# Patient Record
Sex: MALE | Race: OTHER | Employment: UNEMPLOYED | URBAN - METROPOLITAN AREA
[De-identification: names, ages, dates, MRNs, and addresses within clinical notes are randomized per-mention and may not be internally consistent; named-entity substitution may affect disease eponyms.]

---

## 2022-07-26 PROBLEM — D50.9 IRON DEFICIENCY ANEMIA: Status: ACTIVE | Noted: 2022-07-26

## 2022-07-26 PROBLEM — E11.9 TYPE 2 DIABETES MELLITUS WITHOUT COMPLICATION, WITHOUT LONG-TERM CURRENT USE OF INSULIN (HCC): Status: ACTIVE | Noted: 2022-07-26

## 2022-07-26 PROBLEM — I10 PRIMARY HYPERTENSION: Status: ACTIVE | Noted: 2022-07-26

## 2022-07-26 PROBLEM — F33.42 RECURRENT MAJOR DEPRESSIVE DISORDER, IN FULL REMISSION (HCC): Status: ACTIVE | Noted: 2022-07-26

## 2022-07-26 PROBLEM — K21.9 GASTROESOPHAGEAL REFLUX DISEASE WITHOUT ESOPHAGITIS: Status: ACTIVE | Noted: 2022-07-26

## 2022-07-26 PROBLEM — E78.5 HYPERLIPIDEMIA ASSOCIATED WITH TYPE 2 DIABETES MELLITUS (HCC): Status: ACTIVE | Noted: 2022-07-26

## 2022-07-26 PROBLEM — E11.69 HYPERLIPIDEMIA ASSOCIATED WITH TYPE 2 DIABETES MELLITUS: Status: ACTIVE | Noted: 2022-07-26

## 2022-07-26 PROBLEM — E78.5 HYPERLIPIDEMIA ASSOCIATED WITH TYPE 2 DIABETES MELLITUS: Status: ACTIVE | Noted: 2022-07-26

## 2022-07-26 PROBLEM — F33.42 RECURRENT MAJOR DEPRESSIVE DISORDER, IN FULL REMISSION: Status: ACTIVE | Noted: 2022-07-26

## 2022-07-26 PROBLEM — E78.5 HYPERLIPIDEMIA ASSOCIATED WITH TYPE 2 DIABETES MELLITUS  (HCC): Status: ACTIVE | Noted: 2022-07-26

## 2022-07-26 PROBLEM — E11.69 HYPERLIPIDEMIA ASSOCIATED WITH TYPE 2 DIABETES MELLITUS (HCC): Status: ACTIVE | Noted: 2022-07-26

## 2022-07-26 PROBLEM — E11.69 HYPERLIPIDEMIA ASSOCIATED WITH TYPE 2 DIABETES MELLITUS  (HCC): Status: ACTIVE | Noted: 2022-07-26

## 2022-07-26 NOTE — TELEPHONE ENCOUNTER
[facility] stated sertraline tablets are cheaper than capsules and wants to confirm if it was okay to switch.  If okay to switch to tablets, prescriptions would need to change since tablets do not come in 200mgs - they only come in 100mg    Confirmed [phone] as best contact for pharmacy

## 2022-07-26 NOTE — PATIENT INSTRUCTIONS
Please have the blood tests done    Please see me as soon as you get your insurance    Please see our blood specialist, hematologist as well      You may schedule an appointment by one of the following:    - [email or web address]  - [address] [address] at [phone]

## 2022-07-27 NOTE — TELEPHONE ENCOUNTER
Understood, but do not recommend it.  There is a medication called gabapentin that can help with cramps if he is interested    [employer] DO

## 2022-07-27 NOTE — TELEPHONE ENCOUNTER
Pt seen yesterday   He said we were gonna send QUININE SULFATE OR to the pharmacy   It was not sent.    Please advise     Tobi Manzo   [address], [place], [address] [place] [address]  [phone]

## 2022-07-27 NOTE — TELEPHONE ENCOUNTER
Spoke with patient-willing to try gabapentin for leg cramps.  Would like prescription called in to the following pharmacy:    [facility]   [address], [place], [address]  [phone]

## 2022-07-27 NOTE — TELEPHONE ENCOUNTER
Spoke with pt, pt would like to know why Suzan Iraheta does not recommend it.  He states he has been taking it for 15 years and it is  the only thing that cures his leg cramps

## 2022-08-03 ENCOUNTER — HOSPITAL ENCOUNTER (OUTPATIENT)
Age: 67
Discharge: ANOTHER HOSPITAL | End: 2022-08-03
Attending: EMERGENCY MEDICINE
Payer: MEDICAID

## 2022-08-03 ENCOUNTER — HOSPITAL ENCOUNTER (INPATIENT)
Facility: HOSPITAL | Age: 67
LOS: 1 days | Discharge: HOME | End: 2022-08-04
Attending: EMERGENCY MEDICINE
Payer: MEDICAID

## 2022-08-03 VITALS
TEMPERATURE: 98 F | RESPIRATION RATE: 14 BRPM | HEART RATE: 65 BPM | OXYGEN SATURATION: 97 % | DIASTOLIC BLOOD PRESSURE: 62 MMHG | SYSTOLIC BLOOD PRESSURE: 109 MMHG

## 2022-08-03 DIAGNOSIS — K92.2 GASTROINTESTINAL HEMORRHAGE, UNSPECIFIED GASTROINTESTINAL HEMORRHAGE TYPE: ICD-10-CM

## 2022-08-03 DIAGNOSIS — Z95.2 H/O HEART VALVE REPLACEMENT WITH MECHANICAL VALVE: Primary | ICD-10-CM

## 2022-08-03 DIAGNOSIS — Z79.01 CHRONIC ANTICOAGULATION: ICD-10-CM

## 2022-08-03 DIAGNOSIS — K62.5 RECTAL BLEEDING: Primary | ICD-10-CM

## 2022-08-03 DIAGNOSIS — R53.83 FATIGUE, UNSPECIFIED TYPE: ICD-10-CM

## 2022-08-03 DIAGNOSIS — R42 DIZZINESS: ICD-10-CM

## 2022-08-03 LAB
ALBUMIN SERPL-MCNC: 3.2 G/DL (ref 3.4–5)
ALBUMIN/GLOB SERPL: 0.8 {RATIO} (ref 1–2)
ALP LIVER SERPL-CCNC: 128 U/L
ALT SERPL-CCNC: 20 U/L
AMMONIA PLAS-MCNC: <10 UMOL/L (ref 11–32)
ANION GAP SERPL CALC-SCNC: 4 MMOL/L (ref 0–18)
ANTIBODY SCREEN: NEGATIVE
APTT PPP: 39.7 SECONDS (ref 23.3–35.6)
AST SERPL-CCNC: 19 U/L (ref 15–37)
BASOPHILS # BLD AUTO: 0.05 X10(3) UL (ref 0–0.2)
BASOPHILS NFR BLD AUTO: 0.6 %
BILIRUB SERPL-MCNC: 0.4 MG/DL (ref 0.1–2)
BUN BLD-MCNC: 16 MG/DL (ref 7–18)
CALCIUM BLD-MCNC: 9 MG/DL (ref 8.5–10.1)
CHLORIDE SERPL-SCNC: 104 MMOL/L (ref 98–112)
CO2 SERPL-SCNC: 27 MMOL/L (ref 21–32)
CREAT BLD-MCNC: 1.22 MG/DL
EOSINOPHIL # BLD AUTO: 0.17 X10(3) UL (ref 0–0.7)
EOSINOPHIL NFR BLD AUTO: 2.2 %
ERYTHROCYTE [DISTWIDTH] IN BLOOD BY AUTOMATED COUNT: 15.4 %
EST. AVERAGE GLUCOSE BLD GHB EST-MCNC: 148 MG/DL (ref 68–126)
GFR SERPLBLD BASED ON 1.73 SQ M-ARVRAT: 65 ML/MIN/1.73M2 (ref 60–?)
GLOBULIN PLAS-MCNC: 3.9 G/DL (ref 2.8–4.4)
GLUCOSE BLD-MCNC: 140 MG/DL (ref 70–99)
GLUCOSE BLD-MCNC: 199 MG/DL (ref 70–99)
HBA1C MFR BLD: 6.8 % (ref ?–5.7)
HCT VFR BLD AUTO: 26.2 %
HGB BLD-MCNC: 7.7 G/DL
IMM GRANULOCYTES # BLD AUTO: 0.03 X10(3) UL (ref 0–1)
IMM GRANULOCYTES NFR BLD: 0.4 %
INR BLD: 1.32 (ref 0.85–1.16)
IRON SATN MFR SERPL: 4 %
IRON SERPL-MCNC: 20 UG/DL
LIPASE SERPL-CCNC: 272 U/L (ref 73–393)
LYMPHOCYTES # BLD AUTO: 1.01 X10(3) UL (ref 1–4)
LYMPHOCYTES NFR BLD AUTO: 12.9 %
MCH RBC QN AUTO: 23.3 PG (ref 26–34)
MCHC RBC AUTO-ENTMCNC: 29.4 G/DL (ref 31–37)
MCV RBC AUTO: 79.2 FL
MONOCYTES # BLD AUTO: 0.47 X10(3) UL (ref 0.1–1)
MONOCYTES NFR BLD AUTO: 6 %
NEUTROPHILS # BLD AUTO: 6.08 X10 (3) UL (ref 1.5–7.7)
NEUTROPHILS # BLD AUTO: 6.08 X10(3) UL (ref 1.5–7.7)
NEUTROPHILS NFR BLD AUTO: 77.9 %
OSMOLALITY SERPL CALC.SUM OF ELEC: 283 MOSM/KG (ref 275–295)
PLATELET # BLD AUTO: 147 10(3)UL (ref 150–450)
POTASSIUM SERPL-SCNC: 4.3 MMOL/L (ref 3.5–5.1)
PROT SERPL-MCNC: 7.1 G/DL (ref 6.4–8.2)
PROTHROMBIN TIME: 16.4 SECONDS (ref 11.6–14.8)
RBC # BLD AUTO: 3.31 X10(6)UL
RH BLOOD TYPE: POSITIVE
SARS-COV-2 RNA RESP QL NAA+PROBE: NOT DETECTED
SODIUM SERPL-SCNC: 135 MMOL/L (ref 136–145)
TIBC SERPL-MCNC: 553 UG/DL (ref 240–450)
TRANSFERRIN SERPL-MCNC: 371 MG/DL (ref 200–360)
WBC # BLD AUTO: 7.8 X10(3) UL (ref 4–11)

## 2022-08-03 PROCEDURE — 99214 OFFICE O/P EST MOD 30 MIN: CPT

## 2022-08-03 PROCEDURE — 93010 ELECTROCARDIOGRAM REPORT: CPT

## 2022-08-03 PROCEDURE — 99223 1ST HOSP IP/OBS HIGH 75: CPT | Performed by: INTERNAL MEDICINE

## 2022-08-03 PROCEDURE — 3044F HG A1C LEVEL LT 7.0%: CPT | Performed by: INTERNAL MEDICINE

## 2022-08-03 PROCEDURE — 93005 ELECTROCARDIOGRAM TRACING: CPT

## 2022-08-03 RX ORDER — ACETAMINOPHEN 500 MG
500 TABLET ORAL EVERY 4 HOURS PRN
Status: DISCONTINUED | OUTPATIENT
Start: 2022-08-03 | End: 2022-08-04

## 2022-08-03 RX ORDER — BISACODYL 10 MG
10 SUPPOSITORY, RECTAL RECTAL
Status: DISCONTINUED | OUTPATIENT
Start: 2022-08-03 | End: 2022-08-04

## 2022-08-03 RX ORDER — RAMIPRIL 5 MG/1
10 CAPSULE ORAL DAILY
Status: DISCONTINUED | OUTPATIENT
Start: 2022-08-04 | End: 2022-08-04

## 2022-08-03 RX ORDER — SODIUM CHLORIDE, SODIUM LACTATE, POTASSIUM CHLORIDE, CALCIUM CHLORIDE 600; 310; 30; 20 MG/100ML; MG/100ML; MG/100ML; MG/100ML
INJECTION, SOLUTION INTRAVENOUS CONTINUOUS
Status: ACTIVE | OUTPATIENT
Start: 2022-08-03 | End: 2022-08-04

## 2022-08-03 RX ORDER — DEXTROSE MONOHYDRATE 25 G/50ML
50 INJECTION, SOLUTION INTRAVENOUS
Status: DISCONTINUED | OUTPATIENT
Start: 2022-08-03 | End: 2022-08-04

## 2022-08-03 RX ORDER — WARFARIN SODIUM 10 MG/1
5 TABLET ORAL EVERY MORNING
COMMUNITY
End: 2022-08-03 | Stop reason: CLARIF

## 2022-08-03 RX ORDER — PANTOPRAZOLE SODIUM 40 MG/1
40 TABLET, DELAYED RELEASE ORAL
Status: DISCONTINUED | OUTPATIENT
Start: 2022-08-04 | End: 2022-08-04

## 2022-08-03 RX ORDER — SERTRALINE HYDROCHLORIDE 100 MG/1
200 TABLET, FILM COATED ORAL DAILY
Status: DISCONTINUED | OUTPATIENT
Start: 2022-08-04 | End: 2022-08-04

## 2022-08-03 RX ORDER — ONDANSETRON 2 MG/ML
4 INJECTION INTRAMUSCULAR; INTRAVENOUS EVERY 6 HOURS PRN
Status: DISCONTINUED | OUTPATIENT
Start: 2022-08-03 | End: 2022-08-04

## 2022-08-03 RX ORDER — AMLODIPINE BESYLATE 5 MG/1
10 TABLET ORAL DAILY
Status: DISCONTINUED | OUTPATIENT
Start: 2022-08-04 | End: 2022-08-04

## 2022-08-03 RX ORDER — ATORVASTATIN CALCIUM 20 MG/1
20 TABLET, FILM COATED ORAL DAILY
Status: DISCONTINUED | OUTPATIENT
Start: 2022-08-04 | End: 2022-08-04

## 2022-08-03 RX ORDER — SODIUM PHOSPHATE, DIBASIC AND SODIUM PHOSPHATE, MONOBASIC 7; 19 G/133ML; G/133ML
1 ENEMA RECTAL ONCE AS NEEDED
Status: DISCONTINUED | OUTPATIENT
Start: 2022-08-03 | End: 2022-08-04

## 2022-08-03 RX ORDER — GABAPENTIN 300 MG/1
300 CAPSULE ORAL NIGHTLY
Status: DISCONTINUED | OUTPATIENT
Start: 2022-08-03 | End: 2022-08-04

## 2022-08-03 RX ORDER — NICOTINE POLACRILEX 4 MG
30 LOZENGE BUCCAL
Status: DISCONTINUED | OUTPATIENT
Start: 2022-08-03 | End: 2022-08-04

## 2022-08-03 RX ORDER — POLYETHYLENE GLYCOL 3350 17 G/17G
17 POWDER, FOR SOLUTION ORAL DAILY PRN
Status: DISCONTINUED | OUTPATIENT
Start: 2022-08-03 | End: 2022-08-04

## 2022-08-03 RX ORDER — SENNOSIDES 8.6 MG
17.2 TABLET ORAL NIGHTLY PRN
Status: DISCONTINUED | OUTPATIENT
Start: 2022-08-03 | End: 2022-08-04

## 2022-08-03 RX ORDER — NICOTINE POLACRILEX 4 MG
15 LOZENGE BUCCAL
Status: DISCONTINUED | OUTPATIENT
Start: 2022-08-03 | End: 2022-08-04

## 2022-08-03 RX ORDER — MELATONIN
3 NIGHTLY PRN
Status: DISCONTINUED | OUTPATIENT
Start: 2022-08-03 | End: 2022-08-04

## 2022-08-03 NOTE — ED INITIAL ASSESSMENT (HPI)
Dizziness started at 7am morning  With feeling confused. Per daughter pt appears pale to her. Pt is from [facility] visiting his daughter. Accompanied by feeling confused \" I feel like Im drunk\"    Left leg infection- started Sunday pt currently taking antibiotic which was prescribe in [facility] flucloxacillin  Which pt started Sunday  On day 4 of 7. Denies fever. Per pt he has an ongoing chronic leg infection.  Pt has h/o sepsis  Last year September in [facility] pt wa in the hospital for 2 weeks

## 2022-08-03 NOTE — ED INITIAL ASSESSMENT (HPI)
Pt states dizziness upon waking. Pt states he laid back down and slept for 3 hours. Pt states he \"feels drunk\". Pt state hx anemia. Pt states he is pale compared to his normal.  Pt states bright red blood with BM's.

## 2022-08-04 VITALS
HEART RATE: 81 BPM | HEIGHT: 70 IN | RESPIRATION RATE: 18 BRPM | TEMPERATURE: 98 F | OXYGEN SATURATION: 94 % | WEIGHT: 257.69 LBS | SYSTOLIC BLOOD PRESSURE: 135 MMHG | DIASTOLIC BLOOD PRESSURE: 62 MMHG | BODY MASS INDEX: 36.89 KG/M2

## 2022-08-04 LAB
ANION GAP SERPL CALC-SCNC: 4 MMOL/L (ref 0–18)
ATRIAL RATE: 60 BPM
ATRIAL RATE: 62 BPM
BASOPHILS # BLD AUTO: 0.03 X10(3) UL (ref 0–0.2)
BASOPHILS NFR BLD AUTO: 0.7 %
BUN BLD-MCNC: 14 MG/DL (ref 7–18)
CALCIUM BLD-MCNC: 8.7 MG/DL (ref 8.5–10.1)
CHLORIDE SERPL-SCNC: 106 MMOL/L (ref 98–112)
CO2 SERPL-SCNC: 28 MMOL/L (ref 21–32)
CREAT BLD-MCNC: 1.1 MG/DL
DEPRECATED HBV CORE AB SER IA-ACNC: 9.6 NG/ML
EOSINOPHIL # BLD AUTO: 0.14 X10(3) UL (ref 0–0.7)
EOSINOPHIL NFR BLD AUTO: 3.3 %
ERYTHROCYTE [DISTWIDTH] IN BLOOD BY AUTOMATED COUNT: 15.4 %
GFR SERPLBLD BASED ON 1.73 SQ M-ARVRAT: 74 ML/MIN/1.73M2 (ref 60–?)
GLUCOSE BLD-MCNC: 118 MG/DL (ref 70–99)
GLUCOSE BLD-MCNC: 119 MG/DL (ref 70–99)
GLUCOSE BLD-MCNC: 157 MG/DL (ref 70–99)
HCT VFR BLD AUTO: 25.9 %
HGB BLD-MCNC: 6.7 G/DL
HGB BLD-MCNC: 7.7 G/DL
IMM GRANULOCYTES # BLD AUTO: 0.02 X10(3) UL (ref 0–1)
IMM GRANULOCYTES NFR BLD: 0.5 %
INR BLD: 1.49 (ref 0.85–1.16)
LYMPHOCYTES # BLD AUTO: 0.84 X10(3) UL (ref 1–4)
LYMPHOCYTES NFR BLD AUTO: 19.8 %
MCH RBC QN AUTO: 23.8 PG (ref 26–34)
MCHC RBC AUTO-ENTMCNC: 29.7 G/DL (ref 31–37)
MCV RBC AUTO: 79.9 FL
MONOCYTES # BLD AUTO: 0.3 X10(3) UL (ref 0.1–1)
MONOCYTES NFR BLD AUTO: 7.1 %
NEUTROPHILS # BLD AUTO: 2.92 X10 (3) UL (ref 1.5–7.7)
NEUTROPHILS # BLD AUTO: 2.92 X10(3) UL (ref 1.5–7.7)
NEUTROPHILS NFR BLD AUTO: 68.6 %
OSMOLALITY SERPL CALC.SUM OF ELEC: 288 MOSM/KG (ref 275–295)
P AXIS: 108 DEGREES
P AXIS: 99 DEGREES
P-R INTERVAL: 252 MS
P-R INTERVAL: 286 MS
PLATELET # BLD AUTO: 98 10(3)UL (ref 150–450)
POTASSIUM SERPL-SCNC: 4 MMOL/L (ref 3.5–5.1)
PROTHROMBIN TIME: 18 SECONDS (ref 11.6–14.8)
Q-T INTERVAL: 438 MS
Q-T INTERVAL: 446 MS
QRS DURATION: 106 MS
QRS DURATION: 108 MS
QTC CALCULATION (BEZET): 444 MS
QTC CALCULATION (BEZET): 446 MS
R AXIS: -26 DEGREES
R AXIS: -31 DEGREES
RBC # BLD AUTO: 3.24 X10(6)UL
SODIUM SERPL-SCNC: 138 MMOL/L (ref 136–145)
T AXIS: 32 DEGREES
T AXIS: 39 DEGREES
VENTRICULAR RATE: 60 BPM
VENTRICULAR RATE: 62 BPM
WBC # BLD AUTO: 4.3 X10(3) UL (ref 4–11)

## 2022-08-04 PROCEDURE — 30233N1 TRANSFUSION OF NONAUTOLOGOUS RED BLOOD CELLS INTO PERIPHERAL VEIN, PERCUTANEOUS APPROACH: ICD-10-PCS | Performed by: STUDENT IN AN ORGANIZED HEALTH CARE EDUCATION/TRAINING PROGRAM

## 2022-08-04 PROCEDURE — 99239 HOSP IP/OBS DSCHRG MGMT >30: CPT | Performed by: HOSPITALIST

## 2022-08-04 RX ORDER — SODIUM CHLORIDE 9 MG/ML
INJECTION, SOLUTION INTRAVENOUS ONCE
Status: COMPLETED | OUTPATIENT
Start: 2022-08-04 | End: 2022-08-04

## 2022-08-04 NOTE — ED QUICK NOTES
Orders for admission, patient is aware of plan and ready to go upstairs. Any questions, please call ED RN Saw at extension [phone].      Patient Covid vaccination status: Unvaccinated     COVID Test Ordered in ED: Rapid SARS-CoV-2 by PCR    COVID Suspicion at Admission: Low clinical suspicion for COVID    Running Infusions:  None    Mental Status/LOC at time of transport: A&O x3    Other pertinent information:   CIWA score: N/A   NIH score:  N/A

## 2022-08-04 NOTE — PLAN OF CARE
Discharge instructions given to pt, understanding verbalized. Stressed the importance of follow -up next week with GI and Cardiology. Please verbalized understanding of importance for follow-up; Pt rec'd awake and alert. No distress or discomfort asseesed. NPO status for possible GI testing. IVF of LR infusiong as ordered. Pt up in chair, to BR with steady gait. Py's dtr at [facility] for visit , brought pt a sandwich despite NPO orders. GI APN  made aware. Pt refusing any GI testing  this time, new orders for doet noted. GI attending to see pt. Teleemetry shwoing kSR. Will contnue to monitor. Problem: Diabetes/Glucose Control  Goal: Glucose maintained within prescribed range  Description: INTERVENTIONS:  - Monitor Blood Glucose as ordered  - Assess for signs and symptoms of hyperglycemia and hypoglycemia  - Administer ordered medications to maintain glucose within target range  - Assess barriers to adequate nutritional intake and initiate nutrition consult as needed  - Instruct patient on self management of diabetes  Outcome: Progressing     Problem: Patient/Family Goals  Goal: Patient/Family Long Term Goal  Description: Patient's Long Term Goal: to go home  Interventions:  - monitor labs, VS,  telemetry monitoriong, GI work-up  - See additional Care Plan goals for specific interventions  Outcome: Progressing  Goal: Patient/Family Short Term Goal  Description: Patient's Short Term Goal: to feel better    Interventions:   - Moniotr labs, VS Telemetry, GI woerkup.   - See additional Care Plan goals for specific interventions  Outcome: Progressing     Problem: HEMATOLOLGIC  Goal: Maintain hematologic stability  Description: Interventions:  - Assess for signs and symptoms of bleeding or hemorrhage  - Monitor labs for bleeding or clotting disorders  - Administer blood products/factors as ordered  - Educate parent/family on condition and treatment plan  Outcome: Progressing     Problem: INFECTION  Goal: No evidence of infection  Description: Interventions:  - Monitor for symptoms of infection  - Monitor surgical sites and insertion sites for all indwelling lines, tubes and drains for drainage, redness or edema  - Monitor culture and CBC results  - Administer antibiotics as ordered; Monitor drug levels as ordered  - Provide breast milk as ordered  - Educate parent/family on condition and treatment plan  - Educate parent/family on good hand hygiene and isolation precautions  Outcome: Progressing

## 2022-08-04 NOTE — BH RN ADMISSION NOTE
NURSING ADMISSION NOTE      Patient admitted via Cart  Oriented to room. Safety precautions initiated. Bed in low position. Call light in reach. Patient is Aox4. NSR on tele. Pt on room air, denies shortness of breath. Pt denies any pain. Cellulitis to LLE, pt on atb at home. Abrasian to LUE with dried blood. No bloody stools overnight. Continent of B&B. Call light within reach.      Plan: Hgb 6.7-> 1unit RBC transfused overnight  Daily IV Venofer  NPO

## 2022-08-05 ENCOUNTER — TELEPHONE (OUTPATIENT)
Dept: INTERNAL MEDICINE CLINIC | Facility: CLINIC | Age: 67
End: 2022-08-05

## 2022-08-05 ENCOUNTER — PATIENT OUTREACH (OUTPATIENT)
Dept: CASE MANAGEMENT | Age: 67
End: 2022-08-05

## 2022-08-05 LAB
BLOOD TYPE BARCODE: 5100

## 2022-08-05 NOTE — TELEPHONE ENCOUNTER
Spoke to pt for condition update today. Pt does not have HFU appt scheduled at this time. Pt declined an appt when offered. A NON- TCM HFU appt recommended by 8/11/22 as pt is a high risk for readmission. Please advise. Clinical staff:  Please f/u with pt and try to get them to schedule as pt would greatly benefit from an HFU appt. Thank you!

## 2022-08-10 ENCOUNTER — PATIENT OUTREACH (OUTPATIENT)
Dept: CASE MANAGEMENT | Age: 67
End: 2022-08-10

## 2022-08-10 NOTE — PROGRESS NOTES
VM received; pt requesting assistance w/scheduling apt (dc 08/04)    Dr Marlow [address]  [phone]  Follow up 1 week    Dr Chely Mccartney  [address]  [phone]  Follow up 1 week

## 2022-08-24 ENCOUNTER — LAB ENCOUNTER (OUTPATIENT)
Dept: LAB | Age: 67
End: 2022-08-24
Attending: INTERNAL MEDICINE
Payer: MEDICAID

## 2022-08-24 ENCOUNTER — TELEPHONE (OUTPATIENT)
Dept: HEMATOLOGY/ONCOLOGY | Facility: HOSPITAL | Age: 67
End: 2022-08-24

## 2022-08-24 ENCOUNTER — OFFICE VISIT (OUTPATIENT)
Facility: LOCATION | Age: 67
End: 2022-08-24
Payer: MEDICAID

## 2022-08-24 ENCOUNTER — TELEPHONE (OUTPATIENT)
Dept: INTERNAL MEDICINE CLINIC | Facility: CLINIC | Age: 67
End: 2022-08-24

## 2022-08-24 ENCOUNTER — OFFICE VISIT (OUTPATIENT)
Dept: INTERNAL MEDICINE CLINIC | Facility: CLINIC | Age: 67
End: 2022-08-24
Payer: MEDICAID

## 2022-08-24 VITALS
DIASTOLIC BLOOD PRESSURE: 54 MMHG | HEIGHT: 68.75 IN | BODY MASS INDEX: 39.79 KG/M2 | TEMPERATURE: 99 F | WEIGHT: 268.63 LBS | OXYGEN SATURATION: 99 % | HEART RATE: 73 BPM | RESPIRATION RATE: 12 BRPM | SYSTOLIC BLOOD PRESSURE: 124 MMHG

## 2022-08-24 VITALS — TEMPERATURE: 97 F | HEART RATE: 67 BPM

## 2022-08-24 DIAGNOSIS — M25.552 BILATERAL HIP PAIN: ICD-10-CM

## 2022-08-24 DIAGNOSIS — K64.9 HEMORRHOIDS, UNSPECIFIED HEMORRHOID TYPE: Primary | ICD-10-CM

## 2022-08-24 DIAGNOSIS — E78.5 HYPERLIPIDEMIA ASSOCIATED WITH TYPE 2 DIABETES MELLITUS (HCC): ICD-10-CM

## 2022-08-24 DIAGNOSIS — E53.8 VITAMIN B12 DEFICIENCY: ICD-10-CM

## 2022-08-24 DIAGNOSIS — I10 PRIMARY HYPERTENSION: ICD-10-CM

## 2022-08-24 DIAGNOSIS — R60.0 BILATERAL LOWER EXTREMITY EDEMA: ICD-10-CM

## 2022-08-24 DIAGNOSIS — D69.6 THROMBOCYTOPENIA (HCC): ICD-10-CM

## 2022-08-24 DIAGNOSIS — M25.551 BILATERAL HIP PAIN: ICD-10-CM

## 2022-08-24 DIAGNOSIS — Z12.5 SCREENING FOR PROSTATE CANCER: ICD-10-CM

## 2022-08-24 DIAGNOSIS — D50.9 IRON DEFICIENCY ANEMIA, UNSPECIFIED IRON DEFICIENCY ANEMIA TYPE: ICD-10-CM

## 2022-08-24 DIAGNOSIS — F33.42 RECURRENT MAJOR DEPRESSIVE DISORDER, IN FULL REMISSION (HCC): ICD-10-CM

## 2022-08-24 DIAGNOSIS — Z95.2 H/O MECHANICAL AORTIC VALVE REPLACEMENT: ICD-10-CM

## 2022-08-24 DIAGNOSIS — E11.9 TYPE 2 DIABETES MELLITUS WITHOUT COMPLICATION, WITHOUT LONG-TERM CURRENT USE OF INSULIN (HCC): ICD-10-CM

## 2022-08-24 DIAGNOSIS — L98.9 SKIN LESION: ICD-10-CM

## 2022-08-24 DIAGNOSIS — Z95.2 H/O HEART VALVE REPLACEMENT WITH MECHANICAL VALVE: ICD-10-CM

## 2022-08-24 DIAGNOSIS — Z00.00 ROUTINE PHYSICAL EXAMINATION: Primary | ICD-10-CM

## 2022-08-24 DIAGNOSIS — D64.9 ANEMIA, UNSPECIFIED TYPE: ICD-10-CM

## 2022-08-24 DIAGNOSIS — K21.9 GASTROESOPHAGEAL REFLUX DISEASE WITHOUT ESOPHAGITIS: ICD-10-CM

## 2022-08-24 DIAGNOSIS — Z98.84 HISTORY OF GASTRIC BYPASS: ICD-10-CM

## 2022-08-24 DIAGNOSIS — Z00.00 BLOOD TESTS FOR ROUTINE GENERAL PHYSICAL EXAMINATION: ICD-10-CM

## 2022-08-24 DIAGNOSIS — D62 ACUTE BLOOD LOSS ANEMIA: ICD-10-CM

## 2022-08-24 DIAGNOSIS — E11.65 TYPE 2 DIABETES MELLITUS WITH HYPERGLYCEMIA, WITHOUT LONG-TERM CURRENT USE OF INSULIN (HCC): ICD-10-CM

## 2022-08-24 DIAGNOSIS — L20.9 ATOPIC DERMATITIS, UNSPECIFIED TYPE: ICD-10-CM

## 2022-08-24 DIAGNOSIS — E11.69 HYPERLIPIDEMIA ASSOCIATED WITH TYPE 2 DIABETES MELLITUS (HCC): ICD-10-CM

## 2022-08-24 PROBLEM — K92.2 GASTROINTESTINAL HEMORRHAGE, UNSPECIFIED GASTROINTESTINAL HEMORRHAGE TYPE: Status: RESOLVED | Noted: 2022-08-03 | Resolved: 2022-08-24

## 2022-08-24 PROBLEM — E66.01 MORBID (SEVERE) OBESITY DUE TO EXCESS CALORIES (HCC): Status: ACTIVE | Noted: 2022-08-24

## 2022-08-24 LAB
ALT SERPL-CCNC: 21 U/L
ANION GAP SERPL CALC-SCNC: 0 MMOL/L (ref 0–18)
AST SERPL-CCNC: 19 U/L (ref 15–37)
BASOPHILS # BLD AUTO: 0.03 X10(3) UL (ref 0–0.2)
BASOPHILS NFR BLD AUTO: 0.8 %
BUN BLD-MCNC: 20 MG/DL (ref 7–18)
CALCIUM BLD-MCNC: 8.9 MG/DL (ref 8.5–10.1)
CHLORIDE SERPL-SCNC: 108 MMOL/L (ref 98–112)
CHOLEST SERPL-MCNC: 110 MG/DL (ref ?–200)
CO2 SERPL-SCNC: 30 MMOL/L (ref 21–32)
CREAT BLD-MCNC: 0.85 MG/DL
CREAT UR-SCNC: 86.6 MG/DL
DEPRECATED HBV CORE AB SER IA-ACNC: 21.6 NG/ML
EOSINOPHIL # BLD AUTO: 0.11 X10(3) UL (ref 0–0.7)
EOSINOPHIL NFR BLD AUTO: 2.9 %
ERYTHROCYTE [DISTWIDTH] IN BLOOD BY AUTOMATED COUNT: 17.6 %
EST. AVERAGE GLUCOSE BLD GHB EST-MCNC: 128 MG/DL (ref 68–126)
FASTING PATIENT LIPID ANSWER: YES
FASTING STATUS PATIENT QL REPORTED: YES
GFR SERPLBLD BASED ON 1.73 SQ M-ARVRAT: 95 ML/MIN/1.73M2 (ref 60–?)
GLUCOSE BLD-MCNC: 112 MG/DL (ref 70–99)
HBA1C MFR BLD: 6.1 % (ref ?–5.7)
HCT VFR BLD AUTO: 27.7 %
HDLC SERPL-MCNC: 47 MG/DL (ref 40–59)
HGB BLD-MCNC: 7.9 G/DL
IMM GRANULOCYTES # BLD AUTO: 0.02 X10(3) UL (ref 0–1)
IMM GRANULOCYTES NFR BLD: 0.5 %
INR BLD: 1.1 (ref 0.85–1.16)
IRON SATN MFR SERPL: 6 %
IRON SERPL-MCNC: 33 UG/DL
LDLC SERPL CALC-MCNC: 45 MG/DL (ref ?–100)
LYMPHOCYTES # BLD AUTO: 0.72 X10(3) UL (ref 1–4)
LYMPHOCYTES NFR BLD AUTO: 19.3 %
MCH RBC QN AUTO: 24.1 PG (ref 26–34)
MCHC RBC AUTO-ENTMCNC: 28.5 G/DL (ref 31–37)
MCV RBC AUTO: 84.5 FL
MICROALBUMIN UR-MCNC: 1.39 MG/DL
MICROALBUMIN/CREAT 24H UR-RTO: 16.1 UG/MG (ref ?–30)
MONOCYTES # BLD AUTO: 0.32 X10(3) UL (ref 0.1–1)
MONOCYTES NFR BLD AUTO: 8.6 %
NEUTROPHILS # BLD AUTO: 2.53 X10 (3) UL (ref 1.5–7.7)
NEUTROPHILS # BLD AUTO: 2.53 X10(3) UL (ref 1.5–7.7)
NEUTROPHILS NFR BLD AUTO: 67.9 %
NONHDLC SERPL-MCNC: 63 MG/DL (ref ?–130)
OSMOLALITY SERPL CALC.SUM OF ELEC: 289 MOSM/KG (ref 275–295)
PLATELET # BLD AUTO: 67 10(3)UL (ref 150–450)
PLATELET MORPHOLOGY: NORMAL
POTASSIUM SERPL-SCNC: 4.5 MMOL/L (ref 3.5–5.1)
PROTHROMBIN TIME: 14.3 SECONDS (ref 11.6–14.8)
RBC # BLD AUTO: 3.28 X10(6)UL
SODIUM SERPL-SCNC: 138 MMOL/L (ref 136–145)
TIBC SERPL-MCNC: 510 UG/DL (ref 240–450)
TRANSFERRIN SERPL-MCNC: 342 MG/DL (ref 200–360)
TRIGL SERPL-MCNC: 91 MG/DL (ref 30–149)
TSI SER-ACNC: 2.31 MIU/ML (ref 0.36–3.74)
VLDLC SERPL CALC-MCNC: 13 MG/DL (ref 0–30)
WBC # BLD AUTO: 3.7 X10(3) UL (ref 4–11)

## 2022-08-24 PROCEDURE — 83036 HEMOGLOBIN GLYCOSYLATED A1C: CPT

## 2022-08-24 PROCEDURE — 80048 BASIC METABOLIC PNL TOTAL CA: CPT

## 2022-08-24 PROCEDURE — 82043 UR ALBUMIN QUANTITATIVE: CPT

## 2022-08-24 PROCEDURE — 82607 VITAMIN B-12: CPT

## 2022-08-24 PROCEDURE — 1111F DSCHRG MED/CURRENT MED MERGE: CPT | Performed by: INTERNAL MEDICINE

## 2022-08-24 PROCEDURE — 82570 ASSAY OF URINE CREATININE: CPT

## 2022-08-24 PROCEDURE — 3074F SYST BP LT 130 MM HG: CPT | Performed by: INTERNAL MEDICINE

## 2022-08-24 PROCEDURE — 83921 ORGANIC ACID SINGLE QUANT: CPT

## 2022-08-24 PROCEDURE — 3078F DIAST BP <80 MM HG: CPT | Performed by: INTERNAL MEDICINE

## 2022-08-24 PROCEDURE — 84443 ASSAY THYROID STIM HORMONE: CPT

## 2022-08-24 PROCEDURE — 36415 COLL VENOUS BLD VENIPUNCTURE: CPT

## 2022-08-24 PROCEDURE — 83550 IRON BINDING TEST: CPT

## 2022-08-24 PROCEDURE — 84450 TRANSFERASE (AST) (SGOT): CPT

## 2022-08-24 PROCEDURE — 83540 ASSAY OF IRON: CPT

## 2022-08-24 PROCEDURE — 85610 PROTHROMBIN TIME: CPT

## 2022-08-24 PROCEDURE — 80061 LIPID PANEL: CPT

## 2022-08-24 PROCEDURE — 3008F BODY MASS INDEX DOCD: CPT | Performed by: INTERNAL MEDICINE

## 2022-08-24 PROCEDURE — 99397 PER PM REEVAL EST PAT 65+ YR: CPT | Performed by: INTERNAL MEDICINE

## 2022-08-24 PROCEDURE — 82728 ASSAY OF FERRITIN: CPT

## 2022-08-24 PROCEDURE — 85025 COMPLETE CBC W/AUTO DIFF WBC: CPT

## 2022-08-24 PROCEDURE — 84460 ALANINE AMINO (ALT) (SGPT): CPT

## 2022-08-24 RX ORDER — CETIRIZINE HYDROCHLORIDE 10 MG/1
10 TABLET ORAL DAILY
Qty: 90 TABLET | Refills: 0 | Status: SHIPPED | OUTPATIENT
Start: 2022-08-24

## 2022-08-24 RX ORDER — TRIAMCINOLONE ACETONIDE 1 MG/G
CREAM TOPICAL
Qty: 80 G | Refills: 1 | Status: SHIPPED | OUTPATIENT
Start: 2022-08-24

## 2022-08-24 RX ORDER — HYDROCHLOROTHIAZIDE 12.5 MG/1
12.5 TABLET ORAL DAILY
Qty: 90 TABLET | Refills: 1 | Status: SHIPPED | OUTPATIENT
Start: 2022-08-24 | End: 2023-08-19

## 2022-08-24 NOTE — TELEPHONE ENCOUNTER
MANUEL    Spoke with Ramona Alatorre at [facility] [employer] [facility] [phone]. States no GI appts available until end of September. Also stated PCP could order labs for outpatient follow up. Called office of Dr. Wendy Roman [phone], spoke Denia who provided registration # [phone] to inquire about insurance. Called but was unable to speak to anyone-waited on hold for 10 minutes. Called  general surgery [phone], spoke with Braxton Gonzales and advised her of patients condition. Braxton Gonzales was able to schedule patient for today at 2:45 with Dr. Kuldip Stoddard. Patient informed. Called hematology at [phone], spoke with [facility]. Relayed patients condition. States Dr. Ping Blackwood has a  that will call patient to schedule OV. States they usually try to schedule within 7-10 days.

## 2022-08-24 NOTE — PATIENT INSTRUCTIONS
Stop the amlodipine. Start hydrochlorothiazide to help with the swelling in the legs. If no improvement please schedule an ultrasound of the legs    We will call to help schedule an appointment with a hematologist (blood specialist for infusions) and GI doctor    I will let you know about your blood test results    I have started you on a cream to be applied twice a day for 2 weeks and use the pill called zyrtec for itching.  If no improvement see our dermatologist     I have ordered x rays for you

## 2022-08-25 ENCOUNTER — NURSE ONLY (OUTPATIENT)
Dept: INTERNAL MEDICINE CLINIC | Facility: CLINIC | Age: 67
End: 2022-08-25
Payer: MEDICAID

## 2022-08-25 ENCOUNTER — HOSPITAL ENCOUNTER (EMERGENCY)
Facility: HOSPITAL | Age: 67
Discharge: HOME | End: 2022-08-25
Attending: EMERGENCY MEDICINE
Payer: MEDICAID

## 2022-08-25 ENCOUNTER — HOSPITAL ENCOUNTER (OUTPATIENT)
Dept: GENERAL RADIOLOGY | Age: 67
Discharge: HOME | End: 2022-08-25
Attending: INTERNAL MEDICINE
Payer: MEDICAID

## 2022-08-25 ENCOUNTER — PATIENT MESSAGE (OUTPATIENT)
Dept: INTERNAL MEDICINE CLINIC | Facility: CLINIC | Age: 67
End: 2022-08-25

## 2022-08-25 VITALS
HEART RATE: 64 BPM | RESPIRATION RATE: 16 BRPM | OXYGEN SATURATION: 97 % | TEMPERATURE: 97 F | DIASTOLIC BLOOD PRESSURE: 78 MMHG | BODY MASS INDEX: 38.51 KG/M2 | WEIGHT: 260 LBS | SYSTOLIC BLOOD PRESSURE: 156 MMHG | HEIGHT: 69 IN

## 2022-08-25 DIAGNOSIS — E53.8 VITAMIN B12 DEFICIENCY: Primary | ICD-10-CM

## 2022-08-25 DIAGNOSIS — R79.1 SUBTHERAPEUTIC INTERNATIONAL NORMALIZED RATIO (INR): ICD-10-CM

## 2022-08-25 DIAGNOSIS — D69.6 THROMBOCYTOPENIA (HCC): Primary | ICD-10-CM

## 2022-08-25 DIAGNOSIS — M25.551 BILATERAL HIP PAIN: ICD-10-CM

## 2022-08-25 DIAGNOSIS — D64.9 CHRONIC ANEMIA: ICD-10-CM

## 2022-08-25 DIAGNOSIS — M25.552 BILATERAL HIP PAIN: ICD-10-CM

## 2022-08-25 LAB
ALBUMIN SERPL-MCNC: 3.1 G/DL (ref 3.4–5)
ALBUMIN/GLOB SERPL: 0.8 {RATIO} (ref 1–2)
ALP LIVER SERPL-CCNC: 115 U/L
ALT SERPL-CCNC: 25 U/L
ANION GAP SERPL CALC-SCNC: 0 MMOL/L (ref 0–18)
AST SERPL-CCNC: 18 U/L (ref 15–37)
BASOPHILS # BLD AUTO: 0.04 X10(3) UL (ref 0–0.2)
BASOPHILS NFR BLD AUTO: 1.1 %
BILIRUB SERPL-MCNC: 0.4 MG/DL (ref 0.1–2)
BUN BLD-MCNC: 21 MG/DL (ref 7–18)
CALCIUM BLD-MCNC: 8.7 MG/DL (ref 8.5–10.1)
CHLORIDE SERPL-SCNC: 113 MMOL/L (ref 98–112)
CO2 SERPL-SCNC: 28 MMOL/L (ref 21–32)
CREAT BLD-MCNC: 1.03 MG/DL
EOSINOPHIL # BLD AUTO: 0.14 X10(3) UL (ref 0–0.7)
EOSINOPHIL NFR BLD AUTO: 3.8 %
ERYTHROCYTE [DISTWIDTH] IN BLOOD BY AUTOMATED COUNT: 17.4 %
GFR SERPLBLD BASED ON 1.73 SQ M-ARVRAT: 80 ML/MIN/1.73M2 (ref 60–?)
GLOBULIN PLAS-MCNC: 3.8 G/DL (ref 2.8–4.4)
GLUCOSE BLD-MCNC: 119 MG/DL (ref 70–99)
HCT VFR BLD AUTO: 27.9 %
HGB BLD-MCNC: 8.2 G/DL
IMM GRANULOCYTES # BLD AUTO: 0.01 X10(3) UL (ref 0–1)
IMM GRANULOCYTES NFR BLD: 0.3 %
INR BLD: 1.09 (ref 0.85–1.16)
IRON SATN MFR SERPL: 5 %
IRON SERPL-MCNC: 27 UG/DL
LYMPHOCYTES # BLD AUTO: 0.73 X10(3) UL (ref 1–4)
LYMPHOCYTES NFR BLD AUTO: 19.7 %
MCH RBC QN AUTO: 24.6 PG (ref 26–34)
MCHC RBC AUTO-ENTMCNC: 29.4 G/DL (ref 31–37)
MCV RBC AUTO: 83.5 FL
MONOCYTES # BLD AUTO: 0.29 X10(3) UL (ref 0.1–1)
MONOCYTES NFR BLD AUTO: 7.8 %
NEUTROPHILS # BLD AUTO: 2.5 X10 (3) UL (ref 1.5–7.7)
NEUTROPHILS # BLD AUTO: 2.5 X10(3) UL (ref 1.5–7.7)
NEUTROPHILS NFR BLD AUTO: 67.3 %
NT-PROBNP SERPL-MCNC: 486 PG/ML (ref ?–125)
OSMOLALITY SERPL CALC.SUM OF ELEC: 296 MOSM/KG (ref 275–295)
PLATELET # BLD AUTO: 106 10(3)UL (ref 150–450)
POTASSIUM SERPL-SCNC: 4.5 MMOL/L (ref 3.5–5.1)
PROT SERPL-MCNC: 6.9 G/DL (ref 6.4–8.2)
PROTHROMBIN TIME: 14.1 SECONDS (ref 11.6–14.8)
RBC # BLD AUTO: 3.34 X10(6)UL
SODIUM SERPL-SCNC: 141 MMOL/L (ref 136–145)
TIBC SERPL-MCNC: 532 UG/DL (ref 240–450)
TRANSFERRIN SERPL-MCNC: 357 MG/DL (ref 200–360)
TROPONIN I HIGH SENSITIVITY: 11 NG/L
VIT B12 SERPL-MCNC: 181 PG/ML (ref 193–986)
WBC # BLD AUTO: 3.7 X10(3) UL (ref 4–11)

## 2022-08-25 PROCEDURE — 73523 X-RAY EXAM HIPS BI 5/> VIEWS: CPT | Performed by: INTERNAL MEDICINE

## 2022-08-25 PROCEDURE — 80053 COMPREHEN METABOLIC PANEL: CPT | Performed by: EMERGENCY MEDICINE

## 2022-08-25 PROCEDURE — 96372 THER/PROPH/DIAG INJ SC/IM: CPT | Performed by: INTERNAL MEDICINE

## 2022-08-25 PROCEDURE — 83550 IRON BINDING TEST: CPT | Performed by: EMERGENCY MEDICINE

## 2022-08-25 PROCEDURE — 86850 RBC ANTIBODY SCREEN: CPT | Performed by: EMERGENCY MEDICINE

## 2022-08-25 PROCEDURE — 86900 BLOOD TYPING SEROLOGIC ABO: CPT | Performed by: EMERGENCY MEDICINE

## 2022-08-25 PROCEDURE — 83880 ASSAY OF NATRIURETIC PEPTIDE: CPT | Performed by: EMERGENCY MEDICINE

## 2022-08-25 PROCEDURE — 99283 EMERGENCY DEPT VISIT LOW MDM: CPT

## 2022-08-25 PROCEDURE — 84484 ASSAY OF TROPONIN QUANT: CPT | Performed by: EMERGENCY MEDICINE

## 2022-08-25 PROCEDURE — 86901 BLOOD TYPING SEROLOGIC RH(D): CPT | Performed by: EMERGENCY MEDICINE

## 2022-08-25 PROCEDURE — 85025 COMPLETE CBC W/AUTO DIFF WBC: CPT | Performed by: EMERGENCY MEDICINE

## 2022-08-25 PROCEDURE — 96372 THER/PROPH/DIAG INJ SC/IM: CPT

## 2022-08-25 PROCEDURE — 85610 PROTHROMBIN TIME: CPT | Performed by: EMERGENCY MEDICINE

## 2022-08-25 PROCEDURE — 1111F DSCHRG MED/CURRENT MED MERGE: CPT | Performed by: INTERNAL MEDICINE

## 2022-08-25 PROCEDURE — 36415 COLL VENOUS BLD VENIPUNCTURE: CPT

## 2022-08-25 PROCEDURE — 83540 ASSAY OF IRON: CPT | Performed by: EMERGENCY MEDICINE

## 2022-08-25 RX ORDER — CYANOCOBALAMIN 1000 UG/ML
1000 INJECTION INTRAMUSCULAR; SUBCUTANEOUS ONCE
Status: COMPLETED | OUTPATIENT
Start: 2022-08-25 | End: 2022-08-25

## 2022-08-25 RX ORDER — ENOXAPARIN SODIUM 150 MG/ML
1 INJECTION SUBCUTANEOUS ONCE
Status: COMPLETED | OUTPATIENT
Start: 2022-08-25 | End: 2022-08-25

## 2022-08-25 RX ADMIN — CYANOCOBALAMIN 1000 MCG: 1000 INJECTION INTRAMUSCULAR; SUBCUTANEOUS at 16:07:00

## 2022-08-25 NOTE — TELEPHONE ENCOUNTER
Pt is requesting to speak with Dr Lee Couch regarding his lab results from 8/24. I tried to confirm with pt that he already spoke with [facility] RN. Pt stated he was not sure and thought his daughter spoke to her instead. Pt also sent [employer] message inquiring on what he should do next regarding his results.      Confirmed [phone]

## 2022-08-25 NOTE — TELEPHONE ENCOUNTER
Regarding: Blood test results  ----- Message from [facility], EDERRUDOLPH sent at 8/25/2022 12:33 PM CDT -----       ----- Message from Nathen David to Mariano Borrego DO sent at 8/25/2022 12:29 PM -----   Hi Dr. José Hutton,   I have had a look at my results from last night and I see that my platelet count is low and my iron. I'm not sure who I need to call to schedule getting blood or iron and how urgently I need to get blood.   Thanks  Jarad Calderon

## 2022-08-26 LAB
ANTIBODY SCREEN: NEGATIVE
RH BLOOD TYPE: POSITIVE

## 2022-08-26 NOTE — ED INITIAL ASSESSMENT (HPI)
Sent to ED from PCP for low PLT count. Pt with increased weakness, REILLY and intermittent confusion.

## 2022-08-29 ENCOUNTER — OFFICE VISIT (OUTPATIENT)
Dept: HEMATOLOGY/ONCOLOGY | Facility: HOSPITAL | Age: 67
End: 2022-08-29
Attending: INTERNAL MEDICINE
Payer: MEDICAID

## 2022-08-29 VITALS
BODY MASS INDEX: 38.51 KG/M2 | HEART RATE: 63 BPM | RESPIRATION RATE: 16 BRPM | OXYGEN SATURATION: 99 % | TEMPERATURE: 98 F | DIASTOLIC BLOOD PRESSURE: 69 MMHG | HEIGHT: 70 IN | WEIGHT: 269 LBS | SYSTOLIC BLOOD PRESSURE: 109 MMHG

## 2022-08-29 DIAGNOSIS — M89.9 BONE LESION: Primary | ICD-10-CM

## 2022-08-29 DIAGNOSIS — D64.9 ANEMIA, UNSPECIFIED TYPE: ICD-10-CM

## 2022-08-29 LAB
COMPLEXED PSA SERPL-MCNC: 0.16 NG/ML (ref ?–4)
FOLATE SERPL-MCNC: 6.2 NG/ML (ref 8.7–?)
IGA SERPL-MCNC: 271 MG/DL (ref 70–312)
IGM SERPL-MCNC: 232 MG/DL (ref 43–279)
IMMUNOGLOBULIN PNL SER-MCNC: 1010 MG/DL (ref 791–1643)
MMA: 1.4 UMOL/L

## 2022-08-29 PROCEDURE — 99245 OFF/OP CONSLTJ NEW/EST HI 55: CPT | Performed by: INTERNAL MEDICINE

## 2022-08-29 RX ORDER — CYANOCOBALAMIN 1000 UG/ML
INJECTION INTRAMUSCULAR; SUBCUTANEOUS
Qty: 30 ML | Refills: 0 | Status: SHIPPED | OUTPATIENT
Start: 2022-08-29

## 2022-08-29 RX ORDER — SYRINGE W-NEEDLE,DISPOSAB,3 ML 25GX5/8"
SYRINGE, EMPTY DISPOSABLE MISCELLANEOUS
Qty: 50 EACH | Refills: 0 | Status: SHIPPED | OUTPATIENT
Start: 2022-08-29

## 2022-08-29 NOTE — PROGRESS NOTES
New consult for anemia and lesion on his hip noted on xray. Pt complains of right hip pain worse than left. No recent weight loss. Denies night sweats. Pt states he has had diarrhea for 20 years. Pt states he has diarrhea a couple times a day. Last colonoscopy about 3 years in [facility]. He is a vegan. He had gastric bypass surgery about 15 years ago in [facility]. He complains of occasional shortness of breath, dizziness and fatigue.       Outpatient Oncology Care Plan  Problem list:  knowledge deficit    Problems related to:    disease/disease progression    Interventions:  provided general teaching    Expected outcomes:  understands plan of care    Progress towards outcome:  making progress    Education Record    Learner:  Patient and Family Member  Barriers / Limitations:  None  Method:  Brief focused  Outcome:  Shows understanding  Comments:

## 2022-08-30 LAB
KAPPA LC FREE SER-MCNC: 7.88 MG/DL (ref 0.33–1.94)
KAPPA LC FREE/LAMBDA FREE SER NEPH: 1.55 {RATIO} (ref 0.26–1.65)
LAMBDA LC FREE SERPL-MCNC: 5.08 MG/DL (ref 0.57–2.63)

## 2022-08-30 NOTE — PROGRESS NOTES
Mel Victoria, you have a folic acid deficiency. Please start taking 1mg folic acid once a day (this can be purchased over the counter at any pharmacy).

## 2022-09-02 ENCOUNTER — OFFICE VISIT (OUTPATIENT)
Dept: HEMATOLOGY/ONCOLOGY | Facility: HOSPITAL | Age: 67
End: 2022-09-02
Attending: INTERNAL MEDICINE
Payer: MEDICAID

## 2022-09-02 VITALS
BODY MASS INDEX: 38.99 KG/M2 | HEART RATE: 56 BPM | RESPIRATION RATE: 18 BRPM | TEMPERATURE: 98 F | SYSTOLIC BLOOD PRESSURE: 114 MMHG | HEIGHT: 70 IN | OXYGEN SATURATION: 95 % | DIASTOLIC BLOOD PRESSURE: 66 MMHG | WEIGHT: 272.38 LBS

## 2022-09-02 DIAGNOSIS — D50.9 IRON DEFICIENCY ANEMIA, UNSPECIFIED IRON DEFICIENCY ANEMIA TYPE: Primary | ICD-10-CM

## 2022-09-02 PROCEDURE — 96376 TX/PRO/DX INJ SAME DRUG ADON: CPT

## 2022-09-02 PROCEDURE — 96365 THER/PROPH/DIAG IV INF INIT: CPT

## 2022-09-02 NOTE — PROGRESS NOTES
Education Record    Learner:  Patient    Disease / Diagnosis:Iron deficiency anemia, unspecified iron deficiency anemia type     Barriers / Limitations:  None   Comments:    Method:  Brief focused   Comments:    General Topics:  Infection, Medication, Pain, Precautions, Procedure, Side effects and symptom management, Plan of care reviewed and Fall risk and prevention   Comments:    Outcome:  Shows understanding   Comments: Tolerated well. Given AVS. Patient observed for 30 minutes .  VSS

## 2022-09-06 LAB
ALBUMIN SERPL ELPH-MCNC: 3.75 G/DL (ref 3.75–5.21)
ALBUMIN/GLOB SERPL: 1.27 {RATIO} (ref 1–2)
ALPHA1 GLOB SERPL ELPH-MCNC: 0.29 G/DL (ref 0.19–0.46)
ALPHA2 GLOB SERPL ELPH-MCNC: 0.61 G/DL (ref 0.48–1.05)
B-GLOBULIN SERPL ELPH-MCNC: 0.86 G/DL (ref 0.68–1.23)
GAMMA GLOB SERPL ELPH-MCNC: 1.2 G/DL (ref 0.62–1.7)
PROT SERPL-MCNC: 6.7 G/DL (ref 6.4–8.2)

## 2022-09-08 ENCOUNTER — HOSPITAL ENCOUNTER (OUTPATIENT)
Dept: CT IMAGING | Age: 67
Discharge: HOME | End: 2022-09-08
Attending: INTERNAL MEDICINE
Payer: MEDICAID

## 2022-09-08 ENCOUNTER — TELEPHONE (OUTPATIENT)
Dept: HEMATOLOGY/ONCOLOGY | Facility: HOSPITAL | Age: 67
End: 2022-09-08

## 2022-09-08 DIAGNOSIS — M89.9 BONE LESION: ICD-10-CM

## 2022-09-08 PROCEDURE — 74177 CT ABD & PELVIS W/CONTRAST: CPT | Performed by: INTERNAL MEDICINE

## 2022-09-08 NOTE — TELEPHONE ENCOUNTER
Discussed normal CT results but CT did not extend to femur. Need MRI femur for further evaluation. Discussed with daughter Evelin Camarillo.

## 2022-09-08 NOTE — TELEPHONE ENCOUNTER
Contacted pt daughter in regard to MRI scheduled for next Friday. appts will be viewable in [facility]. Verbalized understanding, and will call with any questions.

## 2022-09-10 ENCOUNTER — HOSPITAL ENCOUNTER (OUTPATIENT)
Dept: ULTRASOUND IMAGING | Age: 67
Discharge: HOME | End: 2022-09-10
Attending: INTERNAL MEDICINE
Payer: MEDICAID

## 2022-09-10 DIAGNOSIS — R60.0 BILATERAL LOWER EXTREMITY EDEMA: ICD-10-CM

## 2022-09-10 PROCEDURE — 93970 EXTREMITY STUDY: CPT | Performed by: INTERNAL MEDICINE

## 2022-09-14 ENCOUNTER — TELEPHONE (OUTPATIENT)
Dept: INTERNAL MEDICINE CLINIC | Facility: CLINIC | Age: 67
End: 2022-09-14

## 2022-09-14 NOTE — TELEPHONE ENCOUNTER
Pauline Landaverde with [facility] stating pt has a surgery scheduled for Monday 9/19, pt has not been cleared for surgery by Shin Liang and it is required for this procedure to to take place. Pauline Landaverde would like to know if Shin Scale is able to addend her notes to clear pt.  She is requesting we fax all OV notes clearing pt for surgery to : [phone]

## 2022-09-16 ENCOUNTER — HOSPITAL ENCOUNTER (OUTPATIENT)
Dept: MRI IMAGING | Facility: HOSPITAL | Age: 67
Discharge: HOME | End: 2022-09-16
Attending: INTERNAL MEDICINE
Payer: MEDICAID

## 2022-09-16 ENCOUNTER — ANESTHESIA EVENT (OUTPATIENT)
Dept: SURGERY | Facility: HOSPITAL | Age: 67
End: 2022-09-16
Payer: MEDICAID

## 2022-09-16 DIAGNOSIS — M89.9 LESION OF RIGHT FEMUR: ICD-10-CM

## 2022-09-16 PROCEDURE — 73720 MRI LWR EXTREMITY W/O&W/DYE: CPT | Performed by: INTERNAL MEDICINE

## 2022-09-16 PROCEDURE — A9575 INJ GADOTERATE MEGLUMI 0.1ML: HCPCS | Performed by: INTERNAL MEDICINE

## 2022-09-17 ENCOUNTER — LAB ENCOUNTER (OUTPATIENT)
Dept: LAB | Age: 67
End: 2022-09-17
Attending: SURGERY

## 2022-09-17 DIAGNOSIS — Z20.822 ENCOUNTER FOR PREOPERATIVE SCREENING LABORATORY TESTING FOR COVID-19 VIRUS: ICD-10-CM

## 2022-09-17 DIAGNOSIS — Z01.812 ENCOUNTER FOR PREOPERATIVE SCREENING LABORATORY TESTING FOR COVID-19 VIRUS: ICD-10-CM

## 2022-09-18 LAB — SARS-COV-2 RNA RESP QL NAA+PROBE: NOT DETECTED

## 2022-09-19 ENCOUNTER — HOSPITAL ENCOUNTER (OUTPATIENT)
Facility: HOSPITAL | Age: 67
Setting detail: HOSPITAL OUTPATIENT SURGERY
Discharge: HOME | End: 2022-09-19
Attending: SURGERY | Admitting: SURGERY

## 2022-09-19 ENCOUNTER — ANESTHESIA (OUTPATIENT)
Dept: SURGERY | Facility: HOSPITAL | Age: 67
End: 2022-09-19
Payer: MEDICAID

## 2022-09-19 VITALS
DIASTOLIC BLOOD PRESSURE: 82 MMHG | SYSTOLIC BLOOD PRESSURE: 126 MMHG | RESPIRATION RATE: 16 BRPM | WEIGHT: 262.81 LBS | OXYGEN SATURATION: 100 % | HEART RATE: 72 BPM | BODY MASS INDEX: 37.62 KG/M2 | TEMPERATURE: 97 F | HEIGHT: 70 IN

## 2022-09-19 DIAGNOSIS — K64.9 HEMORRHOIDS, UNSPECIFIED HEMORRHOID TYPE: ICD-10-CM

## 2022-09-19 DIAGNOSIS — Z20.822 ENCOUNTER FOR PREOPERATIVE SCREENING LABORATORY TESTING FOR COVID-19 VIRUS: Primary | ICD-10-CM

## 2022-09-19 DIAGNOSIS — Z01.812 ENCOUNTER FOR PREOPERATIVE SCREENING LABORATORY TESTING FOR COVID-19 VIRUS: Primary | ICD-10-CM

## 2022-09-19 LAB
GLUCOSE BLD-MCNC: 108 MG/DL (ref 70–99)
GLUCOSE BLD-MCNC: 123 MG/DL (ref 70–99)
INR BLD: 1.33 (ref 0.85–1.16)
PROTHROMBIN TIME: 16.4 SECONDS (ref 11.6–14.8)

## 2022-09-19 PROCEDURE — 06LY7CC OCCLUSION OF HEMORRHOIDAL PLEXUS WITH EXTRALUMINAL DEVICE, VIA NATURAL OR ARTIFICIAL OPENING: ICD-10-PCS | Performed by: SURGERY

## 2022-09-19 PROCEDURE — 82962 GLUCOSE BLOOD TEST: CPT

## 2022-09-19 PROCEDURE — 85610 PROTHROMBIN TIME: CPT

## 2022-09-19 RX ORDER — NICOTINE POLACRILEX 4 MG
15 LOZENGE BUCCAL
Status: DISCONTINUED | OUTPATIENT
Start: 2022-09-19 | End: 2022-09-19 | Stop reason: HOSPADM

## 2022-09-19 RX ORDER — NALOXONE HYDROCHLORIDE 0.4 MG/ML
80 INJECTION, SOLUTION INTRAMUSCULAR; INTRAVENOUS; SUBCUTANEOUS AS NEEDED
Status: DISCONTINUED | OUTPATIENT
Start: 2022-09-19 | End: 2022-09-19

## 2022-09-19 RX ORDER — 0.9 % SODIUM CHLORIDE 0.9 %
INTRAVENOUS SOLUTION INTRAVENOUS
Status: DISCONTINUED
Start: 2022-09-19 | End: 2022-09-19 | Stop reason: WASHOUT

## 2022-09-19 RX ORDER — EPHEDRINE SULFATE 50 MG/ML
INJECTION INTRAVENOUS AS NEEDED
Status: DISCONTINUED | OUTPATIENT
Start: 2022-09-19 | End: 2022-09-19 | Stop reason: SURG

## 2022-09-19 RX ORDER — LIDOCAINE HYDROCHLORIDE 40 MG/ML
INJECTION, SOLUTION RETROBULBAR; TOPICAL AS NEEDED
Status: DISCONTINUED | OUTPATIENT
Start: 2022-09-19 | End: 2022-09-19 | Stop reason: SURG

## 2022-09-19 RX ORDER — HYDROMORPHONE HYDROCHLORIDE 1 MG/ML
0.2 INJECTION, SOLUTION INTRAMUSCULAR; INTRAVENOUS; SUBCUTANEOUS EVERY 5 MIN PRN
Status: DISCONTINUED | OUTPATIENT
Start: 2022-09-19 | End: 2022-09-19

## 2022-09-19 RX ORDER — ONDANSETRON 2 MG/ML
4 INJECTION INTRAMUSCULAR; INTRAVENOUS EVERY 6 HOURS PRN
Status: DISCONTINUED | OUTPATIENT
Start: 2022-09-19 | End: 2022-09-19

## 2022-09-19 RX ORDER — MIDAZOLAM HYDROCHLORIDE 1 MG/ML
1 INJECTION INTRAMUSCULAR; INTRAVENOUS EVERY 5 MIN PRN
Status: DISCONTINUED | OUTPATIENT
Start: 2022-09-19 | End: 2022-09-19

## 2022-09-19 RX ORDER — NICOTINE POLACRILEX 4 MG
30 LOZENGE BUCCAL
Status: DISCONTINUED | OUTPATIENT
Start: 2022-09-19 | End: 2022-09-19 | Stop reason: HOSPADM

## 2022-09-19 RX ORDER — METOCLOPRAMIDE HYDROCHLORIDE 5 MG/ML
10 INJECTION INTRAMUSCULAR; INTRAVENOUS EVERY 8 HOURS PRN
Status: DISCONTINUED | OUTPATIENT
Start: 2022-09-19 | End: 2022-09-19

## 2022-09-19 RX ORDER — ONDANSETRON 2 MG/ML
INJECTION INTRAMUSCULAR; INTRAVENOUS AS NEEDED
Status: DISCONTINUED | OUTPATIENT
Start: 2022-09-19 | End: 2022-09-19 | Stop reason: SURG

## 2022-09-19 RX ORDER — HYDROCODONE BITARTRATE AND ACETAMINOPHEN 5; 325 MG/1; MG/1
1 TABLET ORAL ONCE AS NEEDED
Status: COMPLETED | OUTPATIENT
Start: 2022-09-19 | End: 2022-09-19

## 2022-09-19 RX ORDER — CEFOXITIN 2 G/1
INJECTION, POWDER, FOR SOLUTION INTRAVENOUS
Status: DISCONTINUED
Start: 2022-09-19 | End: 2022-09-19 | Stop reason: WASHOUT

## 2022-09-19 RX ORDER — ACETAMINOPHEN 500 MG
1000 TABLET ORAL ONCE AS NEEDED
Status: COMPLETED | OUTPATIENT
Start: 2022-09-19 | End: 2022-09-19

## 2022-09-19 RX ORDER — INSULIN ASPART 100 [IU]/ML
INJECTION, SOLUTION INTRAVENOUS; SUBCUTANEOUS ONCE
Status: DISCONTINUED | OUTPATIENT
Start: 2022-09-19 | End: 2022-09-19

## 2022-09-19 RX ORDER — DIBUCAINE 1 G/100G
1 OINTMENT TOPICAL 3 TIMES DAILY
Qty: 28 G | Refills: 0 | Status: SHIPPED | OUTPATIENT
Start: 2022-09-19 | End: 2022-09-20

## 2022-09-19 RX ORDER — HYDROMORPHONE HYDROCHLORIDE 1 MG/ML
0.6 INJECTION, SOLUTION INTRAMUSCULAR; INTRAVENOUS; SUBCUTANEOUS EVERY 5 MIN PRN
Status: DISCONTINUED | OUTPATIENT
Start: 2022-09-19 | End: 2022-09-19

## 2022-09-19 RX ORDER — ACETAMINOPHEN 500 MG
1000 TABLET ORAL ONCE
Status: DISCONTINUED | OUTPATIENT
Start: 2022-09-19 | End: 2022-09-19 | Stop reason: HOSPADM

## 2022-09-19 RX ORDER — BUPIVACAINE HYDROCHLORIDE 2.5 MG/ML
INJECTION, SOLUTION EPIDURAL; INFILTRATION; INTRACAUDAL AS NEEDED
Status: DISCONTINUED | OUTPATIENT
Start: 2022-09-19 | End: 2022-09-19 | Stop reason: HOSPADM

## 2022-09-19 RX ORDER — HYDROCODONE BITARTRATE AND ACETAMINOPHEN 5; 325 MG/1; MG/1
1 TABLET ORAL EVERY 6 HOURS PRN
Qty: 20 TABLET | Refills: 0 | Status: SHIPPED | OUTPATIENT
Start: 2022-09-19

## 2022-09-19 RX ORDER — HYDROCODONE BITARTRATE AND ACETAMINOPHEN 5; 325 MG/1; MG/1
2 TABLET ORAL ONCE AS NEEDED
Status: COMPLETED | OUTPATIENT
Start: 2022-09-19 | End: 2022-09-19

## 2022-09-19 RX ORDER — HYDROMORPHONE HYDROCHLORIDE 1 MG/ML
0.4 INJECTION, SOLUTION INTRAMUSCULAR; INTRAVENOUS; SUBCUTANEOUS EVERY 5 MIN PRN
Status: DISCONTINUED | OUTPATIENT
Start: 2022-09-19 | End: 2022-09-19

## 2022-09-19 RX ORDER — DEXTROSE MONOHYDRATE 25 G/50ML
50 INJECTION, SOLUTION INTRAVENOUS
Status: DISCONTINUED | OUTPATIENT
Start: 2022-09-19 | End: 2022-09-19 | Stop reason: HOSPADM

## 2022-09-19 RX ORDER — HEPARIN SODIUM 5000 [USP'U]/ML
5000 INJECTION, SOLUTION INTRAVENOUS; SUBCUTANEOUS ONCE
Status: DISCONTINUED | OUTPATIENT
Start: 2022-09-19 | End: 2022-09-19

## 2022-09-19 RX ORDER — SODIUM CHLORIDE, SODIUM LACTATE, POTASSIUM CHLORIDE, CALCIUM CHLORIDE 600; 310; 30; 20 MG/100ML; MG/100ML; MG/100ML; MG/100ML
INJECTION, SOLUTION INTRAVENOUS CONTINUOUS
Status: DISCONTINUED | OUTPATIENT
Start: 2022-09-19 | End: 2022-09-19

## 2022-09-19 RX ORDER — MEPERIDINE HYDROCHLORIDE 25 MG/ML
12.5 INJECTION INTRAMUSCULAR; INTRAVENOUS; SUBCUTANEOUS AS NEEDED
Status: DISCONTINUED | OUTPATIENT
Start: 2022-09-19 | End: 2022-09-19

## 2022-09-19 RX ORDER — LIDOCAINE HYDROCHLORIDE 10 MG/ML
INJECTION, SOLUTION EPIDURAL; INFILTRATION; INTRACAUDAL; PERINEURAL AS NEEDED
Status: DISCONTINUED | OUTPATIENT
Start: 2022-09-19 | End: 2022-09-19 | Stop reason: SURG

## 2022-09-19 RX ORDER — ROCURONIUM BROMIDE 10 MG/ML
INJECTION, SOLUTION INTRAVENOUS AS NEEDED
Status: DISCONTINUED | OUTPATIENT
Start: 2022-09-19 | End: 2022-09-19 | Stop reason: SURG

## 2022-09-19 RX ORDER — AMLODIPINE BESYLATE 10 MG/1
1 TABLET ORAL DAILY
COMMUNITY
Start: 2022-08-26

## 2022-09-19 RX ORDER — DIPHENHYDRAMINE HYDROCHLORIDE 50 MG/ML
12.5 INJECTION INTRAMUSCULAR; INTRAVENOUS AS NEEDED
Status: DISCONTINUED | OUTPATIENT
Start: 2022-09-19 | End: 2022-09-19

## 2022-09-19 RX ORDER — HEPARIN SODIUM 5000 [USP'U]/ML
INJECTION, SOLUTION INTRAVENOUS; SUBCUTANEOUS
Status: DISCONTINUED
Start: 2022-09-19 | End: 2022-09-19 | Stop reason: WASHOUT

## 2022-09-19 RX ADMIN — EPHEDRINE SULFATE 10 MG: 50 INJECTION INTRAVENOUS at 15:51:00

## 2022-09-19 RX ADMIN — EPHEDRINE SULFATE 10 MG: 50 INJECTION INTRAVENOUS at 16:15:00

## 2022-09-19 RX ADMIN — LIDOCAINE HYDROCHLORIDE 4 ML: 40 INJECTION, SOLUTION RETROBULBAR; TOPICAL at 15:39:00

## 2022-09-19 RX ADMIN — ONDANSETRON 4 MG: 2 INJECTION INTRAMUSCULAR; INTRAVENOUS at 16:21:00

## 2022-09-19 RX ADMIN — EPHEDRINE SULFATE 10 MG: 50 INJECTION INTRAVENOUS at 15:47:00

## 2022-09-19 RX ADMIN — SODIUM CHLORIDE, SODIUM LACTATE, POTASSIUM CHLORIDE, CALCIUM CHLORIDE: 600; 310; 30; 20 INJECTION, SOLUTION INTRAVENOUS at 15:34:00

## 2022-09-19 RX ADMIN — ROCURONIUM BROMIDE 50 MG: 10 INJECTION, SOLUTION INTRAVENOUS at 15:38:00

## 2022-09-19 RX ADMIN — LIDOCAINE HYDROCHLORIDE 50 MG: 10 INJECTION, SOLUTION EPIDURAL; INFILTRATION; INTRACAUDAL; PERINEURAL at 15:38:00

## 2022-09-19 RX ADMIN — SODIUM CHLORIDE, SODIUM LACTATE, POTASSIUM CHLORIDE, CALCIUM CHLORIDE: 600; 310; 30; 20 INJECTION, SOLUTION INTRAVENOUS at 16:22:00

## 2022-09-19 NOTE — ANESTHESIA POSTPROCEDURE EVALUATION
[address] Patient Status:  Hospital Outpatient Surgery   Age/Gender 79year old male MRN [id number]   [facility] SURGERY Attending Letha Roy DO   Hosp Day # 0 PCP [facility]       Anesthesia Post-op Note    ANAL EXAMINATION UNDER ANESTHESIA, RUBBER BAND LIGATION, HEMORRHOIDECTOMY LIGATION OF BLEEDING INTERNAL HEMORRHOID    Procedure Summary     Date: 09/19/22 Room / Location: [address] [place] [address] MAIN OR 17 / 1404 [facility] OR    Anesthesia Start: 2167 Anesthesia Stop: 3132    Procedure: ANAL EXAMINATION UNDER ANESTHESIA, RUBBER BAND LIGATION, HEMORRHOIDECTOMY LIGATION OF BLEEDING INTERNAL HEMORRHOID (N/A Anus) Diagnosis:       Hemorrhoids, unspecified hemorrhoid type      (Hemorrhoids, unspecified hemorrhoid type [K64.9])    Surgeons: Letha Roy DO Anesthesiologist: Quentin aGrcia MD    Anesthesia Type: general ASA Status: 3          Anesthesia Type: general    Vitals Value Taken Time   /58 09/19/22 1639   Temp 97.9 09/19/22 1639   Pulse 62 09/19/22 1639   Resp 18 09/19/22 1639   SpO2 99% 09/19/22 1639       Patient Location: PACU    Anesthesia Type: general    Airway Patency: patent    Postop Pain Control: adequate    Mental Status: mildly sedated but able to meaningfully participate in the post-anesthesia evaluation    Nausea/Vomiting: none    Cardiopulmonary/Hydration status: stable euvolemic    Complications: no apparent anesthesia related complications    Postop vital signs: stable    Dental Exam: Unchanged from Preop    Patient to be discharged from PACU when criteria met.

## 2022-09-19 NOTE — ANESTHESIA PROCEDURE NOTES
Airway  Date/Time: 9/19/2022 3:40 PM  Urgency: elective      General Information and Staff    Patient location during procedure: OR  Anesthesiologist: Maryam Mendez MD  Resident/CRNA: Fede Grijalva CRNA  Performed: CRNA     Indications and Patient Condition  Indications for airway management: anesthesia  Sedation level: deep  Preoxygenated: yes  Patient position: sniffing  Mask difficulty assessment: 0 - not attempted    Final Airway Details  Final airway type: endotracheal airway      Successful airway: ETT  Cuffed: yes   Successful intubation technique: direct laryngoscopy  Endotracheal tube insertion site: oral  Blade: Brissa  Blade size: #3  ETT size (mm): 7.5    Cormack-Lehane Classification: grade I - full view of glottis  Placement verified by: chest auscultation and capnometry   Measured from: lips  ETT to lips (cm): 22  Number of attempts at approach: 1

## 2022-09-20 ENCOUNTER — OFFICE VISIT (OUTPATIENT)
Dept: INTERNAL MEDICINE CLINIC | Facility: CLINIC | Age: 67
End: 2022-09-20

## 2022-09-20 VITALS
RESPIRATION RATE: 16 BRPM | WEIGHT: 265.38 LBS | SYSTOLIC BLOOD PRESSURE: 100 MMHG | BODY MASS INDEX: 37.99 KG/M2 | HEIGHT: 70 IN | TEMPERATURE: 99 F | DIASTOLIC BLOOD PRESSURE: 50 MMHG | OXYGEN SATURATION: 95 % | HEART RATE: 74 BPM

## 2022-09-20 DIAGNOSIS — K64.9 HEMORRHOIDS, UNSPECIFIED HEMORRHOID TYPE: Primary | ICD-10-CM

## 2022-09-20 DIAGNOSIS — L98.9 SKIN LESION: ICD-10-CM

## 2022-09-20 DIAGNOSIS — Z86.73 HISTORY OF TIA (TRANSIENT ISCHEMIC ATTACK): ICD-10-CM

## 2022-09-20 DIAGNOSIS — F33.42 RECURRENT MAJOR DEPRESSIVE DISORDER, IN FULL REMISSION (HCC): ICD-10-CM

## 2022-09-20 DIAGNOSIS — D50.9 IRON DEFICIENCY ANEMIA, UNSPECIFIED IRON DEFICIENCY ANEMIA TYPE: ICD-10-CM

## 2022-09-20 PROBLEM — Z98.84 S/P GASTRIC BYPASS: Status: ACTIVE | Noted: 2022-09-20

## 2022-09-20 PROBLEM — Z87.19 HISTORY OF PANCREATITIS: Status: ACTIVE | Noted: 2022-09-20

## 2022-09-20 PROCEDURE — 3008F BODY MASS INDEX DOCD: CPT | Performed by: INTERNAL MEDICINE

## 2022-09-20 PROCEDURE — 3074F SYST BP LT 130 MM HG: CPT | Performed by: INTERNAL MEDICINE

## 2022-09-20 PROCEDURE — 99214 OFFICE O/P EST MOD 30 MIN: CPT | Performed by: INTERNAL MEDICINE

## 2022-09-20 PROCEDURE — 3078F DIAST BP <80 MM HG: CPT | Performed by: INTERNAL MEDICINE

## 2022-09-20 RX ORDER — LIDOCAINE 5 G/100G
CREAM RECTAL; TOPICAL
Qty: 45 G | Refills: 0 | Status: SHIPPED | OUTPATIENT
Start: 2022-09-20

## 2022-09-20 NOTE — PATIENT INSTRUCTIONS
Please follow up with the GI doctor  See Dr. Al Mooney in 2 months    See the skin doctor    Please schedule your MRI

## 2022-09-20 NOTE — OPERATIVE REPORT
[address]    PATIENT'S NAME: Estefany Mirza   ATTENDING PHYSICIAN: Kinza Whitlock D.O.   OPERATING PHYSICIAN: Kinza Whitlock D.O.   PATIENT ACCOUNT#:   [de-identified]    LOCATION:  [address]  MEDICAL RECORD #:   [id number]       YOB: 1955  ADMISSION DATE:       09/19/2022      OPERATION DATE:  09/19/2022    OPERATIVE REPORT    PREOPERATIVE DIAGNOSIS:  Grade 4 hemorrhoidal disease. POSTOPERATIVE DIAGNOSIS:  Grade 4 hemorrhoidal disease with massive engorgement of his hemorrhoidal tissue, with high pressures within the perianal venous plexus, rule out portal hypertension. PROCEDURE:  Anal exam under anesthesia with ligation of bleeding hemorrhoids and rubber band ligation of hemorrhoids at the right anterior position and left lateral position. ASSISTANT:  Krzysztof Mcginnis PA-C. ANESTHESIA:  General.    COMPLICATIONS:  None. OPERATIVE TECHNIQUE:  Informed consent was previously obtained. The patient was taken to the operative suite and placed in the supine position. General inhalational anesthetic was provided, and the patient was then placed in prone jackknife position with buttocks taped widely apart. [place] anoscopy was carried out demonstrating massive hemorrhoidal disease, the worst hemorrhoidal complexes were severe at the right posterior, right anterior, and left lateral positions. The left lateral external component extended approximately the entire left-hand side of the perianal tissue. [place] anoscopy alone caused disruption of one of the hemorrhoidal complexes at the right anterior position. This required suture ligation with a 2-0 chromic suture. Patient had significant bleeding just from the anal examination under anesthesia. A decision was made not to perform excisional hemorrhoidectomy in this patient with high perianal venous hemorrhoidal pressures and rectal vein pressures and currently on Coumadin.   Patient proceeded to the OR with a normal INR; however, he had taken a Coumadin dose the day prior to evaluation. Therefore, a decision was made to proceed with a simple rubber band ligation of the hemorrhoids at the right anterior and left lateral position. This was performed. Gelfoam/dibucaine pack placed in the anal canal.  Overlying sterile dressings were applied. The patient was transported from the operative suite to Recovery in stable condition.     Dictated By Ekaterina Valverde D.O.  d: 09/19/2022 17:56:48  t: 09/19/2022 22:48:48  [facility] [id number]  HQ/    cc: Fahad Regalado D.O.

## 2022-09-22 ENCOUNTER — IMMUNIZATION (OUTPATIENT)
Dept: LAB | Age: 67
End: 2022-09-22
Attending: EMERGENCY MEDICINE

## 2022-09-22 DIAGNOSIS — Z23 NEED FOR VACCINATION: Primary | ICD-10-CM

## 2022-09-22 PROCEDURE — 0052A SARSCOV2 VAC 30MCG TRS SUCR: CPT

## 2022-09-30 ENCOUNTER — HOSPITAL ENCOUNTER (OUTPATIENT)
Dept: MRI IMAGING | Facility: HOSPITAL | Age: 67
Discharge: HOME | End: 2022-09-30
Attending: INTERNAL MEDICINE
Payer: MEDICAID

## 2022-09-30 DIAGNOSIS — Z86.73 HISTORY OF TIA (TRANSIENT ISCHEMIC ATTACK): ICD-10-CM

## 2022-09-30 PROCEDURE — 70551 MRI BRAIN STEM W/O DYE: CPT | Performed by: INTERNAL MEDICINE

## 2022-10-18 DIAGNOSIS — G25.81 RESTLESS LEG SYNDROME: ICD-10-CM

## 2022-10-18 DIAGNOSIS — E11.9 TYPE 2 DIABETES MELLITUS WITHOUT COMPLICATION, WITHOUT LONG-TERM CURRENT USE OF INSULIN (HCC): ICD-10-CM

## 2022-10-19 RX ORDER — GABAPENTIN 300 MG/1
300 CAPSULE ORAL NIGHTLY
Qty: 90 CAPSULE | Refills: 0 | Status: SHIPPED | OUTPATIENT
Start: 2022-10-19

## 2022-10-19 NOTE — TELEPHONE ENCOUNTER
LOV: 9/20/2022 with Dr. Heather Abraham  RTC: 3 months  Last Relevant Labs: August 2022  Filled: 7/26/2022    #90 with 0 refills    Future Appointments   Date Time Provider Annie Licona   10/28/2022 11:15 AM Pat Gonzalez MD [address] [employer]   10/28/2022 11:30 AM [facility] [place] RN2 [address] [place] [address]   11/14/2022  1:00 PM Pat Gonzalez MD [address] [facility]   11/22/2022  1:00 PM [address] [place] [address] [facility] [address] [place] [address]   12/20/2022 10:30 AM Nasrin Hanks MD [facility]

## 2022-10-28 ENCOUNTER — OFFICE VISIT (OUTPATIENT)
Dept: HEMATOLOGY/ONCOLOGY | Facility: HOSPITAL | Age: 67
End: 2022-10-28
Attending: INTERNAL MEDICINE
Payer: MEDICAID

## 2022-10-28 VITALS
RESPIRATION RATE: 18 BRPM | BODY MASS INDEX: 39 KG/M2 | OXYGEN SATURATION: 98 % | DIASTOLIC BLOOD PRESSURE: 75 MMHG | WEIGHT: 272.63 LBS | SYSTOLIC BLOOD PRESSURE: 127 MMHG | HEART RATE: 61 BPM | TEMPERATURE: 98 F

## 2022-10-28 VITALS
HEART RATE: 54 BPM | OXYGEN SATURATION: 98 % | DIASTOLIC BLOOD PRESSURE: 64 MMHG | SYSTOLIC BLOOD PRESSURE: 133 MMHG | RESPIRATION RATE: 16 BRPM | TEMPERATURE: 98 F

## 2022-10-28 DIAGNOSIS — D64.9 ANEMIA, UNSPECIFIED TYPE: ICD-10-CM

## 2022-10-28 DIAGNOSIS — E53.8 VITAMIN B12 DEFICIENCY: Primary | ICD-10-CM

## 2022-10-28 DIAGNOSIS — K64.9 HEMORRHOIDS, UNSPECIFIED HEMORRHOID TYPE: ICD-10-CM

## 2022-10-28 DIAGNOSIS — D50.9 IRON DEFICIENCY ANEMIA, UNSPECIFIED IRON DEFICIENCY ANEMIA TYPE: Primary | ICD-10-CM

## 2022-10-28 LAB
BASOPHILS # BLD AUTO: 0.03 X10(3) UL (ref 0–0.2)
BASOPHILS NFR BLD AUTO: 0.8 %
DEPRECATED HBV CORE AB SER IA-ACNC: 21.5 NG/ML
EOSINOPHIL # BLD AUTO: 0.12 X10(3) UL (ref 0–0.7)
EOSINOPHIL NFR BLD AUTO: 3.4 %
ERYTHROCYTE [DISTWIDTH] IN BLOOD BY AUTOMATED COUNT: 17.6 %
FOLATE SERPL-MCNC: 28.9 NG/ML (ref 8.7–?)
HCT VFR BLD AUTO: 31.5 %
HGB BLD-MCNC: 10 G/DL
IMM GRANULOCYTES # BLD AUTO: 0.01 X10(3) UL (ref 0–1)
IMM GRANULOCYTES NFR BLD: 0.3 %
IRON SATN MFR SERPL: 7 %
IRON SERPL-MCNC: 38 UG/DL
LYMPHOCYTES # BLD AUTO: 0.71 X10(3) UL (ref 1–4)
LYMPHOCYTES NFR BLD AUTO: 19.8 %
MCH RBC QN AUTO: 26.2 PG (ref 26–34)
MCHC RBC AUTO-ENTMCNC: 31.7 G/DL (ref 31–37)
MCV RBC AUTO: 82.7 FL
MONOCYTES # BLD AUTO: 0.31 X10(3) UL (ref 0.1–1)
MONOCYTES NFR BLD AUTO: 8.7 %
NEUTROPHILS # BLD AUTO: 2.4 X10 (3) UL (ref 1.5–7.7)
NEUTROPHILS # BLD AUTO: 2.4 X10(3) UL (ref 1.5–7.7)
NEUTROPHILS NFR BLD AUTO: 67 %
PLATELET # BLD AUTO: 103 10(3)UL (ref 150–450)
RBC # BLD AUTO: 3.81 X10(6)UL
TIBC SERPL-MCNC: 547 UG/DL (ref 240–450)
TRANSFERRIN SERPL-MCNC: 367 MG/DL (ref 200–360)
VIT B12 SERPL-MCNC: 498 PG/ML (ref 193–986)
WBC # BLD AUTO: 3.6 X10(3) UL (ref 4–11)

## 2022-10-28 PROCEDURE — 96365 THER/PROPH/DIAG IV INF INIT: CPT

## 2022-10-28 PROCEDURE — 99215 OFFICE O/P EST HI 40 MIN: CPT | Performed by: INTERNAL MEDICINE

## 2022-10-28 PROCEDURE — 96372 THER/PROPH/DIAG INJ SC/IM: CPT

## 2022-10-28 RX ORDER — CYANOCOBALAMIN 1000 UG/ML
1000 INJECTION, SOLUTION INTRAMUSCULAR; SUBCUTANEOUS ONCE
Status: CANCELLED
Start: 2022-10-28 | End: 2022-10-28

## 2022-10-28 RX ORDER — CYANOCOBALAMIN 1000 UG/ML
1000 INJECTION, SOLUTION INTRAMUSCULAR; SUBCUTANEOUS
Qty: 3 ML | Refills: 3 | Status: SHIPPED | OUTPATIENT
Start: 2022-10-28

## 2022-10-28 RX ORDER — CYANOCOBALAMIN 1000 UG/ML
1000 INJECTION, SOLUTION INTRAMUSCULAR; SUBCUTANEOUS ONCE
Status: COMPLETED | OUTPATIENT
Start: 2022-10-28 | End: 2022-10-28

## 2022-10-28 RX ADMIN — CYANOCOBALAMIN 1000 MCG: 1000 INJECTION, SOLUTION INTRAMUSCULAR; SUBCUTANEOUS at 13:55:00

## 2022-10-28 NOTE — PROGRESS NOTES
Education Record    Learner:  Patient    Disease / Diagnosis: Iron Deficiency Anemia    Barriers / Limitations:  None   Comments:    Method:  Brief focused and Reinforcement   Comments:    General Topics:  Plan of care reviewed and Fall risk and prevention   Comments:    Outcome:  Shows understanding   Comments:  INFED infused without any complications. Observed for half hour after infusion. Vital signs taken at the end of the 30-minute observation. Patient denied any complaints/issues. Discharged in good condition. Advised to call for any questions/concerns/issues. Per Dr. Lona Lott - follow up in 2 months with labs. Patient aware and verbalized understanding. Scheduled for early January.

## 2022-10-28 NOTE — PROGRESS NOTES
Patient is here today for follow up with Matilde Bacon for iron deficiency anemia and Vitamin B12 deficiency. Stated he is feeling better since his last visit in August. Patient denies pain. Medication list,medical history and toxicities were reviewed and updated. Education Record    Learner:  Patient      Disease / Diagnosis:  iron deficiency anemia and Vitamin B12 deficiency    Barriers / Limitations:  None   Comments:    Method:  Brief focused, Discussion, Printed material and Reinforcement   Comments:    General Topics:  Medication, Pain, Procedure and Plan of care reviewed   Comments:    Outcome:  Shows understanding   Comments:  Eugenie Curtis MD visit. Patient to have Infed today and Vitamin B12 injection. - treating RN notified. AVS provided and follow up reviewed. Patient instructed to call as needed.

## 2022-10-28 NOTE — PROGRESS NOTES
Bloodwork still shows you're iron deficient. But folic acid and vitamin B12 now normal, continue daily folic acid and monthly vitamin B12 shots. Come back in 2 months for repeat bloodwork recheck and possible IV iron infusion.

## 2022-10-30 DIAGNOSIS — I10 PRIMARY HYPERTENSION: ICD-10-CM

## 2022-10-30 LAB — COPPER, SERUM: 87.6 UG/DL

## 2022-10-31 RX ORDER — FOLIC ACID 1 MG/1
1 TABLET ORAL DAILY
Qty: 90 TABLET | Refills: 0 | Status: SHIPPED | OUTPATIENT
Start: 2022-10-31

## 2022-10-31 RX ORDER — HYDROCHLOROTHIAZIDE 12.5 MG/1
12.5 TABLET ORAL DAILY
Qty: 90 TABLET | Refills: 1 | Status: SHIPPED | OUTPATIENT
Start: 2022-10-31

## 2022-10-31 NOTE — TELEPHONE ENCOUNTER
LOV: 9/20/2022 with Dr. Jose David Langley  RTC: 3 months  Last Relevant Labs: 10/28/2022  Filled: 8/24/2022 (Hydrochlorothiazide 12.5 mg)     #90 with 1 refill    Calcium Carb-Cholecalciferol 500-400 MG-UNIT Oral Tab historical document in medication list; no previous authorizations/refills.     Future Appointments   Date Time Provider Annie Licona   11/22/2022  1:00  [facility]   12/20/2022 10:30 AM Madhuri Murcia MD [facility]   1/6/2023 11:45 AM Bernie Rutledge MD [facility]

## 2022-11-14 DIAGNOSIS — L20.9 ATOPIC DERMATITIS, UNSPECIFIED TYPE: ICD-10-CM

## 2022-11-14 RX ORDER — CETIRIZINE HYDROCHLORIDE 10 MG/1
TABLET ORAL
Qty: 90 TABLET | Refills: 0 | Status: SHIPPED | OUTPATIENT
Start: 2022-11-14

## 2022-11-22 ENCOUNTER — IMMUNIZATION (OUTPATIENT)
Dept: LAB | Age: 67
End: 2022-11-22
Attending: EMERGENCY MEDICINE
Payer: MEDICAID

## 2022-11-22 DIAGNOSIS — Z23 NEED FOR VACCINATION: Primary | ICD-10-CM

## 2022-11-22 PROCEDURE — 0124A SARSCOV2 VAC BVL 30MCG/0.3ML: CPT

## 2022-11-27 DIAGNOSIS — E11.69 HYPERLIPIDEMIA ASSOCIATED WITH TYPE 2 DIABETES MELLITUS (HCC): ICD-10-CM

## 2022-11-27 DIAGNOSIS — E78.5 HYPERLIPIDEMIA ASSOCIATED WITH TYPE 2 DIABETES MELLITUS (HCC): ICD-10-CM

## 2022-11-27 DIAGNOSIS — I10 PRIMARY HYPERTENSION: ICD-10-CM

## 2022-11-27 DIAGNOSIS — F33.42 RECURRENT MAJOR DEPRESSIVE DISORDER, IN FULL REMISSION (HCC): ICD-10-CM

## 2022-11-28 RX ORDER — SERTRALINE HYDROCHLORIDE 100 MG/1
200 TABLET, FILM COATED ORAL DAILY
Qty: 180 TABLET | Refills: 0 | Status: SHIPPED | OUTPATIENT
Start: 2022-11-28

## 2022-11-28 RX ORDER — RAMIPRIL 10 MG/1
10 CAPSULE ORAL DAILY
Qty: 90 CAPSULE | Refills: 0 | Status: SHIPPED | OUTPATIENT
Start: 2022-11-28

## 2022-11-28 RX ORDER — SIMVASTATIN 40 MG
40 TABLET ORAL NIGHTLY
Qty: 90 TABLET | Refills: 2 | Status: SHIPPED | OUTPATIENT
Start: 2022-11-28

## 2022-11-28 NOTE — TELEPHONE ENCOUNTER
Sertraline 100 mg  Filled 7-26-22  Qty 180  0 refills  No upcoming appt. LOV 9-20-22     Ramipril 10 mg  Filled 7-26-22  Qty 90  0 refills  No upcoming appt. LOV 8-24-22 SV  Labs: 8-24-22 BMP    Simvastatin 40 mg  Filled 7-26-22  Qty 0  No upcoming appt.   LOV 8-24-22 SV  Labs: 8-24-22 Lipid

## 2022-11-30 DIAGNOSIS — E11.9 TYPE 2 DIABETES MELLITUS WITHOUT COMPLICATION, WITHOUT LONG-TERM CURRENT USE OF INSULIN (HCC): ICD-10-CM

## 2022-11-30 NOTE — TELEPHONE ENCOUNTER
LOV: 9/20/2022 with Dr. Marcial Izquierdo  RTC: 3 months  Last Relevant Labs: 10/28/2022  Filled: 10/19/2022    #90 with 0 refills    Future Appointments   Date Time Provider Annie Licona   12/20/2022 10:30 AM Aries Sullivan MD [facility] [address] [facility]   1/6/2023 11:45 AM Andres Carter MD [address] [facility]

## 2022-12-13 ENCOUNTER — PATIENT MESSAGE (OUTPATIENT)
Dept: INTERNAL MEDICINE CLINIC | Facility: CLINIC | Age: 67
End: 2022-12-13

## 2022-12-17 DIAGNOSIS — K21.9 GASTROESOPHAGEAL REFLUX DISEASE WITHOUT ESOPHAGITIS: ICD-10-CM

## 2022-12-19 RX ORDER — LANSOPRAZOLE 30 MG/1
30 CAPSULE, DELAYED RELEASE ORAL
Qty: 90 CAPSULE | Refills: 0 | Status: SHIPPED | OUTPATIENT
Start: 2022-12-19

## 2023-01-06 ENCOUNTER — APPOINTMENT (OUTPATIENT)
Dept: HEMATOLOGY/ONCOLOGY | Facility: HOSPITAL | Age: 68
End: 2023-01-06
Attending: INTERNAL MEDICINE
Payer: MEDICAID

## 2023-01-06 ENCOUNTER — TELEPHONE (OUTPATIENT)
Dept: HEMATOLOGY/ONCOLOGY | Facility: HOSPITAL | Age: 68
End: 2023-01-06

## 2023-01-06 VITALS
RESPIRATION RATE: 18 BRPM | BODY MASS INDEX: 39.8 KG/M2 | OXYGEN SATURATION: 97 % | DIASTOLIC BLOOD PRESSURE: 70 MMHG | TEMPERATURE: 98 F | SYSTOLIC BLOOD PRESSURE: 132 MMHG | WEIGHT: 278 LBS | HEART RATE: 56 BPM | HEIGHT: 70 IN

## 2023-01-06 DIAGNOSIS — D69.6 THROMBOCYTOPENIA (HCC): Primary | ICD-10-CM

## 2023-01-06 DIAGNOSIS — D64.9 ANEMIA, UNSPECIFIED TYPE: ICD-10-CM

## 2023-01-06 DIAGNOSIS — E53.8 VITAMIN B12 DEFICIENCY: ICD-10-CM

## 2023-01-06 DIAGNOSIS — E53.8 FOLIC ACID DEFICIENCY: ICD-10-CM

## 2023-01-06 LAB
BASOPHILS # BLD AUTO: 0.04 X10(3) UL (ref 0–0.2)
BASOPHILS NFR BLD AUTO: 0.8 %
DEPRECATED HBV CORE AB SER IA-ACNC: 102 NG/ML
EOSINOPHIL # BLD AUTO: 0.12 X10(3) UL (ref 0–0.7)
EOSINOPHIL NFR BLD AUTO: 2.4 %
ERYTHROCYTE [DISTWIDTH] IN BLOOD BY AUTOMATED COUNT: 15.9 %
FOLATE SERPL-MCNC: 37.8 NG/ML (ref 8.7–?)
HCT VFR BLD AUTO: 36.9 %
HGB BLD-MCNC: 11.8 G/DL
IMM GRANULOCYTES # BLD AUTO: 0.03 X10(3) UL (ref 0–1)
IMM GRANULOCYTES NFR BLD: 0.6 %
IRON SATN MFR SERPL: 15 %
IRON SERPL-MCNC: 68 UG/DL
LYMPHOCYTES # BLD AUTO: 0.88 X10(3) UL (ref 1–4)
LYMPHOCYTES NFR BLD AUTO: 17.3 %
MCH RBC QN AUTO: 27.3 PG (ref 26–34)
MCHC RBC AUTO-ENTMCNC: 32 G/DL (ref 31–37)
MCV RBC AUTO: 85.2 FL
MONOCYTES # BLD AUTO: 0.28 X10(3) UL (ref 0.1–1)
MONOCYTES NFR BLD AUTO: 5.5 %
NEUTROPHILS # BLD AUTO: 3.75 X10 (3) UL (ref 1.5–7.7)
NEUTROPHILS # BLD AUTO: 3.75 X10(3) UL (ref 1.5–7.7)
NEUTROPHILS NFR BLD AUTO: 73.4 %
PLATELET # BLD AUTO: 31 10(3)UL (ref 150–450)
RBC # BLD AUTO: 4.33 X10(6)UL
TIBC SERPL-MCNC: 466 UG/DL (ref 240–450)
TRANSFERRIN SERPL-MCNC: 313 MG/DL (ref 200–360)
VIT B12 SERPL-MCNC: 1004 PG/ML (ref 193–986)
WBC # BLD AUTO: 5.1 X10(3) UL (ref 4–11)

## 2023-01-06 PROCEDURE — 99215 OFFICE O/P EST HI 40 MIN: CPT | Performed by: INTERNAL MEDICINE

## 2023-01-06 RX ORDER — FOLIC ACID 1 MG/1
1 TABLET ORAL DAILY
Qty: 90 TABLET | Refills: 3 | Status: SHIPPED | OUTPATIENT
Start: 2023-01-06

## 2023-01-06 NOTE — TELEPHONE ENCOUNTER
Kentrellgabriela Vizcaino would like a call back he would like to know his lab results.  Thanks [employer]

## 2023-01-09 ENCOUNTER — PATIENT MESSAGE (OUTPATIENT)
Dept: INTERNAL MEDICINE CLINIC | Facility: CLINIC | Age: 68
End: 2023-01-09

## 2023-01-09 DIAGNOSIS — Z95.2 H/O HEART VALVE REPLACEMENT WITH MECHANICAL VALVE: Primary | ICD-10-CM

## 2023-01-09 NOTE — TELEPHONE ENCOUNTER
Lab order pended-please review and sign if appropriate. TY! Patient desires tubal ligation/1st Trimester Sonogram/20 Week Level II Sonogram/Non Invasive Prenatal Screen (NIPS)/Ultra Screen at 12 Weeks

## 2023-01-10 ENCOUNTER — LAB ENCOUNTER (OUTPATIENT)
Dept: LAB | Age: 68
End: 2023-01-10
Attending: INTERNAL MEDICINE
Payer: MEDICAID

## 2023-01-10 ENCOUNTER — PATIENT MESSAGE (OUTPATIENT)
Dept: INTERNAL MEDICINE CLINIC | Facility: CLINIC | Age: 68
End: 2023-01-10

## 2023-01-10 DIAGNOSIS — Z95.2 H/O HEART VALVE REPLACEMENT WITH MECHANICAL VALVE: ICD-10-CM

## 2023-01-10 LAB
INR BLD: 1.66 (ref 0.85–1.16)
PROTHROMBIN TIME: 19.5 SECONDS (ref 11.6–14.8)

## 2023-01-10 PROCEDURE — 85610 PROTHROMBIN TIME: CPT

## 2023-01-10 PROCEDURE — 36415 COLL VENOUS BLD VENIPUNCTURE: CPT

## 2023-01-10 NOTE — TELEPHONE ENCOUNTER
From: Leroy Khan  Sent: 1/10/2023 10:02 AM CST  To: [facility] Clinical Staff  Subject: Vaccination     Thanks  Will one of your team set up an appointment or shall I just pop round ?

## 2023-01-11 ENCOUNTER — TELEPHONE (OUTPATIENT)
Dept: INTERNAL MEDICINE CLINIC | Facility: CLINIC | Age: 68
End: 2023-01-11

## 2023-01-11 DIAGNOSIS — Z95.2 H/O HEART VALVE REPLACEMENT WITH MECHANICAL VALVE: Primary | ICD-10-CM

## 2023-01-11 NOTE — TELEPHONE ENCOUNTER
Pt called back with his daughter Clara Prado (on verbal). alex confirmed that her dad did stop warfarin on Saturday. She also confirmed he stopped the lansoprazole. Pt and daughter confirmed they saw mcm with cardiologist referral info. Daughter was concerned how soon pt should be seen by cardio since he stopped taking warfarin but also has mechanical valve. Advised her to get an appointment within the next week, and could also see one of Dr. Carter Maldonado partners if they have sooner openings. Told her I would also confirm with Dr. Frankie Yepez and get back to them. Clara Prado stated they check mcm's frequently. After speaking with Dr. Frankie Yepez, she confirmed pt should try to get into cardio in next week.      [facility] sent

## 2023-01-11 NOTE — PROGRESS NOTES
Dear RN, please let the patient know   PT/INR is low. He needs to follow up with a cardiologist as he cannot take coumadin due to his low platelet count but he has a mechanical valve.  If he does not have a cardiologist, I have placed a new referral  [address] [place] [address]

## 2023-01-13 ENCOUNTER — NURSE ONLY (OUTPATIENT)
Dept: HEMATOLOGY/ONCOLOGY | Facility: HOSPITAL | Age: 68
End: 2023-01-13
Attending: INTERNAL MEDICINE
Payer: MEDICAID

## 2023-01-13 DIAGNOSIS — D64.9 ANEMIA, UNSPECIFIED TYPE: ICD-10-CM

## 2023-01-13 DIAGNOSIS — D69.6 THROMBOCYTOPENIA (HCC): ICD-10-CM

## 2023-01-13 LAB
ALBUMIN SERPL-MCNC: 3.3 G/DL (ref 3.4–5)
ALBUMIN/GLOB SERPL: 0.8 {RATIO} (ref 1–2)
ALP LIVER SERPL-CCNC: 121 U/L
ALT SERPL-CCNC: 24 U/L
ANION GAP SERPL CALC-SCNC: 3 MMOL/L (ref 0–18)
AST SERPL-CCNC: 23 U/L (ref 15–37)
BASOPHILS # BLD AUTO: 0.04 X10(3) UL (ref 0–0.2)
BASOPHILS NFR BLD AUTO: 0.9 %
BILIRUB SERPL-MCNC: 0.5 MG/DL (ref 0.1–2)
BUN BLD-MCNC: 23 MG/DL (ref 7–18)
CALCIUM BLD-MCNC: 9.1 MG/DL (ref 8.5–10.1)
CHLORIDE SERPL-SCNC: 105 MMOL/L (ref 98–112)
CO2 SERPL-SCNC: 30 MMOL/L (ref 21–32)
CREAT BLD-MCNC: 1.06 MG/DL
EOSINOPHIL # BLD AUTO: 0.09 X10(3) UL (ref 0–0.7)
EOSINOPHIL NFR BLD AUTO: 2 %
ERYTHROCYTE [DISTWIDTH] IN BLOOD BY AUTOMATED COUNT: 15.7 %
FASTING STATUS PATIENT QL REPORTED: NO
GFR SERPLBLD BASED ON 1.73 SQ M-ARVRAT: 77 ML/MIN/1.73M2 (ref 60–?)
GLOBULIN PLAS-MCNC: 4.2 G/DL (ref 2.8–4.4)
GLUCOSE BLD-MCNC: 261 MG/DL (ref 70–99)
HBV CORE AB SERPL QL IA: REACTIVE
HBV CORE IGM SER QL: NONREACTIVE
HBV SURFACE AB SER QL: REACTIVE
HBV SURFACE AB SERPL IA-ACNC: 21.39 MIU/ML
HBV SURFACE AG SER-ACNC: <0.1 [IU]/L
HBV SURFACE AG SERPL QL IA: NONREACTIVE
HCT VFR BLD AUTO: 35.3 %
HCV AB SERPL QL IA: NONREACTIVE
HGB BLD-MCNC: 11.5 G/DL
IMM GRANULOCYTES # BLD AUTO: 0.03 X10(3) UL (ref 0–1)
IMM GRANULOCYTES NFR BLD: 0.7 %
LYMPHOCYTES # BLD AUTO: 0.74 X10(3) UL (ref 1–4)
LYMPHOCYTES NFR BLD AUTO: 16.5 %
MCH RBC QN AUTO: 28.3 PG (ref 26–34)
MCHC RBC AUTO-ENTMCNC: 32.6 G/DL (ref 31–37)
MCV RBC AUTO: 86.9 FL
MONOCYTES # BLD AUTO: 0.25 X10(3) UL (ref 0.1–1)
MONOCYTES NFR BLD AUTO: 5.6 %
NEUTROPHILS # BLD AUTO: 3.34 X10 (3) UL (ref 1.5–7.7)
NEUTROPHILS # BLD AUTO: 3.34 X10(3) UL (ref 1.5–7.7)
NEUTROPHILS NFR BLD AUTO: 74.3 %
OSMOLALITY SERPL CALC.SUM OF ELEC: 299 MOSM/KG (ref 275–295)
PLATELET # BLD AUTO: 115 10(3)UL (ref 150–450)
POTASSIUM SERPL-SCNC: 4.2 MMOL/L (ref 3.5–5.1)
PROT SERPL-MCNC: 7.5 G/DL (ref 6.4–8.2)
RBC # BLD AUTO: 4.06 X10(6)UL
SODIUM SERPL-SCNC: 138 MMOL/L (ref 136–145)
WBC # BLD AUTO: 4.5 X10(3) UL (ref 4–11)

## 2023-01-13 PROCEDURE — 85025 COMPLETE CBC W/AUTO DIFF WBC: CPT

## 2023-01-13 PROCEDURE — 36415 COLL VENOUS BLD VENIPUNCTURE: CPT

## 2023-01-13 PROCEDURE — 80053 COMPREHEN METABOLIC PANEL: CPT

## 2023-01-13 PROCEDURE — 87340 HEPATITIS B SURFACE AG IA: CPT

## 2023-01-13 PROCEDURE — 86705 HEP B CORE ANTIBODY IGM: CPT

## 2023-01-13 PROCEDURE — 86704 HEP B CORE ANTIBODY TOTAL: CPT

## 2023-01-13 PROCEDURE — 86803 HEPATITIS C AB TEST: CPT

## 2023-01-13 PROCEDURE — 86706 HEP B SURFACE ANTIBODY: CPT

## 2023-01-14 DIAGNOSIS — G25.81 RESTLESS LEG SYNDROME: ICD-10-CM

## 2023-01-16 RX ORDER — GABAPENTIN 300 MG/1
300 CAPSULE ORAL NIGHTLY
Qty: 90 CAPSULE | Refills: 0 | Status: SHIPPED | OUTPATIENT
Start: 2023-01-16

## 2023-01-16 RX ORDER — AMLODIPINE BESYLATE 10 MG/1
10 TABLET ORAL DAILY
Qty: 90 TABLET | Refills: 0 | Status: SHIPPED | OUTPATIENT
Start: 2023-01-16

## 2023-01-21 ENCOUNTER — HOSPITAL ENCOUNTER (EMERGENCY)
Facility: HOSPITAL | Age: 68
Discharge: HOME | End: 2023-01-21
Attending: EMERGENCY MEDICINE
Payer: MEDICAID

## 2023-01-21 ENCOUNTER — APPOINTMENT (OUTPATIENT)
Dept: CT IMAGING | Facility: HOSPITAL | Age: 68
End: 2023-01-21
Attending: EMERGENCY MEDICINE
Payer: MEDICAID

## 2023-01-21 ENCOUNTER — HOSPITAL ENCOUNTER (OUTPATIENT)
Age: 68
Discharge: ANOTHER HOSPITAL | End: 2023-01-21
Attending: EMERGENCY MEDICINE
Payer: MEDICAID

## 2023-01-21 ENCOUNTER — APPOINTMENT (OUTPATIENT)
Dept: GENERAL RADIOLOGY | Age: 68
End: 2023-01-21
Attending: EMERGENCY MEDICINE
Payer: MEDICAID

## 2023-01-21 ENCOUNTER — APPOINTMENT (OUTPATIENT)
Dept: GENERAL RADIOLOGY | Facility: HOSPITAL | Age: 68
End: 2023-01-21
Payer: MEDICAID

## 2023-01-21 VITALS
BODY MASS INDEX: 37.94 KG/M2 | DIASTOLIC BLOOD PRESSURE: 87 MMHG | SYSTOLIC BLOOD PRESSURE: 146 MMHG | WEIGHT: 265 LBS | HEART RATE: 81 BPM | TEMPERATURE: 98 F | OXYGEN SATURATION: 98 % | RESPIRATION RATE: 20 BRPM | HEIGHT: 70 IN

## 2023-01-21 VITALS
TEMPERATURE: 97 F | SYSTOLIC BLOOD PRESSURE: 155 MMHG | HEART RATE: 60 BPM | BODY MASS INDEX: 37.1 KG/M2 | OXYGEN SATURATION: 97 % | HEIGHT: 70.87 IN | DIASTOLIC BLOOD PRESSURE: 74 MMHG | WEIGHT: 265 LBS | RESPIRATION RATE: 14 BRPM
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
## 2023-01-21 DIAGNOSIS — R07.9 CHEST PAIN OF UNCERTAIN ETIOLOGY: Primary | ICD-10-CM
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
## 2023-01-21 DIAGNOSIS — R07.89 CHEST WALL PAIN: ICD-10-CM

## 2023-01-21 LAB
ALBUMIN SERPL-MCNC: 3.7 G/DL (ref 3.4–5)
ALBUMIN/GLOB SERPL: 0.9 {RATIO} (ref 1–2)
ALP LIVER SERPL-CCNC: 157 U/L
ALT SERPL-CCNC: 28 U/L
ANION GAP SERPL CALC-SCNC: 2 MMOL/L (ref 0–18)
APTT PPP: 61.7 SECONDS (ref 23.3–35.6)
AST SERPL-CCNC: 21 U/L (ref 15–37)
BASOPHILS # BLD AUTO: 0.06 X10(3) UL (ref 0–0.2)
BASOPHILS NFR BLD AUTO: 1 %
BILIRUB SERPL-MCNC: 0.5 MG/DL (ref 0.1–2)
BUN BLD-MCNC: 15 MG/DL (ref 7–18)
CALCIUM BLD-MCNC: 9.3 MG/DL (ref 8.5–10.1)
CHLORIDE SERPL-SCNC: 104 MMOL/L (ref 98–112)
CO2 SERPL-SCNC: 32 MMOL/L (ref 21–32)
CREAT BLD-MCNC: 1.09 MG/DL
EOSINOPHIL # BLD AUTO: 0.14 X10(3) UL (ref 0–0.7)
EOSINOPHIL NFR BLD AUTO: 2.3 %
ERYTHROCYTE [DISTWIDTH] IN BLOOD BY AUTOMATED COUNT: 15.6 %
GFR SERPLBLD BASED ON 1.73 SQ M-ARVRAT: 74 ML/MIN/1.73M2 (ref 60–?)
GLOBULIN PLAS-MCNC: 4 G/DL (ref 2.8–4.4)
GLUCOSE BLD-MCNC: 192 MG/DL (ref 70–99)
HCT VFR BLD AUTO: 36.6 %
HGB BLD-MCNC: 12.2 G/DL
IMM GRANULOCYTES # BLD AUTO: 0.06 X10(3) UL (ref 0–1)
IMM GRANULOCYTES NFR BLD: 1 %
INR BLD: 2.9 (ref 0.85–1.16)
LYMPHOCYTES # BLD AUTO: 0.92 X10(3) UL (ref 1–4)
LYMPHOCYTES NFR BLD AUTO: 15.2 %
MCH RBC QN AUTO: 28 PG (ref 26–34)
MCHC RBC AUTO-ENTMCNC: 33.3 G/DL (ref 31–37)
MCV RBC AUTO: 84.1 FL
MONOCYTES # BLD AUTO: 0.39 X10(3) UL (ref 0.1–1)
MONOCYTES NFR BLD AUTO: 6.4 %
NEUTROPHILS # BLD AUTO: 4.48 X10 (3) UL (ref 1.5–7.7)
NEUTROPHILS # BLD AUTO: 4.48 X10(3) UL (ref 1.5–7.7)
NEUTROPHILS NFR BLD AUTO: 74.1 %
OSMOLALITY SERPL CALC.SUM OF ELEC: 292 MOSM/KG (ref 275–295)
PLATELET # BLD AUTO: 136 10(3)UL (ref 150–450)
POTASSIUM SERPL-SCNC: 4.2 MMOL/L (ref 3.5–5.1)
PROT SERPL-MCNC: 7.7 G/DL (ref 6.4–8.2)
PROTHROMBIN TIME: 29.9 SECONDS (ref 11.6–14.8)
RBC # BLD AUTO: 4.35 X10(6)UL
SODIUM SERPL-SCNC: 138 MMOL/L (ref 136–145)
TROPONIN I HIGH SENSITIVITY: 8 NG/L
WBC # BLD AUTO: 6.1 X10(3) UL (ref 4–11)

## 2023-01-21 PROCEDURE — 71045 X-RAY EXAM CHEST 1 VIEW: CPT

## 2023-01-21 PROCEDURE — 99214 OFFICE O/P EST MOD 30 MIN: CPT

## 2023-01-21 PROCEDURE — 80053 COMPREHEN METABOLIC PANEL: CPT | Performed by: EMERGENCY MEDICINE

## 2023-01-21 PROCEDURE — 84484 ASSAY OF TROPONIN QUANT: CPT | Performed by: EMERGENCY MEDICINE

## 2023-01-21 PROCEDURE — 93005 ELECTROCARDIOGRAM TRACING: CPT

## 2023-01-21 PROCEDURE — 99285 EMERGENCY DEPT VISIT HI MDM: CPT

## 2023-01-21 PROCEDURE — 36415 COLL VENOUS BLD VENIPUNCTURE: CPT

## 2023-01-21 PROCEDURE — 85610 PROTHROMBIN TIME: CPT | Performed by: EMERGENCY MEDICINE

## 2023-01-21 PROCEDURE — 93010 ELECTROCARDIOGRAM REPORT: CPT

## 2023-01-21 PROCEDURE — 70450 CT HEAD/BRAIN W/O DYE: CPT | Performed by: EMERGENCY MEDICINE

## 2023-01-21 PROCEDURE — 85730 THROMBOPLASTIN TIME PARTIAL: CPT | Performed by: EMERGENCY MEDICINE

## 2023-01-21 PROCEDURE — 85025 COMPLETE CBC W/AUTO DIFF WBC: CPT | Performed by: EMERGENCY MEDICINE

## 2023-01-21 NOTE — ED QUICK NOTES
Pt instructed to go directly to ED and stay npo; pt verbalized understanding. Pt refusing ambulance.

## 2023-01-21 NOTE — ED INITIAL ASSESSMENT (HPI)
Pt states that 1 week ago he fell out of bed. + head injury. Denies LOC. + blood thinners. Pt states that he landed on his left shoulder and chest. Pt c/o of left CP since the fall that has progressively gotten worse.

## 2023-01-21 NOTE — ED INITIAL ASSESSMENT (HPI)
About 1 week ago pt fell out of bed and hit L side of forehead and L shoulder; head pain resolved but pt states L shoulder has worsened, chest pain and sob

## 2023-01-22 LAB
ATRIAL RATE: 54 BPM
ATRIAL RATE: 69 BPM
P AXIS: 107 DEGREES
P AXIS: 83 DEGREES
P-R INTERVAL: 262 MS
P-R INTERVAL: 278 MS
Q-T INTERVAL: 428 MS
Q-T INTERVAL: 454 MS
QRS DURATION: 108 MS
QRS DURATION: 112 MS
QTC CALCULATION (BEZET): 430 MS
QTC CALCULATION (BEZET): 458 MS
R AXIS: -28 DEGREES
R AXIS: -32 DEGREES
T AXIS: 60 DEGREES
T AXIS: 65 DEGREES
VENTRICULAR RATE: 54 BPM
VENTRICULAR RATE: 69 BPM

## 2023-02-25 DIAGNOSIS — F33.42 RECURRENT MAJOR DEPRESSIVE DISORDER, IN FULL REMISSION (HCC): ICD-10-CM

## 2023-02-25 DIAGNOSIS — Z95.2 H/O MECHANICAL AORTIC VALVE REPLACEMENT: ICD-10-CM

## 2023-02-25 DIAGNOSIS — I10 PRIMARY HYPERTENSION: ICD-10-CM

## 2023-02-25 DIAGNOSIS — L20.9 ATOPIC DERMATITIS, UNSPECIFIED TYPE: ICD-10-CM

## 2023-02-27 RX ORDER — WARFARIN SODIUM 10 MG/1
10 TABLET ORAL NIGHTLY
Qty: 90 TABLET | Refills: 0 | OUTPATIENT
Start: 2023-02-27

## 2023-02-27 RX ORDER — WARFARIN SODIUM 5 MG/1
5 TABLET ORAL NIGHTLY
Qty: 90 TABLET | Refills: 0 | Status: SHIPPED | OUTPATIENT
Start: 2023-02-27

## 2023-02-27 RX ORDER — RAMIPRIL 10 MG/1
10 CAPSULE ORAL DAILY
Qty: 90 CAPSULE | Refills: 0 | Status: SHIPPED | OUTPATIENT
Start: 2023-02-27

## 2023-02-27 RX ORDER — SERTRALINE HYDROCHLORIDE 100 MG/1
200 TABLET, FILM COATED ORAL DAILY
Qty: 180 TABLET | Refills: 0 | Status: SHIPPED | OUTPATIENT
Start: 2023-02-27

## 2023-02-27 RX ORDER — CETIRIZINE HYDROCHLORIDE 10 MG/1
10 TABLET ORAL DAILY
Qty: 90 TABLET | Refills: 0 | Status: SHIPPED | OUTPATIENT
Start: 2023-02-27

## 2023-04-09 DIAGNOSIS — G25.81 RESTLESS LEG SYNDROME: ICD-10-CM

## 2023-04-09 DIAGNOSIS — K21.9 GASTROESOPHAGEAL REFLUX DISEASE WITHOUT ESOPHAGITIS: ICD-10-CM

## 2023-04-09 DIAGNOSIS — F33.42 RECURRENT MAJOR DEPRESSIVE DISORDER, IN FULL REMISSION (HCC): ICD-10-CM

## 2023-04-09 DIAGNOSIS — E78.5 HYPERLIPIDEMIA ASSOCIATED WITH TYPE 2 DIABETES MELLITUS (HCC): ICD-10-CM

## 2023-04-09 DIAGNOSIS — E11.69 HYPERLIPIDEMIA ASSOCIATED WITH TYPE 2 DIABETES MELLITUS (HCC): ICD-10-CM

## 2023-04-09 DIAGNOSIS — I10 PRIMARY HYPERTENSION: ICD-10-CM

## 2023-04-09 DIAGNOSIS — E11.9 TYPE 2 DIABETES MELLITUS WITHOUT COMPLICATION, WITHOUT LONG-TERM CURRENT USE OF INSULIN (HCC): ICD-10-CM

## 2023-04-09 DIAGNOSIS — L20.9 ATOPIC DERMATITIS, UNSPECIFIED TYPE: ICD-10-CM

## 2023-04-10 DIAGNOSIS — E78.5 HYPERLIPIDEMIA ASSOCIATED WITH TYPE 2 DIABETES MELLITUS (HCC): ICD-10-CM

## 2023-04-10 DIAGNOSIS — F33.42 RECURRENT MAJOR DEPRESSIVE DISORDER, IN FULL REMISSION (HCC): ICD-10-CM

## 2023-04-10 DIAGNOSIS — E11.9 TYPE 2 DIABETES MELLITUS WITHOUT COMPLICATION, WITHOUT LONG-TERM CURRENT USE OF INSULIN (HCC): ICD-10-CM

## 2023-04-10 DIAGNOSIS — K21.9 GASTROESOPHAGEAL REFLUX DISEASE WITHOUT ESOPHAGITIS: ICD-10-CM

## 2023-04-10 DIAGNOSIS — G25.81 RESTLESS LEG SYNDROME: ICD-10-CM

## 2023-04-10 DIAGNOSIS — E11.69 HYPERLIPIDEMIA ASSOCIATED WITH TYPE 2 DIABETES MELLITUS (HCC): ICD-10-CM

## 2023-04-10 DIAGNOSIS — I10 PRIMARY HYPERTENSION: ICD-10-CM

## 2023-04-10 DIAGNOSIS — L20.9 ATOPIC DERMATITIS, UNSPECIFIED TYPE: ICD-10-CM

## 2023-04-10 NOTE — TELEPHONE ENCOUNTER
LOV: 9/20/2022 with Dr. Bj Ford  RTC: 3 months  Last Relevant Labs: January 2023    No future appointments.

## 2023-04-11 DIAGNOSIS — E11.9 TYPE 2 DIABETES MELLITUS WITHOUT COMPLICATION, WITHOUT LONG-TERM CURRENT USE OF INSULIN (HCC): ICD-10-CM

## 2023-04-11 RX ORDER — GABAPENTIN 300 MG/1
300 CAPSULE ORAL NIGHTLY
Qty: 90 CAPSULE | Refills: 0 | Status: SHIPPED | OUTPATIENT
Start: 2023-04-11

## 2023-04-11 RX ORDER — LANSOPRAZOLE 30 MG/1
30 CAPSULE, DELAYED RELEASE ORAL
Qty: 90 CAPSULE | Refills: 0 | OUTPATIENT
Start: 2023-04-11

## 2023-04-11 RX ORDER — HYDROCHLOROTHIAZIDE 12.5 MG/1
12.5 TABLET ORAL DAILY
Qty: 90 TABLET | Refills: 1 | OUTPATIENT
Start: 2023-04-11

## 2023-04-11 RX ORDER — TRIAMCINOLONE ACETONIDE 1 MG/G
CREAM TOPICAL
Qty: 80 G | Refills: 1 | OUTPATIENT
Start: 2023-04-11

## 2023-04-11 RX ORDER — SIMVASTATIN 40 MG
40 TABLET ORAL NIGHTLY
Qty: 90 TABLET | Refills: 0 | Status: SHIPPED | OUTPATIENT
Start: 2023-04-11

## 2023-04-11 RX ORDER — AMLODIPINE BESYLATE 10 MG/1
10 TABLET ORAL DAILY
Qty: 90 TABLET | Refills: 0 | OUTPATIENT
Start: 2023-04-11

## 2023-04-11 RX ORDER — RAMIPRIL 10 MG/1
10 CAPSULE ORAL DAILY
Qty: 90 CAPSULE | Refills: 0 | Status: SHIPPED | OUTPATIENT
Start: 2023-04-11

## 2023-04-11 RX ORDER — LANSOPRAZOLE 30 MG/1
30 CAPSULE, DELAYED RELEASE ORAL
Qty: 90 CAPSULE | Refills: 0 | Status: SHIPPED | OUTPATIENT
Start: 2023-04-11 | End: 2023-04-17

## 2023-04-11 RX ORDER — CETIRIZINE HYDROCHLORIDE 10 MG/1
10 TABLET ORAL DAILY
Qty: 90 TABLET | Refills: 0 | OUTPATIENT
Start: 2023-04-11

## 2023-04-11 RX ORDER — RAMIPRIL 10 MG/1
10 CAPSULE ORAL DAILY
Qty: 90 CAPSULE | Refills: 0 | OUTPATIENT
Start: 2023-04-11

## 2023-04-11 RX ORDER — SIMVASTATIN 40 MG
40 TABLET ORAL NIGHTLY
Qty: 90 TABLET | Refills: 2 | OUTPATIENT
Start: 2023-04-11

## 2023-04-11 RX ORDER — GABAPENTIN 300 MG/1
300 CAPSULE ORAL NIGHTLY
Qty: 90 CAPSULE | Refills: 0 | OUTPATIENT
Start: 2023-04-11

## 2023-04-11 RX ORDER — HYDROCHLOROTHIAZIDE 12.5 MG/1
12.5 TABLET ORAL DAILY
Qty: 90 TABLET | Refills: 0 | Status: SHIPPED | OUTPATIENT
Start: 2023-04-11

## 2023-04-11 RX ORDER — WARFARIN SODIUM 5 MG/1
5 TABLET ORAL NIGHTLY
Qty: 90 TABLET | Refills: 0 | Status: SHIPPED | OUTPATIENT
Start: 2023-04-11

## 2023-04-11 RX ORDER — SERTRALINE HYDROCHLORIDE 100 MG/1
200 TABLET, FILM COATED ORAL DAILY
Qty: 180 TABLET | Refills: 0 | OUTPATIENT
Start: 2023-04-11

## 2023-04-11 RX ORDER — SERTRALINE HYDROCHLORIDE 100 MG/1
200 TABLET, FILM COATED ORAL DAILY
Qty: 180 TABLET | Refills: 0 | Status: SHIPPED | OUTPATIENT
Start: 2023-04-11

## 2023-04-11 RX ORDER — CETIRIZINE HYDROCHLORIDE 10 MG/1
10 TABLET ORAL DAILY
Qty: 90 TABLET | Refills: 0 | Status: SHIPPED | OUTPATIENT
Start: 2023-04-11

## 2023-04-11 RX ORDER — WARFARIN SODIUM 5 MG/1
5 TABLET ORAL NIGHTLY
Qty: 90 TABLET | Refills: 0 | OUTPATIENT
Start: 2023-04-11

## 2023-04-11 RX ORDER — AMLODIPINE BESYLATE 10 MG/1
10 TABLET ORAL DAILY
Qty: 90 TABLET | Refills: 0 | Status: SHIPPED | OUTPATIENT
Start: 2023-04-11

## 2023-04-11 NOTE — TELEPHONE ENCOUNTER
Future Appointments   Date Time Provider Annie Licona   4/18/2023 11:30 AM Hallie Gracia,  [facility]

## 2023-04-18 ENCOUNTER — OFFICE VISIT (OUTPATIENT)
Dept: INTERNAL MEDICINE CLINIC | Facility: CLINIC | Age: 68
End: 2023-04-18
Payer: MEDICAID

## 2023-04-18 VITALS
DIASTOLIC BLOOD PRESSURE: 67 MMHG | SYSTOLIC BLOOD PRESSURE: 141 MMHG | OXYGEN SATURATION: 99 % | BODY MASS INDEX: 40.66 KG/M2 | TEMPERATURE: 98 F | WEIGHT: 284 LBS | RESPIRATION RATE: 16 BRPM | HEART RATE: 60 BPM | HEIGHT: 70 IN

## 2023-04-18 DIAGNOSIS — E78.5 HYPERLIPIDEMIA ASSOCIATED WITH TYPE 2 DIABETES MELLITUS (HCC): ICD-10-CM

## 2023-04-18 DIAGNOSIS — E11.65 TYPE 2 DIABETES MELLITUS WITH HYPERGLYCEMIA, WITHOUT LONG-TERM CURRENT USE OF INSULIN (HCC): ICD-10-CM

## 2023-04-18 DIAGNOSIS — E11.69 HYPERLIPIDEMIA ASSOCIATED WITH TYPE 2 DIABETES MELLITUS (HCC): ICD-10-CM

## 2023-04-18 DIAGNOSIS — M16.0 BILATERAL HIP JOINT ARTHRITIS: ICD-10-CM

## 2023-04-18 DIAGNOSIS — K21.9 GASTROESOPHAGEAL REFLUX DISEASE WITHOUT ESOPHAGITIS: ICD-10-CM

## 2023-04-18 DIAGNOSIS — D50.9 IRON DEFICIENCY ANEMIA, UNSPECIFIED IRON DEFICIENCY ANEMIA TYPE: ICD-10-CM

## 2023-04-18 DIAGNOSIS — I10 PRIMARY HYPERTENSION: Primary | ICD-10-CM

## 2023-04-18 DIAGNOSIS — F33.42 RECURRENT MAJOR DEPRESSIVE DISORDER, IN FULL REMISSION (HCC): ICD-10-CM

## 2023-04-18 DIAGNOSIS — Z86.73 HISTORY OF TIA (TRANSIENT ISCHEMIC ATTACK): ICD-10-CM

## 2023-04-18 DIAGNOSIS — E66.01 MORBID (SEVERE) OBESITY DUE TO EXCESS CALORIES (HCC): ICD-10-CM

## 2023-04-18 DIAGNOSIS — Z12.11 SCREENING FOR COLON CANCER: ICD-10-CM

## 2023-04-18 DIAGNOSIS — Z95.2 H/O HEART VALVE REPLACEMENT WITH MECHANICAL VALVE: ICD-10-CM

## 2023-04-18 DIAGNOSIS — D69.6 THROMBOCYTOPENIA (HCC): ICD-10-CM

## 2023-04-18 PROCEDURE — 3008F BODY MASS INDEX DOCD: CPT | Performed by: INTERNAL MEDICINE

## 2023-04-18 PROCEDURE — 99215 OFFICE O/P EST HI 40 MIN: CPT | Performed by: INTERNAL MEDICINE

## 2023-04-18 PROCEDURE — 3077F SYST BP >= 140 MM HG: CPT | Performed by: INTERNAL MEDICINE

## 2023-04-18 PROCEDURE — 3078F DIAST BP <80 MM HG: CPT | Performed by: INTERNAL MEDICINE

## 2023-04-18 NOTE — PATIENT INSTRUCTIONS
I have ordered blood tests for you. Please have it done soon    I have referred you to the cardiologist (Dr. Janeen Clark), GI for colonoscopy (Dr. Vanessa Hanna) and for hip pain (Dr. Mark Mead)    Losing weight is the best for your hip pain    Continue your medications    I recommend the shingles vaccine (2 dose series) and pneumonia vaccine through the pharmacy. Both can cause pain and fever for 24 hours    All patients with diabetes should see an eye doctor. I have referred you to Dr. Meg Carmona and she takes your insurance    Your blood pressure is high, but it could be due to the weight.  Cutting down on salt and watching your diet will help with this    Please see me back in 3 months or sooner for a follow up

## 2023-05-25 ENCOUNTER — HOSPITAL ENCOUNTER (OUTPATIENT)
Dept: GENERAL RADIOLOGY | Age: 68
Discharge: HOME | End: 2023-05-25
Attending: INTERNAL MEDICINE
Payer: MEDICAID

## 2023-05-25 DIAGNOSIS — M16.0 BILATERAL HIP JOINT ARTHRITIS: ICD-10-CM

## 2023-05-25 PROCEDURE — 73523 X-RAY EXAM HIPS BI 5/> VIEWS: CPT | Performed by: INTERNAL MEDICINE

## 2023-06-05 ENCOUNTER — PATIENT MESSAGE (OUTPATIENT)
Dept: INTERNAL MEDICINE CLINIC | Facility: CLINIC | Age: 68
End: 2023-06-05

## 2023-06-05 ENCOUNTER — TELEPHONE (OUTPATIENT)
Dept: ORTHOPEDICS CLINIC | Facility: CLINIC | Age: 68
End: 2023-06-05

## 2023-06-05 DIAGNOSIS — L98.9 SKIN LESION: Primary | ICD-10-CM

## 2023-06-05 NOTE — TELEPHONE ENCOUNTER
Patient has an upcoming appointment for bilat hip pain. Patient had diagnostic imaging on 5/25 and can be viewed in Epic. Please review imaging, and advise if further imaging is required. If, so please place RX accordingly.     Future Appointments   Date Time Provider Annie Monae   7/12/2023  9:40 AM Florin Thompson MD [facility]   8/17/2023 11:00 AM Torey Le MD [facility]

## 2023-07-02 DIAGNOSIS — E11.9 TYPE 2 DIABETES MELLITUS WITHOUT COMPLICATION, WITHOUT LONG-TERM CURRENT USE OF INSULIN (HCC): ICD-10-CM

## 2023-07-03 RX ORDER — WARFARIN SODIUM 5 MG/1
5 TABLET ORAL NIGHTLY
Qty: 90 TABLET | Refills: 0 | Status: SHIPPED | OUTPATIENT
Start: 2023-07-03

## 2023-07-12 ENCOUNTER — OFFICE VISIT (OUTPATIENT)
Dept: ORTHOPEDICS CLINIC | Facility: CLINIC | Age: 68
End: 2023-07-12
Payer: MEDICAID

## 2023-07-12 VITALS — HEIGHT: 69.5 IN | BODY MASS INDEX: 41.52 KG/M2 | WEIGHT: 286.81 LBS

## 2023-07-12 DIAGNOSIS — M76.892 TENDINITIS INVOLVING LEFT HIP ABDUCTORS: Primary | ICD-10-CM

## 2023-07-12 PROCEDURE — 99203 OFFICE O/P NEW LOW 30 MIN: CPT | Performed by: ORTHOPAEDIC SURGERY

## 2023-07-12 PROCEDURE — 3008F BODY MASS INDEX DOCD: CPT | Performed by: ORTHOPAEDIC SURGERY

## 2023-07-14 ENCOUNTER — HOSPITAL ENCOUNTER (OUTPATIENT)
Age: 68
Discharge: HOME | End: 2023-07-14
Payer: MEDICAID

## 2023-07-14 VITALS
HEIGHT: 70 IN | RESPIRATION RATE: 24 BRPM | WEIGHT: 168 LBS | DIASTOLIC BLOOD PRESSURE: 64 MMHG | HEART RATE: 72 BPM | SYSTOLIC BLOOD PRESSURE: 143 MMHG | BODY MASS INDEX: 24.05 KG/M2 | TEMPERATURE: 98 F | OXYGEN SATURATION: 97 %

## 2023-07-14 DIAGNOSIS — L03.116 CELLULITIS OF LEFT LOWER EXTREMITY: Primary | ICD-10-CM

## 2023-07-14 PROCEDURE — 87077 CULTURE AEROBIC IDENTIFY: CPT | Performed by: NURSE PRACTITIONER

## 2023-07-14 PROCEDURE — 87070 CULTURE OTHR SPECIMN AEROBIC: CPT | Performed by: NURSE PRACTITIONER

## 2023-07-14 PROCEDURE — 99214 OFFICE O/P EST MOD 30 MIN: CPT

## 2023-07-14 PROCEDURE — 87205 SMEAR GRAM STAIN: CPT | Performed by: NURSE PRACTITIONER

## 2023-07-14 RX ORDER — CEPHALEXIN 500 MG/1
500 CAPSULE ORAL 3 TIMES DAILY
Qty: 30 CAPSULE | Refills: 0 | Status: SHIPPED | OUTPATIENT
Start: 2023-07-14 | End: 2023-07-24

## 2023-07-14 NOTE — ED INITIAL ASSESSMENT (HPI)
Left lower anterior leg wound x 1 week . Per pt wound has been red, with clear drainage. Per pt his dog ran then hit the wound a piece of skin  fell off. Denies fever.  Pt states he has h/o cellutits and sepsis

## 2023-07-17 DIAGNOSIS — I10 PRIMARY HYPERTENSION: ICD-10-CM

## 2023-07-17 NOTE — TELEPHONE ENCOUNTER
LOV: 4/18/2023 with Dr. Sarmad Diaz  RTC: 3 months  Last Relevant Labs: 1/21/2023  Filled: 4/11/2023    #90 with 0 refills    Future Appointments   Date Time Provider Annie Davidi   7/20/2023  2:00 PM Eros Carrera MD [address]   7/25/2023 11:30 AM Loreta Millan,  [facility]   8/2/2023 11:30 AM Nixon Carpenter PA-C [facility]   8/10/2023  1:30 PM Kane Manzano MD [address]

## 2023-07-18 RX ORDER — HYDROCHLOROTHIAZIDE 12.5 MG/1
12.5 TABLET ORAL DAILY
Qty: 90 TABLET | Refills: 0 | Status: SHIPPED | OUTPATIENT
Start: 2023-07-18

## 2023-07-20 ENCOUNTER — OFFICE VISIT (OUTPATIENT)
Dept: WOUND CARE | Facility: HOSPITAL | Age: 68
End: 2023-07-20
Attending: FAMILY MEDICINE
Payer: MEDICAID

## 2023-07-20 VITALS
TEMPERATURE: 98 F | DIASTOLIC BLOOD PRESSURE: 64 MMHG | WEIGHT: 265 LBS | HEIGHT: 70 IN | RESPIRATION RATE: 16 BRPM | SYSTOLIC BLOOD PRESSURE: 144 MMHG | HEART RATE: 61 BPM | BODY MASS INDEX: 37.94 KG/M2

## 2023-07-20 DIAGNOSIS — I87.332 CHRONIC VENOUS HYPERTENSION (IDIOPATHIC) WITH ULCER AND INFLAMMATION OF LEFT LOWER EXTREMITY (HCC): Primary | ICD-10-CM

## 2023-07-20 DIAGNOSIS — E11.65 TYPE 2 DIABETES MELLITUS WITH HYPERGLYCEMIA, WITHOUT LONG-TERM CURRENT USE OF INSULIN (HCC): ICD-10-CM

## 2023-07-20 DIAGNOSIS — E66.09 CLASS 2 OBESITY DUE TO EXCESS CALORIES WITH BODY MASS INDEX (BMI) OF 38.0 TO 38.9 IN ADULT, UNSPECIFIED WHETHER SERIOUS COMORBIDITY PRESENT: ICD-10-CM

## 2023-07-20 LAB — GLUCOSE BLD-MCNC: 226 MG/DL (ref 70–99)

## 2023-07-20 PROCEDURE — 99204 OFFICE O/P NEW MOD 45 MIN: CPT | Performed by: FAMILY MEDICINE

## 2023-07-20 NOTE — PROGRESS NOTES
Weekly Wound Education Note    Teaching Provided To: Patient  Training Topics: Cleasing and general instructions;Dressing; Discharge instructions; Compression;Edema control  Training Method: Explain/Verbal  Training Response: Patient responds and understands; Reinforcement needed        Notes: Initial visit: wound to left leg. Vashe soak prior to dressing application. Enluxtra (backing removed), ABD pad, unna boot 20-30mmhg. Pt to return for RN visit Monday and provider visit in 1 week. Compression wrap handout provided.

## 2023-07-24 ENCOUNTER — OFFICE VISIT (OUTPATIENT)
Dept: WOUND CARE | Facility: HOSPITAL | Age: 68
End: 2023-07-24
Attending: FAMILY MEDICINE
Payer: MEDICAID

## 2023-07-24 VITALS
RESPIRATION RATE: 16 BRPM | TEMPERATURE: 98 F | DIASTOLIC BLOOD PRESSURE: 75 MMHG | HEART RATE: 71 BPM | SYSTOLIC BLOOD PRESSURE: 127 MMHG

## 2023-07-24 DIAGNOSIS — I87.332 CHRONIC VENOUS HYPERTENSION (IDIOPATHIC) WITH ULCER AND INFLAMMATION OF LEFT LOWER EXTREMITY (HCC): Primary | ICD-10-CM

## 2023-07-24 DIAGNOSIS — E11.65 TYPE 2 DIABETES MELLITUS WITH HYPERGLYCEMIA, WITHOUT LONG-TERM CURRENT USE OF INSULIN (HCC): ICD-10-CM

## 2023-07-24 LAB — GLUCOSE BLD-MCNC: 198 MG/DL (ref 70–99)

## 2023-07-24 PROCEDURE — 82962 GLUCOSE BLOOD TEST: CPT

## 2023-07-24 PROCEDURE — 29581 APPL MULTLAYER CMPRN SYS LEG: CPT

## 2023-07-25 ENCOUNTER — OFFICE VISIT (OUTPATIENT)
Dept: INTERNAL MEDICINE CLINIC | Facility: CLINIC | Age: 68
End: 2023-07-25
Payer: MEDICAID

## 2023-07-25 VITALS
SYSTOLIC BLOOD PRESSURE: 142 MMHG | HEIGHT: 70 IN | BODY MASS INDEX: 40.63 KG/M2 | RESPIRATION RATE: 22 BRPM | WEIGHT: 283.81 LBS | DIASTOLIC BLOOD PRESSURE: 68 MMHG | TEMPERATURE: 98 F | OXYGEN SATURATION: 97 % | HEART RATE: 76 BPM

## 2023-07-25 DIAGNOSIS — E66.01 MORBID (SEVERE) OBESITY DUE TO EXCESS CALORIES (HCC): ICD-10-CM

## 2023-07-25 DIAGNOSIS — Z95.2 H/O HEART VALVE REPLACEMENT WITH MECHANICAL VALVE: ICD-10-CM

## 2023-07-25 DIAGNOSIS — E11.65 TYPE 2 DIABETES MELLITUS WITH HYPERGLYCEMIA, WITHOUT LONG-TERM CURRENT USE OF INSULIN (HCC): Primary | ICD-10-CM

## 2023-07-25 DIAGNOSIS — I10 PRIMARY HYPERTENSION: ICD-10-CM

## 2023-07-25 DIAGNOSIS — C44.90 SKIN CANCER: ICD-10-CM

## 2023-07-25 DIAGNOSIS — D69.6 THROMBOCYTOPENIA (HCC): ICD-10-CM

## 2023-07-25 DIAGNOSIS — E78.5 HYPERLIPIDEMIA ASSOCIATED WITH TYPE 2 DIABETES MELLITUS: ICD-10-CM

## 2023-07-25 DIAGNOSIS — E11.65 TYPE 2 DIABETES MELLITUS WITH HYPERGLYCEMIA, WITHOUT LONG-TERM CURRENT USE OF INSULIN (HCC): ICD-10-CM

## 2023-07-25 DIAGNOSIS — D50.9 IRON DEFICIENCY ANEMIA, UNSPECIFIED IRON DEFICIENCY ANEMIA TYPE: Primary | ICD-10-CM

## 2023-07-25 DIAGNOSIS — E11.69 HYPERLIPIDEMIA ASSOCIATED WITH TYPE 2 DIABETES MELLITUS: ICD-10-CM

## 2023-07-25 PROCEDURE — 90471 IMMUNIZATION ADMIN: CPT | Performed by: INTERNAL MEDICINE

## 2023-07-25 PROCEDURE — 3078F DIAST BP <80 MM HG: CPT | Performed by: INTERNAL MEDICINE

## 2023-07-25 PROCEDURE — 3008F BODY MASS INDEX DOCD: CPT | Performed by: INTERNAL MEDICINE

## 2023-07-25 PROCEDURE — 99214 OFFICE O/P EST MOD 30 MIN: CPT | Performed by: INTERNAL MEDICINE

## 2023-07-25 PROCEDURE — 90677 PCV20 VACCINE IM: CPT | Performed by: INTERNAL MEDICINE

## 2023-07-25 PROCEDURE — 3077F SYST BP >= 140 MM HG: CPT | Performed by: INTERNAL MEDICINE

## 2023-07-25 RX ORDER — BLOOD-GLUCOSE SENSOR
1 EACH MISCELLANEOUS AS DIRECTED
Qty: 6 EACH | Refills: 0 | Status: SHIPPED | OUTPATIENT
Start: 2023-07-25

## 2023-07-25 NOTE — PATIENT INSTRUCTIONS
You received the pneumonia vaccine today'    I have referred you to the eye doctor    Please have blood tests done today    See me back after your colonoscopy for a physical

## 2023-07-27 ENCOUNTER — OFFICE VISIT (OUTPATIENT)
Dept: WOUND CARE | Facility: HOSPITAL | Age: 68
End: 2023-07-27
Attending: FAMILY MEDICINE
Payer: MEDICAID

## 2023-07-27 VITALS
DIASTOLIC BLOOD PRESSURE: 67 MMHG | HEART RATE: 63 BPM | RESPIRATION RATE: 16 BRPM | SYSTOLIC BLOOD PRESSURE: 145 MMHG | TEMPERATURE: 98 F

## 2023-07-27 DIAGNOSIS — I87.332 CHRONIC VENOUS HYPERTENSION (IDIOPATHIC) WITH ULCER AND INFLAMMATION OF LEFT LOWER EXTREMITY (HCC): Primary | ICD-10-CM

## 2023-07-27 DIAGNOSIS — E11.65 TYPE 2 DIABETES MELLITUS WITH HYPERGLYCEMIA, WITHOUT LONG-TERM CURRENT USE OF INSULIN (HCC): ICD-10-CM

## 2023-07-27 DIAGNOSIS — E66.09 CLASS 2 OBESITY DUE TO EXCESS CALORIES WITH BODY MASS INDEX (BMI) OF 38.0 TO 38.9 IN ADULT, UNSPECIFIED WHETHER SERIOUS COMORBIDITY PRESENT: ICD-10-CM

## 2023-07-27 LAB — GLUCOSE BLD-MCNC: 279 MG/DL (ref 70–99)

## 2023-07-27 PROCEDURE — 99215 OFFICE O/P EST HI 40 MIN: CPT | Performed by: FAMILY MEDICINE

## 2023-07-27 NOTE — PROGRESS NOTES
Weekly Wound Education Note    Teaching Provided To: Patient  Training Topics: Cleasing and general instructions; Discharge instructions;Dressing  Training Method: Explain/Verbal  Training Response: Patient responds and understands; Reinforcement needed        Notes: Improving. Hydrofera transfer, kerramax, unna boot 20-30mmhg. Reminded to elevated legs at 3 times a day. 2 layer medivan compression stocking ordered today.

## 2023-07-28 ENCOUNTER — TELEPHONE (OUTPATIENT)
Dept: INTERNAL MEDICINE CLINIC | Facility: CLINIC | Age: 68
End: 2023-07-28

## 2023-07-28 NOTE — TELEPHONE ENCOUNTER
Lets start the PA process but if dexcom is covered then we can try that instead  [address] [place] [address] DO

## 2023-08-01 DIAGNOSIS — G25.81 RESTLESS LEG SYNDROME: ICD-10-CM

## 2023-08-02 ENCOUNTER — OFFICE VISIT (OUTPATIENT)
Dept: ORTHOPEDICS CLINIC | Facility: CLINIC | Age: 68
End: 2023-08-02
Payer: MEDICAID

## 2023-08-02 VITALS — BODY MASS INDEX: 40.23 KG/M2 | WEIGHT: 281 LBS | HEIGHT: 70 IN

## 2023-08-02 DIAGNOSIS — M76.892 TENDINITIS INVOLVING LEFT HIP ABDUCTORS: Primary | ICD-10-CM

## 2023-08-02 PROCEDURE — 3008F BODY MASS INDEX DOCD: CPT | Performed by: PHYSICIAN ASSISTANT

## 2023-08-02 PROCEDURE — 20610 DRAIN/INJ JOINT/BURSA W/O US: CPT | Performed by: PHYSICIAN ASSISTANT

## 2023-08-02 RX ORDER — GABAPENTIN 300 MG/1
300 CAPSULE ORAL NIGHTLY
Qty: 90 CAPSULE | Refills: 3 | Status: SHIPPED | OUTPATIENT
Start: 2023-08-02

## 2023-08-02 RX ORDER — TRIAMCINOLONE ACETONIDE 40 MG/ML
40 INJECTION, SUSPENSION INTRA-ARTICULAR; INTRAMUSCULAR ONCE
Status: COMPLETED | OUTPATIENT
Start: 2023-08-02 | End: 2023-08-02

## 2023-08-02 RX ADMIN — TRIAMCINOLONE ACETONIDE 40 MG: 40 INJECTION, SUSPENSION INTRA-ARTICULAR; INTRAMUSCULAR at 11:51:00

## 2023-08-02 NOTE — TELEPHONE ENCOUNTER
gabapentin 300 MG Oral Cap         Sig: Take 1 capsule (300 mg total) by mouth nightly.     Disp: 90 capsule    Refills: 0    Start: 8/1/2023    Class: Normal    Non-formulary For: Restless leg syndrome    Last ordered: 3 months ago by Janice Cavazos, DO        To be filled at: [place] [id number] [address], [phone], [phone]

## 2023-08-02 NOTE — PROCEDURES
After informed consent, the patient's left hip was marked laterally over the greater trochanter, locally anesthetized with skin refrigerant, prepped with topical antiseptic, and injected with a mixture of 1mL 40mg/mL Kenalog, 2mL 1% lidocaine and 2mL 0.5% marcaine directly into the greater trochanteric bursa. A band-aid was applied. The patient tolerated the procedure well.     Lexis Charles PA-C  [address] Talked to pt and informed her, per Dr. Rohini Daly, Bone density revealed osteopenia, ensure taking daily calcium and vit D supplement otc, weight bearing exercises.

## 2023-08-03 ENCOUNTER — OFFICE VISIT (OUTPATIENT)
Dept: WOUND CARE | Facility: HOSPITAL | Age: 68
End: 2023-08-03
Attending: FAMILY MEDICINE
Payer: MEDICAID

## 2023-08-03 VITALS
RESPIRATION RATE: 16 BRPM | HEART RATE: 74 BPM | TEMPERATURE: 98 F | SYSTOLIC BLOOD PRESSURE: 150 MMHG | DIASTOLIC BLOOD PRESSURE: 76 MMHG

## 2023-08-03 DIAGNOSIS — E66.09 CLASS 2 OBESITY DUE TO EXCESS CALORIES WITH BODY MASS INDEX (BMI) OF 38.0 TO 38.9 IN ADULT, UNSPECIFIED WHETHER SERIOUS COMORBIDITY PRESENT: ICD-10-CM

## 2023-08-03 DIAGNOSIS — I87.332 CHRONIC VENOUS HYPERTENSION (IDIOPATHIC) WITH ULCER AND INFLAMMATION OF LEFT LOWER EXTREMITY (HCC): Primary | ICD-10-CM

## 2023-08-03 DIAGNOSIS — E11.65 TYPE 2 DIABETES MELLITUS WITH HYPERGLYCEMIA, WITHOUT LONG-TERM CURRENT USE OF INSULIN (HCC): ICD-10-CM

## 2023-08-03 LAB — GLUCOSE BLD-MCNC: 299 MG/DL (ref 70–99)

## 2023-08-03 PROCEDURE — 99214 OFFICE O/P EST MOD 30 MIN: CPT | Performed by: FAMILY MEDICINE

## 2023-08-03 NOTE — PATIENT INSTRUCTIONS
PATIENT INSTRUCTIONS      FOR Chelojar[facility] DORIS Pan 1955    DATE OF SERVICE: 8/3/2023        The wound is now completely healed. May be discharged from wound care at this time    Keep the silver foam dressing on the wound and you may remove it after 3 to 4 days. Wear your spandagrip compression stocking until you receive your compression garment in the mail. Always wear your compression garment to help control swelling in your legs in the future and you may remove them at bedtime when legs are elevated. Return to clinic if you have any reoccurrence of the wound or any new wounds develop.

## 2023-08-03 NOTE — PROGRESS NOTES
.Weekly Wound Education Note    Teaching Provided To: Patient  Training Topics: Edema control;Cleasing and general instructions; Compression; Discharge instructions;Dressing  Training Method: Explain/Verbal;Demonstration  Training Response: Reinforcement needed; Patient responds and understands        Notes: Pt here for follow up wound care visit to left lower leg wound. Per provider patient is healed. Edema measurements decreased, wound measurement decreased. Bordered silver foam applied to wound area and spandagrip (E) applied for compression. Pt states he did not recieve compression stocking from [facility] yet, RN states to call clinic if he does not recieve by the end of next week, informed pt to continue wearing spandagrip till he recieves new compression stocking. Educated about importance of daily compression use, patient verbalized understanding. No follow up visit needed with provider at this time. Educated pt to call clinic if any drainge or open areas are noted.

## 2023-08-07 DIAGNOSIS — I10 PRIMARY HYPERTENSION: ICD-10-CM

## 2023-08-07 DIAGNOSIS — L20.9 ATOPIC DERMATITIS, UNSPECIFIED TYPE: ICD-10-CM

## 2023-08-07 RX ORDER — CETIRIZINE HYDROCHLORIDE 10 MG/1
10 TABLET ORAL DAILY
Qty: 90 TABLET | Refills: 0 | Status: SHIPPED | OUTPATIENT
Start: 2023-08-07

## 2023-08-07 RX ORDER — WARFARIN SODIUM 5 MG/1
5 TABLET ORAL NIGHTLY
Qty: 90 TABLET | Refills: 0 | Status: SHIPPED | OUTPATIENT
Start: 2023-08-07

## 2023-08-07 RX ORDER — HYDROCHLOROTHIAZIDE 12.5 MG/1
12.5 TABLET ORAL DAILY
Qty: 90 TABLET | Refills: 0 | Status: SHIPPED | OUTPATIENT
Start: 2023-08-07

## 2023-08-07 RX ORDER — AMLODIPINE BESYLATE 10 MG/1
10 TABLET ORAL DAILY
Qty: 90 TABLET | Refills: 0 | Status: SHIPPED | OUTPATIENT
Start: 2023-08-07

## 2023-08-07 NOTE — TELEPHONE ENCOUNTER
Protocol PASSED    Requesting:      amLODIPine 10 MG Oral Tab         Sig: Take 1 tablet (10 mg total) by mouth daily. Disp: 90 tablet    Refills: 0    Start: 8/7/2023    Class: Normal    Non-formulary    Last ordered: 3 months ago by Isai Iyer DO     Hypertension Medications Protocol [id number]/07/2023 11:40 AM   Protocol Details CMP or BMP in past 12 months    Last serum creatinine< 2.0    Appointment in past 6 or next 3 months       cetirizine 10 MG Oral Tab         Sig: Take 1 tablet (10 mg total) by mouth daily. Disp: 90 tablet    Refills: 0    Start: 8/7/2023    Class: Normal    Non-formulary For: Atopic dermatitis, unspecified type    Last ordered: 3 months ago by Isai Iyer DO     Allergy Medication Protocol [id number]/07/2023 11:40 AM    Appointment in the past 12 or next 3 months       warfarin 5 MG Oral Tab         Sig: Take 1 tablet (5 mg total) by mouth nightly. Disp: 90 tablet    Refills: 0    Start: 8/7/2023    Class: Normal    Non-formulary    Last ordered: 1 month ago by Isai Iyer DO         hydroCHLOROthiazide 12.5 MG Oral Tab         Sig: Take 1 tablet (12.5 mg total) by mouth daily.     Disp: 90 tablet    Refills: 0    Start: 8/7/2023    Class: Normal    Non-formulary For: Primary hypertension    Last ordered: 2 weeks ago by Isai Iyer DO     Hypertension Medications Protocol [id number]/07/2023 11:40 AM   Protocol Details CMP or BMP in past 12 months    Last serum creatinine< 2.0    Appointment in past 6 or next 3 months      To be filled at: [facility]  [address], [phone], [phone]              LOV: 7/25/23  RTC: 1 month   Filled: 7/18/23 90 tablet 0 refill   Recent Labs:     Upcoming OV: NONE

## 2023-08-10 ENCOUNTER — TELEPHONE (OUTPATIENT)
Dept: GASTROENTEROLOGY | Facility: CLINIC | Age: 68
End: 2023-08-10

## 2023-08-10 ENCOUNTER — LAB ENCOUNTER (OUTPATIENT)
Dept: LAB | Age: 68
End: 2023-08-10
Attending: INTERNAL MEDICINE
Payer: MEDICAID

## 2023-08-10 ENCOUNTER — TELEPHONE (OUTPATIENT)
Dept: INTERNAL MEDICINE CLINIC | Facility: CLINIC | Age: 68
End: 2023-08-10

## 2023-08-10 ENCOUNTER — OFFICE VISIT (OUTPATIENT)
Dept: GASTROENTEROLOGY | Facility: CLINIC | Age: 68
End: 2023-08-10

## 2023-08-10 VITALS
HEART RATE: 77 BPM | WEIGHT: 274 LBS | HEIGHT: 70 IN | SYSTOLIC BLOOD PRESSURE: 145 MMHG | BODY MASS INDEX: 39.22 KG/M2 | DIASTOLIC BLOOD PRESSURE: 72 MMHG

## 2023-08-10 DIAGNOSIS — K64.8 HEMORRHOIDS, INTERNAL, WITH BLEEDING: ICD-10-CM

## 2023-08-10 DIAGNOSIS — D50.9 IRON DEFICIENCY ANEMIA, UNSPECIFIED IRON DEFICIENCY ANEMIA TYPE: ICD-10-CM

## 2023-08-10 DIAGNOSIS — D50.9 IRON DEFICIENCY ANEMIA, UNSPECIFIED IRON DEFICIENCY ANEMIA TYPE: Primary | ICD-10-CM

## 2023-08-10 DIAGNOSIS — K64.9 HEMORRHOIDS, UNSPECIFIED HEMORRHOID TYPE: Primary | ICD-10-CM

## 2023-08-10 LAB
BASOPHILS # BLD AUTO: 0.04 X10(3) UL (ref 0–0.2)
BASOPHILS NFR BLD AUTO: 0.5 %
DEPRECATED HBV CORE AB SER IA-ACNC: 18.1 NG/ML
DEPRECATED RDW RBC AUTO: 44.9 FL (ref 35.1–46.3)
EOSINOPHIL # BLD AUTO: 0.14 X10(3) UL (ref 0–0.7)
EOSINOPHIL NFR BLD AUTO: 1.9 %
ERYTHROCYTE [DISTWIDTH] IN BLOOD BY AUTOMATED COUNT: 14.5 % (ref 11–15)
FOLATE SERPL-MCNC: >20 NG/ML (ref 8.7–?)
HCT VFR BLD AUTO: 38.8 %
HGB BLD-MCNC: 12.3 G/DL
IMM GRANULOCYTES # BLD AUTO: 0.04 X10(3) UL (ref 0–1)
IMM GRANULOCYTES NFR BLD: 0.5 %
IRON SATN MFR SERPL: 9 %
IRON SERPL-MCNC: 53 UG/DL
LYMPHOCYTES # BLD AUTO: 1.18 X10(3) UL (ref 1–4)
LYMPHOCYTES NFR BLD AUTO: 15.9 %
MCH RBC QN AUTO: 27.2 PG (ref 26–34)
MCHC RBC AUTO-ENTMCNC: 31.7 G/DL (ref 31–37)
MCV RBC AUTO: 85.8 FL
MONOCYTES # BLD AUTO: 0.49 X10(3) UL (ref 0.1–1)
MONOCYTES NFR BLD AUTO: 6.6 %
NEUTROPHILS # BLD AUTO: 5.53 X10 (3) UL (ref 1.5–7.7)
NEUTROPHILS # BLD AUTO: 5.53 X10(3) UL (ref 1.5–7.7)
NEUTROPHILS NFR BLD AUTO: 74.6 %
RBC # BLD AUTO: 4.52 X10(6)UL
TIBC SERPL-MCNC: 562 UG/DL (ref 240–450)
TRANSFERRIN SERPL-MCNC: 377 MG/DL (ref 200–360)
VIT B12 SERPL-MCNC: 524 PG/ML (ref 193–986)
WBC # BLD AUTO: 7.4 X10(3) UL (ref 4–11)

## 2023-08-10 PROCEDURE — 83540 ASSAY OF IRON: CPT

## 2023-08-10 PROCEDURE — 3077F SYST BP >= 140 MM HG: CPT | Performed by: INTERNAL MEDICINE

## 2023-08-10 PROCEDURE — 82728 ASSAY OF FERRITIN: CPT

## 2023-08-10 PROCEDURE — 82746 ASSAY OF FOLIC ACID SERUM: CPT

## 2023-08-10 PROCEDURE — 84466 ASSAY OF TRANSFERRIN: CPT

## 2023-08-10 PROCEDURE — 36415 COLL VENOUS BLD VENIPUNCTURE: CPT

## 2023-08-10 PROCEDURE — 3008F BODY MASS INDEX DOCD: CPT | Performed by: INTERNAL MEDICINE

## 2023-08-10 PROCEDURE — 82607 VITAMIN B-12: CPT

## 2023-08-10 PROCEDURE — 99245 OFF/OP CONSLTJ NEW/EST HI 55: CPT | Performed by: INTERNAL MEDICINE

## 2023-08-10 PROCEDURE — 85025 COMPLETE CBC W/AUTO DIFF WBC: CPT

## 2023-08-10 PROCEDURE — 3078F DIAST BP <80 MM HG: CPT | Performed by: INTERNAL MEDICINE

## 2023-08-10 NOTE — PROGRESS NOTES
Dear RN, I would like patient to see a colorectal surgeon through [facility]. Can you pend me a referral for Yael Gaines and can we confirm if he does hemorrhoidectomy?     Thank you   [address] DO

## 2023-08-10 NOTE — H&P
[facility], [facility] - Gastroenterology                                                                                                               Reason for consult: ASHLEY, rectal bleeding, h/o hemorrhoids     Requesting physician or provider: Luis Kelly DO    Patient presents with:  Rectal Bleeding: Hemorrhoids       HPI:   Fareed Dawson is a 79year old year-old male with h/o mechanical aortic valve replacement, trang-en-y gastric bypass 2018, HTN, HLD, DM-II,     Pt has had rectal bleeding with every BM. Usually, it's just a little bright red blood when wiping. This has been going on for years and was deemed to the be etiology of his ASHLEY in the past after extensive GI work-up in [facility]. His last CLN was 1.5 years ago and reportedly wnl. Last EGD was ~3-4 years ago and he had 2 VCEs as well. He moved to the   1.5 years ago. A hemorrhoidectomy was attempted 8 months ago and pt states he was told his case was \"too complicated\" and the procedure was aborted. He was told another specialist may need to attempt hemorrhoidectomy. Patient currently denies any GI symptoms of nausea, vomiting, dyspepsia, dysphagia, hematemesis, abdominal pain, change in bowel habits, thin stools, or melena. Additionally there is no weight loss and no reported history of chest pain or shortness of breath. Last Hgb was from 1/2023 and 12.2. INR goal is 2.5-3.5 given h/o a mechanical aortic valve. Denies family h/o CRC. Prior endoscopies:  Had CLN 1.5 years ago. Has had ~10 CLNs, 3 EGDs, and 2 capsule tests.      Soc:  -denies smoking  -denies heavy Etoh  -no illicit drug use    Wt Readings from Last 6 Encounters:  08/10/23 : 274 lb (124.3 kg)  08/02/23 : 281 lb (127.5 kg)  07/25/23 : 283 lb 12.8 oz (128.7 kg)  07/20/23 : 265 lb (120.2 kg)  07/14/23 : 168 lb (76.2 kg)  07/12/23 : 286 lb 12.8 oz (130.1 kg) History, Medications, Allergies, ROS:      Past Medical History:   Diagnosis Date    Anemia, chronic disease     Anesthesia complication     HX: OF AGGRESSION W/PAST PROCEDURES D/T INSUFFICIENT ANESTHESIA IN THE PAST? Arthritis 5 yrs ago    Nothing good to say    Calculus of kidney 5 yrs    Nothing major    Cancer ([facility] [address]     skin cancer    Cellulitis     Diabetes ([facility] [address]     Essential hypertension     Hearing impairment     High blood pressure     Hyperlipidemia     Obesity 10yrs ago    Osteoarthritis 5 yrs ago    Pancreatitis     Sepsis ([facility] [address]     Shortness of breath     INTERMITTENT SOB ON EXERTION    Visual impairment     CONTACTS/GLASSES      Past Surgical History:   Procedure Laterality Date    ANAST PANC CYST-BOWEL,[id number]      CABG      CATH TRANSCATHETER AORTIC VALVE REPLACEMENT      CHOLECYSTECTOMY  8 yrs ago    Following acute pancreatitis    COLONOSCOPY  Many times    HEMORRHOIDECTOMY  1 yr ago    Not very successful to be repeated    OTHER SURGICAL HISTORY  20 yrs ago    Aortic valve replacement. Metal valve now    REMOVAL GALLBLADDER      REPLACE AORTIC VALVE OPEN      performed in [facility]  20 yrs ago      Family Hx: History reviewed. No pertinent family history. Social History:   Social History     Socioeconomic History    Marital status: Single   Tobacco Use    Smoking status: Former     Packs/day: 1.00     Years: 20.00     Pack years: 20.00     Types: Cigarettes     Quit date: 1987     Years since quittin.6    Smokeless tobacco: Never   Vaping Use    Vaping Use: Never used   Substance and Sexual Activity    Alcohol use: Never    Drug use: Never   Other Topics Concern    Caffeine Concern Yes    Exercise No    Stress Concern No    Weight Concern Yes        Medications (Active prior to today's visit):  Current Outpatient Medications   Medication Sig Dispense Refill    folic acid 1 MG Oral Tab Take 1 tablet (1 mg total) by mouth daily.  80 tablet 3    amLODIPine 10 MG Oral Tab Take 1 tablet (10 mg total) by mouth daily. 90 tablet 0    cetirizine 10 MG Oral Tab Take 1 tablet (10 mg total) by mouth daily. 90 tablet 0    warfarin 5 MG Oral Tab Take 1 tablet (5 mg total) by mouth nightly. 90 tablet 0    hydroCHLOROthiazide 12.5 MG Oral Tab Take 1 tablet (12.5 mg total) by mouth daily. 90 tablet 0    gabapentin 300 MG Oral Cap Take 1 capsule (300 mg total) by mouth nightly. 90 capsule 3    Continuous Blood Gluc Sensor (FREESTYLE JS 3 SENSOR) Does not apply Misc 1 each As Directed. Change sensor every 14 days 6 each 0    metFORMIN 500 MG Oral Tab Take 1 tablet (500 mg total) by mouth daily with breakfast. 90 tablet 0    Calcium Carb-Cholecalciferol 500-400 MG-UNIT Oral Tab Take 1 tablet by mouth daily. 90 tablet 0    simvastatin 40 MG Oral Tab Take 1 tablet (40 mg total) by mouth nightly. 90 tablet 0    ramipril 10 MG Oral Cap Take 1 capsule (10 mg total) by mouth daily. 90 capsule 0    sertraline 100 MG Oral Tab Take 2 tablets (200 mg total) by mouth daily. 180 tablet 0    cyanocobalamin 1000 MCG/ML Injection Solution Inject 1 mL (1,000 mcg total) into the muscle every 30 (thirty) days.  3 mL 3    Syringe 25G X 1\" 3 ML Does not apply Misc Use as directed with vit b12 injection 50 each 0    Lidocaine, Anorectal, 5 % External Cream Apply daily 45 g 0    triamcinolone 0.1 % External Cream Apply twice a day for 2 weeks, then take a break for 1 week, reapply if persists 80 g 1       Allergies:  No Known Allergies    ROS:   CONSTITUTIONAL:  negative for fevers, rigors  EYES:  negative for diplopia   RESPIRATORY:  negative for severe shortness of breath  CARDIOVASCULAR:  negative for crushing sub-sternal chest pain  GASTROINTESTINAL:  see HPI  GENITOURINARY:  negative for dysuria or gross hematuria  INTEGUMENT/BREAST:  SKIN:  negative for jaundice   ALLERGIC/IMMUNOLOGIC:  negative for hay fever  ENDOCRINE:  negative for cold intolerance and heat intolerance  MUSCULOSKELETAL:  negative for joint effusion/severe erythema  BEHAVIOR/PSYCH:  negative for psychotic behavior      PHYSICAL EXAM:   Blood pressure 145/72, pulse 77, height 5' 10\" (1.778 m), weight 274 lb (124.3 kg). Gen- Patient appears comfortable and in no acute discomfort  HEENT: the sclera appears anicteric, oropharynx clear, mucus membranes appear moist  CV- regular rate and rhythm, the extremities are warm and well perfused   Lung- Moves air well; No labored breathing  Abdomen- soft, non-tender exam in all quadrants without rigidity or guarding, non-distended, no abnormal bowel sounds noted, no masses are palpated  Skin- No jaundice, some bruising noted on his extremities and abdomen  Rectal - large external tags noted. No blood on CHARITO. Ext: no cyanosis, clubbing or edema is evident. Neuro- Alert and interactive, and gross movements of extremities normal  Psych - appropriate, non-agitated    Labs/Imaging:     Patient's pertinent labs and imaging were reviewed and discussed with patient today. .  ASSESSMENT/PLAN:   Kyler Ford is a 79year old year-old male with h/o mechanical aortic valve replacement, trang-en-y gastric bypass 2018, HTN, HLD, DM-II,     Rectal bleeding, iron deficiency anemia - Case d/w his PCP. Pt has had rectal bleeding with every BM for years. He had a hemorrhoidectomy years ago and given recurrence of hemorrhoids and persistent bleeding that was thought to be driving his iron deficiency, a repeat hemorrhoidectomy was attempted 8 months ago but aborted. He has had extensive GI work-up for his h/o ASHLEY in the past in [facility]. His last CLN was 1.5 years ago and reportedly wnl. Last EGD was ~3-4 years ago and he had 2 VCEs as well. He moved to the  ~1.5 years ago. He has large external tags on CHARITO today and no active bleeding. Last Hgb was 12.2.  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 If his hgb continues to drop and if record review doesn't show any other cause to explain his ASHLEY besides his known hemorrhoids, he may need a referral to colorectal surgery at a tertiary care center to discuss options for definitive management of his hemorrhoids. Of note, if he needs scopes in the future, will need to discuss bridging with his cardiologist.      Recommend    - obtaining records of the the last last CLN, EGD, and VCE, including procedure reports and pathology    - CBC, iron studies, D01, and folic acid    - keep follow up with hematology    - follow up in 2 weeks to discuss next steps - virtual appt is fine      > 60 minute consultation/follow up today with >50% spent in face-to-face discussion with the patient/family as well as additional time spent in coordination of care/discussion with care team.         Orders This Visit:  Orders Placed This Encounter      CBC W Differential W Platelet      Ferritin      Vitamin B12      Iron And Tibc      Folic Acid Serum (Folate)      Meds This Visit:  Requested Prescriptions      No prescriptions requested or ordered in this encounter       Imaging & Referrals:  None         Kristan De Guzman MD          This note was partially prepared using TMAT voice recognition dictation software. As a result, errors may occur. When identified, these errors have been corrected.  While every attempt is made to correct errors during dictation, discrepancies may still exist.

## 2023-08-10 NOTE — PATIENT INSTRUCTIONS
PLAN    - Please have your blood work done today    - Keep follow up with hematology, Dr. Yifan Barboza    - Please have the records from your last EGD, colonoscopy, and video capsule test done sent to Dr. Jovita Foss or MyChart them to her   - She will also need any pathology reports from these procedures    - Follow up with Dr. Jovita Foss in ~2 weeks to go over records

## 2023-08-14 DIAGNOSIS — I10 PRIMARY HYPERTENSION: ICD-10-CM

## 2023-08-14 DIAGNOSIS — F33.42 RECURRENT MAJOR DEPRESSIVE DISORDER, IN FULL REMISSION (HCC): ICD-10-CM

## 2023-08-14 RX ORDER — SERTRALINE HYDROCHLORIDE 100 MG/1
200 TABLET, FILM COATED ORAL DAILY
Qty: 180 TABLET | Refills: 0 | Status: SHIPPED | OUTPATIENT
Start: 2023-08-14

## 2023-08-14 RX ORDER — RAMIPRIL 10 MG/1
10 CAPSULE ORAL DAILY
Qty: 90 CAPSULE | Refills: 0 | Status: SHIPPED | OUTPATIENT
Start: 2023-08-14

## 2023-08-14 NOTE — TELEPHONE ENCOUNTER
LOV: 7/25/2023 with Dr. Fredy Fernandez  RTC: 1 month  Last Relevant Labs: 8/10/2023  Filled: 4/11/2023   #3-month supply with 0 refills    Future Appointments   Date Time Provider Annie Licona   8/24/2023  2:00 PM Kristopher Ramírez MD [address] [facility]

## 2023-08-29 DIAGNOSIS — F33.42 RECURRENT MAJOR DEPRESSIVE DISORDER, IN FULL REMISSION (HCC): ICD-10-CM

## 2023-08-29 DIAGNOSIS — E11.69 HYPERLIPIDEMIA ASSOCIATED WITH TYPE 2 DIABETES MELLITUS: ICD-10-CM

## 2023-08-29 DIAGNOSIS — E78.5 HYPERLIPIDEMIA ASSOCIATED WITH TYPE 2 DIABETES MELLITUS: ICD-10-CM

## 2023-08-29 DIAGNOSIS — L20.9 ATOPIC DERMATITIS, UNSPECIFIED TYPE: ICD-10-CM

## 2023-08-29 DIAGNOSIS — I10 PRIMARY HYPERTENSION: ICD-10-CM

## 2023-08-29 RX ORDER — SERTRALINE HYDROCHLORIDE 100 MG/1
200 TABLET, FILM COATED ORAL DAILY
Qty: 180 TABLET | Refills: 0 | OUTPATIENT
Start: 2023-08-29

## 2023-08-29 RX ORDER — CETIRIZINE HYDROCHLORIDE 10 MG/1
10 TABLET ORAL DAILY
Qty: 90 TABLET | Refills: 0 | OUTPATIENT
Start: 2023-08-29

## 2023-08-29 RX ORDER — WARFARIN SODIUM 5 MG/1
5 TABLET ORAL NIGHTLY
Qty: 90 TABLET | Refills: 0 | OUTPATIENT
Start: 2023-08-29

## 2023-08-29 RX ORDER — RAMIPRIL 10 MG/1
10 CAPSULE ORAL DAILY
Qty: 90 CAPSULE | Refills: 0 | OUTPATIENT
Start: 2023-08-29

## 2023-08-30 RX ORDER — SIMVASTATIN 40 MG
40 TABLET ORAL NIGHTLY
Qty: 90 TABLET | Refills: 0 | Status: SHIPPED | OUTPATIENT
Start: 2023-08-30

## 2023-08-31 ENCOUNTER — TELEPHONE (OUTPATIENT)
Dept: GASTROENTEROLOGY | Facility: CLINIC | Age: 68
End: 2023-08-31

## 2023-08-31 ENCOUNTER — VIRTUAL PHONE E/M (OUTPATIENT)
Dept: GASTROENTEROLOGY | Facility: CLINIC | Age: 68
End: 2023-08-31

## 2023-08-31 DIAGNOSIS — D50.9 IRON DEFICIENCY ANEMIA, UNSPECIFIED IRON DEFICIENCY ANEMIA TYPE: Primary | ICD-10-CM

## 2023-08-31 DIAGNOSIS — K62.5 RECTAL BLEEDING: ICD-10-CM

## 2023-08-31 PROCEDURE — 99214 OFFICE O/P EST MOD 30 MIN: CPT | Performed by: INTERNAL MEDICINE

## 2023-08-31 NOTE — TELEPHONE ENCOUNTER
GI staff - please reach out the patient's cardiologist to determine the followin. Does he need bridging for his EGD and colonoscopy since he has a mechanical aortic valve? 2. If yes, we will need guidance on how to do this. 3. If no, we will need recommendations on whether it is OK to hold the warfarin for 5 days prior to the scopes. Our INR goal is usually < 1.5.     4. Once this is addressed, his scopes should be scheduled. Orders are below. MOHIT Welsh      GI Staff:     Please schedule: CLN with [facility]. Split dose golytely bowel prep ordered. Diagnosis: iron deficiency anemia, rectal bleeding    Medication adjustments:  Hold metformin the day before and day of the procedure. Hold hydrochlorothiazide for 24 hrs before the procedure. We will call re:  how to adjust your warfarin.

## 2023-09-05 NOTE — TELEPHONE ENCOUNTER
Request for bridging warfarin or holding it  prior to procedure per Dr. Alia Orozco below faxed to Dr. Eliverto Bloch. Office fax #   [phone] with successful confirmation received    Office contact # [phone]    Awaiting response. Will route to schedulers after above request addressed per Dr. Alia Orozco below.

## 2023-09-08 ENCOUNTER — OFFICE VISIT (OUTPATIENT)
Dept: HEMATOLOGY/ONCOLOGY | Facility: HOSPITAL | Age: 68
End: 2023-09-08
Attending: INTERNAL MEDICINE
Payer: MEDICAID

## 2023-09-08 VITALS
HEART RATE: 59 BPM | BODY MASS INDEX: 39 KG/M2 | WEIGHT: 270 LBS | DIASTOLIC BLOOD PRESSURE: 80 MMHG | OXYGEN SATURATION: 98 % | SYSTOLIC BLOOD PRESSURE: 121 MMHG | RESPIRATION RATE: 18 BRPM | TEMPERATURE: 98 F

## 2023-09-08 VITALS — HEART RATE: 52 BPM | SYSTOLIC BLOOD PRESSURE: 130 MMHG | DIASTOLIC BLOOD PRESSURE: 68 MMHG

## 2023-09-08 DIAGNOSIS — E53.8 FOLIC ACID DEFICIENCY: ICD-10-CM

## 2023-09-08 DIAGNOSIS — K64.9 HEMORRHOIDS, UNSPECIFIED HEMORRHOID TYPE: ICD-10-CM

## 2023-09-08 DIAGNOSIS — D69.6 THROMBOCYTOPENIA (HCC): ICD-10-CM

## 2023-09-08 DIAGNOSIS — D50.9 IRON DEFICIENCY ANEMIA, UNSPECIFIED IRON DEFICIENCY ANEMIA TYPE: Primary | ICD-10-CM

## 2023-09-08 DIAGNOSIS — E53.8 VITAMIN B12 DEFICIENCY: ICD-10-CM

## 2023-09-08 PROCEDURE — 96365 THER/PROPH/DIAG IV INF INIT: CPT

## 2023-09-08 PROCEDURE — 99215 OFFICE O/P EST HI 40 MIN: CPT | Performed by: INTERNAL MEDICINE

## 2023-09-08 NOTE — PROGRESS NOTES
Education Record    Learner:  Patient    Disease / Diagnosis: iron deficiency anemia    Barriers / Limitations:  None   Comments:    Method:  Discussion   Comments:    General Topics:  Plan of care reviewed   Comments:    Outcome:  Shows understanding   Comments:    Pt here for infed infusion. Observed for 30 mins post completion. Tolerated well. Appt scheduled for pt to have labs checked in 3 months, pt stating he will view on MyChart. Discharged in stable condition.

## 2023-09-08 NOTE — PROGRESS NOTES
Here for f/u and iron infusion. He has seen GI for his hemorrhoid and is getting a scope with them. They deferred the hemorrhoidectomy for now. He is feeling fatigued. No shortness of breath. Still is having hemorrhoid bleeding but more mild and he is used to it.

## 2023-09-14 ENCOUNTER — APPOINTMENT (OUTPATIENT)
Dept: GENERAL RADIOLOGY | Age: 68
End: 2023-09-14
Attending: EMERGENCY MEDICINE
Payer: MEDICAID

## 2023-09-14 ENCOUNTER — HOSPITAL ENCOUNTER (OUTPATIENT)
Age: 68
Discharge: HOME | End: 2023-09-14
Payer: MEDICAID

## 2023-09-14 VITALS
BODY MASS INDEX: 39.37 KG/M2 | WEIGHT: 275 LBS | RESPIRATION RATE: 22 BRPM | HEIGHT: 70 IN | HEART RATE: 78 BPM | DIASTOLIC BLOOD PRESSURE: 69 MMHG | OXYGEN SATURATION: 98 % | SYSTOLIC BLOOD PRESSURE: 112 MMHG | TEMPERATURE: 98 F

## 2023-09-14 DIAGNOSIS — M25.511 ACUTE PAIN OF RIGHT SHOULDER: Primary | ICD-10-CM

## 2023-09-14 LAB
ATRIAL RATE: 67 BPM
P AXIS: 73 DEGREES
P-R INTERVAL: 240 MS
Q-T INTERVAL: 426 MS
QRS DURATION: 108 MS
QTC CALCULATION (BEZET): 450 MS
R AXIS: -41 DEGREES
T AXIS: 32 DEGREES
VENTRICULAR RATE: 67 BPM

## 2023-09-14 PROCEDURE — 93005 ELECTROCARDIOGRAM TRACING: CPT

## 2023-09-14 PROCEDURE — 73030 X-RAY EXAM OF SHOULDER: CPT | Performed by: EMERGENCY MEDICINE

## 2023-09-14 RX ORDER — HYDROCODONE BITARTRATE AND ACETAMINOPHEN 5; 325 MG/1; MG/1
1-2 TABLET ORAL EVERY 6 HOURS PRN
Qty: 10 TABLET | Refills: 0 | Status: SHIPPED | OUTPATIENT
Start: 2023-09-14 | End: 2023-09-19

## 2023-09-21 NOTE — TELEPHONE ENCOUNTER
Received from Dr. Macho Haider Warfarin adjustment order, no bridging needed. Please see below. Fax sent to scan.

## 2023-09-21 NOTE — TELEPHONE ENCOUNTER
GI Schedulers,     Please assist pt with scheduling procedure per  below, CLN with [facility]. NO need to hold hydrochlorothiazide pre-procedure per Dr. Huma Simpson. Once pt has procedure date, please route back to GI RN's so we can give instructions on warfarin. Thank you.

## 2023-10-04 DIAGNOSIS — E11.9 TYPE 2 DIABETES MELLITUS WITHOUT COMPLICATION, WITHOUT LONG-TERM CURRENT USE OF INSULIN (HCC): ICD-10-CM

## 2023-10-04 NOTE — TELEPHONE ENCOUNTER
Protocol FAILED    Requesting: metFORMIN 500 MG Oral Tab     LOV: 7/25/23  RTC: 1 month  Filled: 7/3/23 90 tablet 0 refill  Recent Labs: 7/25/23    Upcoming OV   No future appointments.

## 2023-10-05 ENCOUNTER — PATIENT MESSAGE (OUTPATIENT)
Dept: INTERNAL MEDICINE CLINIC | Facility: CLINIC | Age: 68
End: 2023-10-05

## 2023-10-05 DIAGNOSIS — L20.9 ATOPIC DERMATITIS, UNSPECIFIED TYPE: ICD-10-CM

## 2023-10-05 NOTE — TELEPHONE ENCOUNTER
LOV: 7/25/2023 with Dr. Rosa Phillips  RTC: 1 month  Last Relevant Labs: 8/10/2023  Filled: 8/24/2022    #80 g with 1 refill    No future appointments.

## 2023-10-05 NOTE — TELEPHONE ENCOUNTER
From: Leroy Khan  To: Ema Aldrich  Sent: 10/5/2023 2:06 PM CDT  Subject: Wound care    Hiya    The wound I had on left shin has flared up again. The skin is very red, swollen and shiny in appearance. My self diagnosis in the I need some antibiotics, keep my leg raised as per the instructions given at the wound clinic. My right big toe is also swollen and bright red following me stepping on something that made a small wound.      Hope you are well    Paul Crook

## 2023-10-06 ENCOUNTER — LAB ENCOUNTER (OUTPATIENT)
Dept: LAB | Age: 68
End: 2023-10-06
Attending: INTERNAL MEDICINE
Payer: MEDICAID

## 2023-10-06 ENCOUNTER — OFFICE VISIT (OUTPATIENT)
Dept: INTERNAL MEDICINE CLINIC | Facility: CLINIC | Age: 68
End: 2023-10-06
Payer: MEDICAID

## 2023-10-06 VITALS
WEIGHT: 274.19 LBS | BODY MASS INDEX: 39.25 KG/M2 | SYSTOLIC BLOOD PRESSURE: 100 MMHG | HEIGHT: 70 IN | OXYGEN SATURATION: 98 % | HEART RATE: 62 BPM | RESPIRATION RATE: 16 BRPM | DIASTOLIC BLOOD PRESSURE: 56 MMHG | TEMPERATURE: 97 F

## 2023-10-06 DIAGNOSIS — I10 ESSENTIAL HYPERTENSION: ICD-10-CM

## 2023-10-06 DIAGNOSIS — D69.6 THROMBOCYTOPENIA (HCC): ICD-10-CM

## 2023-10-06 DIAGNOSIS — Z12.11 SCREENING FOR COLON CANCER: ICD-10-CM

## 2023-10-06 DIAGNOSIS — D50.9 IRON DEFICIENCY ANEMIA, UNSPECIFIED IRON DEFICIENCY ANEMIA TYPE: ICD-10-CM

## 2023-10-06 DIAGNOSIS — L03.119 CELLULITIS OF LOWER EXTREMITY, UNSPECIFIED LATERALITY: Primary | ICD-10-CM

## 2023-10-06 DIAGNOSIS — E11.65 TYPE 2 DIABETES MELLITUS WITH HYPERGLYCEMIA, WITHOUT LONG-TERM CURRENT USE OF INSULIN (HCC): ICD-10-CM

## 2023-10-06 LAB
ALT SERPL-CCNC: 36 U/L
ANION GAP SERPL CALC-SCNC: 6 MMOL/L (ref 0–18)
AST SERPL-CCNC: 23 U/L (ref 15–37)
BASOPHILS # BLD AUTO: 0.02 X10(3) UL (ref 0–0.2)
BASOPHILS NFR BLD AUTO: 0.4 %
BUN BLD-MCNC: 24 MG/DL (ref 7–18)
CALCIUM BLD-MCNC: 8.8 MG/DL (ref 8.5–10.1)
CHLORIDE SERPL-SCNC: 104 MMOL/L (ref 98–112)
CHOLEST SERPL-MCNC: 152 MG/DL (ref ?–200)
CO2 SERPL-SCNC: 28 MMOL/L (ref 21–32)
CREAT BLD-MCNC: 1.23 MG/DL
EGFRCR SERPLBLD CKD-EPI 2021: 64 ML/MIN/1.73M2 (ref 60–?)
EOSINOPHIL # BLD AUTO: 0.33 X10(3) UL (ref 0–0.7)
EOSINOPHIL NFR BLD AUTO: 7.1 %
ERYTHROCYTE [DISTWIDTH] IN BLOOD BY AUTOMATED COUNT: 15.8 %
FASTING PATIENT LIPID ANSWER: YES
FASTING STATUS PATIENT QL REPORTED: YES
GLUCOSE BLD-MCNC: 163 MG/DL (ref 70–99)
HCT VFR BLD AUTO: 35.2 %
HDLC SERPL-MCNC: 48 MG/DL (ref 40–59)
HGB BLD-MCNC: 11.5 G/DL
IMM GRANULOCYTES # BLD AUTO: 0.02 X10(3) UL (ref 0–1)
IMM GRANULOCYTES NFR BLD: 0.4 %
LDLC SERPL CALC-MCNC: 63 MG/DL (ref ?–100)
LYMPHOCYTES # BLD AUTO: 0.7 X10(3) UL (ref 1–4)
LYMPHOCYTES NFR BLD AUTO: 15.1 %
MCH RBC QN AUTO: 27.4 PG (ref 26–34)
MCHC RBC AUTO-ENTMCNC: 32.7 G/DL (ref 31–37)
MCV RBC AUTO: 85.6 FL
MONOCYTES # BLD AUTO: 0.3 X10(3) UL (ref 0.1–1)
MONOCYTES NFR BLD AUTO: 6.5 %
NEUTROPHILS # BLD AUTO: 3.28 X10 (3) UL (ref 1.5–7.7)
NEUTROPHILS # BLD AUTO: 3.28 X10(3) UL (ref 1.5–7.7)
NEUTROPHILS NFR BLD AUTO: 70.5 %
NONHDLC SERPL-MCNC: 104 MG/DL (ref ?–130)
OSMOLALITY SERPL CALC.SUM OF ELEC: 294 MOSM/KG (ref 275–295)
PLATELET # BLD AUTO: 84 10(3)UL (ref 150–450)
POTASSIUM SERPL-SCNC: 4.6 MMOL/L (ref 3.5–5.1)
RBC # BLD AUTO: 4.19 X10(6)UL
SODIUM SERPL-SCNC: 138 MMOL/L (ref 136–145)
TRIGL SERPL-MCNC: 258 MG/DL (ref 30–149)
TSI SER-ACNC: 1.81 MIU/ML (ref 0.36–3.74)
VLDLC SERPL CALC-MCNC: 39 MG/DL (ref 0–30)
WBC # BLD AUTO: 4.7 X10(3) UL (ref 4–11)

## 2023-10-06 PROCEDURE — 3008F BODY MASS INDEX DOCD: CPT | Performed by: INTERNAL MEDICINE

## 2023-10-06 PROCEDURE — 84443 ASSAY THYROID STIM HORMONE: CPT | Performed by: INTERNAL MEDICINE

## 2023-10-06 PROCEDURE — 3078F DIAST BP <80 MM HG: CPT | Performed by: INTERNAL MEDICINE

## 2023-10-06 PROCEDURE — 83036 HEMOGLOBIN GLYCOSYLATED A1C: CPT

## 2023-10-06 PROCEDURE — 85025 COMPLETE CBC W/AUTO DIFF WBC: CPT

## 2023-10-06 PROCEDURE — 99213 OFFICE O/P EST LOW 20 MIN: CPT | Performed by: INTERNAL MEDICINE

## 2023-10-06 PROCEDURE — 80048 BASIC METABOLIC PNL TOTAL CA: CPT | Performed by: INTERNAL MEDICINE

## 2023-10-06 PROCEDURE — 36415 COLL VENOUS BLD VENIPUNCTURE: CPT

## 2023-10-06 PROCEDURE — 84460 ALANINE AMINO (ALT) (SGPT): CPT | Performed by: INTERNAL MEDICINE

## 2023-10-06 PROCEDURE — 3074F SYST BP LT 130 MM HG: CPT | Performed by: INTERNAL MEDICINE

## 2023-10-06 PROCEDURE — 80061 LIPID PANEL: CPT | Performed by: INTERNAL MEDICINE

## 2023-10-06 PROCEDURE — 84450 TRANSFERASE (AST) (SGOT): CPT | Performed by: INTERNAL MEDICINE

## 2023-10-06 PROCEDURE — 3052F HG A1C>EQUAL 8.0%<EQUAL 9.0%: CPT | Performed by: INTERNAL MEDICINE

## 2023-10-06 RX ORDER — CEPHALEXIN 500 MG/1
500 CAPSULE ORAL 4 TIMES DAILY
Qty: 28 CAPSULE | Refills: 0 | Status: SHIPPED | OUTPATIENT
Start: 2023-10-06

## 2023-10-06 RX ORDER — TRIAMCINOLONE ACETONIDE 1 MG/G
CREAM TOPICAL
Qty: 80 G | Refills: 1 | Status: SHIPPED | OUTPATIENT
Start: 2023-10-06

## 2023-10-06 NOTE — TELEPHONE ENCOUNTER
LS - you have one opening this afternoon, would you be willing to see this pt? Pictures attached, ty!

## 2023-10-06 NOTE — TELEPHONE ENCOUNTER
Spoke to pt, he will be here at 1:15 PM to see Dr. Erna Tijerina due to Dr. Nick Kelly not in office today.      Future Appointments   Date Time Provider Annie Licona   10/6/2023  1:30 PM Bernadine Alexandre MD [facility]

## 2023-10-06 NOTE — PATIENT INSTRUCTIONS
Blood work and urine are ordered by Dr. Juan Mon up stool FIT testing kit when you are at the lab    Call to schedule appointment with your eye doctor     Start cephalexin antibiotic - take at breakfast, lunch, dinner, and bedtime

## 2023-10-07 LAB
EST. AVERAGE GLUCOSE BLD GHB EST-MCNC: 206 MG/DL (ref 68–126)
HBA1C MFR BLD: 8.8 % (ref ?–5.7)

## 2023-10-07 NOTE — PROGRESS NOTES
Dear RN, please let the patient know   1. Diabetes has worsened. I can do a VV to discuss results or an OV to adjust his medications  2. Anemia remains stable, but platelet count has declined.  Needs to follow up with his hematologist as well  [address] [place] [address] DO

## 2023-10-13 NOTE — TELEPHONE ENCOUNTER
Hello RN's         Per GI Nurse's Once pt has procedure date, please route back to GI RN's so we can give instructions on warfarin.

## 2023-10-13 NOTE — TELEPHONE ENCOUNTER
Scheduled for:  [facility] [place]  Provider Name:  Gilbert Monday  Date:  04/03/2024  Location:  [facility]  Sedation:  [facility]  Time:  9:00am (pt is aware to arrive at 8:00am    Prep:  golytely  Meds/Allergies Reconciled?:  yes    Diagnosis with codes:  iron deficiency anemia, rectal bleeding   Was patient informed to call insurance with codes (Y/N):  yes     Referral sent?:  Referral was sent at the time of electronic surgical scheduling. [facility] [place] [facility] Th  notified?:  I sent an electronic request to Endo Scheduling and received a confirmation today. Medication Orders:  Hold metformin the day before and day of the procedure. Hold hydrochlorothiazide for 24 hrs before the procedure. We will call re:  how to adjust your warfarin. Misc Orders:  none     Further instructions given by staff:  I discussed the prep instructions with the patient which he verbally understood and is aware that I will send the instructions today. via [employer]

## 2023-10-24 ENCOUNTER — OFFICE VISIT (OUTPATIENT)
Dept: INTERNAL MEDICINE CLINIC | Facility: CLINIC | Age: 68
End: 2023-10-24

## 2023-10-24 VITALS
TEMPERATURE: 98 F | BODY MASS INDEX: 38.37 KG/M2 | SYSTOLIC BLOOD PRESSURE: 110 MMHG | RESPIRATION RATE: 16 BRPM | DIASTOLIC BLOOD PRESSURE: 72 MMHG | WEIGHT: 268 LBS | OXYGEN SATURATION: 97 % | HEART RATE: 64 BPM | HEIGHT: 70 IN

## 2023-10-24 DIAGNOSIS — K21.9 GASTROESOPHAGEAL REFLUX DISEASE WITHOUT ESOPHAGITIS: ICD-10-CM

## 2023-10-24 DIAGNOSIS — M25.552 BILATERAL HIP PAIN: ICD-10-CM

## 2023-10-24 DIAGNOSIS — E11.65 TYPE 2 DIABETES MELLITUS WITH HYPERGLYCEMIA, WITHOUT LONG-TERM CURRENT USE OF INSULIN (HCC): ICD-10-CM

## 2023-10-24 DIAGNOSIS — K64.9 HEMORRHOIDS, UNSPECIFIED HEMORRHOID TYPE: ICD-10-CM

## 2023-10-24 DIAGNOSIS — Z00.00 ROUTINE PHYSICAL EXAMINATION: Primary | ICD-10-CM

## 2023-10-24 DIAGNOSIS — M79.671 RIGHT FOOT PAIN: ICD-10-CM

## 2023-10-24 DIAGNOSIS — F33.42 RECURRENT MAJOR DEPRESSIVE DISORDER, IN FULL REMISSION (HCC): ICD-10-CM

## 2023-10-24 DIAGNOSIS — C44.90 SKIN CANCER: ICD-10-CM

## 2023-10-24 DIAGNOSIS — D50.9 IRON DEFICIENCY ANEMIA, UNSPECIFIED IRON DEFICIENCY ANEMIA TYPE: ICD-10-CM

## 2023-10-24 DIAGNOSIS — E66.01 MORBID (SEVERE) OBESITY DUE TO EXCESS CALORIES (HCC): ICD-10-CM

## 2023-10-24 DIAGNOSIS — E11.9 TYPE 2 DIABETES MELLITUS WITHOUT COMPLICATION, WITHOUT LONG-TERM CURRENT USE OF INSULIN (HCC): ICD-10-CM

## 2023-10-24 DIAGNOSIS — I10 PRIMARY HYPERTENSION: ICD-10-CM

## 2023-10-24 DIAGNOSIS — D69.6 THROMBOCYTOPENIA (HCC): ICD-10-CM

## 2023-10-24 DIAGNOSIS — E11.69 HYPERLIPIDEMIA ASSOCIATED WITH TYPE 2 DIABETES MELLITUS: ICD-10-CM

## 2023-10-24 DIAGNOSIS — M25.551 BILATERAL HIP PAIN: ICD-10-CM

## 2023-10-24 DIAGNOSIS — Z95.2 H/O HEART VALVE REPLACEMENT WITH MECHANICAL VALVE: ICD-10-CM

## 2023-10-24 DIAGNOSIS — E78.5 HYPERLIPIDEMIA ASSOCIATED WITH TYPE 2 DIABETES MELLITUS: ICD-10-CM

## 2023-10-24 PROCEDURE — 3078F DIAST BP <80 MM HG: CPT | Performed by: INTERNAL MEDICINE

## 2023-10-24 PROCEDURE — 3008F BODY MASS INDEX DOCD: CPT | Performed by: INTERNAL MEDICINE

## 2023-10-24 PROCEDURE — 99397 PER PM REEVAL EST PAT 65+ YR: CPT | Performed by: INTERNAL MEDICINE

## 2023-10-24 PROCEDURE — 90471 IMMUNIZATION ADMIN: CPT | Performed by: INTERNAL MEDICINE

## 2023-10-24 PROCEDURE — 3074F SYST BP LT 130 MM HG: CPT | Performed by: INTERNAL MEDICINE

## 2023-10-24 PROCEDURE — 90662 IIV NO PRSV INCREASED AG IM: CPT | Performed by: INTERNAL MEDICINE

## 2023-10-24 NOTE — PATIENT INSTRUCTIONS
I have increased your metformin to 1000 mg a day (start taking 2 tablets of what you have) and lets repeat the test again in 3 months to make sure your A1c is improving    I have referred you to a colorectal surgeon in [address] [place] [address] received the flu vaccine today    Continue the rest of your medications    I have referred you for physical therapy    I have referred you to a podiatrist and please get an x ray of the right foot

## 2023-11-01 ENCOUNTER — HOSPITAL ENCOUNTER (OUTPATIENT)
Dept: GENERAL RADIOLOGY | Age: 68
Discharge: HOME | End: 2023-11-01
Attending: INTERNAL MEDICINE
Payer: MEDICAID

## 2023-11-01 DIAGNOSIS — E11.9 TYPE 2 DIABETES MELLITUS WITHOUT COMPLICATION, WITHOUT LONG-TERM CURRENT USE OF INSULIN (HCC): ICD-10-CM

## 2023-11-01 DIAGNOSIS — M79.671 RIGHT FOOT PAIN: ICD-10-CM

## 2023-11-01 DIAGNOSIS — M16.0 BILATERAL HIP JOINT ARTHRITIS: ICD-10-CM

## 2023-11-01 PROCEDURE — 73630 X-RAY EXAM OF FOOT: CPT | Performed by: INTERNAL MEDICINE

## 2023-11-01 PROCEDURE — 73521 X-RAY EXAM HIPS BI 2 VIEWS: CPT | Performed by: INTERNAL MEDICINE

## 2023-11-02 NOTE — TELEPHONE ENCOUNTER
Protocol PASS    Requesting: amLODIPine 10 MG Oral Tab     LOV: 10/24/23  RTC: 3 months  Filled: 8/7/23 90 tablet 0 refill  Recent Labs: 10/6/23    Upcoming OV NONE  Future Appointments   Date Time Provider Annie Licona   11/8/2023 10:00 AM Michelle Garcia DPM [facility]PL[facility]OD [facility]   4/3/2024  9:00 AM Formerly named [facility], PROCEDURE E[facility]IP[facility]OC None

## 2023-11-02 NOTE — TELEPHONE ENCOUNTER
Prescription sent on 10/24.
Render In Strict Bullet Format?: No
Modify Regimen: Spironolactone 50 mg tablet-Take 1 PO QD-BID. \\n*Sent to [facility]
Samples Given: Cordran ointment.
Initiate Treatment: Eucrisa 2 % topical ointment-Apply to affected area on hands 1-2x daily prn for 2-4 weeks
Initiate Treatment: SulfaCleanse 8-4 8 %-4 % topical suspension-Wash acne affected areas daily.\\n*Sent to [facility]
Detail Level: Zone
Continue Regimen: Clindacin P 1 % topical swab-Apply to acne prone areas QAM.\\n\\nEpiduo Forte 0.3 %-2.5 % topical gel with pump-Apply pea sized amount on face once daily at night.\\n\\nAczone 7.5 % topical gel with pump-Apply to the face QAM.\\n*Sent to At
Otc Regimen: Allegra 180MG-TAKE 1 PO BID.

## 2023-11-03 RX ORDER — AMLODIPINE BESYLATE 10 MG/1
10 TABLET ORAL DAILY
Qty: 90 TABLET | Refills: 0 | Status: SHIPPED | OUTPATIENT
Start: 2023-11-03

## 2023-11-08 ENCOUNTER — OFFICE VISIT (OUTPATIENT)
Facility: LOCATION | Age: 68
End: 2023-11-08
Payer: MEDICAID

## 2023-11-08 DIAGNOSIS — B35.1 ONYCHOMYCOSIS: ICD-10-CM

## 2023-11-08 DIAGNOSIS — L97.519 SKIN ULCER OF RIGHT GREAT TOE, UNSPECIFIED ULCER STAGE (HCC): ICD-10-CM

## 2023-11-08 DIAGNOSIS — M20.41 HAMMERTOES OF BOTH FEET: ICD-10-CM

## 2023-11-08 DIAGNOSIS — R60.0 BILATERAL LOWER EXTREMITY EDEMA: ICD-10-CM

## 2023-11-08 DIAGNOSIS — M20.42 HAMMERTOES OF BOTH FEET: ICD-10-CM

## 2023-11-08 DIAGNOSIS — E11.42 TYPE 2 DIABETES MELLITUS WITH DIABETIC POLYNEUROPATHY, WITHOUT LONG-TERM CURRENT USE OF INSULIN (HCC): Primary | ICD-10-CM

## 2023-11-08 DIAGNOSIS — Z78.9 NONPALPABLE PULSE: ICD-10-CM

## 2023-11-08 PROCEDURE — 99203 OFFICE O/P NEW LOW 30 MIN: CPT | Performed by: PODIATRIST

## 2023-11-08 NOTE — PROGRESS NOTES
[facility] Podiatry  Progress Note    Ameena Jones is a 76year old male. Chief Complaint   Patient presents with    New Patient     Right foot pain for the past , xrays taken on 11/1/23. Patient states that he cut his right hallux about 3 weeks ago. He is a diabetic, and is worried about the healing. Patient denies any fever or chills, or drainage from toe. He does have numbness and denies any tingling. FBS is not checked daily, A1C was done on 10/6 with the result of 8.8. HPI:     Patient is a pleasant 78-year-old diabetic male who is presenting to clinic today with complaints of a nonhealing wound to the right great toe. Patient states that he stubbed his toe about 3 weeks ago and had a small cut to the tip of the right great toe. He states that he did get a scab, but the wound has not healed. Continues to experience the scab to the site. Denies noticing any recent drainage or signs of infection. Patient does share that he has a history of scratches on his toes that take their time to heal.  He denies any pain and does state that he has loss of sensation in both of his feet. He is denying any other concerns today and here for further evaluation and care. He does not check his sugars regularly, but his most recent hemoglobin A1c was 8.8% on 10/6/2023. He denies any recent nausea, vomiting, fever, chills. Allergies: Patient has no known allergies. Current Outpatient Medications   Medication Sig Dispense Refill    amLODIPine 10 MG Oral Tab Take 1 tablet (10 mg total) by mouth daily. 90 tablet 0    metFORMIN 500 MG Oral Tab Take 2 tablets (1,000 mg total) by mouth daily with breakfast. 180 tablet 0    triamcinolone 0.1 % External Cream Apply twice a day for 2 weeks, then take a break for 1 week, reapply if persists 80 g 1    folic acid 1 MG Oral Tab Take 1 tablet (1 mg total) by mouth daily.  90 tablet 3    simvastatin 40 MG Oral Tab Take 1 tablet (40 mg total) by mouth nightly. 90 tablet 0    ramipril 10 MG Oral Cap Take 1 capsule (10 mg total) by mouth daily. 90 capsule 0    sertraline 100 MG Oral Tab Take 2 tablets (200 mg total) by mouth daily. 180 tablet 0    cetirizine 10 MG Oral Tab Take 1 tablet (10 mg total) by mouth daily. 90 tablet 0    warfarin 5 MG Oral Tab Take 1 tablet (5 mg total) by mouth nightly. 90 tablet 0    hydroCHLOROthiazide 12.5 MG Oral Tab Take 1 tablet (12.5 mg total) by mouth daily. 90 tablet 0    gabapentin 300 MG Oral Cap Take 1 capsule (300 mg total) by mouth nightly. 90 capsule 3    Calcium Carb-Cholecalciferol 500-400 MG-UNIT Oral Tab Take 1 tablet by mouth daily. 90 tablet 0    cyanocobalamin 1000 MCG/ML Injection Solution Inject 1 mL (1,000 mcg total) into the muscle every 30 (thirty) days. 3 mL 3    Lidocaine, Anorectal, 5 % External Cream Apply daily 45 g 0      Past Medical History:   Diagnosis Date    Anemia, chronic disease     Anesthesia complication     HX: OF AGGRESSION W/PAST PROCEDURES D/T INSUFFICIENT ANESTHESIA IN THE PAST? Arthritis 5 yrs ago    Nothing good to say    Calculus of kidney 5 yrs    Nothing major    Cancer ([address]     skin cancer    Cellulitis     Diabetes ([address]     Essential hypertension     Hearing impairment     High blood pressure     Hyperlipidemia     Obesity 10yrs ago    Osteoarthritis 5 yrs ago    Pancreatitis     Sepsis ([address]     Shortness of breath     INTERMITTENT SOB ON EXERTION    Visual impairment     CONTACTS/GLASSES      Past Surgical History:   Procedure Laterality Date    ANAST PANC CYST-BOWEL,YAAKOV-EN-Y      CABG      CATH TRANSCATHETER AORTIC VALVE REPLACEMENT      CHOLECYSTECTOMY  8 yrs ago    Following acute pancreatitis    COLONOSCOPY  Many times    HEMORRHOIDECTOMY  1 yr ago    Not very successful to be repeated    OTHER SURGICAL HISTORY  20 yrs ago    Aortic valve replacement.  Metal valve now    REMOVAL GALLBLADDER      REPLACE AORTIC VALVE OPEN      performed in [address] [place] [address] TONSILLECTOMY      VASECTOMY  20 yrs ago      No family history on file. Social History     Socioeconomic History    Marital status: Single   Tobacco Use    Smoking status: Former     Packs/day: 1.00     Years: 20.00     Additional pack years: 0.00     Total pack years: 20.00     Types: Cigarettes     Quit date: 1987     Years since quittin.8    Smokeless tobacco: Never   Vaping Use    Vaping Use: Never used   Substance and Sexual Activity    Alcohol use: Never    Drug use: Never   Other Topics Concern    Caffeine Concern No    Exercise No    Seat Belt No    Special Diet No    Stress Concern No    Weight Concern Yes           REVIEW OF SYSTEMS:     10 point ROS completed and was negative, except for pertinent positive and negatives stated in subjective. EXAM:     GENERAL: well developed, well nourished, in no apparent distress  EXTREMITIES:   1. Integument: Stable eschar noted to distal medial portion of right hallux. No surrounding erythema, current drainage, or other signs of infection noted. Small, stable eschars noted to bilateral fourth digits with no current signs of infection. Skin appears dry, cool, and supple. Toenails x9 thickened, elongated, discolored, dystrophic with subungual debris noted to hallux toenails. Right hallux toenail adequate length. There are no color changes. No macerations. No Hyperkeratotic lesions. 2. Vascular: Nonpalpable DP and PT pulses bilaterally. On Doppler, pulses are audible. CFT x10 are brisk and less than 3 seconds  3. Neurological: Gross sensation intact via light touch bilaterally. Protective sensation diminished to the level of the ankle bilaterally via [place] test   4. Musculoskeletal: All muscle groups are graded 5/5 in the foot and ankle. No acute deformities noted bilaterally. Mild hammertoe deformities noted to digits 2-5, bilaterally.          ASSESSMENT AND PLAN:   Diagnoses and all orders for this visit:    Type 2 diabetes mellitus with diabetic polyneuropathy, without long-term current use of insulin ([facility])    Skin ulcer of right great toe, unspecified ulcer stage ([facility])  -     VASCULAR SURGERY - INTERNAL    Nonpalpable pulse  -     VASCULAR SURGERY - INTERNAL    Onychomycosis    Bilateral lower extremity edema    Hammertoes of both feet        Plan:   -Patient was seen and evaluated today in clinic. Chart history reviewed    -Independently reviewed patient's recent right foot radiographs, which does not show any erosive changes or other concerns of osteomyelitis to the area of eschar to right hallux.    -Examined patient's right hallux wound--stable eschar with no current drainage, surrounding erythema, or other signs of infection.    -Discussed both clinical exam and radiographic findings with patient. Explained to patient that I am unable to palpate his wounds today, but they are audible on Doppler. I explained that patient may be experiencing slow healing to his wound due to lack of blood flow. Would recommend further evaluation from vascular surgery prior to any debridements on patient's wound.    -Referral to vascular surgery placed today.    -Patient's right hallux wound painted with Betadine and covered with a Band-Aid. Recommend patient keep it covered at all times.    -Patient educated on signs of infection and advised to seek immediate medical attention if noticing any of the signs.    -Discussed toenail findings which are consistent with onychomycosis/nail dystrophy and treatment options discussed:  -No treatment and monitoring, nail debridement, topical meds, oral meds, and nail avulsion. -Advantages and disadvantages of each reviewed.  Using oral agents would require regular blood test monitoring due to risk of hepatotoxicity and other adverse reactions including drug interactions.   -Topical meds are much safer yet carry very low efficacy.  -Patient can also try home remedies such as soaking their feet in warm water and apple cider vinegar for 10 minutes a day, as well as utilizing Vicks vapor rub to the top of the nails daily.  -Pt has opted for nail debridement and monitoring. This was done today to toenails x9 utilizing a nail nipper without incident.    -The patient indicates understanding of these issues and agrees to the plan. Time spent reviewing pertinent information from patient's chart, reviewing any pertinent imaging, obtaining history and physical exam, and discussing and mutually agreeing on a treatment plan: 35 minutes    RTC 3 weeks      KENNY Uriarte [facility]        11/8/2023    Dragon speech recognition software was used to prepare this note. Errors in word recognition may occur. Please contact me with any questions/concerns with this note.

## 2023-11-30 DIAGNOSIS — I10 PRIMARY HYPERTENSION: ICD-10-CM

## 2023-11-30 DIAGNOSIS — F33.42 RECURRENT MAJOR DEPRESSIVE DISORDER, IN FULL REMISSION (HCC): ICD-10-CM

## 2023-11-30 RX ORDER — RAMIPRIL 10 MG/1
10 CAPSULE ORAL DAILY
Qty: 90 CAPSULE | Refills: 0 | Status: SHIPPED | OUTPATIENT
Start: 2023-11-30

## 2023-11-30 RX ORDER — SERTRALINE HYDROCHLORIDE 100 MG/1
200 TABLET, FILM COATED ORAL DAILY
Qty: 180 TABLET | Refills: 0 | Status: SHIPPED | OUTPATIENT
Start: 2023-11-30

## 2023-11-30 NOTE — TELEPHONE ENCOUNTER
Protocol NONE    Requesting:   sertraline 100 MG Oral Tab 8/14/23 180 tablet 0 refill  ramipril 10 MG Oral Cap 8/14/23 90 capsule 0 refill    LOV: 10/24/23  RTC: 3 months  Recent Labs: 10/6/23    Upcoming OV NONE  Future Appointments   Date Time Provider Annie Licona   12/4/2023 12:00 PM Zack Liu MD CSA ECC CSA   12/5/2023  1:45 PM Sangeeta Lyman DPM ECPLPOD EC PLFD   4/3/2024  9:00  [facility], PROCEDURE ECCF[facility]IPROC None

## 2023-12-05 ENCOUNTER — OFFICE VISIT (OUTPATIENT)
Facility: LOCATION | Age: 68
End: 2023-12-05
Payer: MEDICAID

## 2023-12-05 DIAGNOSIS — Z78.9 NONPALPABLE PULSE: ICD-10-CM

## 2023-12-05 DIAGNOSIS — L97.519 SKIN ULCER OF RIGHT GREAT TOE, UNSPECIFIED ULCER STAGE (HCC): ICD-10-CM

## 2023-12-05 DIAGNOSIS — R60.0 BILATERAL LOWER EXTREMITY EDEMA: ICD-10-CM

## 2023-12-05 DIAGNOSIS — M20.41 HAMMERTOES OF BOTH FEET: ICD-10-CM

## 2023-12-05 DIAGNOSIS — E11.42 TYPE 2 DIABETES MELLITUS WITH DIABETIC POLYNEUROPATHY, WITHOUT LONG-TERM CURRENT USE OF INSULIN (HCC): Primary | ICD-10-CM

## 2023-12-05 DIAGNOSIS — M20.42 HAMMERTOES OF BOTH FEET: ICD-10-CM

## 2023-12-05 DIAGNOSIS — B35.1 ONYCHOMYCOSIS: ICD-10-CM

## 2023-12-05 PROCEDURE — 99212 OFFICE O/P EST SF 10 MIN: CPT | Performed by: PODIATRIST

## 2023-12-05 NOTE — PROGRESS NOTES
[facility] Podiatry  Progress Note    Samm Jack is a 76year old male. Chief Complaint   Patient presents with    Diabetic Ulcer     Right foot hallux ulcer, patient denies any pain in the office. FBS this am was taken, but he forgot. He had an appointment with Dr Nitish Larsen yesterday, bilateral US Doppler ordered. He denies fever or chills. HPI:     Patient is a pleasant 69-year-old diabetic male who is returning to clinic today for recheck of right great toe ulceration. Patient has been utilizing Betadine and Band-Aid and believes that the ulcer is almost healed up. He states that he had a big scab over it that recently fell off. He denies noticing any recent drainage. Denies noticing any signs of infection. He did have an appointment with Dr. Nitish Larsen, vascular surgery, yesterday, who did order bilateral arterial ultrasounds. Patient will be obtaining those in the near future. He does state that he will be going out of town for a few weeks weeks. Patient is currently wearing supportive boots that he got on [employer] He does not have inserts. Denies any other concerns today and is here for further evaluation and care. Denies recent nausea, vomiting, fever, chills. Allergies: Patient has no known allergies. Current Outpatient Medications   Medication Sig Dispense Refill    ramipril 10 MG Oral Cap Take 1 capsule (10 mg total) by mouth daily. 90 capsule 0    sertraline 100 MG Oral Tab Take 2 tablets (200 mg total) by mouth daily. 180 tablet 0    amLODIPine 10 MG Oral Tab Take 1 tablet (10 mg total) by mouth daily. 90 tablet 0    metFORMIN 500 MG Oral Tab Take 2 tablets (1,000 mg total) by mouth daily with breakfast. 180 tablet 0    triamcinolone 0.1 % External Cream Apply twice a day for 2 weeks, then take a break for 1 week, reapply if persists 80 g 1    folic acid 1 MG Oral Tab Take 1 tablet (1 mg total) by mouth daily.  90 tablet 3    simvastatin 40 MG Oral Tab Take 1 tablet (40 mg total) by mouth nightly. 90 tablet 0    cetirizine 10 MG Oral Tab Take 1 tablet (10 mg total) by mouth daily. 90 tablet 0    warfarin 5 MG Oral Tab Take 1 tablet (5 mg total) by mouth nightly. 90 tablet 0    hydroCHLOROthiazide 12.5 MG Oral Tab Take 1 tablet (12.5 mg total) by mouth daily. 90 tablet 0    gabapentin 300 MG Oral Cap Take 1 capsule (300 mg total) by mouth nightly. 90 capsule 3    Calcium Carb-Cholecalciferol 500-400 MG-UNIT Oral Tab Take 1 tablet by mouth daily. 90 tablet 0    cyanocobalamin 1000 MCG/ML Injection Solution Inject 1 mL (1,000 mcg total) into the muscle every 30 (thirty) days. 3 mL 3    Lidocaine, Anorectal, 5 % External Cream Apply daily 45 g 0      Past Medical History:   Diagnosis Date    Anemia, chronic disease     Anesthesia complication     HX: OF AGGRESSION W/PAST PROCEDURES D/T INSUFFICIENT ANESTHESIA IN THE PAST? Arthritis 5 yrs ago    Nothing good to say    Calculus of kidney 5 yrs    Nothing major    Cancer ([address]     skin cancer    Cellulitis     Diabetes ([address]     Essential hypertension     Hearing impairment     High blood pressure     Hyperlipidemia     Obesity 10yrs ago    Osteoarthritis 5 yrs ago    Pancreatitis     Sepsis ([address]     Shortness of breath     INTERMITTENT SOB ON EXERTION    Visual impairment     CONTACTS/GLASSES      Past Surgical History:   Procedure Laterality Date    ANAST PANC CYST-BOWEL,YAAKOV-EN-Y      CABG      CATH TRANSCATHETER AORTIC VALVE REPLACEMENT      CHOLECYSTECTOMY  8 yrs ago    Following acute pancreatitis    COLONOSCOPY  Many times    HEMORRHOIDECTOMY  1 yr ago    Not very successful to be repeated    OTHER SURGICAL HISTORY  20 yrs ago    Aortic valve replacement. Metal valve now    REMOVAL GALLBLADDER      REPLACE AORTIC VALVE OPEN      performed in [facility]  20 yrs ago      No family history on file.    Social History     Socioeconomic History    Marital status: Single   Tobacco Use Smoking status: Former     Packs/day: 1.00     Years: 20.00     Additional pack years: 0.00     Total pack years: 20.00     Types: Cigarettes     Quit date: 1987     Years since quittin.9    Smokeless tobacco: Never   Vaping Use    Vaping Use: Never used   Substance and Sexual Activity    Alcohol use: Never    Drug use: Never   Other Topics Concern    Caffeine Concern No    Exercise No    Seat Belt No    Special Diet No    Stress Concern No    Weight Concern Yes           REVIEW OF SYSTEMS:     10 point ROS completed and was negative, except for pertinent positive and negatives stated in subjective. EXAM:   GENERAL: well developed, well nourished, in no apparent distress  EXTREMITIES:              1. Integument: Very small, stable eschar noted to distal medial portion of right hallux measuring approximately 0.1 cm x 0.1 cm with no depth. No surrounding erythema, current drainage, or other signs of infection noted. Small, stable eschars noted to bilateral fourth digits with no current signs of infection. Skin appears dry, cool, and supple. Toenails x9 thickened, adequate length, discolored, dystrophic with subungual debris noted to hallux toenails. Right hallux toenail adequate length. There are no color changes. No macerations. No Hyperkeratotic lesions. 2. Vascular: Nonpalpable DP and PT pulses bilaterally. On Doppler, pulses are audible. CFT x10 are brisk and less than 3 seconds  3. Neurological: Gross sensation intact via light touch bilaterally. Protective sensation diminished to the level of the ankle bilaterally via [place] test      4. Musculoskeletal: All muscle groups are graded 5/5 in the foot and ankle. No acute deformities noted bilaterally. Mild hammertoe deformities noted to digits 2-5, bilaterally.       ASSESSMENT AND PLAN:   Diagnoses and all orders for this visit:    Type 2 diabetes mellitus with diabetic polyneuropathy, without long-term current use of insulin ([facility]    Skin ulcer of right great toe, unspecified ulcer stage ([facility]    Nonpalpable pulse    Onychomycosis    Bilateral lower extremity edema    Hammertoes of both feet        Plan:   -Patient was seen and evaluated today in clinic.    -Inspected patient's previous right hallux ulceration site. Great improvements noted today with very small, stable eschar noted. No current signs of infection. Eschar left intact today    -Instructed patient to monitor the site for any worsening signs to the wound.    -Patient does have supportive boots on today. I did provide patient with information on [facility] diabetic insoles. I do recommend him utilizing an his shoe gear.    -Discussed importance of proper pedal hygiene, daily foot checks, and tight glycemic control.    -Patient to avoid walking barefoot. Recommend ambulating with supportive diabetic shoes and inserts.    -Patient to monitor for acute signs of infection and seek immediate medical attention if any signs of infection or other concerns arise.    -Patient is leaving town for a few weeks. He will be obtaining his arterial ultrasound of bilateral lower extremities upon his return. I did recommend patient following up around that time for bilateral foot recheck. He will contact my office for any concerns in the meantime.    -The patient indicates understanding of these issues and agrees to the plan. Time spent reviewing pertinent information from patient's chart, reviewing any pertinent imaging, obtaining history and physical exam, and discussing and mutually agreeing on a treatment plan: 15 minutes    RTC 2-3 months or sooner if needed      Coleman Miller        12/5/2023    Dragon speech recognition software was used to prepare this note. Errors in word recognition may occur. Please contact me with any questions/concerns with this note.

## 2023-12-12 DIAGNOSIS — E11.69 HYPERLIPIDEMIA ASSOCIATED WITH TYPE 2 DIABETES MELLITUS  (HCC): ICD-10-CM

## 2023-12-12 DIAGNOSIS — E78.5 HYPERLIPIDEMIA ASSOCIATED WITH TYPE 2 DIABETES MELLITUS  (HCC): ICD-10-CM

## 2023-12-12 RX ORDER — SIMVASTATIN 40 MG
40 TABLET ORAL NIGHTLY
Qty: 90 TABLET | Refills: 0 | Status: SHIPPED | OUTPATIENT
Start: 2023-12-12

## 2023-12-26 DIAGNOSIS — E11.9 TYPE 2 DIABETES MELLITUS WITHOUT COMPLICATION, WITHOUT LONG-TERM CURRENT USE OF INSULIN (HCC): ICD-10-CM

## 2023-12-26 NOTE — TELEPHONE ENCOUNTER
metFORMIN 500 MG Oral Tab          Sig: Take 2 tablets (1,000 mg total) by mouth daily with breakfast.    Disp: 180 tablet    Refills: 0    Start: 12/26/2023    Class: Normal    Non-formulary For: Type 2 diabetes mellitus without complication, without long-term current use of insulin ([address]    Last ordered: 2 months ago (10/24/2023) by Andressa Camilo DO    Diabetic Medication Protocol [id number]/26/2023 10:14 AM   Protocol Details Last HgBA1C < 7.5    HgBA1C procedure resulted in past 6 months    Microalbumin procedure in past 12 months or taking ACE/ARB    Appointment in past 6 or next 3 months      LOV 10/24/23  RTC 3 months  Filled 10/24/23 180 tabs 0 refills   Last A1c 10/6/23  Future Appointments   Date Time Provider Annie Licona   4/3/2024  9:00 AM [facility], PROCEDURE E[facility] None

## 2024-01-26 RX ORDER — AMLODIPINE BESYLATE 10 MG/1
10 TABLET ORAL DAILY
Qty: 90 TABLET | Refills: 0 | Status: SHIPPED | OUTPATIENT
Start: 2024-01-26

## 2024-01-26 NOTE — TELEPHONE ENCOUNTER
MCM sent to pt to schedule f/u appt.     Amlodipine 10 mg  Filled 11-3-23  Qty 90  0 refills  Future Appointments   Date Time Provider Department Center   4/3/2024  9:00 AM [facility], PROCEDURE ECCF[facility]IPROC None   LOV 10-24-23 SV  RTC 3 mo. DM

## 2024-03-03 ENCOUNTER — HOSPITAL ENCOUNTER (INPATIENT)
Facility: HOSPITAL | Age: 69
LOS: 2 days | Discharge: HOME | End: 2024-03-05
Attending: EMERGENCY MEDICINE | Admitting: HOSPITALIST
Payer: MEDICAID

## 2024-03-03 ENCOUNTER — APPOINTMENT (OUTPATIENT)
Dept: GENERAL RADIOLOGY | Facility: HOSPITAL | Age: 69
End: 2024-03-03
Attending: EMERGENCY MEDICINE
Payer: MEDICAID

## 2024-03-03 ENCOUNTER — HOSPITAL ENCOUNTER (INPATIENT)
Facility: HOSPITAL | Age: 69
End: 2024-03-03
Attending: EMERGENCY MEDICINE | Admitting: HOSPITALIST
Payer: MEDICAID

## 2024-03-03 DIAGNOSIS — K92.2 UPPER GI BLEEDING: ICD-10-CM

## 2024-03-03 DIAGNOSIS — D64.9 SYMPTOMATIC ANEMIA: Primary | ICD-10-CM

## 2024-03-03 DIAGNOSIS — D64.9 ANEMIA: ICD-10-CM

## 2024-03-03 LAB
ALBUMIN SERPL-MCNC: 3.5 G/DL (ref 3.4–5)
ALBUMIN/GLOB SERPL: 1 {RATIO} (ref 1–2)
ALP LIVER SERPL-CCNC: 105 U/L
ALT SERPL-CCNC: 31 U/L
ANION GAP SERPL CALC-SCNC: 5 MMOL/L (ref 0–18)
ANTIBODY SCREEN: NEGATIVE
AST SERPL-CCNC: 29 U/L (ref 15–37)
BASOPHILS # BLD AUTO: 0.05 X10(3) UL (ref 0–0.2)
BASOPHILS NFR BLD AUTO: 0.7 %
BILIRUB SERPL-MCNC: 0.5 MG/DL (ref 0.1–2)
BILIRUB UR QL STRIP.AUTO: NEGATIVE
BUN BLD-MCNC: 41 MG/DL (ref 9–23)
CALCIUM BLD-MCNC: 9 MG/DL (ref 8.5–10.1)
CHLORIDE SERPL-SCNC: 101 MMOL/L (ref 98–112)
CLARITY UR REFRACT.AUTO: CLEAR
CO2 SERPL-SCNC: 25 MMOL/L (ref 21–32)
COLOR UR AUTO: YELLOW
CREAT BLD-MCNC: 1.43 MG/DL
EGFRCR SERPLBLD CKD-EPI 2021: 53 ML/MIN/1.73M2 (ref 60–?)
EOSINOPHIL # BLD AUTO: 0.23 X10(3) UL (ref 0–0.7)
EOSINOPHIL NFR BLD AUTO: 3.1 %
ERYTHROCYTE [DISTWIDTH] IN BLOOD BY AUTOMATED COUNT: 16.1 %
EST. AVERAGE GLUCOSE BLD GHB EST-MCNC: 177 MG/DL (ref 68–126)
FLUAV + FLUBV RNA SPEC NAA+PROBE: NEGATIVE
FLUAV + FLUBV RNA SPEC NAA+PROBE: NEGATIVE
GLOBULIN PLAS-MCNC: 3.6 G/DL (ref 2.8–4.4)
GLUCOSE BLD-MCNC: 172 MG/DL (ref 70–99)
GLUCOSE BLD-MCNC: 184 MG/DL (ref 70–99)
GLUCOSE BLD-MCNC: 256 MG/DL (ref 70–99)
GLUCOSE UR STRIP.AUTO-MCNC: NORMAL MG/DL
HBA1C MFR BLD: 7.8 % (ref ?–5.7)
HCT VFR BLD AUTO: 25 %
HGB BLD-MCNC: 8.4 G/DL
HYALINE CASTS #/AREA URNS AUTO: PRESENT /LPF
IMM GRANULOCYTES # BLD AUTO: 0.06 X10(3) UL (ref 0–1)
IMM GRANULOCYTES NFR BLD: 0.8 %
INR BLD: 1.92 (ref 0.8–1.2)
KETONES UR STRIP.AUTO-MCNC: NEGATIVE MG/DL
LEUKOCYTE ESTERASE UR QL STRIP.AUTO: 75
LYMPHOCYTES # BLD AUTO: 1.27 X10(3) UL (ref 1–4)
LYMPHOCYTES NFR BLD AUTO: 17.2 %
MCH RBC QN AUTO: 28.7 PG (ref 26–34)
MCHC RBC AUTO-ENTMCNC: 33.6 G/DL (ref 31–37)
MCV RBC AUTO: 85.3 FL
MONOCYTES # BLD AUTO: 0.37 X10(3) UL (ref 0.1–1)
MONOCYTES NFR BLD AUTO: 5 %
NEUTROPHILS # BLD AUTO: 5.4 X10 (3) UL (ref 1.5–7.7)
NEUTROPHILS # BLD AUTO: 5.4 X10(3) UL (ref 1.5–7.7)
NEUTROPHILS NFR BLD AUTO: 73.2 %
NITRITE UR QL STRIP.AUTO: NEGATIVE
NT-PROBNP SERPL-MCNC: 289 PG/ML (ref ?–125)
OSMOLALITY SERPL CALC.SUM OF ELEC: 291 MOSM/KG (ref 275–295)
PH UR STRIP.AUTO: 5 [PH] (ref 5–8)
PLATELET # BLD AUTO: 136 10(3)UL (ref 150–450)
POTASSIUM SERPL-SCNC: 4.3 MMOL/L (ref 3.5–5.1)
PROT SERPL-MCNC: 7.1 G/DL (ref 6.4–8.2)
PROT UR STRIP.AUTO-MCNC: NEGATIVE MG/DL
PROTHROMBIN TIME: 22.1 SECONDS (ref 11.6–14.8)
RBC # BLD AUTO: 2.93 X10(6)UL
RBC UR QL AUTO: NEGATIVE
RH BLOOD TYPE: POSITIVE
RSV RNA SPEC NAA+PROBE: NEGATIVE
SARS-COV-2 RNA RESP QL NAA+PROBE: NOT DETECTED
SODIUM SERPL-SCNC: 131 MMOL/L (ref 136–145)
SP GR UR STRIP.AUTO: 1.02 (ref 1–1.03)
TROPONIN I SERPL HS-MCNC: 11 NG/L
UROBILINOGEN UR STRIP.AUTO-MCNC: NORMAL MG/DL
WBC # BLD AUTO: 7.4 X10(3) UL (ref 4–11)
WBC CLUMPS UR QL AUTO: PRESENT /HPF

## 2024-03-03 PROCEDURE — 99223 1ST HOSP IP/OBS HIGH 75: CPT | Performed by: HOSPITALIST

## 2024-03-03 PROCEDURE — 71045 X-RAY EXAM CHEST 1 VIEW: CPT | Performed by: EMERGENCY MEDICINE

## 2024-03-03 RX ORDER — NICOTINE POLACRILEX 4 MG
30 LOZENGE BUCCAL
Status: DISCONTINUED | OUTPATIENT
Start: 2024-03-03 | End: 2024-03-05

## 2024-03-03 RX ORDER — METOCLOPRAMIDE HYDROCHLORIDE 5 MG/ML
10 INJECTION INTRAMUSCULAR; INTRAVENOUS EVERY 8 HOURS PRN
Status: DISCONTINUED | OUTPATIENT
Start: 2024-03-03 | End: 2024-03-05

## 2024-03-03 RX ORDER — ACETAMINOPHEN 500 MG
500 TABLET ORAL EVERY 4 HOURS PRN
Status: DISCONTINUED | OUTPATIENT
Start: 2024-03-03 | End: 2024-03-05

## 2024-03-03 RX ORDER — ONDANSETRON 2 MG/ML
4 INJECTION INTRAMUSCULAR; INTRAVENOUS EVERY 6 HOURS PRN
Status: DISCONTINUED | OUTPATIENT
Start: 2024-03-03 | End: 2024-03-05

## 2024-03-03 RX ORDER — SODIUM CHLORIDE 9 MG/ML
INJECTION, SOLUTION INTRAVENOUS CONTINUOUS
Status: DISCONTINUED | OUTPATIENT
Start: 2024-03-03 | End: 2024-03-05

## 2024-03-03 RX ORDER — NICOTINE POLACRILEX 4 MG
15 LOZENGE BUCCAL
Status: DISCONTINUED | OUTPATIENT
Start: 2024-03-03 | End: 2024-03-05

## 2024-03-03 RX ORDER — DEXTROSE MONOHYDRATE 25 G/50ML
50 INJECTION, SOLUTION INTRAVENOUS
Status: DISCONTINUED | OUTPATIENT
Start: 2024-03-03 | End: 2024-03-05

## 2024-03-03 RX ORDER — ECHINACEA PURPUREA EXTRACT 125 MG
1 TABLET ORAL
Status: DISCONTINUED | OUTPATIENT
Start: 2024-03-03 | End: 2024-03-05

## 2024-03-03 RX ORDER — MELATONIN
3 NIGHTLY PRN
Status: DISCONTINUED | OUTPATIENT
Start: 2024-03-03 | End: 2024-03-05

## 2024-03-03 NOTE — H&P
[facility]     Liang Schmidt Patient Status:  Emergency    1955 MRN [id number]   Location [facility] EMERGENCY DEPARTMENT Attending Melodie Cho, *   Hosp Day # 0 PCP Ema Arora DO     Chief Complaint: GI bleed    Subjective:    History of Present Illness:     Liang Schmidt is a 68 year old male with past medical history significant for HTN, DMII, chronic anemia attributed to hemorrhoidal bleeding presents to the ER with melanotic stools.  Pt follows with hematology for IV iron infusions for persistent ASHLEY due to bleeding hemorrhoids.  He states he had an EGD/colonoscopy about 3 years ago.  He is on coumadin for mechanical valve and states he stopped his coumadin for the past few days to see if bleeding would improve.  He also c/o associated dyspnea and lightheadedness.  He c/o mild abdominal discomfort.    History/Other:    Past Medical History:  Past Medical History:   Diagnosis Date    Anemia, chronic disease     Anesthesia complication     HX: OF AGGRESSION W/PAST PROCEDURES D/T INSUFFICIENT ANESTHESIA IN THE PAST?    Arthritis 5 yrs ago    Nothing good to say    Calculus of kidney 5 yrs    Nothing major    Cancer (HCC)     skin cancer    Cellulitis     Diabetes (HCC)     Essential hypertension     Hearing impairment     High blood pressure     Hyperlipidemia     Obesity 10yrs ago    Osteoarthritis 5 yrs ago    Pancreatitis (HCC)     Sepsis (HCC)     Shortness of breath     INTERMITTENT SOB ON EXERTION    Visual impairment     CONTACTS/GLASSES     Past Surgical History:   Past Surgical History:   Procedure Laterality Date    ANAST PANC CYST-BOWEL,YAAKOV-EN-Y      CABG      CATH TRANSCATHETER AORTIC VALVE REPLACEMENT      CHOLECYSTECTOMY  8 yrs ago    Following acute pancreatitis    COLONOSCOPY  Many times    HEMORRHOIDECTOMY  1 yr ago    Not very successful to be repeated    OTHER SURGICAL HISTORY  20 yrs ago    Aortic valve  Called pt and notified her that this rx will need to be sent to [facility]. Pt agreeable. Rx resent to [facility] pharmacy. Number to pharmacy given to pt to contact to see if it can be mailed to her and give her insurance information. [phone].    Christi Tang RN     replacement. Metal valve now    REMOVAL GALLBLADDER      REPLACE AORTIC VALVE OPEN      performed in [place]    SKIN SURGERY      TONSILLECTOMY      VASECTOMY  20 yrs ago      Family History:   No family history on file.  Social History:    reports that he quit smoking about 37 years ago. His smoking use included cigarettes. He has a 20 pack-year smoking history. He has never used smokeless tobacco. He reports that he does not drink alcohol and does not use drugs.     Allergies: No Known Allergies    Medications:    No current facility-administered medications on file prior to encounter.     Current Outpatient Medications on File Prior to Encounter   Medication Sig Dispense Refill    amLODIPine 10 MG Oral Tab Take 1 tablet (10 mg total) by mouth daily. 90 tablet 0    metFORMIN 500 MG Oral Tab Take 2 tablets (1,000 mg total) by mouth daily with breakfast. 180 tablet 0    simvastatin 40 MG Oral Tab Take 1 tablet (40 mg total) by mouth nightly. 90 tablet 0    ramipril 10 MG Oral Cap Take 1 capsule (10 mg total) by mouth daily. 90 capsule 0    sertraline 100 MG Oral Tab Take 2 tablets (200 mg total) by mouth daily. 180 tablet 0    triamcinolone 0.1 % External Cream Apply twice a day for 2 weeks, then take a break for 1 week, reapply if persists 80 g 1    folic acid 1 MG Oral Tab Take 1 tablet (1 mg total) by mouth daily. 90 tablet 3    cetirizine 10 MG Oral Tab Take 1 tablet (10 mg total) by mouth daily. 90 tablet 0    warfarin 5 MG Oral Tab Take 1 tablet (5 mg total) by mouth nightly. 90 tablet 0    hydroCHLOROthiazide 12.5 MG Oral Tab Take 1 tablet (12.5 mg total) by mouth daily. 90 tablet 0    gabapentin 300 MG Oral Cap Take 1 capsule (300 mg total) by mouth nightly. 90 capsule 3    Calcium Carb-Cholecalciferol 500-400 MG-UNIT Oral Tab Take 1 tablet by mouth daily. 90 tablet 0    cyanocobalamin 1000 MCG/ML Injection Solution Inject 1 mL (1,000 mcg total) into the muscle every 30 (thirty) days. 3 mL 3    Lidocaine,  Anorectal, 5 % External Cream Apply daily 45 g 0       Review of Systems:   A comprehensive review of systems was completed.    Pertinent positives and negatives noted in the HPI.    Objective:   Physical Exam:    /69   Pulse 64   Temp 97.6 °F (36.4 °C) (Oral)   Resp 16   Ht 5' 10\" (1.778 m)   Wt 230 lb (104.3 kg)   SpO2 100%   BMI 33.00 kg/m²   General: No acute distress, Alert  Respiratory: No rhonchi, no wheezes  Cardiovascular: S1, S2. Regular rate and rhythm  Abdomen: Soft, Non-tender, non-distended, positive bowel sounds  Neuro: No new focal deficits  Extremities: No edema      Results:    Labs:      Labs Last 24 Hours:    Recent Labs   Lab 03/03/24  1138   RBC 2.93*   HGB 8.4*   HCT 25.0*   MCV 85.3   MCH 28.7   MCHC 33.6   RDW 16.1   NEPRELIM 5.40   WBC 7.4   .0*       Recent Labs   Lab 03/03/24  1138   *   BUN 41*   CREATSERUM 1.43*   EGFRCR 53*   CA 9.0   ALB 3.5   *   K 4.3      CO2 25.0   ALKPHO 105   AST 29   ALT 31   BILT 0.5   TP 7.1       Lab Results   Component Value Date    INR 1.92 (H) 03/03/2024    INR 2.90 (H) 01/21/2023    INR 1.66 (H) 01/10/2023       Recent Labs   Lab 03/03/24  1138   TROPHS 11       Recent Labs   Lab 03/03/24  1138   PBNP 289*       No results for input(s): \"PCT\" in the last 168 hours.    Imaging: Imaging data reviewed in Epic.    Assessment & Plan:      #GI bleed/melena  CLD, NPO after midnight  EGD/colonoscopy in am  Hold coumadin  GI eval    #Acute blood loss anemia due to above  Monitor Hgb  IV iron for now  Transfuse to keep Hgb > 7    #AGGIE, on IVF, f/u BMP in am    #HTN, controlled, hold BP meds    #DMII, hyperglycemia protocol    #Hyponatremia, on IVF, f/u BMP in am    #Chronic anemia related to hemorrhoidal bleeding    #DL, statin when able    #h/o mechanical heart valve on coumadin, unclear which valve, INR 1.95, cont to hold for now      Plan of care discussed with pt and RN.    Mika Alegre MD    Supplementary Documentation:      The 21st Century Cures Act makes medical notes like these available to patients in the interest of transparency. Please be advised this is a medical document. Medical documents are intended to carry relevant information, facts as evident, and the clinical opinion of the practitioner. The medical note is intended as peer to peer communication and may appear blunt or direct. It is written in medical language and may contain abbreviations or verbiage that are unfamiliar.

## 2024-03-03 NOTE — ED PROVIDER NOTES
Patient Seen in: [facility] Emergency Department      History     Chief Complaint   Patient presents with    Difficulty Breathing     For 2 weeks, states progressively worsening. States has low hgb     Stated Complaint: bella x2 weeks O2 98    Subjective:   HPI    68-year-old male presents for evaluation of difficulty breathing.  Patient has been short of breath with exertion for about 2 weeks.  No cough or cold.  No chest pain.  No lower extremity swelling.  During this period of time he has had intermittent melanotic stool.  3 days ago he held his warfarin which he takes for a heart valve issue.  Patient has a long history of intermittent GI bleeding usually bright red blood that is hemorrhoidal in origin.  Denies any abdominal pain at this time.  Did not have any bleeding for the past 24 hours    Objective:   Past Medical History:   Diagnosis Date    Anemia, chronic disease     Anesthesia complication     HX: OF AGGRESSION W/PAST PROCEDURES D/T INSUFFICIENT ANESTHESIA IN THE PAST?    Arthritis 5 yrs ago    Nothing good to say    Calculus of kidney 5 yrs    Nothing major    Cancer (HCC)     skin cancer    Cellulitis     Diabetes (HCC)     Essential hypertension     Hearing impairment     High blood pressure     Hyperlipidemia     Obesity 10yrs ago    Osteoarthritis 5 yrs ago    Pancreatitis (HCC)     Sepsis (HCC)     Shortness of breath     INTERMITTENT SOB ON EXERTION    Visual impairment     CONTACTS/GLASSES              Past Surgical History:   Procedure Laterality Date    ANAST PANC CYST-BOWEL,[id number]-Y      CABG      CATH TRANSCATHETER AORTIC VALVE REPLACEMENT      CHOLECYSTECTOMY  8 yrs ago    Following acute pancreatitis    COLONOSCOPY  Many times    HEMORRHOIDECTOMY  1 yr ago    Not very successful to be repeated    OTHER SURGICAL HISTORY  20 yrs ago    Aortic valve replacement. Metal valve now    REMOVAL GALLBLADDER      REPLACE AORTIC VALVE OPEN      performed in [place]    SKIN SURGERY       TONSILLECTOMY      VASECTOMY  20 yrs ago                Social History     Socioeconomic History    Marital status: Single   Tobacco Use    Smoking status: Former     Packs/day: 1.00     Years: 20.00     Additional pack years: 0.00     Total pack years: 20.00     Types: Cigarettes     Quit date: 1987     Years since quittin.1    Smokeless tobacco: Never   Vaping Use    Vaping Use: Never used   Substance and Sexual Activity    Alcohol use: Never    Drug use: Never   Other Topics Concern    Caffeine Concern No    Exercise No    Seat Belt No    Special Diet No    Stress Concern No    Weight Concern Yes              Review of Systems    Positive for stated complaint: bella x2 weeks O2 98  Other systems are as noted in HPI.  Constitutional and vital signs reviewed.      All other systems reviewed and negative except as noted above.    Physical Exam     ED Triage Vitals [24 1120]   /66   Pulse 70   Resp 18   Temp 97.6 °F (36.4 °C)   Temp src Oral   SpO2 96 %   O2 Device None (Room air)       Current:/69   Pulse 64   Temp 97.6 °F (36.4 °C) (Oral)   Resp 16   Ht 177.8 cm (5' 10\")   Wt 104.3 kg   SpO2 100%   BMI 33.00 kg/m²         Physical Exam    General: Alert, oriented, no apparent distress  HEENT: Atraumatic, normocephalic.  Pupils equal reactive.  Extraocular motions intact. Oropharynx clear.    Neck: Supple  Lungs: Clear to auscultation bilaterally.  Heart: Regular rate and rhythm.  Abdomen: Soft, nontender.   Skin: No rash.  No edema.  Neurologic: No focal neurologic deficits.  Normal speech pattern  Musculoskeletal: No tenderness or deformity noted.  Full range of motion throughout.        ED Course     Labs Reviewed   COMP METABOLIC PANEL (14) - Abnormal; Notable for the following components:       Result Value    Glucose 256 (*)     Sodium 131 (*)     BUN 41 (*)     Creatinine 1.43 (*)     eGFR-Cr 53 (*)     All other components within normal limits   PROTHROMBIN TIME (PT) -  Abnormal; Notable for the following components:    PT 22.1 (*)     INR 1.92 (*)     All other components within normal limits   PRO BETA NATRIURETIC PEPTIDE - Abnormal; Notable for the following components:    Pro-Beta Natriuretic Peptide 289 (*)     All other components within normal limits   CBC W/ DIFFERENTIAL - Abnormal; Notable for the following components:    RBC 2.93 (*)     HGB 8.4 (*)     HCT 25.0 (*)     .0 (*)     All other components within normal limits   TROPONIN I HIGH SENSITIVITY - Normal   SARS-COV-2/FLU A AND B/RSV BY PCR (ZeroPercent.usPERT) - Normal    Narrative:     This test is intended for the qualitative detection and differentiation of SARS-CoV-2, influenza A, influenza B, and respiratory syncytial virus (RSV) viral RNA in nasopharyngeal or nares swabs from individuals suspected of respiratory viral infection consistent with COVID-19 by their healthcare provider. Signs and symptoms of respiratory viral infection due to SARS-CoV-2, influenza, and RSV can be similar.    Test performed using the XpCombat Stroke Xpress SARS-CoV-2/FLU/RSV (real time RT-PCR)  assay on the [employer] instrument, [employer], [place] [address].   This test is being used under the Food and Drug Administration's Emergency Use Authorization.    The authorized Fact Sheet for Healthcare Providers for this assay is available upon request from the laboratory.   CBC WITH DIFFERENTIAL WITH PLATELET    Narrative:     The following orders were created for panel order CBC With Differential With Platelet.  Procedure                               Abnormality         Status                     ---------                               -----------         ------                     CBC W/ DIFFERENTIAL[[id number]]          Abnormal            Final result                 Please view results for these tests on the individual orders.   URINALYSIS, ROUTINE   TYPE AND SCREEN    Narrative:     The following orders were created for panel order Type and  screen.  Procedure                               Abnormality         Status                     ---------                               -----------         ------                     ABORH (Blood Type)[[id number]]                               Final result               Antibody Screen[[id number]]                                  Final result                 Please view results for these tests on the individual orders.   ABORH (BLOOD TYPE)   ANTIBODY SCREEN   RAINBOW DRAW LAVENDER   RAINBOW DRAW LIGHT GREEN   RAINBOW DRAW BLUE     EKG    Rate, intervals and axes as noted on EKG Report.  Rate: 65  Rhythm: Sinus Rhythm  Reading: No acute appearing ST elevation or depression                          MDM      Differential diagnosis includes symptomatic anemia, renal failure, CHF, ACS  Admission disposition: 3/3/2024  1:42 PM         Hemoglobin 8.4.  Last was 11.5 about 4 months ago      I have independently visualized the radiology images, my focus/limited interpretation: No pneumonia or pulmonary edema    Defer to radiologist for other/incidental findings      Troponin negative.    Creatinine 1.4.       Patient likely has symptoms related to progressive anemia.  Tells me he has not bled for the past 24 hours probably due to holding warfarin.  Has an endoscopy scheduled with Dr. Salomon in April.  He would prefer to be seen through our system.     Spoke with Dr. Rpap.  N.p.o. after midnight and plan for endoscopy in the morning.  Then coordinate with hematology regarding restarting warfarin    Spoke with Dr Alegre for admission       Medical Decision Making      Disposition and Plan     Clinical Impression:  1. Symptomatic anemia    2. Upper GI bleeding         Disposition:  Admit  3/3/2024  1:42 pm    Follow-up:  No follow-up provider specified.        Medications Prescribed:  Current Discharge Medication List                            Hospital Problems       Present on Admission  Date Reviewed: 3/3/2024             ICD-10-CM Noted POA    * (Principal) Symptomatic anemia D64.9 3/3/2024 Unknown    H/O heart valve replacement with mechanical valve Z95.2 8/3/2022 Yes    Overview Signed 4/18/2023 11:57 AM by Ema Arora DO     Per Dr. Johnson, keep INR between 2-2.5 given history of bleeding         Iron deficiency anemia D50.9 7/26/2022 Yes    Overview Signed 7/26/2022 12:43 PM by Ema Arora DO     GI work up in [place] has been negative. Including EGD/colonoscopy/capsule         Morbid (severe) obesity due to excess calories (HCC) E66.01 8/24/2022 Yes    Primary hypertension I10 7/26/2022 Yes    Recurrent major depressive disorder, in full remission (HCC) F33.42 7/26/2022 Yes    Type 2 diabetes mellitus with hyperglycemia, without long-term current use of insulin (HCC) E11.65 Unknown Yes

## 2024-03-03 NOTE — ED QUICK NOTES
Orders for admission, patient is aware of plan and ready to go upstairs. Any questions, please call ED RN Dolores at extension [phone].     Patient Covid vaccination status: Fully vaccinated     COVID Test Ordered in ED: SARS-CoV-2/Flu A and B/RSV by PCR (GeneXpert)    COVID Suspicion at Admission: N/A    Running Infusions:  None    Mental Status/LOC at time of transport: AO x 4    Other pertinent information:   CIWA score: N/A   NIH score:  N/A

## 2024-03-03 NOTE — ED INITIAL ASSESSMENT (HPI)
Pt to ER ambulatory. States SOB for 2 weeks progressively worsening. States has low hgb and had hgb drawn yesterday (8.6). Denies CP, dizziness, nausea or any pain. SOB with exertion.

## 2024-03-04 ENCOUNTER — ANESTHESIA EVENT (OUTPATIENT)
Dept: ENDOSCOPY | Facility: HOSPITAL | Age: 69
End: 2024-03-04
Payer: MEDICAID

## 2024-03-04 ENCOUNTER — APPOINTMENT (OUTPATIENT)
Dept: HEMATOLOGY/ONCOLOGY | Facility: HOSPITAL | Age: 69
End: 2024-03-04
Attending: INTERNAL MEDICINE

## 2024-03-04 ENCOUNTER — TELEPHONE (OUTPATIENT)
Dept: HEMATOLOGY/ONCOLOGY | Facility: HOSPITAL | Age: 69
End: 2024-03-04

## 2024-03-04 LAB
ANION GAP SERPL CALC-SCNC: 3 MMOL/L (ref 0–18)
BASOPHILS # BLD AUTO: 0.03 X10(3) UL (ref 0–0.2)
BASOPHILS NFR BLD AUTO: 0.7 %
BUN BLD-MCNC: 29 MG/DL (ref 9–23)
CALCIUM BLD-MCNC: 9.1 MG/DL (ref 8.5–10.1)
CHLORIDE SERPL-SCNC: 103 MMOL/L (ref 98–112)
CO2 SERPL-SCNC: 28 MMOL/L (ref 21–32)
CREAT BLD-MCNC: 1.14 MG/DL
DEPRECATED HBV CORE AB SER IA-ACNC: 133.1 NG/ML
EGFRCR SERPLBLD CKD-EPI 2021: 70 ML/MIN/1.73M2 (ref 60–?)
EOSINOPHIL # BLD AUTO: 0.18 X10(3) UL (ref 0–0.7)
EOSINOPHIL NFR BLD AUTO: 3.9 %
ERYTHROCYTE [DISTWIDTH] IN BLOOD BY AUTOMATED COUNT: 16.1 %
GLUCOSE BLD-MCNC: 156 MG/DL (ref 70–99)
GLUCOSE BLD-MCNC: 160 MG/DL (ref 70–99)
GLUCOSE BLD-MCNC: 174 MG/DL (ref 70–99)
GLUCOSE BLD-MCNC: 187 MG/DL (ref 70–99)
GLUCOSE BLD-MCNC: 191 MG/DL (ref 70–99)
HCT VFR BLD AUTO: 22.4 %
HGB BLD-MCNC: 7.6 G/DL
IMM GRANULOCYTES # BLD AUTO: 0.05 X10(3) UL (ref 0–1)
IMM GRANULOCYTES NFR BLD: 1.1 %
INR BLD: 1.66 (ref 0.8–1.2)
IRON SATN MFR SERPL: 132 %
IRON SERPL-MCNC: 536 UG/DL
LYMPHOCYTES # BLD AUTO: 0.94 X10(3) UL (ref 1–4)
LYMPHOCYTES NFR BLD AUTO: 20.4 %
MCH RBC QN AUTO: 29.1 PG (ref 26–34)
MCHC RBC AUTO-ENTMCNC: 33.9 G/DL (ref 31–37)
MCV RBC AUTO: 85.8 FL
MONOCYTES # BLD AUTO: 0.24 X10(3) UL (ref 0.1–1)
MONOCYTES NFR BLD AUTO: 5.2 %
NEUTROPHILS # BLD AUTO: 3.17 X10 (3) UL (ref 1.5–7.7)
NEUTROPHILS # BLD AUTO: 3.17 X10(3) UL (ref 1.5–7.7)
NEUTROPHILS NFR BLD AUTO: 68.7 %
OSMOLALITY SERPL CALC.SUM OF ELEC: 288 MOSM/KG (ref 275–295)
PLATELET # BLD AUTO: 90 10(3)UL (ref 150–450)
POTASSIUM SERPL-SCNC: 4.3 MMOL/L (ref 3.5–5.1)
PROTHROMBIN TIME: 19.8 SECONDS (ref 11.6–14.8)
RBC # BLD AUTO: 2.61 X10(6)UL
SODIUM SERPL-SCNC: 134 MMOL/L (ref 136–145)
TIBC SERPL-MCNC: 407 UG/DL (ref 240–450)
TRANSFERRIN SERPL-MCNC: 273 MG/DL (ref 200–360)
WBC # BLD AUTO: 4.6 X10(3) UL (ref 4–11)

## 2024-03-04 PROCEDURE — 99232 SBSQ HOSP IP/OBS MODERATE 35: CPT | Performed by: HOSPITALIST

## 2024-03-04 RX ORDER — SERTRALINE HYDROCHLORIDE 100 MG/1
200 TABLET, FILM COATED ORAL DAILY
Status: DISCONTINUED | OUTPATIENT
Start: 2024-03-04 | End: 2024-03-05

## 2024-03-04 RX ORDER — GABAPENTIN 300 MG/1
300 CAPSULE ORAL NIGHTLY
Status: DISCONTINUED | OUTPATIENT
Start: 2024-03-04 | End: 2024-03-05

## 2024-03-04 NOTE — PLAN OF CARE
Patient is alert and orientated, VSS, RA, afebrile and doesn't complain of pain. Patient was finishing bowel prep for GI procedure and was not clear. Procedure was rescheduled for tomorrow. Started new bowel prep at 1800. Patient was on clear liquid diet. NPO at midnight. IV fluids were stopped per MD. Daughter at bedside. Call light and safety precautions are in place.     Problem: GASTROINTESTINAL - ADULT  Goal: Minimal or absence of nausea and vomiting  Description: INTERVENTIONS:  - Maintain adequate hydration with IV or PO as ordered and tolerated  - Nasogastric tube to low intermittent suction as ordered  - Evaluate effectiveness of ordered antiemetic medications  - Provide nonpharmacologic comfort measures as appropriate  - Advance diet as tolerated, if ordered  - Obtain nutritional consult as needed  - Evaluate fluid balance  Outcome: Progressing  Goal: Maintains or returns to baseline bowel function  Description: INTERVENTIONS:  - Assess bowel function  - Maintain adequate hydration with IV or PO as ordered and tolerated  - Evaluate effectiveness of GI medications  - Encourage mobilization and activity  - Obtain nutritional consult as needed  - Establish a toileting routine/schedule  - Consider collaborating with pharmacy to review patient's medication profile  Outcome: Progressing  Goal: Maintains adequate nutritional intake (undernourished)  Description: INTERVENTIONS:  - Monitor percentage of each meal consumed  - Identify factors contributing to decreased intake, treat as appropriate  - Assist with meals as needed  - Monitor I&O, WT and lab values  - Obtain nutritional consult as needed  - Optimize oral hygiene and moisture  - Encourage food from home; allow for food preferences  - Enhance eating environment  Outcome: Progressing  Goal: Achieves appropriate nutritional intake (bariatric)  Description: INTERVENTIONS:  - Monitor for over-consumption  - Identify factors contributing to increased intake,  treat as appropriate  - Monitor I&O, WT and lab values  - Obtain nutritional consult as needed  - Evaluate psychosocial factors contributing to over-consumption  Outcome: Progressing     Problem: PAIN - ADULT  Goal: Verbalizes/displays adequate comfort level or patient's stated pain goal  Description: INTERVENTIONS:  - Encourage pt to monitor pain and request assistance  - Assess pain using appropriate pain scale  - Administer analgesics based on type and severity of pain and evaluate response  - Implement non-pharmacological measures as appropriate and evaluate response  - Consider cultural and social influences on pain and pain management  - Manage/alleviate anxiety  - Utilize distraction and/or relaxation techniques  - Monitor for opioid side effects  - Notify MD/LIP if interventions unsuccessful or patient reports new pain  - Anticipate increased pain with activity and pre-medicate as appropriate  Outcome: Progressing

## 2024-03-04 NOTE — CONSULTS
[facility]  Report of GI Consultation    Liang Schmidt Patient Status:  Inpatient    1955 MRN [id number]   Location [facility] 4NW-A Attending Mika Alegre MD   Hosp Day # 0 PCP Ema Arora DO     Date of Admission:  3/3/2024  Date of Consult:  3/3/2024  PCP: Ema Arora DO    Reason for Consultation:   anemia    History of Present Illness:   Patient is a 68 year old male who was admitted to the hospital for Symptomatic anemia:    Pt with chronic history of anemia, worked-up prev while living in [place], with mult EGDs, colonoscopies, and capsule studies.  His last EGD and colonoscopy was approx 3-4 years ago.  His anemia has been attributed to persistent hemorrhoidal bleeding.  He is on chronic anticoagulation with warfarin.      Most recently he had a bout of severe hemorrhoid bleeding for 2 straight months.  He has been getting IV iron infusions with Dr Carr.  He is due to see him tomorrow.  Then 3 days ago, he had sever bouts of melena.  He stopped his warfarin at that time.      He is scheduled for outpatient colonoscopy in April at an OSH.    He does not use NSAIDs.    Past Medical History  Past Medical History:   Diagnosis Date    Anemia, chronic disease     Anesthesia complication     HX: OF AGGRESSION W/PAST PROCEDURES D/T INSUFFICIENT ANESTHESIA IN THE PAST?    Arthritis 5 yrs ago    Nothing good to say    Calculus of kidney 5 yrs    Nothing major    Cancer (HCC)     skin cancer    Cellulitis     Diabetes (HCC)     Essential hypertension     Hearing impairment     High blood pressure     Hyperlipidemia     Obesity 10yrs ago    Osteoarthritis 5 yrs ago    Pancreatitis (HCC)     Sepsis (HCC)     Shortness of breath     INTERMITTENT SOB ON EXERTION    Visual impairment     CONTACTS/GLASSES       Past Surgical History  Past Surgical History:   Procedure Laterality Date    ANAST PANC CYST-BOWEL,YAAKOV-EN-Y      CABG      CATH TRANSCATHETER AORTIC VALVE REPLACEMENT       CHOLECYSTECTOMY  8 yrs ago    Following acute pancreatitis    COLONOSCOPY  Many times    HEMORRHOIDECTOMY  1 yr ago    Not very successful to be repeated    OTHER SURGICAL HISTORY  20 yrs ago    Aortic valve replacement. Metal valve now    REMOVAL GALLBLADDER      REPLACE AORTIC VALVE OPEN      performed in [place]    SKIN SURGERY      TONSILLECTOMY      VASECTOMY  20 yrs ago       Family History  History reviewed. No pertinent family history.    Social History  Pediatric History   Patient Parents    Not on file     Other Topics Concern    Caffeine Concern No    Exercise No    Seat Belt No    Special Diet No    Stress Concern No    Weight Concern Yes   Social History Narrative    Not on file           Current Medications:  Current Facility-Administered Medications   Medication Dose Route Frequency    glucose (Dex4) 15 GM/59ML oral liquid 15 g  15 g Oral Q15 Min PRN    Or    glucose (Glutose) 40% oral gel 15 g  15 g Oral Q15 Min PRN    Or    glucose-vitamin C (Dex-4) chewable tab 4 tablet  4 tablet Oral Q15 Min PRN    Or    dextrose 50% injection 50 mL  50 mL Intravenous Q15 Min PRN    Or    glucose (Dex4) 15 GM/59ML oral liquid 30 g  30 g Oral Q15 Min PRN    Or    glucose (Glutose) 40% oral gel 30 g  30 g Oral Q15 Min PRN    Or    glucose-vitamin C (Dex-4) chewable tab 8 tablet  8 tablet Oral Q15 Min PRN    insulin aspart (NovoLOG) 100 Units/mL FlexPen 1-10 Units  1-10 Units Subcutaneous TID AC and HS    acetaminophen (Tylenol Extra Strength) tab 500 mg  500 mg Oral Q4H PRN    melatonin tab 3 mg  3 mg Oral Nightly PRN    glycerin-hypromellose- (Artifical Tears) 0.2-0.2-1 % ophthalmic solution 1 drop  1 drop Both Eyes QID PRN    sodium chloride (Saline Mist) 0.65 % nasal solution 1 spray  1 spray Each Nare Q3H PRN    sodium chloride 0.9% infusion   Intravenous Continuous    ondansetron (Zofran) 4 MG/2ML injection 4 mg  4 mg Intravenous Q6H PRN    metoclopramide (Reglan) 5 mg/mL injection 10 mg  10 mg  Intravenous Q8H PRN    [START ON 3/4/2024] polyethylene glycol-electrolyte (Golytely) 236 g oral solution 2,000 mL  2,000 mL Oral Once       Allergies  No Known Allergies    Review of Systems:   A comprehensive 10 point review of systems was completed.  Pertinent positives and negatives noted in the the HPI.     Physical Exam:   Blood pressure 121/51, pulse 56, temperature 98.4 °F (36.9 °C), temperature source [place], resp. rate 16, height 5' 10\" (1.778 m), weight 258 lb 9.6 oz (117.3 kg), SpO2 96%.    GENERAL: NAD, oriented x 3, pleasant  HENT: Normocephalic, no temporal wasting.  Normal oral mucosa with good dentition, no ulcers.  EYES: No scleral icterus, no conjunctival pallor.  NECK: No thyromegaly or cervical lymphadenopathy  PULM: Lungs clear to auscultation anteriorly  CV: Regular, no murmurs, 2+ radial pulses  ABD: Normoactive bowel sounds; soft/nondistended  EXT: No edema, normal muscle mass  SKIN: No rash, no jaundice, no spider angiomas    Results:     Laboratory Data:  Lab Results   Component Value Date    WBC 7.4 03/03/2024    HGB 8.4 (L) 03/03/2024    .0 (L) 03/03/2024    CREATSERUM 1.43 (H) 03/03/2024    BUN 41 (H) 03/03/2024     (L) 03/03/2024    K 4.3 03/03/2024    CO2 25.0 03/03/2024    CA 9.0 03/03/2024    ALB 3.5 03/03/2024    ALKPHO 105 03/03/2024    TP 7.1 03/03/2024    AST 29 03/03/2024    ALT 31 03/03/2024    BILT 0.5 03/03/2024    PTT 61.7 (H) 01/21/2023    INR 1.92 (H) 03/03/2024    PTP 22.1 (H) 03/03/2024    TSH 1.810 10/06/2023     08/03/2022           No results for input(s): \"URINE\", \"CULTI\", \"BLDSMR\" in the last 168 hours.    Recent Labs   Lab 03/03/24  1138   ALT 31   AST 29   ALKPHO 105   BILT 0.5   HGB 8.4*   WBC 7.4       Imaging:  XR CHEST AP PORTABLE  (CPT=71045)    Result Date: 3/3/2024  CONCLUSION:  No lobar pneumonia or overt congestive failure.  Mild atelectasis/scarring in the lower lungs.   LOCATION:  [facility]      Dictated by (CST): Chandrakant Pedroza MD on  3/03/2024 at 12:56 PM     Finalized by (CST): Chandrakant Pedroza MD on 3/03/2024 at 12:58 PM           Impression:   Chronic long-standing iron def anemia  Recurrent bleeding from hemorrhoids, has seen surgery but not yet pursued resection  Chronic use of anticoagulation  Recent bout of melena    Due for follow-up endoscopic evaluation, has outpt colonoscopy scheduled in 4/2024.  Given new melena, should have EGD and colonoscopy.  If neg, suspect the current anemia reflects the 2 months of rectal bleeding that only recently resolved.      Recommendations:  EGD and colonoscopy tomorrow  OK for discharge If neg  Should follow-up with surgery re: Hemorrhoids  IV iron while inpatient    Thank you for allowing me to participate in the care of your patient.    Prashanth Rapp MD   3/3/2024

## 2024-03-04 NOTE — PROGRESS NOTES
[facility]   part of [facility]     Hospitalist Progress Note     Liang Schmidt Patient Status:  Inpatient    1955 MRN [id number]   Location [facility] 4NW-A Attending Mika Alegre MD   Hosp Day # 1 PCP Ema Arora DO     Chief Complaint: GI bleed    Subjective:     Patient doing ok, denies abdominal pain, EGD/colon now scheduled for tomorrow.    Objective:    Review of Systems:   A comprehensive review of systems was completed; pertinent positive and negatives stated in subjective.    Vital signs:  Temp:  [97.6 °F (36.4 °C)-98.4 °F (36.9 °C)] 98.4 °F (36.9 °C)  Pulse:  [56-75] 75  Resp:  [14-22] 16  BP: (110-141)/(51-70) 141/70  SpO2:  [95 %-100 %] 95 %    Physical Exam:    General: No acute distress  Respiratory: No wheezes, no rhonchi  Cardiovascular: S1, S2, regular rate and rhythm  Abdomen: Soft, Non-tender, non-distended, positive bowel sounds  Neuro: No new focal deficits.   Extremities: No edema      Diagnostic Data:    Labs:  Recent Labs   Lab 24  1138 24  0607   WBC 7.4 4.6   HGB 8.4* 7.6*   MCV 85.3 85.8   .0* 90.0*   INR 1.92*  --        Recent Labs   Lab 24  1138 24  0607   * 174*   BUN 41* 29*   CREATSERUM 1.43* 1.14   CA 9.0 9.1   ALB 3.5  --    * 134*   K 4.3 4.3    103   CO2 25.0 28.0   ALKPHO 105  --    AST 29  --    ALT 31  --    BILT 0.5  --    TP 7.1  --        Estimated Creatinine Clearance: 64 mL/min (based on SCr of 1.14 mg/dL).    Recent Labs   Lab 24  1138   TROPHS 11       Recent Labs   Lab 24  1138   PTP 22.1*   INR 1.92*                  Microbiology    No results found for this visit on 24.      Imaging: Reviewed in Epic.    Medications:    gabapentin  300 mg Oral Nightly    sertraline  200 mg Oral Daily    insulin aspart  1-10 Units Subcutaneous TID AC and HS       Assessment & Plan:      #GI bleed/melena  CLD, NPO after midnight  EGD/colonoscopy today  Hold coumadin  GI  following    #Acute blood loss anemia due to above  Monitor Hgb  IV iron for now  Hgb lower today  Transfuse to keep Hgb > 7     #AGGIE, on IVF, resolved     #HTN, controlled, hold BP meds     #DMII, hyperglycemia protocol     #Hyponatremia, on IVF, improved     #Chronic anemia related to hemorrhoidal bleeding     #DL, statin when able     #h/o mechanical heart valve on coumadin, unclear which valve, INR 1.95, cont to hold for now      Mika Alegre MD    Supplementary Documentation:     Quality:  DVT Mechanical Prophylaxis:   SCDs,    DVT Pharmacologic Prophylaxis   Medication   None                Code Status: Not on file  Vee: No urinary catheter in place  Vee Duration (in days):   Central line:    ADAMARIS:     Discharge is dependent on: progress  At this point Mr. Schmidt is expected to be discharge to: home    The 21st Century Cures Act makes medical notes like these available to patients in the interest of transparency. Please be advised this is a medical document. Medical documents are intended to carry relevant information, facts as evident, and the clinical opinion of the practitioner. The medical note is intended as peer to peer communication and may appear blunt or direct. It is written in medical language and may contain abbreviations or verbiage that are unfamiliar.

## 2024-03-04 NOTE — PROGRESS NOTES
Gastroenterology Progress Note  Liang Wiley Brayan Patient Status:  Inpatient    1955 MRN [id number]   Location [facility] 4NW-A Attending Mika Alegre MD   Hosp Day # 1 PCP Ema Arora DO     Chief Complaint: Anemia   S: Pt completed Golytely this AM and is passing brown, opaque stools. Mild abd cramping. No nausea or vomiting   O: /70 (BP Location: Left arm)   Pulse 75   Temp 98.4 °F (36.9 °C) ([place])   Resp 16   Ht 5' 10\" (1.778 m)   Wt 258 lb 9.6 oz (117.3 kg)   SpO2 95%   BMI 37.11 kg/m²   Gen: AAOx3  CV: RRR with normal S1 / S2  Resp: CTA bilaterally; No increase in respiratory effort   Abd: Obese, (+)BS, soft, non-tender, non-distended; no rebound or guarding  Ext: No edema or cyanosis  Skin: Warm and dry  Laboratory Data:       Recent Labs   Lab 24  1602 24  2124 24  0517   PGLU 184* 172* 191*     Recent Labs   Lab 24  1138   INR 1.92*         Recent Labs   Lab 24  1138 24  0607   WBC 7.4 4.6   HGB 8.4* 7.6*   .0* 90.0*       Recent Labs   Lab 24  1138 24  0607   * 134*   K 4.3 4.3    103   CO2 25.0 28.0   BUN 41* 29*   CREATSERUM 1.43* 1.14       Recent Labs   Lab 24  1138   ALT 31   AST 29     Assessment: 68 yr-old male with hx of CAD s/p CABG, s/p mechanical valve on Coumadin, s/p Antonio-en-Y gastric bypass, DM, and anemia previously attributed to persistent hemorrhoidal bleeding admitted 3/3 with symptomatic anemia. Pt was to have bidirectional endoscopy today to assess for sources of ASHLEY however stools not clear. Will plan for additional bowel prep this evening and plan for bidirectional endoscopy tomorrow  The risks, benefits, alternatives of the procedure including the risks of anesthesia, bleeding, perforation, missed lesions, need for surgery, and infection were discussed with the patient. He expressed understanding of the risks and was  agreeable to proceed.  Plan:   1. Plan for EGD and colonoscopy in AM under MAC with Dr Kelley  2. Clear liquid diet today; NPO after midnight with sips of water for necessary medications   3.  Plenvu split prep tonight given hx of gastric bypass (each dose to be completed in 2 hours)  4. Protonix daily   5. Please continue to hold Coumadin if the risk remains acceptable; STAT INR now and repeat in AM--if Heparin bridge is needed OK to continue Heparin today and ideally hold x 6 hrs prior to endoscopic start time   6. Consider outpatient consult with general surgery for hemorrhoidal eval  7. CBC, BMP, Mg in AM correcting electrolytes per Hospitalist recommendations and transfusing PRBC as needed to maintain Hgb 7-8 given hx of CAD    Case discussed with Dr HEATHER Torres, APRN  10:33 AM  3/4/2024  [facility]  [phone]

## 2024-03-04 NOTE — ANESTHESIA PREPROCEDURE EVALUATION
PRE-OP EVALUATION    Patient Name: Liang Schmidt    Admit Diagnosis: Upper GI bleeding [K92.2]  Symptomatic anemia [D64.9]    Pre-op Diagnosis: Anemia [D64.9]    ESOPHAGOGASTRODUODENOSCOPY (EGD) AND COLONOSCOPY WITH MAC    Anesthesia Procedure: ESOPHAGOGASTRODUODENOSCOPY (EGD) AND COLONOSCOPY WITH MAC    Surgeon(s) and Role:     * Zenon Kelley, DO - Primary    Pre-op vitals reviewed.  Temp: 97.6 °F (36.4 °C)  Pulse: 75  Resp: 20  BP: 131/57  SpO2: 95 %  Body mass index is 37.11 kg/m².    Current medications reviewed.  Hospital Medications:  • gabapentin (Neurontin) cap 300 mg  300 mg Oral Nightly   • sertraline (Zoloft) tab 200 mg  200 mg Oral Daily   • pantoprazole (Protonix) 40 mg in sodium chloride 0.9% PF 10 mL IV push  40 mg Intravenous Q24H   • polyethylene glycol-electrolyte solution (Plenvu) 140 g SOLR 480 mL  480 mL Oral BID@0300,1800   • [COMPLETED] pantoprazole (Protonix) 40 mg in sodium chloride 0.9% PF 10 mL IV push  40 mg Intravenous Once   • glucose (Dex4) 15 GM/59ML oral liquid 15 g  15 g Oral Q15 Min PRN    Or   • glucose (Glutose) 40% oral gel 15 g  15 g Oral Q15 Min PRN    Or   • glucose-vitamin C (Dex-4) chewable tab 4 tablet  4 tablet Oral Q15 Min PRN    Or   • dextrose 50% injection 50 mL  50 mL Intravenous Q15 Min PRN    Or   • glucose (Dex4) 15 GM/59ML oral liquid 30 g  30 g Oral Q15 Min PRN    Or   • glucose (Glutose) 40% oral gel 30 g  30 g Oral Q15 Min PRN    Or   • glucose-vitamin C (Dex-4) chewable tab 8 tablet  8 tablet Oral Q15 Min PRN   • insulin aspart (NovoLOG) 100 Units/mL FlexPen 1-10 Units  1-10 Units Subcutaneous TID AC and HS   • acetaminophen (Tylenol Extra Strength) tab 500 mg  500 mg Oral Q4H PRN   • melatonin tab 3 mg  3 mg Oral Nightly PRN   • glycerin-hypromellose- (Artifical Tears) 0.2-0.2-1 % ophthalmic solution 1 drop  1 drop Both Eyes QID PRN   • sodium chloride (Saline Mist) 0.65 % nasal solution 1 spray  1 spray Each Nare Q3H PRN   • sodium  chloride 0.9% infusion   Intravenous Continuous   • ondansetron (Zofran) 4 MG/2ML injection 4 mg  4 mg Intravenous Q6H PRN   • metoclopramide (Reglan) 5 mg/mL injection 10 mg  10 mg Intravenous Q8H PRN   • [COMPLETED] iron sucrose (Venofer) 20 mg/mL injection 200 mg  200 mg Intravenous Once   • [COMPLETED] polyethylene glycol-electrolyte (Golytely) 236 g oral solution 2,000 mL  2,000 mL Oral Once   • [COMPLETED] polyethylene glycol-electrolyte (Golytely) 236 g oral solution 2,000 mL  2,000 mL Oral Once   • [COMPLETED] iron sucrose (Venofer) 20 mg/mL injection 200 mg  200 mg Intravenous Once       Outpatient Medications:     Medications Prior to Admission   Medication Sig Dispense Refill Last Dose   • nystatin-triamcinolone 100,000-0.1 Units/g-% External Cream APPLY TO LEFT AND RIGHT AFFECTED AREA OF GROIN ONCE A DAY AND COVER WITH DRY GAUZE   3/2/2024   • amLODIPine 10 MG Oral Tab Take 1 tablet (10 mg total) by mouth daily. 90 tablet 0 3/2/2024   • metFORMIN 500 MG Oral Tab Take 2 tablets (1,000 mg total) by mouth daily with breakfast. 180 tablet 0 3/2/2024   • simvastatin 40 MG Oral Tab Take 1 tablet (40 mg total) by mouth nightly. 90 tablet 0 3/2/2024   • ramipril 10 MG Oral Cap Take 1 capsule (10 mg total) by mouth daily. 90 capsule 0 3/2/2024   • sertraline 100 MG Oral Tab Take 2 tablets (200 mg total) by mouth daily. 180 tablet 0 3/2/2024   • triamcinolone 0.1 % External Cream Apply twice a day for 2 weeks, then take a break for 1 week, reapply if persists 80 g 1 Taking   • folic acid 1 MG Oral Tab Take 1 tablet (1 mg total) by mouth daily. 90 tablet 3 3/2/2024   • cetirizine 10 MG Oral Tab Take 1 tablet (10 mg total) by mouth daily. 90 tablet 0 3/2/2024   • warfarin 5 MG Oral Tab Take 1 tablet (5 mg total) by mouth nightly. 90 tablet 0 Past Week   • hydroCHLOROthiazide 12.5 MG Oral Tab Take 1 tablet (12.5 mg total) by mouth daily. 90 tablet 0 3/2/2024   • gabapentin 300 MG Oral Cap Take 1 capsule (300 mg  total) by mouth nightly. 90 capsule 3 3/2/2024   • Calcium Carb-Cholecalciferol 500-400 MG-UNIT Oral Tab Take 1 tablet by mouth daily. 90 tablet 0 3/2/2024   • cyanocobalamin 1000 MCG/ML Injection Solution Inject 1 mL (1,000 mcg total) into the muscle every 30 (thirty) days. 3 mL 3 Past Week   • Lidocaine, Anorectal, 5 % External Cream Apply daily 45 g 0 3/2/2024       Allergies: Patient has no known allergies.      Anesthesia Evaluation    Patient summary reviewed.    Anesthetic Complications           GI/Hepatic/Renal  Comment: S/P gastric bypass    (+) GERD                           Cardiovascular                (+) obesity  (+) hypertension             (+) valvular problems/murmurs (S/P mechanical aortic valve replacement)                        Endo/Other      (+) diabetes and poorly controlled, type 2, not using insulin      (+) anemia        (+) thrombocytopenia           Pulmonary                           Neuro/Psych             (+) TIA                       Past Surgical History:   Procedure Laterality Date   • ANAST PANC CYST-BOWEL,YAAKOV-EN-Y     • CABG     • CATH TRANSCATHETER AORTIC VALVE REPLACEMENT     • CHOLECYSTECTOMY  8 yrs ago    Following acute pancreatitis   • COLONOSCOPY  Many times   • HEMORRHOIDECTOMY  1 yr ago    Not very successful to be repeated   • OTHER SURGICAL HISTORY  20 yrs ago    Aortic valve replacement. Metal valve now   • REMOVAL GALLBLADDER     • REPLACE AORTIC VALVE OPEN      performed in [place]   • SKIN SURGERY     • TONSILLECTOMY     • VASECTOMY  20 yrs ago     Social History     Socioeconomic History   • Marital status: Single   Tobacco Use   • Smoking status: Former     Packs/day: 1.00     Years: 20.00     Additional pack years: 0.00     Total pack years: 20.00     Types: Cigarettes     Quit date: 1987     Years since quittin.1   • Smokeless tobacco: Never   Vaping Use   • Vaping Use: Never used   Substance and Sexual Activity   • Alcohol use: Never   • Drug use:  Never   Other Topics Concern   • Caffeine Concern No   • Exercise No   • Seat Belt No   • Special Diet No   • Stress Concern No   • Weight Concern Yes     History   Drug Use Unknown     Available pre-op labs reviewed.  Lab Results   Component Value Date    WBC 4.6 03/04/2024    RBC 2.61 (L) 03/04/2024    HGB 7.6 (L) 03/04/2024    HCT 22.4 (L) 03/04/2024    MCV 85.8 03/04/2024    MCH 29.1 03/04/2024    MCHC 33.9 03/04/2024    RDW 16.1 03/04/2024    PLT 90.0 (L) 03/04/2024     Lab Results   Component Value Date     (L) 03/04/2024    K 4.3 03/04/2024     03/04/2024    CO2 28.0 03/04/2024    BUN 29 (H) 03/04/2024    CREATSERUM 1.14 03/04/2024     (H) 03/04/2024    CA 9.1 03/04/2024     Lab Results   Component Value Date    INR 1.66 (H) 03/04/2024          ASA: 4   Plan: MAC                           Present on Admission:  • Iron deficiency anemia  • Recurrent major depressive disorder, in full remission ([facility])  • Primary hypertension  • Type 2 diabetes mellitus with hyperglycemia, without long-term current use of insulin ([facility])  • H/O heart valve replacement with mechanical valve  • Morbid (severe) obesity due to excess calories ([facility])

## 2024-03-04 NOTE — PLAN OF CARE
Pt a/o x4. VSS on RA. No c/o pain. Meds per MAR. IVF per order. Pt reports black watery stool. Plan for egd & colonoscopy 3/4.     Call light within reach.

## 2024-03-04 NOTE — PLAN OF CARE
Patient this afternoon was admitted from the ED, denies any nausea and vomiting. Has had minimal stools since admission this afternoon. Prep is being started this evening for procedure tomorrow. Patient is comfortable and oriented to the room this shift. Belongings at the bedside, call light in reach, frequent rounding for patient questions and needs.       Problem: GASTROINTESTINAL - ADULT  Goal: Minimal or absence of nausea and vomiting  Description: INTERVENTIONS:  - Maintain adequate hydration with IV or PO as ordered and tolerated  - Nasogastric tube to low intermittent suction as ordered  - Evaluate effectiveness of ordered antiemetic medications  - Provide nonpharmacologic comfort measures as appropriate  - Advance diet as tolerated, if ordered  - Obtain nutritional consult as needed  - Evaluate fluid balance  Outcome: Progressing  Goal: Maintains or returns to baseline bowel function  Description: INTERVENTIONS:  - Assess bowel function  - Maintain adequate hydration with IV or PO as ordered and tolerated  - Evaluate effectiveness of GI medications  - Encourage mobilization and activity  - Obtain nutritional consult as needed  - Establish a toileting routine/schedule  - Consider collaborating with pharmacy to review patient's medication profile  Outcome: Progressing

## 2024-03-05 ENCOUNTER — ANESTHESIA (OUTPATIENT)
Dept: ENDOSCOPY | Facility: HOSPITAL | Age: 69
End: 2024-03-05
Payer: MEDICAID

## 2024-03-05 VITALS
HEIGHT: 70 IN | HEART RATE: 61 BPM | WEIGHT: 258.63 LBS | RESPIRATION RATE: 18 BRPM | SYSTOLIC BLOOD PRESSURE: 123 MMHG | BODY MASS INDEX: 37.02 KG/M2 | OXYGEN SATURATION: 98 % | DIASTOLIC BLOOD PRESSURE: 48 MMHG | TEMPERATURE: 98 F

## 2024-03-05 LAB
ANION GAP SERPL CALC-SCNC: 8 MMOL/L (ref 0–18)
BASOPHILS # BLD AUTO: 0.02 X10(3) UL (ref 0–0.2)
BASOPHILS NFR BLD AUTO: 0.4 %
BUN BLD-MCNC: 18 MG/DL (ref 9–23)
CALCIUM BLD-MCNC: 9.2 MG/DL (ref 8.5–10.1)
CHLORIDE SERPL-SCNC: 106 MMOL/L (ref 98–112)
CO2 SERPL-SCNC: 24 MMOL/L (ref 21–32)
CREAT BLD-MCNC: 1.1 MG/DL
EGFRCR SERPLBLD CKD-EPI 2021: 73 ML/MIN/1.73M2 (ref 60–?)
EOSINOPHIL # BLD AUTO: 0.22 X10(3) UL (ref 0–0.7)
EOSINOPHIL NFR BLD AUTO: 4.7 %
ERYTHROCYTE [DISTWIDTH] IN BLOOD BY AUTOMATED COUNT: 17.2 %
GLUCOSE BLD-MCNC: 174 MG/DL (ref 70–99)
GLUCOSE BLD-MCNC: 197 MG/DL (ref 70–99)
GLUCOSE BLD-MCNC: 218 MG/DL (ref 70–99)
HCT VFR BLD AUTO: 23.7 %
HGB BLD-MCNC: 7.8 G/DL
IMM GRANULOCYTES # BLD AUTO: 0.08 X10(3) UL (ref 0–1)
IMM GRANULOCYTES NFR BLD: 1.7 %
INR BLD: 1.44 (ref 0.8–1.2)
LYMPHOCYTES # BLD AUTO: 0.71 X10(3) UL (ref 1–4)
LYMPHOCYTES NFR BLD AUTO: 15.1 %
MAGNESIUM SERPL-MCNC: 1.7 MG/DL (ref 1.6–2.6)
MCH RBC QN AUTO: 28.9 PG (ref 26–34)
MCHC RBC AUTO-ENTMCNC: 32.9 G/DL (ref 31–37)
MCV RBC AUTO: 87.8 FL
MONOCYTES # BLD AUTO: 0.34 X10(3) UL (ref 0.1–1)
MONOCYTES NFR BLD AUTO: 7.2 %
NEUTROPHILS # BLD AUTO: 3.34 X10 (3) UL (ref 1.5–7.7)
NEUTROPHILS # BLD AUTO: 3.34 X10(3) UL (ref 1.5–7.7)
NEUTROPHILS NFR BLD AUTO: 70.9 %
OSMOLALITY SERPL CALC.SUM OF ELEC: 292 MOSM/KG (ref 275–295)
PLATELET # BLD AUTO: 98 10(3)UL (ref 150–450)
POTASSIUM SERPL-SCNC: 4 MMOL/L (ref 3.5–5.1)
PROTHROMBIN TIME: 17.6 SECONDS (ref 11.6–14.8)
RBC # BLD AUTO: 2.7 X10(6)UL
SODIUM SERPL-SCNC: 138 MMOL/L (ref 136–145)
WBC # BLD AUTO: 4.7 X10(3) UL (ref 4–11)

## 2024-03-05 PROCEDURE — 0DBN8ZZ EXCISION OF SIGMOID COLON, VIA NATURAL OR ARTIFICIAL OPENING ENDOSCOPIC: ICD-10-PCS | Performed by: STUDENT IN AN ORGANIZED HEALTH CARE EDUCATION/TRAINING PROGRAM

## 2024-03-05 PROCEDURE — 0DB98ZX EXCISION OF DUODENUM, VIA NATURAL OR ARTIFICIAL OPENING ENDOSCOPIC, DIAGNOSTIC: ICD-10-PCS | Performed by: STUDENT IN AN ORGANIZED HEALTH CARE EDUCATION/TRAINING PROGRAM

## 2024-03-05 PROCEDURE — 0DBH8ZZ EXCISION OF CECUM, VIA NATURAL OR ARTIFICIAL OPENING ENDOSCOPIC: ICD-10-PCS | Performed by: STUDENT IN AN ORGANIZED HEALTH CARE EDUCATION/TRAINING PROGRAM

## 2024-03-05 PROCEDURE — 0DBL8ZZ EXCISION OF TRANSVERSE COLON, VIA NATURAL OR ARTIFICIAL OPENING ENDOSCOPIC: ICD-10-PCS | Performed by: STUDENT IN AN ORGANIZED HEALTH CARE EDUCATION/TRAINING PROGRAM

## 2024-03-05 PROCEDURE — 0DBM8ZZ EXCISION OF DESCENDING COLON, VIA NATURAL OR ARTIFICIAL OPENING ENDOSCOPIC: ICD-10-PCS | Performed by: STUDENT IN AN ORGANIZED HEALTH CARE EDUCATION/TRAINING PROGRAM

## 2024-03-05 PROCEDURE — 0DB68ZX EXCISION OF STOMACH, VIA NATURAL OR ARTIFICIAL OPENING ENDOSCOPIC, DIAGNOSTIC: ICD-10-PCS | Performed by: STUDENT IN AN ORGANIZED HEALTH CARE EDUCATION/TRAINING PROGRAM

## 2024-03-05 PROCEDURE — 99239 HOSP IP/OBS DSCHRG MGMT >30: CPT | Performed by: HOSPITALIST

## 2024-03-05 RX ORDER — SODIUM CHLORIDE, SODIUM LACTATE, POTASSIUM CHLORIDE, CALCIUM CHLORIDE 600; 310; 30; 20 MG/100ML; MG/100ML; MG/100ML; MG/100ML
INJECTION, SOLUTION INTRAVENOUS CONTINUOUS PRN
Status: DISCONTINUED | OUTPATIENT
Start: 2024-03-05 | End: 2024-03-05 | Stop reason: SURG

## 2024-03-05 RX ORDER — LIDOCAINE HYDROCHLORIDE 10 MG/ML
INJECTION, SOLUTION EPIDURAL; INFILTRATION; INTRACAUDAL; PERINEURAL AS NEEDED
Status: DISCONTINUED | OUTPATIENT
Start: 2024-03-05 | End: 2024-03-05 | Stop reason: SURG

## 2024-03-05 RX ORDER — MAGNESIUM OXIDE 400 MG/1
400 TABLET ORAL ONCE
Status: COMPLETED | OUTPATIENT
Start: 2024-03-05 | End: 2024-03-05

## 2024-03-05 RX ADMIN — LIDOCAINE HYDROCHLORIDE 25 MG: 10 INJECTION, SOLUTION EPIDURAL; INFILTRATION; INTRACAUDAL; PERINEURAL at 07:05:00

## 2024-03-05 RX ADMIN — SODIUM CHLORIDE, SODIUM LACTATE, POTASSIUM CHLORIDE, CALCIUM CHLORIDE: 600; 310; 30; 20 INJECTION, SOLUTION INTRAVENOUS at 07:00:00

## 2024-03-05 NOTE — PLAN OF CARE
NURSING DISCHARGE NOTE    Discharged Home via Wheelchair.  Accompanied by Family member  Belongings Taken by patient/family.    Pt a/o x4. VSS, remained afebrile. Meds given per MAR. Denies pain. Tolerating diet. PIV removed prior to discharge. AVS reviewed with patient.

## 2024-03-05 NOTE — PLAN OF CARE
Pt a/o x4. VSS on RA. No c/o pain. Meds per MAR. Plenvu prep done. Plan for egd/colon.    Call light within reach.     Black BM (not clear) after starting 2nd half of prep. MD updated. New order carried out (CBC). Hgb 7.8. Endo updated. Pt taken for procedure.    Problem: GASTROINTESTINAL - ADULT  Goal: Minimal or absence of nausea and vomiting  Description: INTERVENTIONS:  - Maintain adequate hydration with IV or PO as ordered and tolerated  - Nasogastric tube to low intermittent suction as ordered  - Evaluate effectiveness of ordered antiemetic medications  - Provide nonpharmacologic comfort measures as appropriate  - Advance diet as tolerated, if ordered  - Obtain nutritional consult as needed  - Evaluate fluid balance  Outcome: Progressing     Problem: PAIN - ADULT  Goal: Verbalizes/displays adequate comfort level or patient's stated pain goal  Description: INTERVENTIONS:  - Encourage pt to monitor pain and request assistance  - Assess pain using appropriate pain scale  - Administer analgesics based on type and severity of pain and evaluate response  - Implement non-pharmacological measures as appropriate and evaluate response  - Consider cultural and social influences on pain and pain management  - Manage/alleviate anxiety  - Utilize distraction and/or relaxation techniques  - Monitor for opioid side effects  - Notify MD/LIP if interventions unsuccessful or patient reports new pain  - Anticipate increased pain with activity and pre-medicate as appropriate  Outcome: Progressing

## 2024-03-05 NOTE — PROGRESS NOTES
[facility]   part of [facility]     Hospitalist Progress Note     Rejiarydeedee Saurabh Schmidt Patient Status:  Inpatient    1955 MRN [id number]   [facility] 4NW-A Attending Mika Alegre MD   Hosp Day # 2 PCP Ema Arora DO     Chief Complaint: GI bleed    Subjective:     Patient doing ok, had EGD/colon this morning, no further bleeding.    Objective:    Review of Systems:   A comprehensive review of systems was completed; pertinent positive and negatives stated in subjective.    Vital signs:  Temp:  [97.4 °F (36.3 °C)-97.9 °F (36.6 °C)] 97.5 °F (36.4 °C)  Pulse:  [55-81] 61  Resp:  [18-20] 18  BP: (116-131)/(48-79) 123/48  SpO2:  [95 %-98 %] 98 %    Physical Exam:    General: No acute distress  Respiratory: No wheezes, no rhonchi  Cardiovascular: S1, S2, regular rate and rhythm  Abdomen: Soft, Non-tender, non-distended, positive bowel sounds  Neuro: No new focal deficits.   Extremities: No edema      Diagnostic Data:    Labs:  Recent Labs   Lab 24  1138 24  0607 24  1024 24  0544   WBC 7.4 4.6  --  4.7   HGB 8.4* 7.6*  --  7.8*   MCV 85.3 85.8  --  87.8   .0* 90.0*  --  98.0*   INR 1.92*  --  1.66* 1.44*       Recent Labs   Lab 24  1138 24  0607 24  0544   * 174* 174*   BUN 41* 29* 18   CREATSERUM 1.43* 1.14 1.10   CA 9.0 9.1 9.2   ALB 3.5  --   --    * 134* 138   K 4.3 4.3 4.0    103 106   CO2 25.0 28.0 24.0   ALKPHO 105  --   --    AST 29  --   --    ALT 31  --   --    BILT 0.5  --   --    TP 7.1  --   --        Estimated Creatinine Clearance: 66.4 mL/min (based on SCr of 1.1 mg/dL).    Recent Labs   Lab 24  1138   TROPHS 11       Recent Labs   Lab 24  1138 24  1024 24  0544   PTP 22.1* 19.8* 17.6*   INR 1.92* 1.66* 1.44*                  Microbiology    No results found for this visit on 24.      Imaging: Reviewed in Epic.    Medications:    gabapentin  300 mg Oral Nightly     sertraline  200 mg Oral Daily    pantoprazole  40 mg Intravenous Q24H    insulin aspart  1-10 Units Subcutaneous TID AC and HS       Assessment & Plan:      #GI bleed/melena  EGD/colonoscopy today, biopsies taken, polyps removed, internal, external hemorrhoids noted  Outpt referral for hemorrhoidectomy  Hold coumadin  GI following    #Acute blood loss anemia due to above  Monitor Hgb  IV iron   Hgb stable  Transfuse to keep Hgb > 7     #AGGIE, on IVF, resolved     #HTN, controlled, hold BP meds     #DMII, hyperglycemia protocol     #Hyponatremia, on IVF, improved     #Chronic anemia related to hemorrhoidal bleeding     #DL, statin when able     #h/o mechanical heart valve on coumadin, unclear which valve, INR 1.95, cont to hold for now      Mika Alegre MD    Supplementary Documentation:     Quality:  DVT Mechanical Prophylaxis:   SCDs,    DVT Pharmacologic Prophylaxis   Medication   None                Code Status: Not on file  Vee: No urinary catheter in place  Vee Duration (in days):   Central line:    ADAMARIS:     Discharge is dependent on: progress  At this point Mr. Schmidt is expected to be discharge to: home    The 21st Century Cures Act makes medical notes like these available to patients in the interest of transparency. Please be advised this is a medical document. Medical documents are intended to carry relevant information, facts as evident, and the clinical opinion of the practitioner. The medical note is intended as peer to peer communication and may appear blunt or direct. It is written in medical language and may contain abbreviations or verbiage that are unfamiliar.

## 2024-03-05 NOTE — ANESTHESIA POSTPROCEDURE EVALUATION
[facility]    Liang Schmidt Patient Status:  Inpatient   Age/Gender 68 year old male MRN [id number]   Location [facility] 4NW-A Attending Mika Alegre MD   Hosp Day # 2 PCP Ema Arora DO       Anesthesia Post-op Note    ESOPHAGOGASTRODUODENOSCOPY (EGD) WITH BIOPSIES AND COLONOSCOPY WITH COLD SNARE POLYPECTOMIES, HOT SNARE POLYPECTOMIES AND FORCEP POLYPECTOMIES    Procedure Summary       Date: 03/05/24 Room / Location:  ENDOSCOPY 03 /  ENDOSCOPY    Anesthesia Start: 0700 Anesthesia Stop: 0811    Procedures:       ESOPHAGOGASTRODUODENOSCOPY (EGD) WITH BIOPSIES AND COLONOSCOPY WITH COLD SNARE POLYPECTOMIES, HOT SNARE POLYPECTOMIES AND FORCEP POLYPECTOMIES      COLONOSCOPY Diagnosis:       Anemia      (EGD: mild gastritis, post gastric biopsy anatomy  COLON: polyps, diverticulosis, hemorrhoids)    Surgeons: Zenon Kelley DO Anesthesiologist: Petros Cota MD    Anesthesia Type: MAC ASA Status: 4            Anesthesia Type: MAC    Vitals Value Taken Time   /68 03/05/24 0812   Temp 98.0 03/05/24 0812   Pulse 68 03/05/24 0812   Resp 16 03/05/24 0812   SpO2 99 % 03/05/24 0812   Vitals shown include unfiled device data.    Patient Location: Endoscopy    Anesthesia Type: MAC    Airway Patency: patent    Postop Pain Control: adequate    Mental Status: mildly sedated but able to meaningfully participate in the post-anesthesia evaluation    Nausea/Vomiting: none    Cardiopulmonary/Hydration status: stable euvolemic    Complications: no apparent anesthesia related complications    Postop vital signs: stable    Dental Exam: Unchanged from Preop    Patient to be discharged from PACU when criteria met.

## 2024-03-05 NOTE — OPERATIVE REPORT
EGD Operative Report  Patient Name: Liang Schmidt  YOB: 1955  MRN: [id number]  Procedure: Esophagogastroduodenoscopy (EGD)  with biopsy  Date of Service: 3/5/2024  Pre-operative Diagnosis & Indication: Anemia, GI bleed  Post-operative Diagnosis:   Normal appearing esophagus s/p biopsy   Mild gastritis of gastric pouch s/p biopsy to assess for H. pylori  Antonio-en-Y anatomy  Normal small bowel s/p biopsy to assess for celiac disease  Attending Endoscopist: Zenon Kelley D.O.  Informed Consent: The planned procedure(s), the explanation of the procedure, its expected benefits, the potential complications and risks and possible alternatives and their benefits and risks were discussed with the patient or the patient's surrogate. The discussion of risks, not limited to but including bleeding, infection, perforation, adverse effects from anesthesia, need for emergency surgery/prolonged hospitalization,  cardiac arrhythmias,  and aspiration were discussed with patient.  Pt and/or surrogate understood the proposed procedure(s), its risks, benefits and alternatives and wish to proceed with procedure(s). All questions answered in full.  After all questions were answered to their satisfaction, a signed, informed, and witnessed consent was obtained.  A TIME OUT WAS COMPLETED prior to the procedure to confirm the patient, procedure(s) and complete endoscopy safety procedure.  Sedation: Monitored Anesthesia Care; ASA class per anesthesiology team   Monitoring: Pulsoximetry, pulse, respirations, and blood pressure , vitals were monitored throughout the entire procedure under monitored anesthesia care.   Procedure: The patient was then brought to the endoscopy suite where his/her pulse, pulse oximetry and blood pressure were monitored. The patient was placed in the left lateral decubitus position and deep sedation was administered. Once adequate sedation was achieved, a bite block was placed and a  lubricated tip of an Olympus video upper endoscope was inserted through the oropharynx and gently manipulated through the esophagus into the stomach and the second portion of the duodenum. Upon withdrawal of the endoscope, careful visualization of the mucosa was performed. The endoscope was then withdrawn into the gastric antrum and placed in a retroflexed position.  The endoscope was then righted, and air was suctioned from the stomach.  The endoscope was then withdrawn from the patient, with careful visual inspection of the mucosa. The patient left the procedure room in stable condition for recovery. Findings and endotherapy as listed below  Toleration: Patient tolerated procedure well  Complications: No immediate complications  Technical Difficulty: The procedure was not technically difficult   Estimated Blood Loss: Minimal, less than 5mL of estimated blood loss.   Findings and Therapeutics:  Esophagus:   The mucosa was normal.   There were no strictures or stenosis. GEJ junction traversed with endoscope without resistance.  The Z-line was irregular, appreciated at 40 cm from the incisors.    Stomach:    Prior Antonio-en-Y gastric bypass anatomy was noted.  Gastric pouch measured 5 cm and was noted to be mildly erythematous endoscopic consistent with mild gastritis.  No ulcers, erosions or masses visualized. Endoscope was unable to be placed in a retroflexion view in the stomach due to small gastric pouch. Biopsies with cold forceps were obtained to assess for H. pylori.  Small bowel:   The entire examined small bowel was normal.  Biopsies with cold forceps were obtained to assess for celiac disease.   Recommendations:  Post EGD precautions, watch for bleeding, infection, perforation, adverse drug reactions   Follow-up biopsies  Avoid non-aspirin NSAID  Proceed with colonoscopy    Zenon Kelley DO  3/5/2024  7:14 AM

## 2024-03-05 NOTE — OPERATIVE REPORT
Colonoscopy Operative Report  Patient Name: Liang Schmidt  YOB: 1955  MRN: [id number]  Procedure: Colonoscopy with polypectomy  Date of Service: 3/5/2024  Pre-operative Diagnosis & Indication: Anemia, GI bleed  Post-operative Diagnosis:   2 polyps in the cecum both 2 mm in size removed using cold forceps  7 total transverse colon polyps.  3 large ranging in size from 1 cm to 1.2 cm removed using hot snare.  3 polyps remaining in size from 4 mm to 6 mm removed using cold snare.  2 mm polyp which was removed using cold forceps.  5 mm descending colon polyp removed using cold snare  5 mm sigmoid polyp removed using cold snare  Pandiverticulosis  Internal and external hemorrhoids with stigmata of recent bleeding  Tortuous and redundant colon  Attending Endoscopist: Zenon Kelley D.O.  Informed Consent: The planned procedure(s), the explanation of the procedure, its expected benefits, the potential complications and risks and possible alternatives and their benefits and risks were discussed with the patient or the patient's surrogate. The discussion of risks, not limited to but including bleeding, infection, perforation, adverse effects from anesthesia, need for emergency surgery, medication effects, cardiac arrhythmias, missed polyps, and aspiration and death, were discussed with patient.  Pt and/or surrogate understood the proposed procedure(s), its risks, benefits and alternatives and wish to proceed with procedure(s). All questions answered in full.  After all questions were answered to their satisfaction, a signed, informed, and witnessed consent was obtained.  A TIME OUT WAS COMPLETED prior to the procedure to confirm the patient, procedure and complete endoscopy safety procedure.   Sedation: Monitored Anesthesia Care; ASA class per anesthesiology team   Monitoring: Pulsoximetry, pulse, respirations, and blood pressure, vitals were monitored throughout the entire procedure under  monitored anesthesia care.   Preparation Quality:  Austin Bowel Prep Score: 6  [Right 2/ Transverse 2/ Left 2]   Procedure: After achieving adequate sedation, and placing the patient in the left lateral decubitus position, a digital rectal examination was performed.  The lubricated tip of the adult colonoscope was then introduced into the rectum and advanced to the cecum.  The appendiceal orifice and ileocecal valve were clearly and distinctly visualized, thus verifying the cecum.  The terminal ileum was intubated.  The endoscope was then carefully withdrawn from the patient with careful visualization of the colonic mucosa to the best of my ability, with bowel preparation quality as noted above.  Air was suctioned to the best of my ability, during withdrawal of the endoscope.  When the endoscope reached the rectum, it was placed in a retroflexed position, and the rectal bulb was thus visualized.  The endoscope was righted, and air was suctioned from the colon to the best of my ability, as it was during withdrawn from the colon.  The endoscope was then removed from the patient.  The patient tolerated the procedure without apparent procedural complications.  The patient left the procedure room in stable condition for recovery.  Toleration: Patient tolerated procedure well  Complications: No immediate complications  Technical Difficulty: The procedure was not technically difficult   Estimated Blood Loss: Minimal, less than 5mL of estimated blood loss.   Findings and Therapeutics:  Terminal Ileum: The entire examined ileum was normal.   Colon: There were 2 polyps in the cecum both 2 mm in size removed using cold forcep. There were 7 total transverse colon polyps.  3 large ranging in size from 1 cm to 1.2 cm removed using hot snare.  3 polyps remaining in size from 4 mm to 6 mm removed using cold snare.  2 mm polyp which was removed using cold forceps. There was a 5 mm descending colon polyp removed using cold snare.  There was a 5 mm sigmoid polyp removed using cold snare. There were no signs of masses. No signs of inflammation, ulceration or erosions. There were diverticula noted throughout the entire visualized colon.   Rectum: There were moderate sized, internal and external hemorrhoids seen on retroflexion. One of the hemorrhoids noted with stigmata of recent bleeding.  Recommendations:   Post Colonoscopy/polypectomy precautions, watch for bleeding, infection, perforation, adverse drug reactions.   GI soft diet  Repeat colonoscopy in 3 years for screening  Follow-up pathology results  Outpatient surgery referral for hemorrhoidectomy  Continue to monitor hemoglobin and transfuse for HgB < 7  If tolerating diet and hemoglobin stable then okay to discharge from GI perspective.  May follow-up locally with nurse practitioner or Dr. Rapp or at [facility] where he has recently been following.  Please call with any questions or concerns.    Zenon Kelley DO  3/5/2024  8:32 AM

## 2024-03-05 NOTE — PAYOR COMM NOTE
--------------  ADMISSION REVIEW     Payor: [facility]  Subscriber #:  [id number]  Authorization Number: [id number]    Admit date: 3/3/24  Admit time:  2:36 PM       Patient Seen in: [facility] Emergency Department    History     Chief Complaint   Patient presents with    Difficulty Breathing     For 2 weeks, states progressively worsening. States has low hgb     Stated Complaint: bella x2 weeks O2 98    68-year-old male presents for evaluation of difficulty breathing.  Patient has been short of breath with exertion for about 2 weeks.  No cough or cold.  No chest pain.  No lower extremity swelling.  During this period of time he has had intermittent melanotic stool.  3 days ago he held his warfarin which he takes for a heart valve issue.  Patient has a long history of intermittent GI bleeding usually bright red blood that is hemorrhoidal in origin.  Denies any abdominal pain at this time.  Did not have any bleeding for the past 24 hours    Objective:   Past Medical History:   Diagnosis Date    Anemia, chronic disease     Anesthesia complication     HX: OF AGGRESSION W/PAST PROCEDURES D/T INSUFFICIENT ANESTHESIA IN THE PAST?    Arthritis 5 yrs ago    Nothing good to say    Calculus of kidney 5 yrs    Nothing major    Cancer (HCC)     skin cancer    Cellulitis     Diabetes (HCC)     Essential hypertension     Hearing impairment     High blood pressure     Hyperlipidemia     Obesity 10yrs ago    Osteoarthritis 5 yrs ago    Pancreatitis (HCC)     Sepsis (HCC)     Shortness of breath     INTERMITTENT SOB ON EXERTION    Visual impairment     CONTACTS/GLASSES     Past Surgical History:   Procedure Laterality Date    ANAST PANC CYST-BOWEL,YAAKOV-EN-Y      CABG      CATH TRANSCATHETER AORTIC VALVE REPLACEMENT      CHOLECYSTECTOMY  8 yrs ago    Following acute pancreatitis    COLONOSCOPY  Many times    HEMORRHOIDECTOMY  1 yr ago    Not very successful to be repeated    OTHER SURGICAL HISTORY   20 yrs ago    Aortic valve replacement. Metal valve now    REMOVAL GALLBLADDER      REPLACE AORTIC VALVE OPEN      performed in [place]    SKIN SURGERY      TONSILLECTOMY      VASECTOMY  20 yrs ago     Physical Exam     ED Triage Vitals [03/03/24 1120]   /66   Pulse 70   Resp 18   Temp 97.6 °F (36.4 °C)   Temp src Oral   SpO2 96 %   O2 Device None (Room air)     Current:/69   Pulse 64   Temp 97.6 °F (36.4 °C) (Oral)   Resp 16   Ht 177.8 cm (5' 10\")   Wt 104.3 kg   SpO2 100%   BMI 33.00 kg/m²     Physical Exam    General: Alert, oriented, no apparent distress  HEENT: Atraumatic, normocephalic.  Pupils equal reactive.  Extraocular motions intact. Oropharynx clear.    Neck: Supple  Lungs: Clear to auscultation bilaterally.  Heart: Regular rate and rhythm.  Abdomen: Soft, nontender.   Skin: No rash.  No edema.  Neurologic: No focal neurologic deficits.  Normal speech pattern  Musculoskeletal: No tenderness or deformity noted.  Full range of motion throughout.    Labs Reviewed   COMP METABOLIC PANEL (14) - Abnormal; Notable for the following components:       Result Value    Glucose 256 (*)     Sodium 131 (*)     BUN 41 (*)     Creatinine 1.43 (*)     eGFR-Cr 53 (*)     All other components within normal limits   PROTHROMBIN TIME (PT) - Abnormal; Notable for the following components:    PT 22.1 (*)     INR 1.92 (*)     All other components within normal limits   PRO BETA NATRIURETIC PEPTIDE - Abnormal; Notable for the following components:    Pro-Beta Natriuretic Peptide 289 (*)     All other components within normal limits   CBC W/ DIFFERENTIAL - Abnormal; Notable for the following components:    RBC 2.93 (*)     HGB 8.4 (*)     HCT 25.0 (*)     .0 (*)     All other components within normal limits   TROPONIN I HIGH SENSITIVITY - Normal   SARS-COV-2/FLU A AND B/RSV BY PCR (GENEXPERT) - Normal     EKG 65 inus Rhythm  Reading: No acute appearing ST elevation or depression    MDM      Differential  diagnosis includes symptomatic anemia, renal failure, CHF, ACS    Hemoglobin 8.4.  Last was 11.5 about 4 months ago    I have independently visualized the radiology images, my focus/limited interpretation: No pneumonia or pulmonary edema       Patient likely has symptoms related to progressive anemia.  Tells me he has not bled for the past 24 hours probably due to holding warfarin.  Has an endoscopy scheduled with Dr. Salomon in April.  He would prefer to be seen through our system.     Spoke with Dr. Rapp.  N.p.o. after midnight and plan for endoscopy in the morning.  Then coordinate with hematology regarding restarting warfarin    Disposition and Plan     Clinical Impression:  1. Symptomatic anemia    2. Upper GI bleeding          History and Physical     Liang Schmidt is a 68 year old male with past medical history significant for HTN, DMII, chronic anemia attributed to hemorrhoidal bleeding presents to the ER with melanotic stools.  Pt follows with hematology for IV iron infusions for persistent ASHLEY due to bleeding hemorrhoids.  He states he had an EGD/colonoscopy about 3 years ago.  He is on coumadin for mechanical valve and states he stopped his coumadin for the past few days to see if bleeding would improve.  He also c/o associated dyspnea and lightheadedness.  He c/o mild abdominal discomfort.     Assessment & Plan:   #GI bleed/melena  CLD, NPO after midnight  EGD/colonoscopy in am  Hold coumadin  GI eval    #Acute blood loss anemia due to above  Monitor Hgb  IV iron for now  Transfuse to keep Hgb > 7     #AGGIE, on IVF, f/u BMP in am     #HTN, controlled, hold BP meds     #DMII, hyperglycemia protocol     #Hyponatremia, on IVF, f/u BMP in am     #Chronic anemia related to hemorrhoidal bleeding     #DL, statin when able     #h/o mechanical heart valve on coumadin, unclear which valve, INR 1.95, cont to hold for now      GI:    Patient is a 68 year old male who was admitted to the hospital for  Symptomatic anemia:     Pt with chronic history of anemia, worked-up prev while living in [place], with mult EGDs, colonoscopies, and capsule studies.  His last EGD and colonoscopy was approx 3-4 years ago.  His anemia has been attributed to persistent hemorrhoidal bleeding.  He is on chronic anticoagulation with warfarin.       Most recently he had a bout of severe hemorrhoid bleeding for 2 straight months.  He has been getting IV iron infusions with Dr Carr.  He is due to see him tomorrow.  Then 3 days ago, he had sever bouts of melena.  He stopped his warfarin at that time.       He is scheduled for outpatient colonoscopy in April at an OSH.     He does not use NSAIDs.      Impression:   Chronic long-standing iron def anemia  Recurrent bleeding from hemorrhoids, has seen surgery but not yet pursued resection  Chronic use of anticoagulation  Recent bout of melena     Due for follow-up endoscopic evaluation, has outpt colonoscopy scheduled in 4/2024.  Given new melena, should have EGD and colonoscopy.  If neg, suspect the current anemia reflects the 2 months of rectal bleeding that only recently resolved.       Recommendations:  EGD and colonoscopy tomorrow  IV iron while inpatient      3/4:    GI:    S: Pt completed Golytely this AM and is passing brown, opaque stools. Mild abd cramping. No nausea or vomiting   O: /70 (BP Location: Left arm)   Pulse 75   Temp 98.4 °F (36.9 °C) (Oral)   Resp 16   Ht 5' 10\" (1.778 m)   Wt 258 lb 9.6 oz (117.3 kg)     WBC 7.4 4.6   HGB 8.4* 7.6*   .0* 90.0*      * 134*   K 4.3 4.3    103   CO2 25.0 28.0   BUN 41* 29*   CREATSERUM 1.43* 1.14         Assessment: 68 yr-old male with hx of CAD s/p CABG, s/p mechanical valve on Coumadin, s/p Antonio-en-Y gastric bypass, DM, and anemia previously attributed to persistent hemorrhoidal bleeding admitted 3/3 with symptomatic anemia. Pt was to have bidirectional endoscopy today to assess for sources of ASHLEY however  stools not clear. Will plan for additional bowel prep this evening and plan for bidirectional endoscopy tomorrow  The risks, benefits, alternatives of the procedure including the risks of anesthesia, bleeding, perforation, missed lesions, need for surgery, and infection were discussed with the patient. He expressed understanding of the risks and was agreeable to proceed.  Plan:   1. Plan for EGD and colonoscopy in AM under MAC with Dr Kelley  2. Clear liquid diet today; NPO after midnight with sips of water for necessary medications   3.  Plenvu split prep tonight given hx of gastric bypass (each dose to be completed in 2 hours)  4. Protonix daily   5. Please continue to hold Coumadin if the risk remains acceptable; STAT INR now and repeat in AM--if Heparin bridge is needed OK to continue Heparin today and ideally hold x 6 hrs prior to endoscopic start time   6. Consider outpatient consult with general surgery for hemorrhoidal eval  7. CBC, BMP, Mg in AM correcting electrolytes per Hospitalist recommendations and transfusing PRBC as needed to maintain Hgb 7-8 given hx of CAD       NURSING:    Patient was finishing bowel prep for GI procedure and was not clear. Procedure was rescheduled for tomorrow. Started new bowel prep at 1800. Patient was on clear liquid diet. NPO at midnight.     3/5:    EGD Operative Report  Procedure: Esophagogastroduodenoscopy (EGD)  with biopsy  Pre-operative Diagnosis & Indication: Anemia, GI bleed  Post-operative Diagnosis:   Normal appearing esophagus s/p biopsy   Mild gastritis of gastric pouch s/p biopsy to assess for H. pylori  Antonio-en-Y anatomy  Normal small bowel s/p biopsy to assess for celiac disease  Colonoscopy Operative Report  Procedure: Colonoscopy with polypectomy  Pre-operative Diagnosis & Indication: Anemia, GI bleed  Post-operative Diagnosis:   2 polyps in the cecum both 2 mm in size removed using cold forceps  7 total transverse colon polyps.  3 large ranging in size  from 1 cm to 1.2 cm removed using hot snare.  3 polyps remaining in size from 4 mm to 6 mm removed using cold snare.  2 mm polyp which was removed using cold forceps.  5 mm descending colon polyp removed using cold snare  5 mm sigmoid polyp removed using cold snare  Pandiverticulosis  Internal and external hemorrhoids with stigmata of recent bleeding  Tortuous and redundant colon  DC HOME  MEDICATIONS ADMINISTERED IN LAST 1 DAY:    lactated ringers infusion       Date Action Dose Route User    3/5/2024 0700 New Bag (none) Intravenous Petros Cota MD          pantoprazole (Protonix) 40 mg in sodium chloride 0.9% PF 10 mL IV push       Date Action Dose Route User    3/5/2024 1256 Given 40 mg Intravenous Jovanni Sierra, RN    3/4/2024 1443 Given 40 mg Intravenous Denice Huizar, RN

## 2024-03-06 ENCOUNTER — PATIENT OUTREACH (OUTPATIENT)
Dept: CASE MANAGEMENT | Age: 69
End: 2024-03-06

## 2024-03-07 RX ORDER — WARFARIN SODIUM 5 MG/1
5 TABLET ORAL NIGHTLY
Qty: 90 TABLET | Refills: 0 | Status: SHIPPED | OUTPATIENT
Start: 2024-03-07

## 2024-03-07 NOTE — TELEPHONE ENCOUNTER
Requested Renewals     warfarin 5 MG Oral Tab         Sig: Take 1 tablet (5 mg total) by mouth nightly.    Disp: 90 tablet    Refills: 0    Start: 3/6/2024    Class: Normal    Non-formulary    Last ordered: 7 months ago (8/7/2023) by Ema Arora DO    Patient comment: 5mg please       To be filled at: [id number] [address] AT [facility] [address] [facility] [address] , [phone], [phone]           LOV: 10/24/2023  RTC: 3 months  RECENT LABS: 10/06/2023  FOV: none

## 2024-03-07 NOTE — PAYOR COMM NOTE
Discharge Notification    Patient Name: TENISHA AGUILLON  Payor: [facility]  Subscriber #: [id number]  Authorization Number: [id number]  Admit Date/Time: 3/3/2024 11:25 AM  Discharge Date/Time: 3/5/2024 2:48 PM

## 2024-03-10 LAB
ATRIAL RATE: 65 BPM
P AXIS: 90 DEGREES
P-R INTERVAL: 272 MS
Q-T INTERVAL: 418 MS
QRS DURATION: 110 MS
QTC CALCULATION (BEZET): 434 MS
R AXIS: -33 DEGREES
T AXIS: 24 DEGREES
VENTRICULAR RATE: 65 BPM

## 2024-03-10 NOTE — DISCHARGE SUMMARY
[facility]     Liang Schmidt Patient Status:  Inpatient    1955 MRN [id number]   Location [facility] 4NW-A Attending No att. providers found   Hosp Day # 2 PCP Ema Arora DO     Date of Admission: 3/3/2024  Date of Discharge:  3/5/2024     Discharge Disposition: Home or Self Care    Discharge Diagnosis:  #GI bleed/melena  EGD/colonoscopy today, biopsies taken, polyps removed, internal, external hemorrhoids noted  Outpt referral for hemorrhoidectomy  Hold coumadin  GI following    #Acute blood loss anemia due to above  Monitor Hgb  IV iron   Hgb stable  Transfuse to keep Hgb > 7     #AGGIE, on IVF, resolved     #HTN, controlled, hold BP meds     #DMII, hyperglycemia protocol     #Hyponatremia, on IVF, improved     #Chronic anemia related to hemorrhoidal bleeding     #DL, statin when able     #h/o mechanical heart valve on coumadin, unclear which valve, INR 1.95, cont to hold for now    History of Present Illness:   Liang Schmidt is a 68 year old male with past medical history significant for HTN, DMII, chronic anemia attributed to hemorrhoidal bleeding presents to the ER with melanotic stools.  Pt follows with hematology for IV iron infusions for persistent ASHLEY due to bleeding hemorrhoids.  He states he had an EGD/colonoscopy about 3 years ago.  He is on coumadin for mechanical valve and states he stopped his coumadin for the past few days to see if bleeding would improve.  He also c/o associated dyspnea and lightheadedness.  He c/o mild abdominal discomfort.       Brief Synopsis: pt was admitted with GI bleed, seen by GI, underwent EGD/colonoscopy and found to have external hemorrhoids, was given outpt referral for hemorrhoidectomy per GI, bleeding resolved and he was cleared for dc per GI and dc home in stable condition.    Lace+ Score: 48  59-90 High Risk  29-58 Medium Risk  0-28   Low Risk       TCM Follow-Up Recommendation:  LACE 29-58:  Moderate Risk of readmission after discharge from the hospital.      Procedures during hospitalization:   EGD/colonoscopy    Incidental or significant findings and recommendations (brief descriptions):  As above    Lab/Test results pending at Discharge:   Pathology from polyp removal during colonoscopy     Consultants:  GI    Discharge Medication List:     Discharge Medications        CONTINUE taking these medications        Instructions Prescription details   amLODIPine 10 MG Tabs  Commonly known as: Norvasc      Take 1 tablet (10 mg total) by mouth daily.   Quantity: 90 tablet  Refills: 0     Calcium Carb-Cholecalciferol 500-400 MG-UNIT Tabs      Take 1 tablet by mouth daily.   Quantity: 90 tablet  Refills: 0     cetirizine 10 MG Tabs  Commonly known as: ZyrTEC      Take 1 tablet (10 mg total) by mouth daily.   Quantity: 90 tablet  Refills: 0     cyanocobalamin 1000 MCG/ML Soln  Commonly known as: Vitamin B12      Inject 1 mL (1,000 mcg total) into the muscle every 30 (thirty) days.   Quantity: 3 mL  Refills: 3     folic acid 1 MG Tabs  Commonly known as: Folvite      Take 1 tablet (1 mg total) by mouth daily.   Quantity: 90 tablet  Refills: 3     gabapentin 300 MG Caps  Commonly known as: Neurontin      Take 1 capsule (300 mg total) by mouth nightly.   Quantity: 90 capsule  Refills: 3     hydroCHLOROthiazide 12.5 MG Tabs  Commonly known as: HYDRODIURIL      Take 1 tablet (12.5 mg total) by mouth daily.   Quantity: 90 tablet  Refills: 0     Lidocaine (Anorectal) 5 % Crea      Apply daily   Quantity: 45 g  Refills: 0     metFORMIN 500 MG Tabs  Commonly known as: Glucophage      Take 2 tablets (1,000 mg total) by mouth daily with breakfast.   Quantity: 180 tablet  Refills: 0     nystatin-triamcinolone 100,000-0.1 Units/g-% Crea  Commonly known as: Mycolog II      APPLY TO LEFT AND RIGHT AFFECTED AREA OF GROIN ONCE A DAY AND COVER WITH DRY GAUZE   Refills: 0     ramipril 10 MG Caps  Commonly known as: Altace       Take 1 capsule (10 mg total) by mouth daily.   Quantity: 90 capsule  Refills: 0     sertraline 100 MG Tabs  Commonly known as: Zoloft      Take 2 tablets (200 mg total) by mouth daily.   Quantity: 180 tablet  Refills: 0     simvastatin 40 MG Tabs  Commonly known as: Zocor      Take 1 tablet (40 mg total) by mouth nightly.   Quantity: 90 tablet  Refills: 0     triamcinolone 0.1 % Crea  Commonly known as: Kenalog      Apply twice a day for 2 weeks, then take a break for 1 week, reapply if persists   Quantity: 80 g  Refills: 1              [place] n[place]    Follow-up appointment:   Prashanth Rapp MD  [address] [place] [address] [phone]    Go in 3 week(s)  for a follow-up visit with GI. the office will call you to establish the date/time of your appointment    Ema Arora, DO  [address]  [address]  [facility] [id number] [phone]    Follow up in 2 week(s)  Follow up    Appointments for Next 30 Days 3/10/2024 - 2024        Date Arrival Time Visit Type Length Department Provider     4/3/2024  9:00 AM  PROCEDURE MAC 45 [9477] 45 min [facility], [facility], ROBI Mary    Patient Instructions:                           Vital signs:       Physical Exam:    General: No acute distress   Lungs: clear to auscultation  Cardiovascular: S1, S2  Abdomen: Soft      -----------------------------------------------------------------------------------------------  PATIENT DISCHARGE INSTRUCTIONS: See electronic chart    Mika Alegre MD    Total time spent on discharge plannin minutes     The  Cures Act makes medical notes like these available to patients in the interest of transparency. Please be advised this is a medical document. Medical documents are intended to carry relevant information, facts as evident, and the clinical opinion of the practitioner. The medical note is intended as peer to peer communication and may appear blunt or direct.  It is written in medical language and may contain abbreviations or verbiage that are unfamiliar.

## 2024-03-18 DIAGNOSIS — E11.69 HYPERLIPIDEMIA ASSOCIATED WITH TYPE 2 DIABETES MELLITUS (HCC): ICD-10-CM

## 2024-03-18 DIAGNOSIS — E78.5 HYPERLIPIDEMIA ASSOCIATED WITH TYPE 2 DIABETES MELLITUS (HCC): ICD-10-CM

## 2024-03-19 RX ORDER — SIMVASTATIN 40 MG
40 TABLET ORAL NIGHTLY
Qty: 90 TABLET | Refills: 0 | Status: SHIPPED | OUTPATIENT
Start: 2024-03-19

## 2024-03-19 NOTE — TELEPHONE ENCOUNTER
LOV: 10/6/23   RTC: none noted   Filled: 12/12/23 #90   Labs: 10/6/23   Future Appointments   Date Time Provider Department Center   4/3/2024  9:00 AM [facility] HEWITT [id number] None

## 2024-03-21 ENCOUNTER — TELEPHONE (OUTPATIENT)
Facility: CLINIC | Age: 69
End: 2024-03-21

## 2024-03-21 NOTE — TELEPHONE ENCOUNTER
Health maintenance/Specialty Comments updated.    Last colonoscopy/EGD done 3/5/24 by Dr. Kelley    1 year recall placed into Pt Outreach    Next due on 3/5/25  per Dr. Ortiz

## 2024-03-21 NOTE — TELEPHONE ENCOUNTER
GI staff: please place recall for colonoscopy in 1 year   GI staff: please place recall for EGD in 1 year

## 2024-04-11 DIAGNOSIS — I10 PRIMARY HYPERTENSION: ICD-10-CM

## 2024-04-11 RX ORDER — RAMIPRIL 10 MG/1
10 CAPSULE ORAL DAILY
Qty: 90 CAPSULE | Refills: 0 | Status: SHIPPED | OUTPATIENT
Start: 2024-04-11

## 2024-04-11 NOTE — TELEPHONE ENCOUNTER
Protocol PASS    Requesting: ramipril 10 MG Oral Cap     LOV: 10/24/23  RTC: 3 months  Filled: 11/30/23 90 capsule 0 refill  Recent Labs: 10/6/23    Upcoming OV NONE  Future Appointments   Date Time Provider Department Center   5/21/2024  1:30 PM Salome Salomon MD [facility]

## 2024-04-12 DIAGNOSIS — E11.9 TYPE 2 DIABETES MELLITUS WITHOUT COMPLICATION, WITHOUT LONG-TERM CURRENT USE OF INSULIN (HCC): ICD-10-CM

## 2024-04-12 DIAGNOSIS — E78.5 HYPERLIPIDEMIA ASSOCIATED WITH TYPE 2 DIABETES MELLITUS (HCC): ICD-10-CM

## 2024-04-12 DIAGNOSIS — E53.8 VITAMIN B12 DEFICIENCY: ICD-10-CM

## 2024-04-12 DIAGNOSIS — E11.69 HYPERLIPIDEMIA ASSOCIATED WITH TYPE 2 DIABETES MELLITUS (HCC): ICD-10-CM

## 2024-04-12 RX ORDER — SIMVASTATIN 40 MG
40 TABLET ORAL NIGHTLY
Qty: 90 TABLET | Refills: 0 | Status: SHIPPED | OUTPATIENT
Start: 2024-04-12

## 2024-04-12 RX ORDER — CYANOCOBALAMIN 1000 UG/ML
1000 INJECTION, SOLUTION INTRAMUSCULAR; SUBCUTANEOUS
Qty: 3 ML | Refills: 3 | Status: SHIPPED | OUTPATIENT
Start: 2024-04-12

## 2024-04-12 NOTE — TELEPHONE ENCOUNTER
Protocol FAIL    Requesting:   metFORMIN 500 MG Oral Tab 12/26/23 180 tablet 0 refill  simvastatin 40 MG Oral Tab 3/19/24 90 tablet 0 refill    LOV: 10/24/23  RTC: 3 months  Recent Labs: 10/6/23    Upcoming OV NONE  Future Appointments   Date Time Provider Department Center   5/21/2024  1:30 PM Salome Salomon MD [facility]

## 2024-04-13 DIAGNOSIS — I10 PRIMARY HYPERTENSION: ICD-10-CM

## 2024-04-15 DIAGNOSIS — F33.42 RECURRENT MAJOR DEPRESSIVE DISORDER, IN FULL REMISSION (HCC): ICD-10-CM

## 2024-04-15 RX ORDER — HYDROCHLOROTHIAZIDE 12.5 MG/1
12.5 TABLET ORAL DAILY
Qty: 90 TABLET | Refills: 0 | Status: SHIPPED | OUTPATIENT
Start: 2024-04-15 | End: 2024-07-07

## 2024-04-15 NOTE — TELEPHONE ENCOUNTER
Protocol PASS    Requesting: sertraline 100 MG Oral Tab     LOV: 10/24/23  RTC: 3 months  Filled: 11/30/23 180 tablet 0 refill  Recent Labs: 10/6/23    Upcoming OV NONE  Future Appointments   Date Time Provider Department Center   5/21/2024  1:30 PM Salome Salomon MD [facility]

## 2024-04-15 NOTE — TELEPHONE ENCOUNTER
Protocol PASS    Requesting: hydroCHLOROthiazide 12.5 MG Oral Tab     LOV: 10/24/23  RTC: 3 months  Filled: 8/7/23 90 tablet 0 refill  Recent Labs: 10/6/23    Upcoming OV NONE  Future Appointments   Date Time Provider Department Center   5/21/2024  1:30 PM Salome Salomon MD [facility]

## 2024-04-16 RX ORDER — SERTRALINE HYDROCHLORIDE 100 MG/1
200 TABLET, FILM COATED ORAL DAILY
Qty: 180 TABLET | Refills: 0 | Status: SHIPPED | OUTPATIENT
Start: 2024-04-16

## 2024-06-10 RX ORDER — AMLODIPINE BESYLATE 10 MG/1
10 TABLET ORAL DAILY
Qty: 90 TABLET | Refills: 0 | Status: SHIPPED | OUTPATIENT
Start: 2024-06-10

## 2024-06-10 NOTE — TELEPHONE ENCOUNTER
amLODIPine 10 MG Oral Tab         Sig: Take 1 tablet (10 mg total) by mouth daily.    Disp: 90 tablet    Refills: 0    Start: 6/8/2024    Class: Normal    Non-formulary    Last ordered: 4 months ago (1/26/2024) by Ema Arora DO    Hypertension Medications Protocol Eylaex0606/08/2024 03:32 PM   Protocol Details CMP or BMP in past 12 months    Last BP reading less than 140/90    In person appointment or virtual visit in the past 12 mos or appointment in next 3 mos    EGFRCR or GFRNAA > 50      LOV 10/24/23  RTC 3 months  Filled 1/26/24 90 tabs 0 refills   Last labs 3/5/24  No future appointments.

## 2024-06-21 ENCOUNTER — TELEPHONE (OUTPATIENT)
Dept: INTERNAL MEDICINE CLINIC | Facility: CLINIC | Age: 69
End: 2024-06-21

## 2024-06-21 DIAGNOSIS — L03.119 CELLULITIS OF LOWER EXTREMITY, UNSPECIFIED LATERALITY: Primary | ICD-10-CM

## 2024-06-21 RX ORDER — AMOXICILLIN AND CLAVULANATE POTASSIUM 875; 125 MG/1; MG/1
1 TABLET, FILM COATED ORAL 2 TIMES DAILY
Qty: 14 TABLET | Refills: 0 | Status: SHIPPED | OUTPATIENT
Start: 2024-06-21 | End: 2024-06-28

## 2024-06-21 NOTE — TELEPHONE ENCOUNTER
I will send augmentin, but if symptoms worsen needs to see me or go to UC. Antibiotic may cause diarrhea. Due for follow up appointment  Ema Arora DO

## 2024-06-21 NOTE — TELEPHONE ENCOUNTER
Called and spoke to patient, he states he Get these blisters from time to time, its about an inch square. He states he thinks he may Have bumped it and it burst.    Now it is Red and stinging, there is some drainage and sore.    No Fever, no pus but thinks it may be getting infected.     Previously patient mychart about this 10/5    Patient will upload pic

## 2024-06-21 NOTE — TELEPHONE ENCOUNTER
Can you triage this patient about the water blister. He sent a message when requesting medications  Ema Arora DO

## 2024-06-26 DIAGNOSIS — E11.9 TYPE 2 DIABETES MELLITUS WITHOUT COMPLICATION, WITHOUT LONG-TERM CURRENT USE OF INSULIN (HCC): ICD-10-CM

## 2024-06-27 ENCOUNTER — HOSPITAL ENCOUNTER (EMERGENCY)
Facility: HOSPITAL | Age: 69
Discharge: HOME | End: 2024-06-27
Attending: STUDENT IN AN ORGANIZED HEALTH CARE EDUCATION/TRAINING PROGRAM
Payer: MEDICAID

## 2024-06-27 VITALS
TEMPERATURE: 97 F | WEIGHT: 251 LBS | HEART RATE: 55 BPM | OXYGEN SATURATION: 97 % | SYSTOLIC BLOOD PRESSURE: 118 MMHG | BODY MASS INDEX: 36 KG/M2 | DIASTOLIC BLOOD PRESSURE: 72 MMHG | RESPIRATION RATE: 18 BRPM

## 2024-06-27 DIAGNOSIS — L03.116 CELLULITIS OF LEFT LOWER EXTREMITY: Primary | ICD-10-CM

## 2024-06-27 LAB
ALBUMIN SERPL-MCNC: 3.8 G/DL (ref 3.4–5)
ALBUMIN/GLOB SERPL: 0.9 {RATIO} (ref 1–2)
ALP LIVER SERPL-CCNC: 128 U/L
ALT SERPL-CCNC: 45 U/L
ANION GAP SERPL CALC-SCNC: 4 MMOL/L (ref 0–18)
APTT PPP: 52 SECONDS (ref 23–36)
AST SERPL-CCNC: 37 U/L (ref 15–37)
BASOPHILS # BLD AUTO: 0.06 X10(3) UL (ref 0–0.2)
BASOPHILS NFR BLD AUTO: 1 %
BILIRUB SERPL-MCNC: 0.4 MG/DL (ref 0.1–2)
BUN BLD-MCNC: 26 MG/DL (ref 9–23)
CALCIUM BLD-MCNC: 9.6 MG/DL (ref 8.5–10.1)
CHLORIDE SERPL-SCNC: 106 MMOL/L (ref 98–112)
CO2 SERPL-SCNC: 28 MMOL/L (ref 21–32)
CREAT BLD-MCNC: 1.38 MG/DL
EGFRCR SERPLBLD CKD-EPI 2021: 56 ML/MIN/1.73M2 (ref 60–?)
EOSINOPHIL # BLD AUTO: 0.24 X10(3) UL (ref 0–0.7)
EOSINOPHIL NFR BLD AUTO: 4.1 %
ERYTHROCYTE [DISTWIDTH] IN BLOOD BY AUTOMATED COUNT: 16.7 %
GLOBULIN PLAS-MCNC: 4.2 G/DL (ref 2.8–4.4)
GLUCOSE BLD-MCNC: 190 MG/DL (ref 70–99)
GLUCOSE BLD-MCNC: 202 MG/DL (ref 70–99)
HCT VFR BLD AUTO: 32.7 %
HGB BLD-MCNC: 10.4 G/DL
IMM GRANULOCYTES # BLD AUTO: 0.02 X10(3) UL (ref 0–1)
IMM GRANULOCYTES NFR BLD: 0.3 %
INR BLD: 2.07 (ref 0.8–1.2)
LACTATE SERPL-SCNC: 0.8 MMOL/L (ref 0.4–2)
LACTATE SERPL-SCNC: 2.7 MMOL/L (ref 0.4–2)
LYMPHOCYTES # BLD AUTO: 1.17 X10(3) UL (ref 1–4)
LYMPHOCYTES NFR BLD AUTO: 20.1 %
MCH RBC QN AUTO: 24.3 PG (ref 26–34)
MCHC RBC AUTO-ENTMCNC: 31.8 G/DL (ref 31–37)
MCV RBC AUTO: 76.4 FL
MONOCYTES # BLD AUTO: 0.36 X10(3) UL (ref 0.1–1)
MONOCYTES NFR BLD AUTO: 6.2 %
NEUTROPHILS # BLD AUTO: 3.98 X10 (3) UL (ref 1.5–7.7)
NEUTROPHILS # BLD AUTO: 3.98 X10(3) UL (ref 1.5–7.7)
NEUTROPHILS NFR BLD AUTO: 68.3 %
OSMOLALITY SERPL CALC.SUM OF ELEC: 297 MOSM/KG (ref 275–295)
PLATELET # BLD AUTO: 124 10(3)UL (ref 150–450)
PLATELETS.RETICULATED NFR BLD AUTO: 3.8 % (ref 0–7)
POTASSIUM SERPL-SCNC: 4.9 MMOL/L (ref 3.5–5.1)
PROT SERPL-MCNC: 8 G/DL (ref 6.4–8.2)
PROTHROMBIN TIME: 23.5 SECONDS (ref 11.6–14.8)
RBC # BLD AUTO: 4.28 X10(6)UL
SODIUM SERPL-SCNC: 138 MMOL/L (ref 136–145)
WBC # BLD AUTO: 5.8 X10(3) UL (ref 4–11)

## 2024-06-27 PROCEDURE — 85610 PROTHROMBIN TIME: CPT | Performed by: STUDENT IN AN ORGANIZED HEALTH CARE EDUCATION/TRAINING PROGRAM

## 2024-06-27 PROCEDURE — 83605 ASSAY OF LACTIC ACID: CPT

## 2024-06-27 PROCEDURE — 85610 PROTHROMBIN TIME: CPT

## 2024-06-27 PROCEDURE — 82962 GLUCOSE BLOOD TEST: CPT

## 2024-06-27 PROCEDURE — 80053 COMPREHEN METABOLIC PANEL: CPT | Performed by: STUDENT IN AN ORGANIZED HEALTH CARE EDUCATION/TRAINING PROGRAM

## 2024-06-27 PROCEDURE — 87040 BLOOD CULTURE FOR BACTERIA: CPT | Performed by: STUDENT IN AN ORGANIZED HEALTH CARE EDUCATION/TRAINING PROGRAM

## 2024-06-27 PROCEDURE — 85025 COMPLETE CBC W/AUTO DIFF WBC: CPT

## 2024-06-27 PROCEDURE — 96361 HYDRATE IV INFUSION ADD-ON: CPT

## 2024-06-27 PROCEDURE — 80053 COMPREHEN METABOLIC PANEL: CPT

## 2024-06-27 PROCEDURE — 99284 EMERGENCY DEPT VISIT MOD MDM: CPT

## 2024-06-27 PROCEDURE — 85730 THROMBOPLASTIN TIME PARTIAL: CPT | Performed by: STUDENT IN AN ORGANIZED HEALTH CARE EDUCATION/TRAINING PROGRAM

## 2024-06-27 PROCEDURE — 96365 THER/PROPH/DIAG IV INF INIT: CPT

## 2024-06-27 PROCEDURE — 36415 COLL VENOUS BLD VENIPUNCTURE: CPT

## 2024-06-27 PROCEDURE — 85730 THROMBOPLASTIN TIME PARTIAL: CPT

## 2024-06-27 PROCEDURE — 85025 COMPLETE CBC W/AUTO DIFF WBC: CPT | Performed by: STUDENT IN AN ORGANIZED HEALTH CARE EDUCATION/TRAINING PROGRAM

## 2024-06-27 PROCEDURE — 83605 ASSAY OF LACTIC ACID: CPT | Performed by: STUDENT IN AN ORGANIZED HEALTH CARE EDUCATION/TRAINING PROGRAM

## 2024-06-27 RX ORDER — CEPHALEXIN 500 MG/1
500 CAPSULE ORAL 4 TIMES DAILY
Qty: 40 CAPSULE | Refills: 0 | Status: SHIPPED | OUTPATIENT
Start: 2024-06-27 | End: 2024-06-27 | Stop reason: DRUGHIGH

## 2024-06-27 RX ORDER — DEXTROSE MONOHYDRATE 25 G/50ML
50 INJECTION, SOLUTION INTRAVENOUS
Status: DISCONTINUED | OUTPATIENT
Start: 2024-06-27 | End: 2024-06-28

## 2024-06-27 RX ORDER — DOXYCYCLINE HYCLATE 100 MG/1
100 CAPSULE ORAL 2 TIMES DAILY
Qty: 14 CAPSULE | Refills: 0 | Status: SHIPPED | OUTPATIENT
Start: 2024-06-27 | End: 2024-07-04

## 2024-06-27 RX ORDER — WARFARIN SODIUM 5 MG/1
5 TABLET ORAL NIGHTLY
Qty: 90 TABLET | Refills: 0 | Status: SHIPPED | OUTPATIENT
Start: 2024-06-27

## 2024-06-27 RX ORDER — NICOTINE POLACRILEX 4 MG
30 LOZENGE BUCCAL
Status: DISCONTINUED | OUTPATIENT
Start: 2024-06-27 | End: 2024-06-28

## 2024-06-27 RX ORDER — NICOTINE POLACRILEX 4 MG
15 LOZENGE BUCCAL
Status: DISCONTINUED | OUTPATIENT
Start: 2024-06-27 | End: 2024-06-28

## 2024-06-27 NOTE — TELEPHONE ENCOUNTER
Protocol FAIL     Requesting:   warfarin 5 MG Oral Tab 3/7/24 90 TABLET 0 REFILL  metFORMIN 500 MG Oral Tab 4/12/24 180 TABLET 0 REFILL    LOV: 10/24/23  RTC: 3 MONTHS  Recent Labs: 10/6/23    Upcoming OV   No future appointments.

## 2024-06-28 NOTE — DISCHARGE INSTRUCTIONS
Please call your coumadin tomorrow to tell them that you have started doxycyline so they can tell you when to get your INR checked as this can lead to increase INR

## 2024-06-28 NOTE — ED PROVIDER NOTES
Patient Seen in: [facility] Emergency Department      History     Chief Complaint   Patient presents with    Cellulitis     Stated Complaint: cellulitis, failed antibiotics    Subjective:   HPI    The patient is a 68-year-old male presented to the emergency room with reports of cellulitis to the left lower extremity.  He was on Augmentin he states for about a week.  He does not feel his infection is improving.  He notes he had a blister that burst up.  He said no fevers or chills or systemic symptoms.    Objective:   Past Medical History:    Anemia, chronic disease    Anesthesia complication    HX: OF AGGRESSION W/PAST PROCEDURES D/T INSUFFICIENT ANESTHESIA IN THE PAST?    Arthritis    Nothing good to say    Calculus of kidney    Nothing major    Cancer (HCC)    skin cancer    Cellulitis    Diabetes (HCC)    Essential hypertension    Hearing impairment    High blood pressure    Hyperlipidemia    Obesity    Osteoarthritis    Pancreatitis (HCC)    Sepsis (HCC)    Shortness of breath    INTERMITTENT SOB ON EXERTION    Visual impairment    CONTACTS/GLASSES              Past Surgical History:   Procedure Laterality Date    Anast panc cyst-bowel,trang-en-y      Cabg      Cath transcatheter aortic valve replacement      Cholecystectomy  8 yrs ago    Following acute pancreatitis    Colonoscopy  Many times    Colonoscopy N/A 3/5/2024    Procedure: COLONOSCOPY;  Surgeon: Zenon Kelley DO;  Location:  ENDOSCOPY    Hemorrhoidectomy  1 yr ago    Not very successful to be repeated    Other surgical history  20 yrs ago    Aortic valve replacement. Metal valve now    Removal gallbladder      Replace aortic valve open      performed in [place]    Skin surgery      Tonsillectomy      Vasectomy  20 yrs ago                Social History     Socioeconomic History    Marital status: Single   Tobacco Use    Smoking status: Former     Current packs/day: 0.00     Average packs/day: 1 pack/day for 20.0 years (20.0 ttl pk-yrs)      Types: Cigarettes     Start date: 1967     Quit date: 1987     Years since quittin.5    Smokeless tobacco: Never   Vaping Use    Vaping status: Never Used   Substance and Sexual Activity    Alcohol use: Never    Drug use: Never   Other Topics Concern    Caffeine Concern No    Exercise No    Seat Belt No    Special Diet No    Stress Concern No    Weight Concern Yes     Social Determinants of Health     Food Insecurity: No Food Insecurity (3/3/2024)    Food Insecurity     Food Insecurity: Never true   Transportation Needs: No Transportation Needs (3/3/2024)    Transportation Needs     Lack of Transportation: No   Housing Stability: Low Risk  (3/3/2024)    Housing Stability     Housing Instability: No              Review of Systems    Positive for stated Chief Complaint: Cellulitis    Other systems are as noted in HPI.  Constitutional and vital signs reviewed.      All other systems reviewed and negative except as noted above.    Physical Exam     ED Triage Vitals [24 1453]   /74   Pulse 59   Resp 14   Temp 96.8 °F (36 °C)   Temp src Temporal   SpO2 97 %   O2 Device None (Room air)       Current Vitals:   Vital Signs  BP: 118/72  Pulse: 55  Resp: 18  Temp: 96.8 °F (36 °C)  Temp src: Temporal  MAP (mmHg): 90    Oxygen Therapy  SpO2: 97 %  O2 Device: None (Room air)            Physical Exam  Vitals and nursing note reviewed.   Constitutional:       General: He is not in acute distress.     Appearance: Normal appearance.   HENT:      Head: Normocephalic.      Nose: Nose normal.      Mouth/Throat:      Mouth: Mucous membranes are moist.   Eyes:      Extraocular Movements: Extraocular movements intact.      Pupils: Pupils are equal, round, and reactive to light.   Cardiovascular:      Rate and Rhythm: Normal rate and regular rhythm.      Pulses: Normal pulses.   Pulmonary:      Effort: Pulmonary effort is normal.   Abdominal:      General: Abdomen is flat. Bowel sounds are normal. There is no  distension.      Palpations: Abdomen is soft.      Tenderness: There is no abdominal tenderness. There is no right CVA tenderness, left CVA tenderness, guarding or rebound.      Hernia: No hernia is present.   Musculoskeletal:         General: No swelling or tenderness. Normal range of motion.      Cervical back: Normal range of motion.      Comments: Left lower extremity distal portion with erythema and there are 2 superficial wounds.  Area that is scabbed over and another lesion that is about a quarter size has a yellowish drainage to its base with surrounding erythema.  No significant warmth   Skin:     General: Skin is warm and dry.   Neurological:      Mental Status: He is alert and oriented to person, place, and time. Mental status is at baseline.   Psychiatric:         Mood and Affect: Mood normal.               ED Course     Labs Reviewed   COMP METABOLIC PANEL (14) - Abnormal; Notable for the following components:       Result Value    Glucose 202 (*)     BUN 26 (*)     Creatinine 1.38 (*)     Calculated Osmolality 297 (*)     eGFR-Cr 56 (*)     Alkaline Phosphatase 128 (*)     A/G Ratio 0.9 (*)     All other components within normal limits   PROTHROMBIN TIME (PT) - Abnormal; Notable for the following components:    PT 23.5 (*)     INR 2.07 (*)     All other components within normal limits   PTT, ACTIVATED - Abnormal; Notable for the following components:    PTT 52.0 (*)     All other components within normal limits   LACTIC ACID, PLASMA - Abnormal; Notable for the following components:    Lactic Acid 2.7 (*)     All other components within normal limits   POCT GLUCOSE - Abnormal; Notable for the following components:    POC Glucose 190 (*)     All other components within normal limits   CBC W/ DIFFERENTIAL - Abnormal; Notable for the following components:    HGB 10.4 (*)     HCT 32.7 (*)     .0 (*)     MCV 76.4 (*)     MCH 24.3 (*)     All other components within normal limits   LACTIC ACID, PLASMA  - Normal   CBC WITH DIFFERENTIAL WITH PLATELET    Narrative:     The following orders were created for panel order CBC With Differential With Platelet.  Procedure                               Abnormality         Status                     ---------                               -----------         ------                     CBC W/ DIFFERENTIAL[[id number]]          Abnormal            Final result                 Please view results for these tests on the individual orders.   LACTIC ACID REFLEX POST POSTIVE   RAINBOW DRAW LAVENDER   RAINBOW DRAW LIGHT GREEN   RAINBOW DRAW BLUE   BLOOD CULTURE   BLOOD CULTURE                      MDM      Differential for the patient's presentation includes cellulitis, wound infection    Patient is afebrile here hemodynamically stable.  Labs reveal a slightly elevated lactic at 2.7 and also mildly elevated BUN/creatinine.  I discussed IV antibiotics and admission with the patient.  He has a normal white blood cell count.  He does not want to stay therefore we opted for fluids with repeat lactic.  Patient was given a liter and a half of IV fluids and had his lactic acid rechecked which trended down to 0.8.  I discussed antibiotic coverage with our ED pharmacist Param who recommended doxycycline.  Doxycycline can increase warfarin to the product.  I discussed this with our hospital pharmacist who simply recommended patient follow-up with his Coumadin clinic and inform them tomorrow that he is being started on doxycycline for frequent rechecks but doxycycline would still be a safe choice.    This was explained to the patient who was ultimately discharged with strict recommendations to return for any worsening symptoms.                                   Medical Decision Making      Disposition and Plan     Clinical Impression:  1. Cellulitis of left lower extremity         Disposition:  Discharge  6/27/2024 10:15 pm    Follow-up:  Ema Arora DO  [address]  [place]  [facility] [address]  [phone]    Follow up            Medications Prescribed:  Discharge Medication List as of 6/27/2024 10:27 PM        START taking these medications    Details   doxycycline 100 MG Oral Cap Take 1 capsule (100 mg total) by mouth 2 (two) times daily for 7 days., Normal, Disp-14 capsule, R-0

## 2024-07-07 DIAGNOSIS — I10 PRIMARY HYPERTENSION: ICD-10-CM

## 2024-07-07 DIAGNOSIS — F33.42 RECURRENT MAJOR DEPRESSIVE DISORDER, IN FULL REMISSION (HCC): ICD-10-CM

## 2024-07-09 NOTE — TELEPHONE ENCOUNTER
Protocol FAIL    Requesting:   ramipril 10 MG Oral Cap 4/11/24 90 CAPSULE 0 REFILL  hydroCHLOROthiazide 12.5 MG Oral Tab 4/15/24 90 TABLET 0 REFILL  sertraline 100 MG Oral Tab 4/16/24 180 TABLET 0 REFILL    LOV:10/24/23  RTC: 3 MONTHS  Recent Labs: 10/6/23    Upcoming OV   No future appointments.

## 2024-07-10 RX ORDER — SERTRALINE HYDROCHLORIDE 100 MG/1
200 TABLET, FILM COATED ORAL DAILY
Qty: 180 TABLET | Refills: 0 | Status: SHIPPED | OUTPATIENT
Start: 2024-07-10

## 2024-07-10 RX ORDER — RAMIPRIL 10 MG/1
10 CAPSULE ORAL DAILY
Qty: 90 CAPSULE | Refills: 0 | Status: SHIPPED | OUTPATIENT
Start: 2024-07-10

## 2024-07-10 RX ORDER — HYDROCHLOROTHIAZIDE 12.5 MG/1
12.5 TABLET ORAL DAILY
Qty: 90 TABLET | Refills: 0 | Status: SHIPPED | OUTPATIENT
Start: 2024-07-10

## 2024-07-11 NOTE — TELEPHONE ENCOUNTER
Contacted pt and scheduled OV.     Future Appointments   Date Time Provider Department Center   8/19/2024  1:00 PM Ema Arora,  [facility] [place]

## 2024-08-19 ENCOUNTER — LAB ENCOUNTER (OUTPATIENT)
Dept: LAB | Age: 69
End: 2024-08-19
Attending: INTERNAL MEDICINE
Payer: COMMERCIAL

## 2024-08-19 ENCOUNTER — APPOINTMENT (OUTPATIENT)
Dept: INTERNAL MEDICINE CLINIC | Facility: CLINIC | Age: 69
End: 2024-08-19
Payer: COMMERCIAL

## 2024-08-19 ENCOUNTER — OFFICE VISIT (OUTPATIENT)
Dept: INTERNAL MEDICINE CLINIC | Facility: CLINIC | Age: 69
End: 2024-08-19
Payer: COMMERCIAL

## 2024-08-19 VITALS
RESPIRATION RATE: 18 BRPM | TEMPERATURE: 98 F | HEART RATE: 90 BPM | SYSTOLIC BLOOD PRESSURE: 118 MMHG | HEIGHT: 70 IN | WEIGHT: 239 LBS | BODY MASS INDEX: 34.22 KG/M2 | OXYGEN SATURATION: 99 % | DIASTOLIC BLOOD PRESSURE: 68 MMHG

## 2024-08-19 DIAGNOSIS — E53.8 VITAMIN B12 DEFICIENCY: ICD-10-CM

## 2024-08-19 DIAGNOSIS — E53.8 FOLIC ACID DEFICIENCY: ICD-10-CM

## 2024-08-19 DIAGNOSIS — E78.5 HYPERLIPIDEMIA ASSOCIATED WITH TYPE 2 DIABETES MELLITUS (HCC): ICD-10-CM

## 2024-08-19 DIAGNOSIS — E66.01 MORBID (SEVERE) OBESITY DUE TO EXCESS CALORIES (HCC): ICD-10-CM

## 2024-08-19 DIAGNOSIS — K64.8 INTERNAL HEMORRHOIDS: ICD-10-CM

## 2024-08-19 DIAGNOSIS — L29.9 PRURITUS: ICD-10-CM

## 2024-08-19 DIAGNOSIS — E11.65 TYPE 2 DIABETES MELLITUS WITH HYPERGLYCEMIA, WITHOUT LONG-TERM CURRENT USE OF INSULIN (HCC): Primary | ICD-10-CM

## 2024-08-19 DIAGNOSIS — Z12.5 SCREENING FOR PROSTATE CANCER: ICD-10-CM

## 2024-08-19 DIAGNOSIS — D50.9 IRON DEFICIENCY ANEMIA, UNSPECIFIED IRON DEFICIENCY ANEMIA TYPE: ICD-10-CM

## 2024-08-19 DIAGNOSIS — E11.43 TYPE 2 DIABETES MELLITUS WITH DIABETIC AUTONOMIC NEUROPATHY, WITHOUT LONG-TERM CURRENT USE OF INSULIN (HCC): ICD-10-CM

## 2024-08-19 DIAGNOSIS — Z85.828 HISTORY OF BASAL CELL CANCER: ICD-10-CM

## 2024-08-19 DIAGNOSIS — I10 PRIMARY HYPERTENSION: ICD-10-CM

## 2024-08-19 DIAGNOSIS — E11.65 TYPE 2 DIABETES MELLITUS WITH HYPERGLYCEMIA, WITHOUT LONG-TERM CURRENT USE OF INSULIN (HCC): ICD-10-CM

## 2024-08-19 DIAGNOSIS — F33.42 RECURRENT MAJOR DEPRESSIVE DISORDER, IN FULL REMISSION (HCC): ICD-10-CM

## 2024-08-19 DIAGNOSIS — E11.69 HYPERLIPIDEMIA ASSOCIATED WITH TYPE 2 DIABETES MELLITUS (HCC): ICD-10-CM

## 2024-08-19 DIAGNOSIS — Z95.2 H/O HEART VALVE REPLACEMENT WITH MECHANICAL VALVE: ICD-10-CM

## 2024-08-19 PROBLEM — D64.9 SYMPTOMATIC ANEMIA: Status: RESOLVED | Noted: 2024-03-03 | Resolved: 2024-08-19

## 2024-08-19 LAB
ALT SERPL-CCNC: 29 U/L
ANION GAP SERPL CALC-SCNC: 6 MMOL/L (ref 0–18)
AST SERPL-CCNC: 26 U/L (ref ?–34)
BASOPHILS # BLD AUTO: 0.05 X10(3) UL (ref 0–0.2)
BASOPHILS NFR BLD AUTO: 0.6 %
BUN BLD-MCNC: 32 MG/DL (ref 9–23)
CALCIUM BLD-MCNC: 9.3 MG/DL (ref 8.7–10.4)
CHLORIDE SERPL-SCNC: 106 MMOL/L (ref 98–112)
CHOLEST SERPL-MCNC: 120 MG/DL (ref ?–200)
CO2 SERPL-SCNC: 22 MMOL/L (ref 21–32)
COMPLEXED PSA SERPL-MCNC: 0.15 NG/ML (ref ?–4)
CREAT BLD-MCNC: 1.93 MG/DL
DEPRECATED HBV CORE AB SER IA-ACNC: 5.3 NG/ML
EGFRCR SERPLBLD CKD-EPI 2021: 37 ML/MIN/1.73M2 (ref 60–?)
EOSINOPHIL # BLD AUTO: 0.22 X10(3) UL (ref 0–0.7)
EOSINOPHIL NFR BLD AUTO: 2.8 %
ERYTHROCYTE [DISTWIDTH] IN BLOOD BY AUTOMATED COUNT: 16.2 %
EST. AVERAGE GLUCOSE BLD GHB EST-MCNC: 206 MG/DL (ref 68–126)
FASTING PATIENT LIPID ANSWER: YES
FASTING STATUS PATIENT QL REPORTED: YES
FOLATE SERPL-MCNC: >24 NG/ML (ref 5.4–?)
GLUCOSE BLD-MCNC: 205 MG/DL (ref 70–99)
HBA1C MFR BLD: 8.8 % (ref ?–5.7)
HCT VFR BLD AUTO: 24.8 %
HDLC SERPL-MCNC: 60 MG/DL (ref 40–59)
HGB BLD-MCNC: 7.4 G/DL
IMM GRANULOCYTES # BLD AUTO: 0.04 X10(3) UL (ref 0–1)
IMM GRANULOCYTES NFR BLD: 0.5 %
IRON SATN MFR SERPL: 3 %
IRON SERPL-MCNC: 14 UG/DL
LDLC SERPL CALC-MCNC: 34 MG/DL (ref ?–100)
LYMPHOCYTES # BLD AUTO: 1.4 X10(3) UL (ref 1–4)
LYMPHOCYTES NFR BLD AUTO: 17.8 %
MCH RBC QN AUTO: 21.4 PG (ref 26–34)
MCHC RBC AUTO-ENTMCNC: 29.8 G/DL (ref 31–37)
MCV RBC AUTO: 71.9 FL
MONOCYTES # BLD AUTO: 0.59 X10(3) UL (ref 0.1–1)
MONOCYTES NFR BLD AUTO: 7.5 %
NEUTROPHILS # BLD AUTO: 5.57 X10 (3) UL (ref 1.5–7.7)
NEUTROPHILS # BLD AUTO: 5.57 X10(3) UL (ref 1.5–7.7)
NEUTROPHILS NFR BLD AUTO: 70.8 %
NONHDLC SERPL-MCNC: 60 MG/DL (ref ?–130)
OSMOLALITY SERPL CALC.SUM OF ELEC: 291 MOSM/KG (ref 275–295)
POTASSIUM SERPL-SCNC: 4.6 MMOL/L (ref 3.5–5.1)
RBC # BLD AUTO: 3.45 X10(6)UL
SODIUM SERPL-SCNC: 134 MMOL/L (ref 136–145)
TOTAL IRON BINDING CAPACITY: 540 UG/DL (ref 250–425)
TRANSFERRIN SERPL-MCNC: 419 MG/DL (ref 215–365)
TRIGL SERPL-MCNC: 155 MG/DL (ref 30–149)
TSI SER-ACNC: 2.08 MIU/ML (ref 0.55–4.78)
VIT B12 SERPL-MCNC: 681 PG/ML (ref 211–911)
VLDLC SERPL CALC-MCNC: 21 MG/DL (ref 0–30)
WBC # BLD AUTO: 7.9 X10(3) UL (ref 4–11)

## 2024-08-19 PROCEDURE — 36415 COLL VENOUS BLD VENIPUNCTURE: CPT

## 2024-08-19 PROCEDURE — 84450 TRANSFERASE (AST) (SGOT): CPT | Performed by: INTERNAL MEDICINE

## 2024-08-19 PROCEDURE — 82728 ASSAY OF FERRITIN: CPT

## 2024-08-19 PROCEDURE — 82746 ASSAY OF FOLIC ACID SERUM: CPT

## 2024-08-19 PROCEDURE — 83540 ASSAY OF IRON: CPT

## 2024-08-19 PROCEDURE — 84443 ASSAY THYROID STIM HORMONE: CPT | Performed by: INTERNAL MEDICINE

## 2024-08-19 PROCEDURE — 80048 BASIC METABOLIC PNL TOTAL CA: CPT | Performed by: INTERNAL MEDICINE

## 2024-08-19 PROCEDURE — 85025 COMPLETE CBC W/AUTO DIFF WBC: CPT

## 2024-08-19 PROCEDURE — 83036 HEMOGLOBIN GLYCOSYLATED A1C: CPT

## 2024-08-19 PROCEDURE — 83550 IRON BINDING TEST: CPT

## 2024-08-19 PROCEDURE — 82607 VITAMIN B-12: CPT

## 2024-08-19 PROCEDURE — 80061 LIPID PANEL: CPT | Performed by: INTERNAL MEDICINE

## 2024-08-19 PROCEDURE — 92229 IMG RTA DETC/MNTR DS POC ALY: CPT | Performed by: INTERNAL MEDICINE

## 2024-08-19 PROCEDURE — 84460 ALANINE AMINO (ALT) (SGPT): CPT | Performed by: INTERNAL MEDICINE

## 2024-08-19 RX ORDER — TRIAMCINOLONE ACETONIDE 1 MG/G
CREAM TOPICAL
Qty: 60 G | Refills: 0 | Status: SHIPPED | OUTPATIENT
Start: 2024-08-19

## 2024-08-19 NOTE — PATIENT INSTRUCTIONS
Please follow up with your dermatologist: Dr. Payal Dorado in [place]    Follow up with Dr. Johnson from cardiology    Please have blood tests done today    Continue your medications    I have referred you to Dr. Sanchez (our colon rectal specialist) for the hemorrhoids to see what else he recommends     We did your eye exam today    You have lost weight and you have mentioned that you are doing it on purpose, but if you lose weight unintentionally then we need to do further work up     We will let you know about your test results  See me back in 1 month or sooner for any acute concerns. If your fatigue worsens then go to the ER as they can give you a blood transfusion immediately

## 2024-08-19 NOTE — PROGRESS NOTES
Patient Office Visit    ASSESSMENT AND PLAN:   1. Type 2 diabetes mellitus with hyperglycemia, without long-term current use of insulin (HCC)  Note: Continue metformin and we will repeat labs  - Diabetic Retinopathy Exam  OU - Both Eyes; Future  - Hemoglobin A1C [E]; Future    2. Recurrent major depressive disorder, in full remission (HCC)  Note: Has been taking sertraline 200 mg for years and he is not interested in cutting the dose down.  He feels the dose controls his symptoms    3. Morbid (severe) obesity due to excess calories (HCC)  Note: Has noted significant weight loss.  Patient reports that he is working on his diet and lifestyle modifications.  Patient is up-to-date with his endoscopy and colonoscopy.  Reminded patient to follow-up with his dermatologist skin cancer screening.  Will have patient follow-up for weight check and if he is still losing more weight will obtain a chest x-ray.  Blood tests have been ordered    4. Hyperlipidemia associated with type 2 diabetes mellitus (HCC)  Note: Continue statin  - ALT(SGPT)  - AST (SGOT)  - Lipid Panel    5. Primary hypertension  Note: Controlled on ramipril and hydrochlorothiazide  - Basic Metabolic Panel (8)  - TSH W Reflex To Free T4    6. Iron deficiency anemia, unspecified iron deficiency anemia type  Note: Denies any hemorrhoidal bleeding today but will obtain blood test as below.  Discussed that if his fatigue is getting worse he should go to the ER for blood transfusion.  He follows up with his hematologist but has not followed up in a while.  - Oncology/Hematology Referral - Peter ([place])  - CBC With Differential With Platelet; Future  - Ferritin [E]; Future  - Iron And Tibc [E]; Future    7. Internal hemorrhoids  Note: Referral provided  - Surgery Referral - In Network    8. Pruritus  Note: Will try treatment below  - triamcinolone 0.1 % External Cream; appl  Dispense: 60 g; Refill: 0    9. Vitamin B12 deficiency  - Vitamin B12 [E];  Future    10. Folic acid deficiency  - Folic Acid Serum [E]; Future    11. History of basal cell cancer  Note: Reminded patient to follow-up with his dermatologist    12. Type 2 diabetes mellitus with diabetic autonomic neuropathy, without long-term current use of insulin (HCC)  Note: Continue gabapentin    13.  History of aortic valve replacement  Note: Follows with his cardiologist.  He takes Coumadin as needed due to his bleeding.  INR needs to be between 2-2.5.  He has not been taking his Coumadin in the past week.  Patient understands that he needs to follow-up with his cardiologist to see if there are other alternatives due to his bleeding risk    Return to clinic in 1 month for follow-up      Patient/Caregiver Education: Patient/Caregiver Education: There are no barriers to learning. Medical education done. Outcome: Patient verbalizes understanding. Patient is notified to call with any questions, complications, allergies, or worsening or changing symptoms.  Patient is to call with any side effects or complications from the treatments as a result of today.      Reviewed Past Medical History and   Patient Active Problem List   Diagnosis    Iron deficiency anemia    Recurrent major depressive disorder, in full remission (HCC)    Primary hypertension    Hyperlipidemia associated with type 2 diabetes mellitus (HCC)    Gastroesophageal reflux disease without esophagitis    H/O heart valve replacement with mechanical valve    Type 2 diabetes mellitus with hyperglycemia, without long-term current use of insulin (HCC)    Thrombocytopenia (HCC)    Morbid (severe) obesity due to excess calories (HCC)    History of TIA (transient ischemic attack)    S/P gastric bypass    History of pancreatitis    Skin cancer    Upper GI bleeding       Orders Placed This Encounter   Procedures    ALT(SGPT)    AST (SGOT)    Basic Metabolic Panel (8)    CBC With Differential With Platelet     Standing Status:   Future     Standing  Expiration Date:   8/19/2025    TSH W Reflex To Free T4    Ferritin [E]     Standing Status:   Future     Standing Expiration Date:   8/19/2025     Order Specific Question:   Release to patient     Answer:   Immediate    Iron And Tibc [E]     Standing Status:   Future     Standing Expiration Date:   8/19/2025     Order Specific Question:   Release to patient     Answer:   Immediate    Lipid Panel    Folic Acid Serum [E]     Standing Status:   Future     Standing Expiration Date:   8/19/2025     Order Specific Question:   Release to patient     Answer:   Immediate    Vitamin B12 [E]     Standing Status:   Future     Standing Expiration Date:   8/19/2025     Order Specific Question:   Release to patient     Answer:   Immediate    Hemoglobin A1C [E]     Standing Status:   Future     Standing Expiration Date:   8/19/2025     Order Specific Question:   Release to patient     Answer:   Immediate    Diabetic Retinopathy Exam  OU - Both Eyes     Standing Status:   Future     Standing Expiration Date:   8/19/2025     Requested Prescriptions     Signed Prescriptions Disp Refills    triamcinolone 0.1 % External Cream 60 g 0     Sig: appl         Ema Arora DO  CC:  Chief Complaint   Patient presents with    Follow - Up         HPI:   Liang Schmidt is a 68 year old male who presents for a follow up    Anemia: he feels that he is anemic as he has been feeling fatigued for the past few weeks. He has not been back to his hematologist, but is hoping to get blood test done today. He has not followed up with the surgeon yet. He is not noticing dark stools or blood in stool.  He stopped his Coumadin a week ago and he self manages his levels all the time  Obesity: He has made changes to his diet and lifestyle and has been losing weight.  Hyperlipidemia/diabetes/depression: Stable on medicine  Itching: He has noticed that he is itching in his right arm and is concerned it could be due to mites.  He is requesting any  cream that can help    Past Medical History:    Anemia, chronic disease    Anesthesia complication    HX: OF AGGRESSION W/PAST PROCEDURES D/T INSUFFICIENT ANESTHESIA IN THE PAST?    Arthritis    Nothing good to say    Calculus of kidney    Nothing major    Cancer (HCC)    skin cancer    Cellulitis    Diabetes (HCC)    Essential hypertension    Hearing impairment    High blood pressure    Hyperlipidemia    Obesity    Osteoarthritis    Pancreatitis (HCC)    Sepsis (HCC)    Shortness of breath    INTERMITTENT SOB ON EXERTION    Visual impairment    CONTACTS/GLASSES       Past Surgical History:   Procedure Laterality Date    Anast panc cyst-bowel,trang-en-y      Cabg      Cath transcatheter aortic valve replacement      Cholecystectomy  8 yrs ago    Following acute pancreatitis    Colonoscopy  Many times    Colonoscopy N/A 3/5/2024    Procedure: COLONOSCOPY;  Surgeon: Zenon Kelley DO;  Location:  ENDOSCOPY    Hemorrhoidectomy  1 yr ago    Not very successful to be repeated    Other surgical history  20 yrs ago    Aortic valve replacement. Metal valve now    Removal gallbladder      Replace aortic valve open      performed in [place]    Skin surgery      Tonsillectomy      Vasectomy  20 yrs ago       Social History:  Social History     Socioeconomic History    Marital status: Single   Tobacco Use    Smoking status: Former     Current packs/day: 0.00     Average packs/day: 1 pack/day for 20.0 years (20.0 ttl pk-yrs)     Types: Cigarettes     Start date: 1967     Quit date: 1987     Years since quittin.6    Smokeless tobacco: Never    Tobacco comments:     No comment   Vaping Use    Vaping status: Never Used   Substance and Sexual Activity    Alcohol use: Never    Drug use: Never   Other Topics Concern    Caffeine Concern No    Exercise No    Seat Belt No    Special Diet No    Stress Concern No    Weight Concern Yes     Social Determinants of Health     Food Insecurity: No Food Insecurity (3/3/2024)     Food Insecurity     Food Insecurity: Never true   Transportation Needs: No Transportation Needs (3/3/2024)    Transportation Needs     Lack of Transportation: No   Housing Stability: Low Risk  (3/3/2024)    Housing Stability     Housing Instability: No     Family History:  History reviewed. No pertinent family history.  Allergies:  No Known Allergies  Current Meds:  Current Outpatient Medications on File Prior to Visit   Medication Sig Dispense Refill    ramipril 10 MG Oral Cap Take 1 capsule (10 mg total) by mouth daily. 90 capsule 0    hydroCHLOROthiazide 12.5 MG Oral Tab Take 1 tablet (12.5 mg total) by mouth daily. 90 tablet 0    sertraline 100 MG Oral Tab Take 2 tablets (200 mg total) by mouth daily. 180 tablet 0    warfarin 5 MG Oral Tab Take 1 tablet (5 mg total) by mouth nightly. 90 tablet 0    metFORMIN 500 MG Oral Tab Take 2 tablets (1,000 mg total) by mouth daily with breakfast. 180 tablet 0    amLODIPine 10 MG Oral Tab Take 1 tablet (10 mg total) by mouth daily. 90 tablet 0    cyanocobalamin 1000 MCG/ML Injection Solution Inject 1 mL (1,000 mcg total) into the muscle every 30 (thirty) days. 3 mL 3    simvastatin 40 MG Oral Tab Take 1 tablet (40 mg total) by mouth nightly. 90 tablet 0    triamcinolone 0.1 % External Cream Apply twice a day for 2 weeks, then take a break for 1 week, reapply if persists 80 g 1    folic acid 1 MG Oral Tab Take 1 tablet (1 mg total) by mouth daily. 90 tablet 3    cetirizine 10 MG Oral Tab Take 1 tablet (10 mg total) by mouth daily. 90 tablet 0    gabapentin 300 MG Oral Cap Take 1 capsule (300 mg total) by mouth nightly. 90 capsule 3    Calcium Carb-Cholecalciferol 500-400 MG-UNIT Oral Tab Take 1 tablet by mouth daily. 90 tablet 0     No current facility-administered medications on file prior to visit.         REVIEW OF SYSTEMS   Constitutional: fatigued   HENT: normal sinuses and no mouth issues   Eyes: . normal vision no eye pain   Respiratory: normal respirations no cough    Cardiovascular: no CP, or palpitations   Gastrointestinal: normal bowels and no abd pains   Genitourinary:  normal urination no hematuria, no frequency   Musculoskeletal: no pains in arms/legs, normal range of motion   Skin: no rashes or skin lesions that are new   Neurological:  no weakness, no numbness, normal gait   Hematological:  no bruises or bleeding   Psychiatric/Behavioral: normal mood no anxiety normal behavior     /68 (BP Location: Right arm, Patient Position: Sitting, Cuff Size: adult)   Pulse 90   Temp 97.6 °F (36.4 °C) (Temporal)   Resp 18   Ht 5' 10\" (1.778 m)   Wt 239 lb (108.4 kg)   SpO2 99%   BMI 34.29 kg/m²     PHYSICAL EXAM:   Constitutional: Vital signs reviewed as noted, chronically ill appearing, in no acute distress.   HENT: NCAT  Eyes: pupils reactive bilaterally  Cardiovascular: nl s1 s2, + mechanical click  Pulmonary/Chest: CTA bilaterally with no wheezes  Extremities: no pedal edema   Neurological:  no weakness in UE and LE, reflexes are normal. Decreased sensation to monofilament testing. No ulcers seen under the foot   Skin: excorations noted on the right arm  Psychiatric:normal mood

## 2024-08-20 ENCOUNTER — OFFICE VISIT (OUTPATIENT)
Dept: HEMATOLOGY/ONCOLOGY | Facility: HOSPITAL | Age: 69
End: 2024-08-20
Attending: INTERNAL MEDICINE
Payer: COMMERCIAL

## 2024-08-20 ENCOUNTER — TELEPHONE (OUTPATIENT)
Dept: HEMATOLOGY/ONCOLOGY | Facility: HOSPITAL | Age: 69
End: 2024-08-20

## 2024-08-20 VITALS
DIASTOLIC BLOOD PRESSURE: 53 MMHG | SYSTOLIC BLOOD PRESSURE: 139 MMHG | HEART RATE: 62 BPM | WEIGHT: 239.63 LBS | TEMPERATURE: 97 F | BODY MASS INDEX: 34 KG/M2 | OXYGEN SATURATION: 99 %

## 2024-08-20 DIAGNOSIS — D50.9 IRON DEFICIENCY ANEMIA, UNSPECIFIED IRON DEFICIENCY ANEMIA TYPE: Primary | ICD-10-CM

## 2024-08-20 DIAGNOSIS — E53.8 VITAMIN B12 DEFICIENCY: ICD-10-CM

## 2024-08-20 DIAGNOSIS — N18.31 STAGE 3A CHRONIC KIDNEY DISEASE (HCC): Primary | ICD-10-CM

## 2024-08-20 DIAGNOSIS — E53.8 FOLIC ACID DEFICIENCY: ICD-10-CM

## 2024-08-20 PROCEDURE — 96365 THER/PROPH/DIAG IV INF INIT: CPT

## 2024-08-20 PROCEDURE — 99215 OFFICE O/P EST HI 40 MIN: CPT | Performed by: INTERNAL MEDICINE

## 2024-08-20 NOTE — TELEPHONE ENCOUNTER
Appointment For: Liang Schmidt ([id number])  Visit Type: MYCHART FOLLOW UP SPECIALTY (7519)     8/22/2024    9:00 AM  15 mins.  [facility]     Patient Comments:  As you can see from yesterday’s tests I am struggling to breath.   I need a bunch of fluids

## 2024-08-20 NOTE — PROGRESS NOTES
Hematology/Oncology Clinic Follow Up Visit    Patient Name: Liang Schmidt  Medical Record Number: [id number]    YOB: 1955   PCP: Ema Arora DO  Other providers: Dr Juan Cedeno    Reason for Consultation:  Liang Schmidt was seen today for the diagnosis of anemia.    Hematologic History:  8/2022: Labs noted iron deficiency, vitamin B12 and folic acid deficiency. S/p IV iron dextran 1000mg, IM vit B12, folic acid repletion  10/2022: 1000mg IV infed  09/2023: 1000mg IV infed  3/2024: 200mg IV venofer x 2  8/2024: 1000mg IV infed    History of Present Illness:      67 y/o M PMH grade 4 hemorrhoidal disease s/p 2 hemorrhoidectomies, hx AVR on long term warfarin, hx trang-en-Y here for follow-up.    - having ongoing shortness of breath and fatigue  - hgb 6/27/24 showed improved hgb 10.4 g/dL  - repeat labs 8/2024 showed microcytic anemia 7.4g/dL, ferritin of 5.3  - he denies further hemorrhoidal bleeding  - notes adherence to vitamin B12 and folic acid    Past Medical History:  Past Medical History:    Anemia, chronic disease    Anesthesia complication    HX: OF AGGRESSION W/PAST PROCEDURES D/T INSUFFICIENT ANESTHESIA IN THE PAST?    Arthritis    Nothing good to say    Calculus of kidney    Nothing major    Cancer (HCC)    skin cancer    Cellulitis    Diabetes (HCC)    Essential hypertension    Hearing impairment    High blood pressure    Hyperlipidemia    Obesity    Osteoarthritis    Pancreatitis (HCC)    Sepsis (HCC)    Shortness of breath    INTERMITTENT SOB ON EXERTION    Visual impairment    CONTACTS/GLASSES     Past Surgical History:   Procedure Laterality Date    Anast panc cyst-bowel,trang-en-y      Cabg      Cath transcatheter aortic valve replacement      Cholecystectomy  8 yrs ago    Following acute pancreatitis    Colonoscopy  Many times    Colonoscopy N/A 3/5/2024    Procedure: COLONOSCOPY;  Surgeon: Zenon Kelley DO;  Location:  ENDOSCOPY     Hemorrhoidectomy  1 yr ago    Not very successful to be repeated    Other surgical history  20 yrs ago    Aortic valve replacement. Metal valve now    Removal gallbladder      Replace aortic valve open      performed in [place]    Skin surgery      Tonsillectomy      Vasectomy  20 yrs ago       Home Medications:  No outpatient medications have been marked as taking for the 24 encounter (Appointment) with Elvin Carr MD.       Allergies:   No Known Allergies    Psychosocial History:  Social History     Socioeconomic History    Marital status: Single     Spouse name: Not on file    Number of children: Not on file    Years of education: Not on file    Highest education level: Not on file   Occupational History    Not on file   Tobacco Use    Smoking status: Former     Current packs/day: 0.00     Average packs/day: 1 pack/day for 20.0 years (20.0 ttl pk-yrs)     Types: Cigarettes     Start date: 1967     Quit date: 1987     Years since quittin.6    Smokeless tobacco: Never    Tobacco comments:     No comment   Vaping Use    Vaping status: Never Used   Substance and Sexual Activity    Alcohol use: Never    Drug use: Never    Sexual activity: Not on file   Other Topics Concern    Caffeine Concern No    Exercise No    Seat Belt No    Special Diet No    Stress Concern No    Weight Concern Yes   Social History Narrative    Not on file     Social Determinants of Health     Financial Resource Strain: Not on file   Food Insecurity: No Food Insecurity (3/3/2024)    Food Insecurity     Food Insecurity: Never true   Transportation Needs: No Transportation Needs (3/3/2024)    Transportation Needs     Lack of Transportation: No   Physical Activity: Not on file   Stress: Not on file   Social Connections: Not on file   Housing Stability: Low Risk  (3/3/2024)    Housing Stability     Housing Instability: No     Housing Instability Emergency: Not on file     Crib or Bassinette: Not on file       [facility]  History:  No family history on file.    Review of Systems:  A 10-point ROS was done with pertinent positives and negative per the HPI    Vital Signs:  Height: --  Weight: --  BSA (Calculated - sq m): --  Pulse: --  BP: --  Temp: --  Do Not Use - Resp Rate: --  SpO2: --    Wt Readings from Last 6 Encounters:   08/19/24 108.4 kg (239 lb)   06/27/24 113.9 kg (251 lb)   03/03/24 117.3 kg (258 lb 9.6 oz)   10/24/23 121.6 kg (268 lb)   10/06/23 124.4 kg (274 lb 3.2 oz)   09/14/23 124.7 kg (275 lb)       Physical Examination:  General: Patient is alert and oriented, not in acute distress  Psych: Mood and affect are appropriate  Eyes: EOMI, PERRL  ENT: Oropharynx is clear, no adenopathy  CV: no LE edema  Respiratory: Non-labored respirations  GI/Abd: Soft, non-tender   Neurological: Grossly intact   Skin: no rashes or petechiae      Laboratory:  Lab Results   Component Value Date    WBC 7.9 08/19/2024    WBC 5.8 06/27/2024    WBC 4.7 03/05/2024    HGB 7.4 (L) 08/19/2024    HGB 10.4 (L) 06/27/2024    HGB 7.8 (L) 03/05/2024    HCT 24.8 (L) 08/19/2024    MCV 71.9 (L) 08/19/2024    MCH 21.4 (L) 08/19/2024    MCHC 29.8 (L) 08/19/2024    RDW 16.2 08/19/2024    PLT  08/19/2024      Comment:      Platelet aggregates seen.    .0 (L) 06/27/2024    PLT 98.0 (L) 03/05/2024     Lab Results   Component Value Date     (H) 08/19/2024    BUN 32 (H) 08/19/2024    CREATSERUM 1.93 (H) 08/19/2024    CREATSERUM 1.38 (H) 06/27/2024    CREATSERUM 1.10 03/05/2024    ANIONGAP 6 08/19/2024    CA 9.3 08/19/2024    OSMOCALC 291 08/19/2024    ALKPHO 128 (H) 06/27/2024    AST 26 08/19/2024    ALT 29 08/19/2024    BILT 0.4 06/27/2024    TP 8.0 06/27/2024    ALB 3.8 06/27/2024    GLOBULIN 4.2 06/27/2024     (L) 08/19/2024    K 4.6 08/19/2024     08/19/2024    CO2 22.0 08/19/2024     Lab Results   Component Value Date    PTT 52.0 (H) 06/27/2024    INR 2.07 (H) 06/27/2024       Imaging:    None pertinent to review    Impression &  Plan:     Iron deficiency anemia  - iron deficiency secondary to chronic blood loss and nutritional deficiencies from hx of gastric bypass  - vit B12 injection 1000mcg once monthly, folic acid 1mg daily  - continue f/u with GI  - IV 1000mg iron dextran today. Repeat CBC/iron/tibc/ferritin in 2 months. I emphasized to patient that he needs to be on top of his iron monitoring labs. There are standing lab draws for every 3 months so that he can get IV iron infusions if needed, otherwise he will develop symptomatic anemia from persistent iron deficiency due to inadequate absorption from his hx of gastric bypass    Thrombocytopenia  - unclear etiology. Mild, chronic, can continue to monitor for now. Has hx of platelet clumping; if platelet count is much lower, would recommend repeating platelet count in citrated tube (blue top tube).  - vit B12 and folate stores are replete. Prior CT A/P showed normal liver. CMP normal liver function.     Hx mechanical valve surgery  - on coumadin with INRs monitored by PCP. Goal INR 2.5-3.5    Follow up: to return for IV iron whenever iron deficient    Elvin Carr  Hematology/Medical Oncology  [facility]

## 2024-08-20 NOTE — TELEPHONE ENCOUNTER
Hx of anemia, Vit B 12 monthly, Folic acid 1 mg daily    Spoke to patient, he is having sob, recent labs available from yesterday.    Denies fevers, having sob, no chest pain.  Requesting apt for possible transfusion    ACV for today at 145 due to sob and weakness.

## 2024-08-20 NOTE — PROGRESS NOTES
Dear RN, please let the patient know   1.  He is anemic and he has a low iron level.  I see he has a follow-up appointment with his hematologist but he needs to be very consistent and following up with them.  2.  His prostate blood test is normal.  His folic acid and vitamin B12 levels are normal  3.  His diabetes has worsened.  I would like him to follow-up again with me to discuss other medications.  His diabetes is affecting his kidneys as well.  We can do a video visit if he prefers.  4.  He needs to follow-up with the colorectal specialist as discussed yesterday to help decrease his bleeding episodes.  He also needs to follow-up with his cardiologist  Ema Arora DO

## 2024-08-20 NOTE — PROGRESS NOTES
Education Record    Learner:  Patient    Pt here for: ASHLEY    Barriers / Limitations:  None    Method:  Brief focused, printed material and  reinforcement    General Topics:  Plan of care reviewed    Outcome: Pt tolerated infusion with no c/o.     Here for MD f/u and infed. Not new, tolerated well. PL f/u for iron labs scheduled in two months.

## 2024-08-22 ENCOUNTER — APPOINTMENT (OUTPATIENT)
Dept: HEMATOLOGY/ONCOLOGY | Facility: HOSPITAL | Age: 69
End: 2024-08-22
Attending: INTERNAL MEDICINE
Payer: COMMERCIAL

## 2024-08-31 ENCOUNTER — HOSPITAL ENCOUNTER (INPATIENT)
Facility: HOSPITAL | Age: 69
LOS: 1 days | Discharge: HOME | End: 2024-09-01
Attending: EMERGENCY MEDICINE | Admitting: HOSPITALIST
Payer: COMMERCIAL

## 2024-08-31 ENCOUNTER — APPOINTMENT (OUTPATIENT)
Dept: GENERAL RADIOLOGY | Facility: HOSPITAL | Age: 69
End: 2024-08-31
Payer: COMMERCIAL

## 2024-08-31 ENCOUNTER — HOSPITAL ENCOUNTER (OUTPATIENT)
Facility: HOSPITAL | Age: 69
Setting detail: OBSERVATION
Discharge: HOME | End: 2024-09-01
Attending: EMERGENCY MEDICINE | Admitting: HOSPITALIST
Payer: COMMERCIAL

## 2024-08-31 DIAGNOSIS — D64.9 ANEMIA, UNSPECIFIED TYPE: Primary | ICD-10-CM

## 2024-08-31 DIAGNOSIS — R79.89 ELEVATED TROPONIN: ICD-10-CM

## 2024-08-31 DIAGNOSIS — D69.6 THROMBOCYTOPENIA (HCC): ICD-10-CM

## 2024-08-31 LAB
ALBUMIN SERPL-MCNC: 4.4 G/DL (ref 3.2–4.8)
ALBUMIN/GLOB SERPL: 1.4 {RATIO} (ref 1–2)
ALP LIVER SERPL-CCNC: 124 U/L
ALT SERPL-CCNC: 40 U/L
ANION GAP SERPL CALC-SCNC: 10 MMOL/L (ref 0–18)
AST SERPL-CCNC: 32 U/L (ref ?–34)
BASOPHILS # BLD AUTO: 0.05 X10(3) UL (ref 0–0.2)
BASOPHILS NFR BLD AUTO: 0.8 %
BILIRUB SERPL-MCNC: 0.3 MG/DL (ref 0.2–1.1)
BUN BLD-MCNC: 30 MG/DL (ref 9–23)
CALCIUM BLD-MCNC: 9.3 MG/DL (ref 8.7–10.4)
CHLORIDE SERPL-SCNC: 108 MMOL/L (ref 98–112)
CHOLEST SERPL-MCNC: 122 MG/DL (ref ?–200)
CO2 SERPL-SCNC: 19 MMOL/L (ref 21–32)
CREAT BLD-MCNC: 1.94 MG/DL
EGFRCR SERPLBLD CKD-EPI 2021: 37 ML/MIN/1.73M2 (ref 60–?)
EOSINOPHIL # BLD AUTO: 0.28 X10(3) UL (ref 0–0.7)
EOSINOPHIL NFR BLD AUTO: 4.5 %
ERYTHROCYTE [DISTWIDTH] IN BLOOD BY AUTOMATED COUNT: 25.3 %
GLOBULIN PLAS-MCNC: 3.1 G/DL (ref 2–3.5)
GLUCOSE BLD-MCNC: 181 MG/DL (ref 70–99)
HCT VFR BLD AUTO: 29.3 %
HDLC SERPL-MCNC: 50 MG/DL (ref 40–59)
HGB BLD-MCNC: 8.8 G/DL
IMM GRANULOCYTES # BLD AUTO: 0.04 X10(3) UL (ref 0–1)
IMM GRANULOCYTES NFR BLD: 0.6 %
INR BLD: 3.73 (ref 0.8–1.2)
LDLC SERPL CALC-MCNC: 44 MG/DL (ref ?–100)
LYMPHOCYTES # BLD AUTO: 1.25 X10(3) UL (ref 1–4)
LYMPHOCYTES NFR BLD AUTO: 20.1 %
MCH RBC QN AUTO: 23.8 PG (ref 26–34)
MCHC RBC AUTO-ENTMCNC: 30 G/DL (ref 31–37)
MCV RBC AUTO: 79.2 FL
MONOCYTES # BLD AUTO: 0.42 X10(3) UL (ref 0.1–1)
MONOCYTES NFR BLD AUTO: 6.8 %
NEUTROPHILS # BLD AUTO: 4.18 X10 (3) UL (ref 1.5–7.7)
NEUTROPHILS # BLD AUTO: 4.18 X10(3) UL (ref 1.5–7.7)
NEUTROPHILS NFR BLD AUTO: 67.2 %
NONHDLC SERPL-MCNC: 72 MG/DL (ref ?–130)
NT-PROBNP SERPL-MCNC: 1853 PG/ML (ref ?–125)
OSMOLALITY SERPL CALC.SUM OF ELEC: 295 MOSM/KG (ref 275–295)
PLATELET # BLD AUTO: 118 10(3)UL (ref 150–450)
PLATELET MORPHOLOGY: NORMAL
PLATELETS.RETICULATED NFR BLD AUTO: 4.9 % (ref 0–7)
POTASSIUM SERPL-SCNC: 4 MMOL/L (ref 3.5–5.1)
PROT SERPL-MCNC: 7.5 G/DL (ref 5.7–8.2)
PROTHROMBIN TIME: 37.6 SECONDS (ref 11.6–14.8)
RBC # BLD AUTO: 3.7 X10(6)UL
SODIUM SERPL-SCNC: 137 MMOL/L (ref 136–145)
TRIGL SERPL-MCNC: 168 MG/DL (ref 30–149)
TROPONIN I SERPL HS-MCNC: 235 NG/L
TROPONIN I SERPL HS-MCNC: 260 NG/L
VLDLC SERPL CALC-MCNC: 24 MG/DL (ref 0–30)
WBC # BLD AUTO: 6.2 X10(3) UL (ref 4–11)

## 2024-08-31 PROCEDURE — 99223 1ST HOSP IP/OBS HIGH 75: CPT | Performed by: HOSPITALIST

## 2024-08-31 PROCEDURE — 71045 X-RAY EXAM CHEST 1 VIEW: CPT | Performed by: EMERGENCY MEDICINE

## 2024-09-01 ENCOUNTER — APPOINTMENT (OUTPATIENT)
Dept: CV DIAGNOSTICS | Facility: HOSPITAL | Age: 69
End: 2024-09-01
Attending: HOSPITALIST
Payer: COMMERCIAL

## 2024-09-01 VITALS
SYSTOLIC BLOOD PRESSURE: 103 MMHG | RESPIRATION RATE: 18 BRPM | TEMPERATURE: 98 F | WEIGHT: 233.44 LBS | HEART RATE: 70 BPM | OXYGEN SATURATION: 100 % | DIASTOLIC BLOOD PRESSURE: 65 MMHG | BODY MASS INDEX: 34 KG/M2

## 2024-09-01 PROBLEM — R79.89 TROPONIN LEVEL ELEVATED: Status: ACTIVE | Noted: 2024-09-01

## 2024-09-01 PROBLEM — R06.00 DYSPNEA: Status: ACTIVE | Noted: 2024-09-01

## 2024-09-01 PROBLEM — I21.4 NSTEMI (NON-ST ELEVATED MYOCARDIAL INFARCTION) (HCC): Status: ACTIVE | Noted: 2024-09-01

## 2024-09-01 PROBLEM — N18.30 ANEMIA OF CHRONIC RENAL FAILURE, STAGE 3 (MODERATE) (HCC): Status: ACTIVE | Noted: 2024-09-01

## 2024-09-01 PROBLEM — G62.9 PERIPHERAL POLYNEUROPATHY: Chronic | Status: ACTIVE | Noted: 2024-09-01

## 2024-09-01 PROBLEM — N17.9 AKI (ACUTE KIDNEY INJURY) (HCC): Status: ACTIVE | Noted: 2024-09-01

## 2024-09-01 PROBLEM — N17.9 AKI (ACUTE KIDNEY INJURY): Status: ACTIVE | Noted: 2024-09-01

## 2024-09-01 PROBLEM — D63.1 ANEMIA OF CHRONIC RENAL FAILURE, STAGE 3 (MODERATE) (HCC): Status: ACTIVE | Noted: 2024-09-01

## 2024-09-01 LAB
ANION GAP SERPL CALC-SCNC: 8 MMOL/L (ref 0–18)
BASOPHILS # BLD AUTO: 0.04 X10(3) UL (ref 0–0.2)
BASOPHILS NFR BLD AUTO: 0.8 %
BUN BLD-MCNC: 31 MG/DL (ref 9–23)
CALCIUM BLD-MCNC: 9 MG/DL (ref 8.7–10.4)
CHLORIDE SERPL-SCNC: 108 MMOL/L (ref 98–112)
CO2 SERPL-SCNC: 18 MMOL/L (ref 21–32)
CREAT BLD-MCNC: 1.75 MG/DL
EGFRCR SERPLBLD CKD-EPI 2021: 42 ML/MIN/1.73M2 (ref 60–?)
EOSINOPHIL # BLD AUTO: 0.19 X10(3) UL (ref 0–0.7)
EOSINOPHIL NFR BLD AUTO: 3.6 %
ERYTHROCYTE [DISTWIDTH] IN BLOOD BY AUTOMATED COUNT: 25.5 %
GLUCOSE BLD-MCNC: 201 MG/DL (ref 70–99)
GLUCOSE BLD-MCNC: 204 MG/DL (ref 70–99)
GLUCOSE BLD-MCNC: 205 MG/DL (ref 70–99)
HCT VFR BLD AUTO: 26.8 %
HGB BLD-MCNC: 8 G/DL
IMM GRANULOCYTES # BLD AUTO: 0.04 X10(3) UL (ref 0–1)
IMM GRANULOCYTES NFR BLD: 0.8 %
INR BLD: 4.13 (ref 0.8–1.2)
LYMPHOCYTES # BLD AUTO: 0.79 X10(3) UL (ref 1–4)
LYMPHOCYTES NFR BLD AUTO: 14.8 %
MCH RBC QN AUTO: 23.9 PG (ref 26–34)
MCHC RBC AUTO-ENTMCNC: 29.9 G/DL (ref 31–37)
MCV RBC AUTO: 80 FL
MONOCYTES # BLD AUTO: 0.3 X10(3) UL (ref 0.1–1)
MONOCYTES NFR BLD AUTO: 5.6 %
NEUTROPHILS # BLD AUTO: 3.97 X10 (3) UL (ref 1.5–7.7)
NEUTROPHILS # BLD AUTO: 3.97 X10(3) UL (ref 1.5–7.7)
NEUTROPHILS NFR BLD AUTO: 74.4 %
OSMOLALITY SERPL CALC.SUM OF ELEC: 290 MOSM/KG (ref 275–295)
PLATELET # BLD AUTO: 115 10(3)UL (ref 150–450)
PLATELETS.RETICULATED NFR BLD AUTO: 2.4 % (ref 0–7)
POTASSIUM SERPL-SCNC: 4.1 MMOL/L (ref 3.5–5.1)
PROTHROMBIN TIME: 40.6 SECONDS (ref 11.6–14.8)
RBC # BLD AUTO: 3.35 X10(6)UL
SODIUM SERPL-SCNC: 134 MMOL/L (ref 136–145)
TROPONIN I SERPL HS-MCNC: 174 NG/L
TROPONIN I SERPL HS-MCNC: 241 NG/L
WBC # BLD AUTO: 5.3 X10(3) UL (ref 4–11)

## 2024-09-01 PROCEDURE — 93306 TTE W/DOPPLER COMPLETE: CPT | Performed by: HOSPITALIST

## 2024-09-01 PROCEDURE — 99239 HOSP IP/OBS DSCHRG MGMT >30: CPT | Performed by: HOSPITALIST

## 2024-09-01 PROCEDURE — 99255 IP/OBS CONSLTJ NEW/EST HI 80: CPT | Performed by: INTERNAL MEDICINE

## 2024-09-01 RX ORDER — BISACODYL 10 MG
10 SUPPOSITORY, RECTAL RECTAL
Status: DISCONTINUED | OUTPATIENT
Start: 2024-09-01 | End: 2024-09-01

## 2024-09-01 RX ORDER — ACETAMINOPHEN 500 MG
500 TABLET ORAL EVERY 4 HOURS PRN
Status: DISCONTINUED | OUTPATIENT
Start: 2024-09-01 | End: 2024-09-01

## 2024-09-01 RX ORDER — SODIUM PHOSPHATE, DIBASIC AND SODIUM PHOSPHATE, MONOBASIC 7; 19 G/230ML; G/230ML
1 ENEMA RECTAL ONCE AS NEEDED
Status: DISCONTINUED | OUTPATIENT
Start: 2024-09-01 | End: 2024-09-01

## 2024-09-01 RX ORDER — SERTRALINE HYDROCHLORIDE 100 MG/1
200 TABLET, FILM COATED ORAL DAILY
Status: DISCONTINUED | OUTPATIENT
Start: 2024-09-01 | End: 2024-09-01

## 2024-09-01 RX ORDER — NICOTINE POLACRILEX 4 MG
15 LOZENGE BUCCAL
Status: DISCONTINUED | OUTPATIENT
Start: 2024-09-01 | End: 2024-09-01

## 2024-09-01 RX ORDER — DILTIAZEM HYDROCHLORIDE 120 MG/1
120 CAPSULE, EXTENDED RELEASE ORAL DAILY
Status: DISCONTINUED | OUTPATIENT
Start: 2024-09-02 | End: 2024-09-01

## 2024-09-01 RX ORDER — RAMIPRIL 5 MG/1
5 CAPSULE ORAL DAILY
Status: DISCONTINUED | OUTPATIENT
Start: 2024-09-02 | End: 2024-09-01

## 2024-09-01 RX ORDER — DILTIAZEM HYDROCHLORIDE 120 MG/1
120 CAPSULE, COATED, EXTENDED RELEASE ORAL DAILY
Qty: 30 CAPSULE | Refills: 0 | Status: SHIPPED | OUTPATIENT
Start: 2024-09-02

## 2024-09-01 RX ORDER — MELATONIN
3 NIGHTLY PRN
Status: DISCONTINUED | OUTPATIENT
Start: 2024-09-01 | End: 2024-09-01

## 2024-09-01 RX ORDER — ASPIRIN 81 MG/1
81 TABLET, CHEWABLE ORAL DAILY
Status: DISCONTINUED | OUTPATIENT
Start: 2024-09-01 | End: 2024-09-01

## 2024-09-01 RX ORDER — WARFARIN SODIUM 5 MG/1
5 TABLET ORAL NIGHTLY
Status: DISCONTINUED | OUTPATIENT
Start: 2024-09-01 | End: 2024-09-01

## 2024-09-01 RX ORDER — SENNOSIDES 8.6 MG
17.2 TABLET ORAL NIGHTLY PRN
Status: DISCONTINUED | OUTPATIENT
Start: 2024-09-01 | End: 2024-09-01

## 2024-09-01 RX ORDER — POLYETHYLENE GLYCOL 3350 17 G/17G
17 POWDER, FOR SOLUTION ORAL DAILY PRN
Status: DISCONTINUED | OUTPATIENT
Start: 2024-09-01 | End: 2024-09-01

## 2024-09-01 RX ORDER — NICOTINE POLACRILEX 4 MG
30 LOZENGE BUCCAL
Status: DISCONTINUED | OUTPATIENT
Start: 2024-09-01 | End: 2024-09-01

## 2024-09-01 RX ORDER — DEXTROSE MONOHYDRATE 25 G/50ML
50 INJECTION, SOLUTION INTRAVENOUS
Status: DISCONTINUED | OUTPATIENT
Start: 2024-09-01 | End: 2024-09-01

## 2024-09-01 RX ORDER — HYDROCHLOROTHIAZIDE 12.5 MG/1
12.5 TABLET ORAL DAILY
Status: DISCONTINUED | OUTPATIENT
Start: 2024-09-01 | End: 2024-09-01

## 2024-09-01 RX ORDER — ATORVASTATIN CALCIUM 20 MG/1
20 TABLET, FILM COATED ORAL NIGHTLY
Status: DISCONTINUED | OUTPATIENT
Start: 2024-09-01 | End: 2024-09-01

## 2024-09-01 RX ORDER — AMLODIPINE BESYLATE 5 MG/1
10 TABLET ORAL DAILY
Status: DISCONTINUED | OUTPATIENT
Start: 2024-09-01 | End: 2024-09-01

## 2024-09-01 RX ORDER — METOCLOPRAMIDE HYDROCHLORIDE 5 MG/ML
5 INJECTION INTRAMUSCULAR; INTRAVENOUS EVERY 8 HOURS PRN
Status: DISCONTINUED | OUTPATIENT
Start: 2024-09-01 | End: 2024-09-01

## 2024-09-01 RX ORDER — ONDANSETRON 2 MG/ML
4 INJECTION INTRAMUSCULAR; INTRAVENOUS EVERY 6 HOURS PRN
Status: DISCONTINUED | OUTPATIENT
Start: 2024-09-01 | End: 2024-09-01

## 2024-09-01 RX ORDER — RAMIPRIL 5 MG/1
10 CAPSULE ORAL DAILY
Status: DISCONTINUED | OUTPATIENT
Start: 2024-09-01 | End: 2024-09-01

## 2024-09-01 RX ORDER — ATORVASTATIN CALCIUM 20 MG/1
20 TABLET, FILM COATED ORAL NIGHTLY
Qty: 30 TABLET | Refills: 0 | Status: SHIPPED | OUTPATIENT
Start: 2024-09-01

## 2024-09-01 RX ORDER — RAMIPRIL 5 MG/1
5 CAPSULE ORAL DAILY
Qty: 30 CAPSULE | Refills: 0 | Status: SHIPPED | OUTPATIENT
Start: 2024-09-02

## 2024-09-01 RX ORDER — GABAPENTIN 300 MG/1
300 CAPSULE ORAL NIGHTLY
Status: DISCONTINUED | OUTPATIENT
Start: 2024-09-01 | End: 2024-09-01

## 2024-09-01 NOTE — H&P
[facility]     Liang Schmidt Patient Status:  Emergency    1955 MRN [id number]   Location [facility] EMERGENCY DEPARTMENT Attending Malathi Gonsales DO   Hosp Day # 0 PCP Ema Arora DO     Chief Complaint: SOB/ chest pain    Subjective:    History of Present Illness:     Liang Schmidt is a 69 year old male with history of iron deficiency anemia, type 2 diabetes, hypertension, chronic kidney disease, hyperlipidemia presents emergency room with 2 weeks of shortness of breath and started having some chest pains that mostly occur when she lays down.  No diaphoresis, nausea, vomiting.  No dizziness, lightheadedness, or syncope.    History/Other:    Past Medical History:  Past Medical History:    Anemia, chronic disease    Anesthesia complication    HX: OF AGGRESSION W/PAST PROCEDURES D/T INSUFFICIENT ANESTHESIA IN THE PAST?    Arthritis    Nothing good to say    Calculus of kidney    Nothing major    Cancer (HCC)    skin cancer    Cellulitis    Diabetes (HCC)    Essential hypertension    Hearing impairment    High blood pressure    Hyperlipidemia    Obesity    Osteoarthritis    Pancreatitis (HCC)    Sepsis (HCC)    Shortness of breath    INTERMITTENT SOB ON EXERTION    Visual impairment    CONTACTS/GLASSES     Past Surgical History:   Past Surgical History:   Procedure Laterality Date    Anast panc cyst-bowel,trang-en-y      Cabg      Cath transcatheter aortic valve replacement      Cholecystectomy  8 yrs ago    Following acute pancreatitis    Colonoscopy  Many times    Colonoscopy N/A 3/5/2024    Procedure: COLONOSCOPY;  Surgeon: Zenon eKlley DO;  Location:  ENDOSCOPY    Hemorrhoidectomy  1 yr ago    Not very successful to be repeated    Other surgical history  20 yrs ago    Aortic valve replacement. Metal valve now    Removal gallbladder      Replace aortic valve open      performed in [place]    Skin surgery      Tonsillectomy       Vasectomy  20 yrs ago      Family History:   No family history on file.  Social History:    reports that he quit smoking about 37 years ago. His smoking use included cigarettes. He started smoking about 57 years ago. He has a 20 pack-year smoking history. He has never used smokeless tobacco. He reports that he does not drink alcohol and does not use drugs.     Allergies: No Known Allergies    Medications:    Current Facility-Administered Medications on File Prior to Encounter   Medication Dose Route Frequency Provider Last Rate Last Admin    [COMPLETED] iron dextran (Infed) 1,000 mg in sodium chloride 0.9% 270 mL IVPB  1,000 mg Intravenous Once Elvin Carr MD   Stopped at 08/20/24 1421    [COMPLETED] sodium chloride 0.9 % IV bolus 1,000 mL  1,000 mL Intravenous Once Danyell Morales MD   Stopped at 06/27/24 2102    [COMPLETED] ceFAZolin (Ancef) 2g in 10mL IV syringe premix  2 g Intravenous Once Danyell Morales MD   Stopped at 06/27/24 2217     Current Outpatient Medications on File Prior to Encounter   Medication Sig Dispense Refill    ramipril 10 MG Oral Cap Take 1 capsule (10 mg total) by mouth daily. 90 capsule 0    hydroCHLOROthiazide 12.5 MG Oral Tab Take 1 tablet (12.5 mg total) by mouth daily. 90 tablet 0    sertraline 100 MG Oral Tab Take 2 tablets (200 mg total) by mouth daily. 180 tablet 0    warfarin 5 MG Oral Tab Take 1 tablet (5 mg total) by mouth nightly. 90 tablet 0    metFORMIN 500 MG Oral Tab Take 2 tablets (1,000 mg total) by mouth daily with breakfast. 180 tablet 0    amLODIPine 10 MG Oral Tab Take 1 tablet (10 mg total) by mouth daily. 90 tablet 0    cyanocobalamin 1000 MCG/ML Injection Solution Inject 1 mL (1,000 mcg total) into the muscle every 30 (thirty) days. 3 mL 3    simvastatin 40 MG Oral Tab Take 1 tablet (40 mg total) by mouth nightly. 90 tablet 0    triamcinolone 0.1 % External Cream Apply twice a day for 2 weeks, then take a break for 1 week, reapply if persists 80 g 1    folic acid 1 MG  Oral Tab Take 1 tablet (1 mg total) by mouth daily. 90 tablet 3    cetirizine 10 MG Oral Tab Take 1 tablet (10 mg total) by mouth daily. 90 tablet 0    gabapentin 300 MG Oral Cap Take 1 capsule (300 mg total) by mouth nightly. 90 capsule 3    triamcinolone 0.1 % External Cream appl 60 g 0    [] doxycycline 100 MG Oral Cap Take 1 capsule (100 mg total) by mouth 2 (two) times daily for 7 days. 14 capsule 0    [] amoxicillin clavulanate 875-125 MG Oral Tab Take 1 tablet by mouth 2 (two) times daily for 7 days. 14 tablet 0       Review of Systems:   A comprehensive review of systems was completed.    Pertinent positives and negatives noted in the HPI.    Objective:   Physical Exam:    /64   Pulse 64   Temp 97.2 °F (36.2 °C) (Temporal)   Resp 21   Wt 239 lb (108.4 kg)   SpO2 100%   BMI 34.29 kg/m²   General: No acute distress, Alert  Respiratory: No rhonchi, no wheezes  Cardiovascular: S1, S2. Regular rate and rhythm  Abdomen: Soft, Non-tender, non-distended, positive bowel sounds  Neuro: No new focal deficits  Extremities: No edema      Results:    Labs:      Labs Last 24 Hours:    Recent Labs   Lab 24   RBC 3.70*   HGB 8.8*   HCT 29.3*   MCV 79.2*   MCH 23.8*   MCHC 30.0*   RDW 25.3   NEPRELIM 4.18   WBC 6.2   .0*       Recent Labs   Lab 24   *   BUN 30*   CREATSERUM 1.94*   EGFRCR 37*   CA 9.3   ALB 4.4      K 4.0      CO2 19.0*   ALKPHO 124*   AST 32   ALT 40   BILT 0.3   TP 7.5       Lab Results   Component Value Date    INR 3.73 (H) 2024    INR 2.07 (H) 2024    INR 1.44 (H) 2024       Recent Labs   Lab 24   TROPHS 260*       Recent Labs   Lab 24   PBNP 1,853*       No results for input(s): \"PCT\" in the last 168 hours.    Imaging: Imaging data reviewed in Epic.    Assessment & Plan:      # NSTEMI  - Will rule out ACS with serial troponins  - Echo in the am  - Coumadin theraputic  - Cardiology on  consult    # Aortic valve replacement  - Pt on coumadin  - On hold due to supertheraputic INR    # Shortness of breath  - Elevated BNP  - Echo in the am.    # Anemia  - Heme + but no melena or hematochezia  - Hgb stable at 8.8  - Iron level is 14 last week    # Hx of     # Acute kidney injury on chronic stage 2 renal failure.    # Type 2 diabetes  -Will place on hyperglycemia protocol with correction factor insulin.    # Hypertension  - Will continue on hydrochlorothiazide, ramipril, amlodipine.    # Depression  -Will continue on sertraline.    # Hyperlipidemia  -Will continue on statin therapy.    # Peripheral neuropathy  -Will continue on gabapentin.    Plan of care discussed with patient at bedside.    Mio Rowland, DO    Supplementary Documentation:     The 21st Century Cures Act makes medical notes like these available to patients in the interest of transparency. Please be advised this is a medical document. Medical documents are intended to carry relevant information, facts as evident, and the clinical opinion of the practitioner. The medical note is intended as peer to peer communication and may appear blunt or direct. It is written in medical language and may contain abbreviations or verbiage that are unfamiliar.

## 2024-09-01 NOTE — HISTORICAL OFFICE NOTE
[facility]  Outside Information  Continuity of Care Document  6/20/2023  Liang Schmidt - 67 y.o. Male; born [birth year]Summary of episode note, generated on Jul. 12, 2023July 12, 2023   CHIEF COMPLAINT    CHIEF COMPLAINT  Reason for Visit/Chief Complaint   follow up   Follow-up visit. Doing well. Still having trouble with hemorrhoidal bleeding. Also has basal cell skin cancer. Is going to have the skin cancer evaluated and will probably require additional colon surgery.Lungs are clear. He is in sinus rhythm. Valve sounds great.     PROBLEMS  Reconcile with Patient's ChartPROBLEMS  Problem Effective Dates Date resolved Problem Status   Pre-op evaluation, [SNOMED-CT: 615357433] 9/14/2022 - Active   Primary hypertension, [SNOMED-CT: 24174846] 7/26/2022 - Active   Hyperlipidemia associated with type 2 diabetes mellitus, [SNOMED-CT: 68808844] 7/26/2022 - Active   H/O heart valve replacement with mechanical valve, [SNOMED-CT: 37026095938506] 8/3/2022 - Active   Hyperlipidemia, [SNOMED-CT: 97771870] 8/3/2022 - Active     ENCOUNTER DIAGNOSIS    No data available    VITAL SIGNS    VITAL SIGNS  Date / Time: 6/20/2023   BP Systolic 116 mmHg   BP Diastolic 60 mmHg   Height 70 inches   Weight 280 lbs   Pulse Rate 78 bpm   BSA (Body Surface Area) 2.6 cc/m2   BMI (Body Mass Index) 40.2 cc/m2   Blood Pressure 116 / 60 mmHg     PHYSICAL EXAMINATION    PHYSICAL EXAMINATION  Header Details   Vitals Right Arm Sitting  / 60 mmHg, Pulse rate 78 bpm, Height in 5' 10\", BMI: 40.2, Weight in 279.99 lbs (or) 127 kgs, BSA : 2.56 cc/m²   General Appearance No Acute Distress   Head/Eyes/Ears/Nose/Mouth/Throat No icterus   Neck Normal carotid pulsations   Respiratory Lungs clear with normal breath sounds   Cardiovascular Regular rhythm.   Gastrointestinal Abdomen soft   Lower Extremities No edema   Skin Warm and dry   Neurologic / Psychiatric Non-focal     ALLERGIES, ADVERSE REACTIONS,  ALERTS    No data available    MEDICATIONS ADMINISTERED DURING VISIT    No data available    MEDICATIONS    MEDICATIONS  Medication Start Date Route/Frequency Status   amLODIPine 10 MG Oral Tab, [RxNorm: 436275] 8/26/2022 Take 1 tablet (10 mg total) by mouth daily. Active   Calcium Carb-Cholecalciferol 500-400 MG-UNIT Oral Tab, [RxNorm: 906702] 8/26/2022 Take 1 tablet by mouth daily. Active   gabapentin 300 MG Oral Cap, [RxNorm: 890861] 8/26/2022 Take 1 capsule (300 mg total) by mouth nightly. Active   lansoprazole 30 MG Oral Capsule Delayed Release, [RxNorm: 742707] 8/26/2022 Take 1 capsule (30 mg total) by mouth every morning before breakfast. Active   metFORMIN 500 MG Oral Tab, [RxNorm: 773485] 8/26/2022 Take 1 tablet (500 mg total) by mouth daily with breakfast. Active   ramipril 10 MG Oral Cap, [RxNorm: 577748] 8/26/2022 Take 1 capsule (10 mg total) by mouth daily. Active   sertraline 100 MG Oral Tab, [RxNorm: 092205] 8/26/2022 Take 2 tablets (200 mg total) by mouth daily. Active   simvastatin 40 MG Oral Tab, [RxNorm: 622906] 8/26/2022 Take 1 tablet (40 mg total) by mouth nightly. Active   warfarin 10 MG Oral Tab, [RxNorm: 222659] 8/26/2022 Take 1 tablet (10 mg total) by mouth nightly. Active     ASSESSMENT    Compensated cardiac status. Prior mechanical aortic valve replacement. No known coronary disease he had as described risk factors including dyslipidemia, diabetes mellitus and hypertension.Overall doing well.Liang is at acceptable cardiac risk to proceed with colon surgery as required. As in the past have given him the okay to hold his Coumadin 5 days prior to the procedure. He can resume his Coumadin postprocedure when his bleeding risk has declined. We are continuing to target his INR between 2 and 2.5 to try to minimize his bleeding risk.I will see him in 1 year. He should plan on having a follow-up transthoracic echocardiogram performed before that visit.     FAMILY HISTORY    No data available     GENERAL STATUS    No data available    PAST MEDICAL HISTORY    PAST MEDICAL HISTORY  Problem Date diagonsed Date resolved Status   Pre-op evaluation, [SNOMED-CT: 825096758] 9/14/2022 - Active   Primary hypertension, [SNOMED-CT: 35791533] 7/26/2022 - Active   Hyperlipidemia associated with type 2 diabetes mellitus, [SNOMED-CT: 92184595] 7/26/2022 - Active   Hyperlipidemia, [SNOMED-CT: 65914734] 8/3/2022 - Active     HISTORY OF PRESENT ILLNESS    Follow-up visit. Doing well. Still having trouble with hemorrhoidal bleeding. Also has basal cell skin cancer. Is going to have the skin cancer evaluated and will probably require additional colon surgery.Lungs are clear. He is in sinus rhythm. Valve sounds great.     IMMUNIZATIONS    No data available    PLAN OF CARE    PLAN OF CARE  Planned Care Date   Echocardiography - Complete 1/1/1900   Referral Visit - Ema Arora ([email or web address]) : 1/1/1900   Follow up visit - Jeffrey Johnson 1/1/1900     PROCEDURES    No data available    LAB RESULTS    No data available    REVIEW OF SYSTEMS    REVIEW OF SYSTEMS  Header Details   Constitutional No history of Weight Loss   Eyes No history of Double vision   ENT No history of Bloody nose (epistaxis)   Gastrointestinal No history of Abdominal pain   Cardiovascular No history of Chest pain   Genitourinary No history of Hematuria   Musculoskeletal No history of Muscle weakness   Respiratory No history of Hemoptysis   Neurological No history of Ataxia   Endocrine No history of Orthostatic symptoms   Psychiatric No history of Drug abuse   Hem/Lymphatic No history of Blood clots   Allergy Immunology No history of Hives   Integumentary Skin changes     SOCIAL HISTORY    SOCIAL HISTORY  Social History Element Description Effective Dates   Smoking status Never smoked -     FUNCTIONAL STATUS    No data available    MEDICAL EQUIPMENT    No data available    Goals Sections    No data available    REASON FOR REFERRAL            Health Concerns Section    No data available    COGNITIVE/MENTAL STATUS    No data available    Patient Demographics    Patient Demographics  Patient Address Patient Name Communication   [address] [place] [address] Liang Schmidt [phone] (Home)     Patient Demographics  Language Race / Ethnicity Marital Status   English (Preferred) White / Unknown      Document Information    Primary Care Provider Other Service Providers Document Coverage Dates   Alex Murphy  NPI: [id number]  [phone] (Work)  [address] [place] [address] [id number] [place] [id number]  Interpreting Physicians  [facility]  [phone] (Work)  [address] [place] [address] Oh AUGUSTK Malathi  NPI: [id number]  [phone] (Work)  [address] [place] [address]  [place] [address]  Nurses     Juni Singh  NPI: [id number]  [phone] (Work)  [address] [place] [address]  [place] [address]  Nurses Jun. 20, 2023June 20, 2023      Organization   [facility]  [phone] (Work)  [address] [place] [address] [id number] [place] [id number]     Encounter Providers Encounter Date    Jun. 20, 2023June 20, 2023     Legal Authenticator    Jeffrey Johnson  NPI: [id number]  [phone] (Work)  [address] [place] [address] [id number] [place] [id number]

## 2024-09-01 NOTE — ED INITIAL ASSESSMENT (HPI)
Pt to the emergency room for shortness of breath for the past 2 weeks. Pt reports that he has hx of anemia and is worried that his hgb may be low because this resembles his symptoms in the past. Pt states that he also began having chest pain at about the same time. Pt states that the chest pain mostly occurs at night when he is laying down.

## 2024-09-01 NOTE — CONSULTS
[facility]   part of [facility]    Report of Consultation    Liang Schmidt Patient Status:  Inpatient    1955 MRN [id number]   Location [facility] 8NE-A Attending Evan Miranda MD   Hosp Day # 0 PCP Ema Arora DO     Date of Admission:  2024  Date of Consult:  24    Reason for Consultation:  ROMANO    History of Present Illness:  Liang Schmidt is a a(n) 69 year old male. Admitted through ER with 2-3 weeks of progressive exertional dyspnea.    Hgb 8.8      Mildly elevated troponins yet flat curve    Pro-bnp 1853    INR 3.7    CXR with clear lungs - eventration of the right HD    ECG atrial flutter    History:  Past Medical History:    Anemia, chronic disease    Anesthesia complication    HX: OF AGGRESSION W/PAST PROCEDURES D/T INSUFFICIENT ANESTHESIA IN THE PAST?    Arthritis    Nothing good to say    Calculus of kidney    Nothing major    Cancer (HCC)    skin cancer    Cellulitis    Diabetes (HCC)    Essential hypertension    Hearing impairment    High blood pressure    Hyperlipidemia    Obesity    Osteoarthritis    Pancreatitis (HCC)    Sepsis (HCC)    Shortness of breath    INTERMITTENT SOB ON EXERTION    Visual impairment    CONTACTS/GLASSES     Past Surgical History:   Procedure Laterality Date    Anast panc cyst-bowel,trang-en-y      Cabg      Cath transcatheter aortic valve replacement      Cholecystectomy  8 yrs ago    Following acute pancreatitis    Colonoscopy  Many times    Colonoscopy N/A 3/5/2024    Procedure: COLONOSCOPY;  Surgeon: Zenon Kelley DO;  Location:  ENDOSCOPY    Hemorrhoidectomy  1 yr ago    Not very successful to be repeated    Other surgical history  20 yrs ago    Aortic valve replacement. Metal valve now    Removal gallbladder      Replace aortic valve open      performed in [place]    Skin surgery      Tonsillectomy      Vasectomy  20 yrs ago     No family history on file.   reports that he quit smoking about 37  years ago. His smoking use included cigarettes. He started smoking about 57 years ago. He has a 20 pack-year smoking history. He has never used smokeless tobacco. He reports that he does not drink alcohol and does not use drugs.    Allergies:  No Known Allergies    Medications:    Current Facility-Administered Medications:     amLODIPine (Norvasc) tab 10 mg, 10 mg, Oral, Daily    gabapentin (Neurontin) cap 300 mg, 300 mg, Oral, Nightly    hydroCHLOROthiazide tab 12.5 mg, 12.5 mg, Oral, Daily    ramipril (Altace) cap 10 mg, 10 mg, Oral, Daily    sertraline (Zoloft) tab 200 mg, 200 mg, Oral, Daily    atorvastatin (Lipitor) tab 20 mg, 20 mg, Oral, Nightly    glucose (Dex4) 15 GM/59ML oral liquid 15 g, 15 g, Oral, Q15 Min PRN **OR** glucose (Glutose) 40% oral gel 15 g, 15 g, Oral, Q15 Min PRN **OR** glucose-vitamin C (Dex-4) chewable tab 4 tablet, 4 tablet, Oral, Q15 Min PRN **OR** dextrose 50% injection 50 mL, 50 mL, Intravenous, Q15 Min PRN **OR** glucose (Dex4) 15 GM/59ML oral liquid 30 g, 30 g, Oral, Q15 Min PRN **OR** glucose (Glutose) 40% oral gel 30 g, 30 g, Oral, Q15 Min PRN **OR** glucose-vitamin C (Dex-4) chewable tab 8 tablet, 8 tablet, Oral, Q15 Min PRN    melatonin tab 3 mg, 3 mg, Oral, Nightly PRN    ondansetron (Zofran) 4 MG/2ML injection 4 mg, 4 mg, Intravenous, Q6H PRN    metoclopramide (Reglan) 5 mg/mL injection 5 mg, 5 mg, Intravenous, Q8H PRN    acetaminophen (Tylenol Extra Strength) tab 500 mg, 500 mg, Oral, Q4H PRN    polyethylene glycol (PEG 3350) (Miralax) 17 g oral packet 17 g, 17 g, Oral, Daily PRN    sennosides (Senokot) tab 17.2 mg, 17.2 mg, Oral, Nightly PRN    bisacodyl (Dulcolax) 10 MG rectal suppository 10 mg, 10 mg, Rectal, Daily PRN    fleet enema (Fleet) rectal enema 133 mL, 1 enema, Rectal, Once PRN    insulin aspart (NovoLOG) 100 Units/mL FlexPen 1-10 Units, 1-10 Units, Subcutaneous, TID AC and HS    sodium ferric gluconate (Ferrlecit) 125 mg in sodium chloride 0.9% 100mL IVPB  premix, 125 mg, Intravenous, Daily    aspirin chewable tab 81 mg, 81 mg, Oral, Daily    Review of Systems:  All systems were reviewed and are negative except as described above in HPI.    Physical Exam:  Blood pressure 96/48, pulse 95, temperature 97.6 °F (36.4 °C), temperature source Oral, resp. rate 18, weight 233 lb 7.5 oz (105.9 kg), SpO2 100%.  Temp (24hrs), Av.9 °F (36.6 °C), Min:97.2 °F (36.2 °C), Max:98.8 °F (37.1 °C)    Wt Readings from Last 3 Encounters:   24 233 lb 7.5 oz (105.9 kg)   24 239 lb 9.6 oz (108.7 kg)   24 239 lb (108.4 kg)       Telemetry: atrial fibrillation  General: Alert and oriented in no apparent distress.  HEENT: No focal deficits.  Neck: No JVD  Cardiac: irregular - no significant MR  Lungs: Clear without wheezes, rales, rhonchi or dullness.  Normal excursions and effort.  Abdomen: Soft, non-tender.   Extremities: Without clubbing, cyanosis or edema.    Neurologic: Alert and oriented, normal affect.  Skin: Multiple tatoos    Laboratories and Data:  Diagnostics:  EKG: as above  Echo:   Stress Test:   Cath:   CTA Chest:   CXR: clear    Labs:   Lab Results   Component Value Date    WBC 5.3 2024    RBC 3.35 2024    HGB 8.0 2024    HCT 26.8 2024    MCV 80.0 2024    MCH 23.9 2024    MCHC 29.9 2024    RDW 25.5 2024    .0 2024     Lab Results   Component Value Date    INR 4.13 (H) 2024    INR 3.73 (H) 2024    INR 2.07 (H) 2024       Assessment/Plan:  Patient Active Problem List   Diagnosis    Iron deficiency anemia    Recurrent major depressive disorder, in full remission (HCC)    Primary hypertension    Hyperlipidemia associated with type 2 diabetes mellitus (HCC)    Gastroesophageal reflux disease without esophagitis    H/O heart valve replacement with mechanical valve    Type 2 diabetes mellitus with hyperglycemia, without long-term current use of insulin (HCC)    Thrombocytopenia (HCC)     Morbid (severe) obesity due to excess calories (HCC)    History of TIA (transient ischemic attack)    S/P gastric bypass    History of pancreatitis    Skin cancer    Upper GI bleeding    Type 2 diabetes mellitus with diabetic autonomic neuropathy, without long-term current use of insulin (HCC)    History of basal cell cancer    Anemia    Peripheral polyneuropathy    NSTEMI (non-ST elevated myocardial infarction) (HCC)    Anemia of chronic renal failure, stage 3 (moderate) (HCC)    Dyspnea    AGGIE (acute kidney injury) (HCC)    Troponin level elevated       Multifactorial exertional dyspnea - chronic iron deficiency anemia, CKD and potentially newly identified afib/flutter playing a role    Echocardiogram today    Continue baseline coumadin    Outpatient IV iron per hematology    Home today fine by me - same medications except change amlodipine to cardizem  mg daily    Decrease Altace to 5 mg daily as blood pressures are low    Plan to see me in the office in 2-3 weeks for follow up    Jeffrey Johnson MD  9/1/2024  10:30 AM      C5

## 2024-09-01 NOTE — ED PROVIDER NOTES
Patient Seen in: [facility] 8ne-a      History     Chief Complaint   Patient presents with    Chest Pain Angina     Stated Complaint: Pt has had SOB x2 weeks. Pt has hx of anemia and blodo transfusions.    Subjective:   HPI  Patient is a 68 yo M with a history of anemia, HTN, DM, aortic valve replacement on coumadin, hx of trang-en-Y who presents to ED for evaluation of SOB over past 2 weeks. Pt states his SOB is worse with exertion. He becomes very SOB walking short distances. Pt also c/o central chest pressure with walking occasionally.  He also has increased fatigue and generalized weakness. Denies any bleeding.     Patient reports history of anemia. Heme/onc note from 8/20 shows pt was having ongoing SOB and fatigue at that time. On 6/27/24, hgb 10.4. recent labs show microcytic anemia with hgb of 7.4, ferritin of 5.3. Anemia thought  be 2/2 chronic blood loss from nutritional deficiences from hx of gastric bypass. Pt receives IV iron infusions as well fo rthis.     Objective:   Past Medical History:    Anemia, chronic disease    Anesthesia complication    HX: OF AGGRESSION W/PAST PROCEDURES D/T INSUFFICIENT ANESTHESIA IN THE PAST?    Arthritis    Nothing good to say    Calculus of kidney    Nothing major    Cancer (HCC)    skin cancer    Cellulitis    Diabetes (HCC)    Essential hypertension    Hearing impairment    High blood pressure    Hyperlipidemia    Obesity    Osteoarthritis    Pancreatitis (HCC)    Sepsis (HCC)    Shortness of breath    INTERMITTENT SOB ON EXERTION    Visual impairment    CONTACTS/GLASSES              Past Surgical History:   Procedure Laterality Date    Anast panc cyst-bowel,trang-en-y      Cabg      Cath transcatheter aortic valve replacement      Cholecystectomy  8 yrs ago    Following acute pancreatitis    Colonoscopy  Many times    Colonoscopy N/A 3/5/2024    Procedure: COLONOSCOPY;  Surgeon: Zenon Kelley DO;  Location:  ENDOSCOPY    Hemorrhoidectomy  1 yr ago    Not  very successful to be repeated    Other surgical history  20 yrs ago    Aortic valve replacement. Metal valve now    Removal gallbladder      Replace aortic valve open      performed in [place]    Skin surgery      Tonsillectomy      Vasectomy  20 yrs ago                Social History     Socioeconomic History    Marital status: Single   Tobacco Use    Smoking status: Former     Current packs/day: 0.00     Average packs/day: 1 pack/day for 20.0 years (20.0 ttl pk-yrs)     Types: Cigarettes     Start date: 1967     Quit date: 1987     Years since quittin.6    Smokeless tobacco: Never    Tobacco comments:     No comment   Vaping Use    Vaping status: Never Used   Substance and Sexual Activity    Alcohol use: Never    Drug use: Never   Other Topics Concern    Caffeine Concern No    Exercise No    Seat Belt No    Special Diet No    Stress Concern No    Weight Concern Yes     Social Determinants of Health     Food Insecurity: No Food Insecurity (2024)    Food Insecurity     Food Insecurity: Never true   Transportation Needs: No Transportation Needs (2024)    Transportation Needs     Lack of Transportation: No   Housing Stability: Low Risk  (2024)    Housing Stability     Housing Instability: No              Review of Systems    Positive for stated Chief Complaint: Chest Pain Angina    Other systems are as noted in HPI.  Constitutional and vital signs reviewed.      All other systems reviewed and negative except as noted above.    Physical Exam     ED Triage Vitals [24]   BP 95/60   Pulse 73   Resp 20   Temp 97.2 °F (36.2 °C)   Temp src Temporal   SpO2 99 %   O2 Device None (Room air)       Current Vitals:   Vital Signs  BP: (!) 67/48  Pulse: 87  Resp: 22  Temp: 98.8 °F (37.1 °C)  Temp src: Oral  MAP (mmHg): (!) 56    Oxygen Therapy  SpO2: 97 %  O2 Device: None (Room air)            Physical Exam  Vitals and nursing note reviewed.   Constitutional:       General: He is not in acute  distress.  HENT:      Head: Normocephalic and atraumatic.      Nose: Nose normal.      Mouth/Throat:      Mouth: Mucous membranes are moist.   Eyes:      Extraocular Movements: Extraocular movements intact.      Pupils: Pupils are equal, round, and reactive to light.   Cardiovascular:      Rate and Rhythm: Normal rate and regular rhythm.      Pulses: Normal pulses.   Pulmonary:      Effort: Pulmonary effort is normal. No respiratory distress.      Breath sounds: No wheezing.   Abdominal:      General: There is no distension.      Palpations: Abdomen is soft.   Genitourinary:     Rectum: Guaiac result positive (grossly brown).   Musculoskeletal:         General: No swelling. Normal range of motion.      Cervical back: Normal range of motion.   Skin:     General: Skin is warm and dry.      Capillary Refill: Capillary refill takes less than 2 seconds.   Neurological:      General: No focal deficit present.      Mental Status: He is alert.   Psychiatric:         Mood and Affect: Mood normal.           ED Course     Labs Reviewed   COMP METABOLIC PANEL (14) - Abnormal; Notable for the following components:       Result Value    Glucose 181 (*)     CO2 19.0 (*)     BUN 30 (*)     Creatinine 1.94 (*)     eGFR-Cr 37 (*)     Alkaline Phosphatase 124 (*)     All other components within normal limits   CBC WITH DIFFERENTIAL WITH PLATELET - Abnormal; Notable for the following components:    RBC 3.70 (*)     HGB 8.8 (*)     HCT 29.3 (*)     .0 (*)     MCV 79.2 (*)     MCH 23.8 (*)     MCHC 30.0 (*)     All other components within normal limits   TROPONIN I HIGH SENSITIVITY - Abnormal; Notable for the following components:    Troponin I (High Sensitivity) 260 (*)     All other components within normal limits   PRO BETA NATRIURETIC PEPTIDE - Abnormal; Notable for the following components:    Pro-Beta Natriuretic Peptide 1,853 (*)     All other components within normal limits   PROTHROMBIN TIME (PT) - Abnormal; Notable for  the following components:    PT 37.6 (*)     INR 3.73 (*)     All other components within normal limits   LIPID PANEL - Abnormal; Notable for the following components:    Triglycerides 168 (*)     All other components within normal limits   RBC MORPHOLOGY SCAN - Abnormal; Notable for the following components:    RBC Morphology See morphology below (*)     All other components within normal limits   TROPONIN I HIGH SENSITIVITY - Abnormal; Notable for the following components:    Troponin I (High Sensitivity) 235 (*)     All other components within normal limits   SCAN SLIDE   BASIC METABOLIC PANEL (8)   CBC WITH DIFFERENTIAL WITH PLATELET   TROPONIN I HIGH SENSITIVITY   TROPONIN I HIGH SENSITIVITY   RAINBOW DRAW LAVENDER   RAINBOW DRAW LIGHT GREEN   RAINBOW DRAW BLUE   RAINBOW DRAW GOLD     EKG    Rate, intervals and axes as noted on EKG Report.  Rate: 68  Rhythm: Atrial Fibrillation  Reading: A fib with rate of 68, no acute ST changes, LAD                     MDM      Patient is a 70 yo M with a history of anemia, HTN, DM, aortic valve replacement on coumadin, hx of trang-en-Y who presents to ED for evaluation of SOB over past 2 weeks associate with chest pressure. VSS. Pt is nontoxic on exam. He has grossly brown, hemoccult positive stool. Differential includes but not limited to anemia, ACS, pneumonia, fluid overload. Low suspicion for PE given pt is on coumadin.    ED Course:   - CBC shows hgb of 8.8, which is pt's recent baseline. Plts 118, no leukocytosis, CMP shows creatinine jnear baseline. BNP elevated from prior. Trop elevated at 260. EKG shows NSR, no acute ST changes  but appears to have a fib. Pt has no known a fib on chart review. Rate controlled. CXR shows no fluid overload or pneumonia. Repeat trop downtrended to 235. Suspect possible demand ischemia. INR 3.73. Discussed with [facility] cardiology team who will see pt tomorrow.     Patient admitted to Dr. Quintanilla.              Admission disposition:  8/31/2024 11:21 PM                       Medical Decision Making      Disposition and Plan     Clinical Impression:  1. Anemia, unspecified type    2. Elevated troponin    3. Thrombocytopenia (HCC)         Disposition:  Admit  8/31/2024 11:21 pm    Follow-up:  No follow-up provider specified.        Medications Prescribed:  Current Discharge Medication List                            Hospital Problems       Present on Admission  Date Reviewed: 8/20/2024            ICD-10-CM Noted POA    Anemia D64.9 8/31/2024 Unknown

## 2024-09-01 NOTE — PROGRESS NOTES
NURSING DISCHARGE NOTE    Discharged Home via Wheelchair.  Accompanied by Support staff  Belongings Taken by patient/family.    All upcoming appointments reviewed with and understood by patient. All medications reviewed with and understood by patient. All printed prescriptions given to patient. All questions answered at this time. Patient left unit with no signs of distress.

## 2024-09-01 NOTE — CONSULTS
Hematology/Oncology Initial Consultation Note    Patient Name: Liang Schmidt  Medical Record Number: [id number]    YOB: 1955   Date of Consultation: 9/1/2024   Physician requesting consultation: Dr Miranda    Reason for Consultation:  Liang Schmitd was seen today for the diagnosis of iron deficiency anemia    Hematologic History:  8/2022: Labs noted iron deficiency, vitamin B12 and folic acid deficiency. S/p IV iron dextran 1000mg, IM vit B12, folic acid repletion  10/2022: 1000mg IV infed  09/2023: 1000mg IV infed  3/2024: 200mg IV venofer x 2  8/2024: 1000mg IV infed    History of Present Illness:      69 y/o M PMH grade 4 hemorrhoidal disease s/p 2 hemorrhoidectomies, hx AVR on long term warfarin, hx trang-en-Y who is admitted for shortness of breath and chest pain.    - felt some symptomatic improvement after his IV 1000mg infed on 8/2024, but felt like he did not get enough iron  - hgb 7.4 g/dL on 8/19/24, with improvement to 8.8g/dl on 8/31/24  - cardiology has been consulted to assess for ACS  - he reports rare intermittent bleeding in his stools      Past Medical History:  Past Medical History:    Anemia, chronic disease    Anesthesia complication    HX: OF AGGRESSION W/PAST PROCEDURES D/T INSUFFICIENT ANESTHESIA IN THE PAST?    Arthritis    Nothing good to say    Calculus of kidney    Nothing major    Cancer (HCC)    skin cancer    Cellulitis    Diabetes (HCC)    Essential hypertension    Hearing impairment    High blood pressure    Hyperlipidemia    Obesity    Osteoarthritis    Pancreatitis (HCC)    Sepsis (HCC)    Shortness of breath    INTERMITTENT SOB ON EXERTION    Visual impairment    CONTACTS/GLASSES       Past Surgical History:   Procedure Laterality Date    Anast panc cyst-bowel,trang-en-y      Cabg      Cath transcatheter aortic valve replacement      Cholecystectomy  8 yrs ago    Following acute pancreatitis    Colonoscopy  Many times    Colonoscopy N/A 3/5/2024     Procedure: COLONOSCOPY;  Surgeon: Zenon Kelley DO;  Location:  ENDOSCOPY    Hemorrhoidectomy  1 yr ago    Not very successful to be repeated    Other surgical history  20 yrs ago    Aortic valve replacement. Metal valve now    Removal gallbladder      Replace aortic valve open      performed in [place]    Skin surgery      Tonsillectomy      Vasectomy  20 yrs ago       Home Medications:  Current Facility-Administered Medications on File Prior to Encounter   Medication Dose Route Frequency Provider Last Rate Last Admin    [COMPLETED] iron dextran (Infed) 1,000 mg in sodium chloride 0.9% 270 mL IVPB  1,000 mg Intravenous Once Elvin Carr MD   Stopped at 08/20/24 1421     Current Outpatient Medications on File Prior to Encounter   Medication Sig Dispense Refill    ramipril 10 MG Oral Cap Take 1 capsule (10 mg total) by mouth daily. 90 capsule 0    hydroCHLOROthiazide 12.5 MG Oral Tab Take 1 tablet (12.5 mg total) by mouth daily. 90 tablet 0    sertraline 100 MG Oral Tab Take 2 tablets (200 mg total) by mouth daily. 180 tablet 0    warfarin 5 MG Oral Tab Take 1 tablet (5 mg total) by mouth nightly. 90 tablet 0    metFORMIN 500 MG Oral Tab Take 2 tablets (1,000 mg total) by mouth daily with breakfast. 180 tablet 0    amLODIPine 10 MG Oral Tab Take 1 tablet (10 mg total) by mouth daily. 90 tablet 0    cyanocobalamin 1000 MCG/ML Injection Solution Inject 1 mL (1,000 mcg total) into the muscle every 30 (thirty) days. 3 mL 3    simvastatin 40 MG Oral Tab Take 1 tablet (40 mg total) by mouth nightly. (Patient taking differently: Take 1 tablet (40 mg total) by mouth daily.) 90 tablet 0    triamcinolone 0.1 % External Cream Apply twice a day for 2 weeks, then take a break for 1 week, reapply if persists 80 g 1    folic acid 1 MG Oral Tab Take 1 tablet (1 mg total) by mouth daily. 90 tablet 3    cetirizine 10 MG Oral Tab Take 1 tablet (10 mg total) by mouth daily. 90 tablet 0    gabapentin 300 MG Oral Cap Take 1  capsule (300 mg total) by mouth nightly. (Patient taking differently: Take 1 capsule (300 mg total) by mouth daily.) 90 capsule 3       Current Inpatient Medications:  Inpatient Meds:   amLODIPine  10 mg Oral Daily    gabapentin  300 mg Oral Nightly    hydroCHLOROthiazide  12.5 mg Oral Daily    ramipril  10 mg Oral Daily    sertraline  200 mg Oral Daily    atorvastatin  20 mg Oral Nightly    insulin aspart  1-10 Units Subcutaneous TID AC and HS    sodium ferric gluconate  125 mg Intravenous Daily         PRN Meds:    glucose **OR** glucose **OR** glucose-vitamin C **OR** dextrose **OR** glucose **OR** glucose **OR** glucose-vitamin C    melatonin    ondansetron    metoclopramide    acetaminophen    polyethylene glycol (PEG 3350)    sennosides    bisacodyl    fleet enema    Allergies:   No Known Allergies    Psychosocial History:  Social History     Social History Narrative    Not on file     Social History     Socioeconomic History    Marital status: Single   Tobacco Use    Smoking status: Former     Current packs/day: 0.00     Average packs/day: 1 pack/day for 20.0 years (20.0 ttl pk-yrs)     Types: Cigarettes     Start date: 1967     Quit date: 1987     Years since quittin.6    Smokeless tobacco: Never    Tobacco comments:     No comment   Vaping Use    Vaping status: Never Used   Substance and Sexual Activity    Alcohol use: Never    Drug use: Never   Other Topics Concern    Caffeine Concern No    Exercise No    Seat Belt No    Special Diet No    Stress Concern No    Weight Concern Yes     Social Determinants of Health     Food Insecurity: No Food Insecurity (2024)    Food Insecurity     Food Insecurity: Never true   Transportation Needs: No Transportation Needs (2024)    Transportation Needs     Lack of Transportation: No   Housing Stability: Low Risk  (2024)    Housing Stability     Housing Instability: No       Family Medical History:  No family history on file.    Review of  Systems:  A 10-point ROS was done with pertinent positives and negative per the HPI    Vital Signs:  Height: --  Weight: 105.9 kg (233 lb 7.5 oz) (09/01 0146)  BSA (Calculated - sq m): --  Pulse: 95 (09/01 0801)  BP: 96/48 (09/01 0801)  Temp: 97.6 °F (36.4 °C) (09/01 0801)  Do Not Use - Resp Rate: --  SpO2: 100 % (09/01 0801)      Wt Readings from Last 6 Encounters:   09/01/24 105.9 kg (233 lb 7.5 oz)   08/20/24 108.7 kg (239 lb 9.6 oz)   08/19/24 108.4 kg (239 lb)   06/27/24 113.9 kg (251 lb)   03/03/24 117.3 kg (258 lb 9.6 oz)   10/24/23 121.6 kg (268 lb)         Physical Examination:  General: Patient is alert and oriented, not in acute distress  Psych: Mood and affect are appropriate  Eyes: EOMI, PERRL  ENT: Oropharynx is clear, no adenopathy  CV: no LE edema  Respiratory: Non-labored respirations  GI/Abd: Soft, non-tender  Neurological: Grossly intact   Skin: no rashes or petechiae      Laboratory:  Recent Labs   Lab 08/31/24 2123 09/01/24 0448   WBC 6.2 5.3   HGB 8.8* 8.0*   HCT 29.3* 26.8*   .0* 115.0*   MCV 79.2* 80.0   RDW 25.3 25.5   NEPRELIM 4.18 3.97       Recent Labs   Lab 08/31/24 2123 09/01/24 0448    134*   K 4.0 4.1    108   CO2 19.0* 18.0*   BUN 30* 31*   CREATSERUM 1.94* 1.75*   * 205*   CA 9.3 9.0   TP 7.5  --    ALB 4.4  --    ALKPHO 124*  --    AST 32  --    ALT 40  --    BILT 0.3  --        Recent Labs   Lab 08/31/24 2123 09/01/24  0452   INR 3.73* 4.13*       Impression & Plan:     Iron deficiency anemia  - iron deficiency secondary to chronic blood loss and nutritional deficiencies from hx of gastric bypass. Has component of anemia of chronic kidney disease from CKD stage 3. His iron deficiency anemia was able to improve to 12.3g/dL before with regular iron infusions, but he was lost to follow up for a period  - vit B12 injection 1000mcg once monthly, folic acid 1mg daily  - he received 1000mg IV infed on 8/20/24. His hgb has slightly improved. Based on iron  deficit calculation, he can receive 800mg more IV iron. Will order IV ferrlecit 125mg IV daily while inpatient but he does not need to remain inpatient for entire duration of IV iron if he is discharged before then. We can order repeat IV iron outpatient    Chest pain  - cardiology to evaluate    Thrombocytopenia  - stable. Mild, chronic, can monitor fo rnow    Hx mechanical valve placement  - on coumadin, INR goal 2.5-3.5. Pharmacy to manage    Ok to DC whenever cleared by other services. Will sign off.    Elvin Carr MD  Hematology/Medical Oncology  [facility]

## 2024-09-01 NOTE — PROGRESS NOTES
[facility]   part of [facility]     Hospitalist Progress Note     Liang Schmidt Patient Status:  Inpatient    1955 MRN [id number]   [facility] 8NE-A Attending Evan Miranda MD   Hosp Day # 0 PCP Ema Arora DO     Chief Complaint:   Chief Complaint   Patient presents with    Chest Pain Angina       Subjective:     Patient denies any complaints at rest.  Some dyspnea with discomfort.    Objective:    Review of Systems:   6  point ROS completed and was negative, except for pertinent positive and negatives stated in subjective.    Vital signs:  Temp:  [97.2 °F (36.2 °C)-98.8 °F (37.1 °C)] 97.6 °F (36.4 °C)  Pulse:  [64-98] 95  Resp:  [17-22] 18  BP: ()/(44-74) 96/48  SpO2:  [97 %-100 %] 100 %    Physical Exam:    General: No acute distress.   Respiratory: Clear to auscultation bilaterally. No wheezes. No rhonchi.  Cardiovascular: S1, S2. Irreg irreg.  Abdomen: Soft, nontender, nondistended.    Extremities: No edema.    Diagnostic Data:    Labs:  Recent Labs   Lab 24   WBC 6.2 5.3  --    HGB 8.8* 8.0*  --    MCV 79.2* 80.0  --    .0* 115.0*  --    INR 3.73*  --  4.13*       Recent Labs   Lab 24   * 205*   BUN 30* 31*   CREATSERUM 1.94* 1.75*   CA 9.3 9.0   ALB 4.4  --     134*   K 4.0 4.1    108   CO2 19.0* 18.0*   ALKPHO 124*  --    AST 32  --    ALT 40  --    BILT 0.3  --    TP 7.5  --        Estimated Creatinine Clearance: 41.1 mL/min (A) (based on SCr of 1.75 mg/dL (H)).    Recent Labs   Lab 242   PTP 37.6* 40.6*   INR 3.73* 4.13*            COVID-19 Lab Results    COVID-19  Lab Results   Component Value Date    COVID19 Not Detected 2024    COVID19 Not Detected 2022    COVID19 Not Detected 2022       Pro-Calcitonin  No results for input(s): \"PCT\" in the last 168 hours.    Cardiac  Recent Labs   Lab 24  2123   PBNP  1,853*       Creatinine Kinase  No results for input(s): \"CK\" in the last 168 hours.    Inflammatory Markers  No results for input(s): \"CRP\", \"GUILLERMINA\", \"LDH\", \"DDIMER\" in the last 168 hours.    Recent Labs   Lab 08/31/24 2123 08/31/24  2332 09/01/24  0448   TROPHS 260* 235* 241*       Imaging: Imaging data reviewed in Epic.    Medications:    amLODIPine  10 mg Oral Daily    gabapentin  300 mg Oral Nightly    hydroCHLOROthiazide  12.5 mg Oral Daily    ramipril  10 mg Oral Daily    sertraline  200 mg Oral Daily    atorvastatin  20 mg Oral Nightly    insulin aspart  1-10 Units Subcutaneous TID AC and HS    sodium ferric gluconate  125 mg Intravenous Daily       Assessment & Plan:      #dyspnea/chest pain; possibly 2/2 afib and anemia;  possible need to rule out ischemic dz; check echo; cards to see.    #iron def anemia; iron replacement ordered;  GI consult;  could be contribution from CKD and hemorroidal bleeding; - Heme + but no melena or hematochezia    #trop elevation; suspect demand ischemia;  cards to see;  check echo    #thrombocytopenia-chronic; monitor    # Aortic valve replacement, mechanical  - Pt on coumadin; discussed with cardiology; INR goal 2-3 for now, given hx hemorroidal bleeding; however, has had 2.5-3.5 goal in past, and with afib now, might be better off at 2.5-3.5.  will follow up with cardiology in afternoon regading this.  INR >4 now regardless.      # Acute kidney injury on chronic stage 2 renal failure.  monitor     # Type 2 diabetes  -Will place on hyperglycemia protocol with correction factor insulin.     # Hypertension  - Will continue on hydrochlorothiazide, ramipril, amlodipine.     # Depression  -Will continue on sertraline.     # Hyperlipidemia  -Will continue on statin therapy.     # Peripheral neuropathy  -Will continue on gabapentin.    #hx gastric bypass    Dvt prophy: INR  4.13 today    Plan of care discussed with patient and RN    Evan Wong MD

## 2024-09-01 NOTE — CONSULTS
[facility] Dosing Service  Warfarin (Coumadin) Initial Dosing    Liang Schmidt is a 69 year old patient  with hemorrhoidal bleeding for whom pharmacy has been consulted to dose warfarin (COUMADIN) for a.fib by ANTONETTE Villanueva .  Based on this indication, goal INR is 2-3.    Pertinent Patient Medical History:  mechanical aortic valve replacement, basal cell skin cancer  Potential Drug Interactions:      Latest INR:   Recent Labs     09/01/24  0452   INR 4.13*       Other Anticoagulants:  none  Home regimen (if applicable):   5mg daily  Date/Time last dose was given (if applicable):     Based on above -  1.  For today, Hold warfarin (COUMADIN) dose    2.  PT/INR ordered daily while on warfarin    3.  Pharmacy will continue to follow.  We appreciate the opportunity to assist in the care of this patient.    Aspen Huizar, PharmD  9/1/2024  8:03 AM

## 2024-09-01 NOTE — PLAN OF CARE
NURSING ADMISSION NOTE      Patient admitted via Cart  Oriented to room.  Safety precautions initiated.  Bed in low position.  Call light in reach.    Received patient awake and alert x4.   On Telemetry monitoring, Normal sinus rhythm.   O2 protocol.  On Coumadin, bleeding and fall precaution.   QID accuchecks  Pt has hgb 8.8, will monitor closely.   Troponin elevated, trending down.   Non-cardiac electrolyte protocol, K 4    Problem: CARDIOVASCULAR - ADULT  Goal: Absence of cardiac arrhythmias or at baseline  Description: INTERVENTIONS:  - Continuous cardiac monitoring, monitor vital signs, obtain 12 lead EKG if indicated  - Evaluate effectiveness of antiarrhythmic and heart rate control medications as ordered  - Initiate emergency measures for life threatening arrhythmias  - Monitor electrolytes and administer replacement therapy as ordered  9/1/2024 0150 by Colette Antonio RN  Outcome: Progressing  9/1/2024 0149 by Colette Antonio RN  Outcome: Progressing     Problem: METABOLIC/FLUID AND ELECTROLYTES - ADULT  Goal: Glucose maintained within prescribed range  Description: INTERVENTIONS:  - Monitor Blood Glucose as ordered  - Assess for signs and symptoms of hyperglycemia and hypoglycemia  - Administer ordered medications to maintain glucose within target range  - Assess barriers to adequate nutritional intake and initiate nutrition consult as needed  - Instruct patient on self management of diabetes  9/1/2024 0150 by Cloette Antonio, RN  Outcome: Progressing  9/1/2024 0149 by Colette Antonio RN  Outcome: Progressing  Goal: Electrolytes maintained within normal limits  Description: INTERVENTIONS:  - Monitor labs and rhythm and assess patient for signs and symptoms of electrolyte imbalances  - Administer electrolyte replacement as ordered  - Monitor response to electrolyte replacements, including rhythm and repeat lab results as appropriate    9/1/2024 0150 by Colette Antonio, RN  Outcome: Progressing  9/1/2024  0149 by Colette Antonio, RN  Outcome: Progressing     Problem: HEMATOLOGIC - ADULT  Goal: Maintains hematologic stability  Description: INTERVENTIONS  - Assess for signs and symptoms of bleeding or hemorrhage  - Monitor labs and vital signs for trends  - Administer supportive blood products/factors, fluids and medications as ordered and appropriate  - Administer supportive blood products/factors as ordered and appropriate  9/1/2024 0150 by Colette Antonio, RN  Outcome: Progressing  9/1/2024 0149 by Colette Antonio, RN  Outcome: Progressing

## 2024-09-01 NOTE — DISCHARGE SUMMARY
[facility]     Liang Schmidt Patient Status:  Inpatient    1955 MRN [id number]   Location [facility] 8NE-A Attending Evan Miranda MD   Hosp Day # 1 PCP Ema Arora DO     Date of Admission: 2024  Date of Discharge: 2024  Discharge Disposition: Home or Self Care  Chief complaint:   Chief Complaint   Patient presents with    Chest Pain Angina         Discharge Diagnoses and Brief Synopsis:  #dyspnea/chest pain; possibly 2/2 afib and anemia;  given IV iron and taken off amlodipine, added to diltiazem.  No procedures at this time per GI.  Altace reduced given marginal BP; echo performed; see report.  Will need close f/u with cards in clinic.     #iron def anemia; continue OP IV iron     #trop elevation; suspect demand ischemia     #thrombocytopenia     # Aortic valve replacement, mechanical     # Acute kidney injury on chronic stage 2 renal failure.       # Type 2 diabetes    # Hypertension    # Depression    # Hyperlipidemia    # Peripheral neuropathy    #hx gastric bypass        Lab/Test results pending at Discharge:   none        Admission History of Present Illness (author of HPI not necessarily myself; see H&P author): Liang Schmidt is a 69 year old male with history of iron deficiency anemia, type 2 diabetes, hypertension, chronic kidney disease, hyperlipidemia presents emergency room with 2 weeks of shortness of breath and started having some chest pains that mostly occur when she lays down.  No diaphoresis, nausea, vomiting.  No dizziness, lightheadedness, or syncope.       Lace+ Score: 69  59-90 High Risk  29-58 Medium Risk  0-28   Low Risk       TCM Follow-Up Recommendation:  LACE > 58: High Risk of readmission after discharge from the hospital.      Discharge Medication List:     Discharge Medications        START taking these medications        Instructions Prescription details   atorvastatin 20 MG Tabs  Commonly known as:  Lipitor  Replaces: simvastatin 40 MG Tabs      Take 1 tablet (20 mg total) by mouth nightly.   Quantity: 30 tablet  Refills: 0     dilTIAZem  MG Cp24  Commonly known as: Cardizem CD  Start taking on: September 2, 2024      Take 1 capsule (120 mg total) by mouth daily.   Quantity: 30 capsule  Refills: 0            CHANGE how you take these medications        Instructions Prescription details   gabapentin 300 MG Caps  Commonly known as: Neurontin  What changed: when to take this      Take 1 capsule (300 mg total) by mouth nightly.   Quantity: 90 capsule  Refills: 3     ramipril 5 MG Caps  Commonly known as: Altace  Start taking on: September 2, 2024  What changed:   medication strength  how much to take      Take 1 capsule (5 mg total) by mouth daily.   Quantity: 30 capsule  Refills: 0            CONTINUE taking these medications        Instructions Prescription details   cetirizine 10 MG Tabs  Commonly known as: ZyrTEC      Take 1 tablet (10 mg total) by mouth daily.   Quantity: 90 tablet  Refills: 0     cyanocobalamin 1000 MCG/ML Soln  Commonly known as: Vitamin B12      Inject 1 mL (1,000 mcg total) into the muscle every 30 (thirty) days.   Quantity: 3 mL  Refills: 3     folic acid 1 MG Tabs  Commonly known as: Folvite      Take 1 tablet (1 mg total) by mouth daily.   Quantity: 90 tablet  Refills: 3     hydroCHLOROthiazide 12.5 MG Tabs      Take 1 tablet (12.5 mg total) by mouth daily.   Quantity: 90 tablet  Refills: 0     metFORMIN 500 MG Tabs  Commonly known as: Glucophage      Take 2 tablets (1,000 mg total) by mouth daily with breakfast.   Quantity: 180 tablet  Refills: 0     sertraline 100 MG Tabs  Commonly known as: Zoloft      Take 2 tablets (200 mg total) by mouth daily.   Quantity: 180 tablet  Refills: 0     triamcinolone 0.1 % Crea  Commonly known as: Kenalog      Apply twice a day for 2 weeks, then take a break for 1 week, reapply if persists   Quantity: 80 g  Refills: 1     warfarin 5 MG  Tabs  Commonly known as: Coumadin      Take 1 tablet (5 mg total) by mouth nightly.   Quantity: 90 tablet  Refills: 0            STOP taking these medications      amLODIPine 10 MG Tabs  Commonly known as: Norvasc        simvastatin 40 MG Tabs  Commonly known as: Zocor  Replaced by: atorvastatin 20 MG Tabs                  Where to Get Your Medications        Please  your prescriptions at the location directed by your doctor or nurse    Bring a paper prescription for each of these medications  atorvastatin 20 MG Tabs  dilTIAZem  MG Cp24  ramipril 5 MG Caps         [place]    Follow-up appointment:   mEa Arora DO  [address]  [facility] [address]  [phone]    Follow up in 1 week(s)      Elvin Carr MD  [address]  [facility] [address]  [facility] [address]  [phone]    Follow up in 2 week(s)      Jeffrey Johnson MD  [address] [address]  [phone]    Follow up in 2 week(s)      Appointments for Next 30 Days 9/1/2024 - 10/1/2024      None            Vital signs:  Temp:  [97.6 °F (36.4 °C)-98.8 °F (37.1 °C)] 97.9 °F (36.6 °C)  Pulse:  [64-98] 70  Resp:  [17-22] 18  BP: ()/(44-74) 103/65  SpO2:  [97 %-100 %] 100 %    Physical Exam:    General: No acute distress.   Respiratory: Clear to auscultation bilaterally. No wheezes. No rhonchi.  Cardiovascular: S1, S2. Irreg irreg.  Abdomen: Soft, nontender, nondistended.    Extremities: No edema.  -----------------------------------------------------------------------------------------------  PATIENT DISCHARGE INSTRUCTIONS: See electronic chart    Evan Wong MD    Time spent:   32 minutes

## 2024-09-01 NOTE — PLAN OF CARE
Patient alert and oriented x 4. On RA. Up with SBA. NSR on tele. Continent of bowel/bladder. No complaints of pain, shortness of breath, chest pain/discomfort. POC: IV iron daily, echo, monitor Hgb. Call light within reach. Fall precautions in place.     Problem: CARDIOVASCULAR - ADULT  Goal: Absence of cardiac arrhythmias or at baseline  Description: INTERVENTIONS:  - Continuous cardiac monitoring, monitor vital signs, obtain 12 lead EKG if indicated  - Evaluate effectiveness of antiarrhythmic and heart rate control medications as ordered  - Initiate emergency measures for life threatening arrhythmias  - Monitor electrolytes and administer replacement therapy as ordered  Outcome: Progressing     Problem: METABOLIC/FLUID AND ELECTROLYTES - ADULT  Goal: Glucose maintained within prescribed range  Description: INTERVENTIONS:  - Monitor Blood Glucose as ordered  - Assess for signs and symptoms of hyperglycemia and hypoglycemia  - Administer ordered medications to maintain glucose within target range  - Assess barriers to adequate nutritional intake and initiate nutrition consult as needed  - Instruct patient on self management of diabetes  Outcome: Progressing  Goal: Electrolytes maintained within normal limits  Description: INTERVENTIONS:  - Monitor labs and rhythm and assess patient for signs and symptoms of electrolyte imbalances  - Administer electrolyte replacement as ordered  - Monitor response to electrolyte replacements, including rhythm and repeat lab results as appropriate    Outcome: Progressing     Problem: HEMATOLOGIC - ADULT  Goal: Maintains hematologic stability  Description: INTERVENTIONS  - Assess for signs and symptoms of bleeding or hemorrhage  - Monitor labs and vital signs for trends  - Administer supportive blood products/factors, fluids and medications as ordered and appropriate  - Administer supportive blood products/factors as ordered and appropriate  Outcome: Progressing

## 2024-09-01 NOTE — ED QUICK NOTES
Orders for admission, patient is aware of plan and ready to go upstairs. Any questions, please call ED RN Mami at extension [phone].     Patient Covid vaccination status: Fully vaccinated     COVID Test Ordered in ED: None    COVID Suspicion at Admission: N/A    Running Infusions:  None    Mental Status/LOC at time of transport: A&Ox4    Other pertinent information:   CIWA score: N/A   NIH score:  N/A

## 2024-09-01 NOTE — PROGRESS NOTES
Seen and examined;  continue warfarin;  now with afib;  known anemia, probably contribution from hemorroids.  GI and cards to see.  Defer anticoag to cards.  Possible ischemic eval needed?

## 2024-09-01 NOTE — PROGRESS NOTES
Liang Schmidt Patient Status:  Inpatient    1955 MRN [id number]   Location [facility] 8NE-A Attending Mio Rowland*   Hosp Day # 0 PCP Ema Arora,      Cardiology Nocturnal APN Note    Briefly: (Documentation from chart review)     Liang Schmidt is a 70 y/o male who presented with GIB and has a PMH/PSH of:       Past Medical History:    Anemia, chronic disease    Anesthesia complication    HX: OF AGGRESSION W/PAST PROCEDURES D/T INSUFFICIENT ANESTHESIA IN THE PAST?    Arthritis    Nothing good to say    Calculus of kidney    Nothing major    Cancer (HCC)    skin cancer    Cellulitis    Diabetes (HCC)    Essential hypertension    Hearing impairment    High blood pressure    Hyperlipidemia    Obesity    Osteoarthritis    Pancreatitis (HCC)    Sepsis (HCC)    Shortness of breath    INTERMITTENT SOB ON EXERTION    Visual impairment    CONTACTS/GLASSES       Primary Cardiologist Alex    Vital Signs:       2024     1:48 AM 2024     1:51 AM   Vitals History   /65 67/48   Pulse 93 87   SpO2 97 %         Labs:   Lab Results   Component Value Date    WBC 6.2 2024    HGB 8.8 2024    HCT 29.3 2024    .0 2024    CREATSERUM 1.94 2024    BUN 30 2024     2024    K 4.0 2024     2024    CO2 19.0 2024     2024    CA 9.3 2024    ALB 4.4 2024    ALKPHO 124 2024    BILT 0.3 2024    TP 7.5 2024    AST 32 2024    ALT 40 2024    INR 3.73 2024    TROPHS 235 2024       Diagnostics:   XR CHEST AP PORTABLE  (CPT=71045)    Result Date: 2024  PROCEDURE:  XR CHEST AP PORTABLE  (CPT=71045)  TECHNIQUE:  AP chest radiograph was obtained.  COMPARISON:  [facility] , XR, XR CHEST AP PORTABLE  (CPT=71045), 3/03/2024, 11:54 AM.  INDICATIONS:  Pt has had SOB x2 weeks. Pt has hx of anemia and blodo transfusions.  PATIENT STATED HISTORY:  (As transcribed by Technologist)  Patient offered no additional history at this time.    FINDINGS:  Eventration of the right diaphragm.  Median sternotomy changes and valve prosthesis noted.  Cardiomegaly with normal pulmonary vascularity.  Mild scarring/atelectasis in the lower lungs.  No pleural effusion or pneumothorax.            CONCLUSION:  No lobar pneumonia or overt congestive failure.  Mild scarring/atelectasis in the lower lungs.   LOCATION:  [facility]      Dictated by (CST): Chandrakant Pedroza MD on 8/31/2024 at 9:34 PM     Finalized by (CST): Chandrakant Pedroza MD on 8/31/2024 at 9:34 PM         Allergies:  No Known Allergies    Medications:    amLODIPine    gabapentin    hydroCHLOROthiazide    ramipril    sertraline    atorvastatin    warfarin    glucose **OR** glucose **OR** glucose-vitamin C **OR** dextrose **OR** glucose **OR** glucose **OR** glucose-vitamin C    melatonin    ondansetron    metoclopramide    acetaminophen    polyethylene glycol (PEG 3350)    sennosides    bisacodyl    fleet enema    insulin aspart    Assessment:   Elevated troponin  - 260>235  - Likely demand ischemia secondary to anemia  - No chest pain  - ECG without acute changes      Plan:    - Continue to monitor overnight  - Formal Cardiology consult to follow in AM.       TRAM Myles  [facility]  9/1/2024  2:12 AM

## 2024-09-01 NOTE — PLAN OF CARE
Discussed with Dr. Johnson. Patient to discharge tonight. He should contact his PCP tomorrow for INR management as their office has historically followed his warfarin. He will contact us with any concerns. We will make him an appt with Dr. Johnson in two weeks for cardiac follow up care.

## 2024-09-01 NOTE — CONSULTS
Gastroenterology Initial Consultation    Liang Schmidt Patient Status:  Inpatient    1955 MRN [id number]   Location [facility] 8NE-A Attending Evan Miranda MD   Hosp Day # 0 PCP Ema Arora DO       Reason for Consultation/Chief Complaint: Anemia    Date: 24     History of Present Illness:    Mr. Liang Schmidt is a 69 year-old male being seen in consultation for anemia. Patient has a history of chronic anemia. Follows with hematology and has received IV iron infusions. Patient was feeling short of breath with exertion and thus presented to ED. Hgb in ED 8.8 which is near baseline though able to get up to 12 range with IV venofer. Also with history of CKD3 which has been known to contribute to his anemia. He denies melena or hematochezia. Rectal exam in ED revealed brown stool, occult + blood. He underwent EGD/Colonoscopy 3/2024 for similar presentation which revealed multiple colon polyps which were removed, diverticulosis, internal and external hemorrhoids. EGD revealed mild gastritis and trang-en-y gastric bypass anatomy. Biopsies were negative for celiac disease.     History:  Past Medical History:    Anemia, chronic disease    Anesthesia complication    HX: OF AGGRESSION W/PAST PROCEDURES D/T INSUFFICIENT ANESTHESIA IN THE PAST?    Arthritis    Nothing good to say    Calculus of kidney    Nothing major    Cancer (HCC)    skin cancer    Cellulitis    Diabetes (HCC)    Essential hypertension    Hearing impairment    High blood pressure    Hyperlipidemia    Obesity    Osteoarthritis    Pancreatitis (HCC)    Sepsis (HCC)    Shortness of breath    INTERMITTENT SOB ON EXERTION    Visual impairment    CONTACTS/GLASSES     Past Surgical History:   Procedure Laterality Date    Anast panc cyst-bowel,trang-en-y      Cabg      Cath transcatheter aortic valve replacement      Cholecystectomy  8 yrs ago    Following acute pancreatitis    Colonoscopy  Many times    Colonoscopy N/A  3/5/2024    Procedure: COLONOSCOPY;  Surgeon: Zenon Kelley DO;  Location:  ENDOSCOPY    Hemorrhoidectomy  1 yr ago    Not very successful to be repeated    Other surgical history  20 yrs ago    Aortic valve replacement. Metal valve now    Removal gallbladder      Replace aortic valve open      performed in [place]    Skin surgery      Tonsillectomy      Vasectomy  20 yrs ago     No family history on file.   reports that he quit smoking about 37 years ago. His smoking use included cigarettes. He started smoking about 57 years ago. He has a 20 pack-year smoking history. He has never used smokeless tobacco. He reports that he does not drink alcohol and does not use drugs.    Allergies:  No Known Allergies    Medications:    Current Facility-Administered Medications:     amLODIPine (Norvasc) tab 10 mg, 10 mg, Oral, Daily    gabapentin (Neurontin) cap 300 mg, 300 mg, Oral, Nightly    hydroCHLOROthiazide tab 12.5 mg, 12.5 mg, Oral, Daily    ramipril (Altace) cap 10 mg, 10 mg, Oral, Daily    sertraline (Zoloft) tab 200 mg, 200 mg, Oral, Daily    atorvastatin (Lipitor) tab 20 mg, 20 mg, Oral, Nightly    glucose (Dex4) 15 GM/59ML oral liquid 15 g, 15 g, Oral, Q15 Min PRN **OR** glucose (Glutose) 40% oral gel 15 g, 15 g, Oral, Q15 Min PRN **OR** glucose-vitamin C (Dex-4) chewable tab 4 tablet, 4 tablet, Oral, Q15 Min PRN **OR** dextrose 50% injection 50 mL, 50 mL, Intravenous, Q15 Min PRN **OR** glucose (Dex4) 15 GM/59ML oral liquid 30 g, 30 g, Oral, Q15 Min PRN **OR** glucose (Glutose) 40% oral gel 30 g, 30 g, Oral, Q15 Min PRN **OR** glucose-vitamin C (Dex-4) chewable tab 8 tablet, 8 tablet, Oral, Q15 Min PRN    melatonin tab 3 mg, 3 mg, Oral, Nightly PRN    ondansetron (Zofran) 4 MG/2ML injection 4 mg, 4 mg, Intravenous, Q6H PRN    metoclopramide (Reglan) 5 mg/mL injection 5 mg, 5 mg, Intravenous, Q8H PRN    acetaminophen (Tylenol Extra Strength) tab 500 mg, 500 mg, Oral, Q4H PRN    polyethylene glycol (PEG 3350)  (Miralax) 17 g oral packet 17 g, 17 g, Oral, Daily PRN    sennosides (Senokot) tab 17.2 mg, 17.2 mg, Oral, Nightly PRN    bisacodyl (Dulcolax) 10 MG rectal suppository 10 mg, 10 mg, Rectal, Daily PRN    fleet enema (Fleet) rectal enema 133 mL, 1 enema, Rectal, Once PRN    insulin aspart (NovoLOG) 100 Units/mL FlexPen 1-10 Units, 1-10 Units, Subcutaneous, TID AC and HS    sodium ferric gluconate (Ferrlecit) 125 mg in sodium chloride 0.9% 100mL IVPB premix, 125 mg, Intravenous, Daily    Review of Systems:    Except for what is noted on the HPI the remainder 10 point review of systems is negative.    Gastrointestinal: Except for what is noted on the HPI the remainder 10 point review of systems is negative  General: Denies fatigue, chills/fever, night sweats, weight loss, loss of appetite, weight gain, sleep disturbance.  Neurological: Denies frequent headaches, recent passing out, recent dizziness, convulsions/seizures.  Cardiovascular: Denies history of heart murmur, leg swelling, chest pain or pressure after eating or when upset, angina, irregular heart rate/palpitations.  Respiratory: Denies shortness of breath, chronic/frequent hoarseness, wheezing, chronic cough, spitting up blood, cough up sputum.  Genitourinary: Denies painful/difficult urination, frequent urinary infections, frequent urination, blood in urine, incontinence.  Psychosocial: Denies usually feeling lonely or depressed, denies anxiety, stress, history of eating disorder.  Skin: Denies severe itching, rash, flushing, change in hair or nails.  Bone/joint: Denies back pain, joint pain.  Heme/Lymphatic: Denies easy bruising, excessive bleeding, enlarging or painful lymph nodes.  Allergy: Denies latex/rubber allergy, anaphylactic or other reaction to anesthesia, food allergy.   Eyes: Denies blurred/double vision, eye disease, glasses or contacts, glaucoma.  ENT: Denies nose or gums bleeding, mouth sores, bad breath or bad taste in mouth, hearing  loss.      Physical Exam:    Blood pressure 96/48, pulse 95, temperature 97.6 °F (36.4 °C), temperature source Oral, resp. rate 18, weight 233 lb 7.5 oz (105.9 kg), SpO2 100%.    Constitutional: Obese body habitus, well groomed  Ears, Nose, Mouth, Throat: Normal appearance of nose and ears. Normal appearance of lips, teeth, gums and oral mucosa  Neck: Normal neck inspection, normal thyroid palpation  Respiratory: Normal respiratory effort, normal breath sounds on bilateral auscultation  Cardiovascular: Normal carotid artery pulses bilaterally, no lower extremity edema  Gastrointestinal: Soft, non-tender, non-guarded on palpation. No mass or hernia palpated. FOBT not indicated.   Lymphatic: No cervical or supraclavicular nodes palpated.   Skin: No rashes, induration, nodules or tightening of examined abdominal skin  Psychiatric: Normal judgement and insight. Normal mood and affect.     Data:  Relevant labs, imaging and medications reviewed.     Lab Results   Component Value Date    WBC 5.3 09/01/2024    HGB 8.0 09/01/2024    HCT 26.8 09/01/2024    .0 09/01/2024    CREATSERUM 1.75 09/01/2024    BUN 31 09/01/2024     09/01/2024    K 4.1 09/01/2024     09/01/2024    CO2 18.0 09/01/2024     09/01/2024    CA 9.0 09/01/2024    ALB 4.4 08/31/2024    ALKPHO 124 08/31/2024    BILT 0.3 08/31/2024    TP 7.5 08/31/2024    AST 32 08/31/2024    ALT 40 08/31/2024    INR 4.13 09/01/2024    PTP 40.6 09/01/2024    PGLU 204 09/01/2024           Assessment/Plan:    Chronic Iron Deficiency Anemia  -likely multifactorial including trang-en-y anatomy and CKD, less likely hemorrhoidal given lack of overt bleeding though possibly contributing to some extent  -completed endo eval 3/2024  -recommend continued management per hematology, IV venofer and outpatient follow up  -can consider colorectal referral if overt bleeding develops from hemorrhoids otherwise recommend high fiber diet  -no further GI recs at this  time  -please call with questions      Baljit Andres D.O.   Gastroenterology   [facility], [facility].

## 2024-09-03 ENCOUNTER — PATIENT OUTREACH (OUTPATIENT)
Dept: CASE MANAGEMENT | Age: 69
End: 2024-09-03

## 2024-09-03 ENCOUNTER — TELEPHONE (OUTPATIENT)
Dept: HEMATOLOGY/ONCOLOGY | Facility: HOSPITAL | Age: 69
End: 2024-09-03

## 2024-09-03 PROBLEM — I48.0 PAROXYSMAL ATRIAL FIBRILLATION (HCC): Status: ACTIVE | Noted: 2024-09-03

## 2024-09-03 LAB
Q-T INTERVAL: 438 MS
QRS DURATION: 114 MS
QTC CALCULATION (BEZET): 465 MS
R AXIS: -36 DEGREES
T AXIS: 79 DEGREES
VENTRICULAR RATE: 68 BPM

## 2024-09-03 NOTE — PROGRESS NOTES
LM for pt to call [facility] for TCM since discharge. [facility] phone number was provided for pt to call back.

## 2024-09-03 NOTE — PAYOR COMM NOTE
--------------  ADMISSION REVIEW     Payor: [employer]  Subscriber #:  [id number]  Authorization Number: [id number]    Admit date: 9/1/24  Admit time:  1:36 AM       REVIEW DOCUMENTATION:     ED Provider Notes        ED Provider Notes signed by Malathi Gonsales DO at 9/1/2024  4:44 AM    Patient Seen in: [facility] 8ne-a      History     Chief Complaint   Patient presents with    Chest Pain Angina     Stated Complaint: Pt has had SOB x2 weeks. Pt has hx of anemia and blodo transfusions.    Subjective:   HPI  Patient is a 70 yo M with a history of anemia, HTN, DM, aortic valve replacement on coumadin, hx of trang-en-Y who presents to ED for evaluation of SOB over past 2 weeks. Pt states his SOB is worse with exertion. He becomes very SOB walking short distances. Pt also c/o central chest pressure with walking occasionally.  He also has increased fatigue and generalized weakness. Denies any bleeding.     Patient reports history of anemia. Heme/onc note from 8/20 shows pt was having ongoing SOB and fatigue at that time. On 6/27/24, hgb 10.4. recent labs show microcytic anemia with hgb of 7.4, ferritin of 5.3. Anemia thought  be 2/2 chronic blood loss from nutritional deficiences from hx of gastric bypass. Pt receives IV iron infusions as well fo rthis.     Objective:   Past Medical History:    Anemia, chronic disease    Anesthesia complication    HX: OF AGGRESSION W/PAST PROCEDURES D/T INSUFFICIENT ANESTHESIA IN THE PAST?    Arthritis    Nothing good to say    Calculus of kidney    Nothing major    Cancer (HCC)    skin cancer    Cellulitis    Diabetes (HCC)    Essential hypertension    Hearing impairment    High blood pressure    Hyperlipidemia    Obesity    Osteoarthritis    Pancreatitis (HCC)    Sepsis (HCC)    Shortness of breath    INTERMITTENT SOB ON EXERTION    Visual impairment    CONTACTS/GLASSES     Review of Systems    Positive for stated Chief Complaint: Chest Pain Angina    Other systems are as noted  in HPI.  Constitutional and vital signs reviewed.      All other systems reviewed and negative except as noted above.    Physical Exam     ED Triage Vitals [08/31/24 2046]   BP 95/60   Pulse 73   Resp 20   Temp 97.2 °F (36.2 °C)   Temp src Temporal   SpO2 99 %   O2 Device None (Room air)       Current Vitals:   Vital Signs  BP: (!) 67/48  Pulse: 87  Resp: 22  Temp: 98.8 °F (37.1 °C)  Temp src: Oral  MAP (mmHg): (!) 56    Oxygen Therapy  SpO2: 97 %  O2 Device: None (Room air)            Physical Exam  Vitals and nursing note reviewed.   Constitutional:       General: He is not in acute distress.  HENT:      Head: Normocephalic and atraumatic.      Nose: Nose normal.      Mouth/Throat:      Mouth: Mucous membranes are moist.   Eyes:      Extraocular Movements: Extraocular movements intact.      Pupils: Pupils are equal, round, and reactive to light.   Cardiovascular:      Rate and Rhythm: Normal rate and regular rhythm.      Pulses: Normal pulses.   Pulmonary:      Effort: Pulmonary effort is normal. No respiratory distress.      Breath sounds: No wheezing.   Abdominal:      General: There is no distension.      Palpations: Abdomen is soft.   Genitourinary:     Rectum: Guaiac result positive (grossly brown).   Musculoskeletal:         General: No swelling. Normal range of motion.      Cervical back: Normal range of motion.   Skin:     General: Skin is warm and dry.      Capillary Refill: Capillary refill takes less than 2 seconds.   Neurological:      General: No focal deficit present.      Mental Status: He is alert.   Psychiatric:         Mood and Affect: Mood normal.           ED Course     Labs Reviewed   COMP METABOLIC PANEL (14) - Abnormal; Notable for the following components:       Result Value    Glucose 181 (*)     CO2 19.0 (*)     BUN 30 (*)     Creatinine 1.94 (*)     eGFR-Cr 37 (*)     Alkaline Phosphatase 124 (*)     All other components within normal limits   CBC WITH DIFFERENTIAL WITH PLATELET -  Abnormal; Notable for the following components:    RBC 3.70 (*)     HGB 8.8 (*)     HCT 29.3 (*)     .0 (*)     MCV 79.2 (*)     MCH 23.8 (*)     MCHC 30.0 (*)     All other components within normal limits   TROPONIN I HIGH SENSITIVITY - Abnormal; Notable for the following components:    Troponin I (High Sensitivity) 260 (*)     All other components within normal limits   PRO BETA NATRIURETIC PEPTIDE - Abnormal; Notable for the following components:    Pro-Beta Natriuretic Peptide 1,853 (*)     All other components within normal limits   PROTHROMBIN TIME (PT) - Abnormal; Notable for the following components:    PT 37.6 (*)     INR 3.73 (*)     All other components within normal limits   LIPID PANEL - Abnormal; Notable for the following components:    Triglycerides 168 (*)     All other components within normal limits   RBC MORPHOLOGY SCAN - Abnormal; Notable for the following components:    RBC Morphology See morphology below (*)     All other components within normal limits   TROPONIN I HIGH SENSITIVITY - Abnormal; Notable for the following components:    Troponin I (High Sensitivity) 235 (*)     All other components within normal limits   SCAN SLIDE   BASIC METABOLIC PANEL (8)   CBC WITH DIFFERENTIAL WITH PLATELET   TROPONIN I HIGH SENSITIVITY   TROPONIN I HIGH SENSITIVITY   RAINBOW DRAW LAVENDER   RAINBOW DRAW LIGHT GREEN   RAINBOW DRAW BLUE   RAINBOW DRAW GOLD     EKG    Rate, intervals and axes as noted on EKG Report.  Rate: 68  Rhythm: Atrial Fibrillation  Reading: A fib with rate of 68, no acute ST changes, LAD      MDM      Patient is a 68 yo M with a history of anemia, HTN, DM, aortic valve replacement on coumadin, hx of trang-en-Y who presents to ED for evaluation of SOB over past 2 weeks associate with chest pressure. VSS. Pt is nontoxic on exam. He has grossly brown, hemoccult positive stool. Differential includes but not limited to anemia, ACS, pneumonia, fluid overload. Low suspicion for PE given  pt is on coumadin.    ED Course:   - CBC shows hgb of 8.8, which is pt's recent baseline. Plts 118, no leukocytosis, CMP shows creatinine jnear baseline. BNP elevated from prior. Trop elevated at 260. EKG shows NSR, no acute ST changes  but appears to have a fib. Pt has no known a fib on chart review. Rate controlled. CXR shows no fluid overload or pneumonia. Repeat trop downtrended to 235. Suspect possible demand ischemia. INR 3.73. Discussed with [facility] cardiology team who will see pt tomorrow.     Patient admitted to Dr. Quintanilla.       Medical Decision Making      Disposition and Plan     Clinical Impression:  1. Anemia, unspecified type    2. Elevated troponin    3. Thrombocytopenia (HCC)         Disposition:  Admit  8/31/2024 11:21 pm    Hospital Problems       Present on Admission  Date Reviewed: 8/20/2024            ICD-10-CM Noted POA    Anemia D64.9 8/31/2024 Unknown           Signed by Malathi Gonsales DO on 9/1/2024  4:44 AM                     History and Physical    H&P signed by Mio Rowland DO at 9/1/2024  8:54 AM       [facility]  [facility]         Chief Complaint: SOB/ chest pain     Subjective:    History of Present Illness:      Liang Schmidt is a 69 year old male with history of iron deficiency anemia, type 2 diabetes, hypertension, chronic kidney disease, hyperlipidemia presents emergency room with 2 weeks of shortness of breath and started having some chest pains that mostly occur when she lays down.  No diaphoresis, nausea, vomiting.  No dizziness, lightheadedness, or syncope.    Objective:   Physical Exam:    /64   Pulse 64   Temp 97.2 °F (36.2 °C) (Temporal)   Resp 21   Wt 239 lb (108.4 kg)   SpO2 100%   BMI 34.29 kg/m²   General: No acute distress, Alert  Respiratory: No rhonchi, no wheezes  Cardiovascular: S1, S2. Regular rate and rhythm  Abdomen: Soft, Non-tender, non-distended, positive bowel sounds  Neuro: No new focal  deficits  Extremities: No edema        Results:    Labs:        Labs Last 24 Hours:         Recent Labs   Lab 08/31/24 2123   RBC 3.70*   HGB 8.8*   HCT 29.3*   MCV 79.2*   MCH 23.8*   MCHC 30.0*   RDW 25.3   NEPRELIM 4.18   WBC 6.2   .0*             Recent Labs   Lab 08/31/24 2123   *   BUN 30*   CREATSERUM 1.94*   EGFRCR 37*   CA 9.3   ALB 4.4      K 4.0      CO2 19.0*   ALKPHO 124*   AST 32   ALT 40   BILT 0.3   TP 7.5               Lab Results   Component Value Date     INR 3.73 (H) 08/31/2024     INR 2.07 (H) 06/27/2024     INR 1.44 (H) 03/05/2024             Recent Labs   Lab 08/31/24 2123   TROPHS 260*             Recent Labs   Lab 08/31/24 2123   PBNP 1,853*         No results for input(s): \"PCT\" in the last 168 hours.     Imaging: Imaging data reviewed in Epic.     Assessment & Plan:       # NSTEMI  - Will rule out ACS with serial troponins  - Echo in the am  - Coumadin theraputic  - Cardiology on consult     # Aortic valve replacement  - Pt on coumadin  - On hold due to supertheraputic INR     # Shortness of breath  - Elevated BNP  - Echo in the am.     # Anemia  - Heme + but no melena or hematochezia  - Hgb stable at 8.8  - Iron level is 14 last week     # Hx of      # Acute kidney injury on chronic stage 2 renal failure.     # Type 2 diabetes  -Will place on hyperglycemia protocol with correction factor insulin.     # Hypertension  - Will continue on hydrochlorothiazide, ramipril, amlodipine.     # Depression  -Will continue on sertraline.     # Hyperlipidemia  -Will continue on statin therapy.     # Peripheral neuropathy  -Will continue on gabapentin.     Plan of care discussed with patient at bedside.     Mio Rowland, DO                 Vitals (last day) before discharge       Date/Time Temp Pulse Resp BP SpO2 Weight O2 Device O2 Flow Rate (L/min) [facility]    09/01/24 1622 97.9 °F (36.6 °C) 70 18 103/65 100 % -- None (Room air) 0 L/min AL    09/01/24 1147 97.7 °F  (36.5 °C) 64 20 99/54 100 % -- None (Room air) 0 L/min AL    09/01/24 0801 97.6 °F (36.4 °C) 95 18 96/48 100 % -- None (Room air) 0 L/min AL    09/01/24 0456 -- 98 22 99/44 -- -- -- -- KJ    09/01/24 0151 -- 87 -- 67/48 -- -- -- -- KJ    09/01/24 0148 -- 93 -- 109/65 97 % -- -- -- KJ    09/01/24 0146 98.8 °F (37.1 °C) 87 -- 97/74 98 % 233 lb 7.5 oz (105.9 kg) -- -- KJ    09/01/24 0100 -- 76 22 106/71 99 % -- -- -- BR    09/01/24 0015 -- 65 17 102/52 100 % -- None (Room air) -- BG    08/31/24 2200 -- 64 21 107/64 100 % -- None (Room air) -- BG    08/31/24 2123 -- -- -- -- -- -- None (Room air) -- BG    08/31/24 2046 97.2 °F (36.2 °C) 73 20 95/60 99 % 239 lb (108.4 kg) None (Room air) -- MT            Medication Administration Report  for Brayan Liang Wiley as of 08/23/24 through 09/01/24  Legend:       Medications 08/23 08/24 08/25 08/26 08/27 08/28 08/29 08/30 08/31 09/01   Discontinued Medications                amLODIPine (Norvasc) tab 10 mg  Dose: 10 mg  Freq: Daily Route: OR  Start: 09/01/24 0145 End: 09/01/24 1040             [id number]     [id number]        aspirin chewable tab 81 mg  Dose: 81 mg  Freq: Daily Route: OR  Start: 09/01/24 1000 End: 09/01/24 1950             [id number]                                                                                                                                                              hydroCHLOROthiazide tab 12.5 mg  Dose: 12.5 mg  Freq: Daily Route: OR  Start: 09/01/24 0145 End: 09/01/24 1950             0147)6     [id number]        insulin aspart (NovoLOG) 100 Units/mL FlexPen 1-10 Units  Dose: 1-10 Units  Freq: 3 times daily before meals and nightly Route: SC  Start: 09/01/24 0700 End: 09/01/24 1950   Admin Instructions:   Correction Insulin - calculate insulin requirement  Give 1 unit of Novolog (aspart) insulin for every 40 points blood glucose is greater than 140 mg/dL  **DO NOT HOLD OR ALTER INSULIN DOSE WITHOUT A PHYSICIAN ORDER**  Give Novolog/aspart insulin  subcutaneously within 30 minutes of scheduled finger-stick time  Notify physician for blood glucose >351mg/dL with time and last dose of correction insulin given.             [id number]     (1600) [C]10                                                            ramipril (Altace) cap 10 mg  Dose: 10 mg  Freq: Daily Route: OR  Start: 09/01/24 0145 End: 09/01/24 1040             (0145)11     [id number]                     sertraline (Zoloft) tab 200 mg  Dose: 200 mg  Freq: Daily Route: OR  Start: 09/01/24 0145 End: 09/01/24 1950             (0145)13     [id number]        sodium ferric gluconate (Ferrlecit) 125 mg in sodium chloride 0.9% 100mL IVPB premix  Dose: 125 mg  Freq: Daily Route: IV  Start: 09/01/24 0700 End: 09/01/24 1950             [id number]        warfarin (Coumadin) tab 5 mg  Dose: 5 mg  Freq: Nightly Route: OR  Start: 09/01/24 0145 End: 09/01/24 0802   Admin Instructions:   CAUTION: REPRODUCTIVE RISK             (0145)[id number]        Medications 08/23 08/24 08/25 08/26 08/27 08/28 08/29 08/30 08/31 09/01   Administration Detail

## 2024-09-03 NOTE — TELEPHONE ENCOUNTER
Patient of Dr. Carr. Patient states he is having shortage of breath and is very anemic. Please call patient at [phone].  Called 9/3/24 [place]

## 2024-09-03 NOTE — TELEPHONE ENCOUNTER
Toxicities: Iron Dextran infusion on 8/20/2024    Patient seen in the  ER 8/31/2024 - 9/1/2024 for dyspnea, chest pain, possibly 2/2 Afib and anemia.    I attempted to reach Liang. I left a voice mail message asking him to please return my call so I may do a telephone assessment.

## 2024-09-03 NOTE — TELEPHONE ENCOUNTER
Dyspnea/Chest Pain    Rejiismael reports he is feeling short of breath and is having chest pain. \"It is not as bad as it was on Friday & Saturday.       I could not reach TRAM Wall. She is in with a patient. I spoke with ADRIANA Scruggs for Dr Carr. She said ER for evaluation. I updated Liang. He said he won't go unless he speaks with Dr Carr. I told him I would forward this message to Dr Carr and his nurse now.

## 2024-09-03 NOTE — PAYOR COMM NOTE
--------------  DISCHARGE REVIEW    Payor: [employer]  Subscriber #:  [id number]  Authorization Number: [id number]    Admit date: 24  Admit time:   1:36 AM  Discharge Date: 2024  5:35 PM     Admitting Physician: Mio Rowland DO  Attending Physician:  No att. providers found  Primary Care Physician: Ema Arora DO          Discharge Summary Notes        Discharge Summary signed by Evan Miranda MD at 2024  8:51 PM       Author: Evan Miranda MD Specialty: HOSPITALIST, Internal Medicine Author Type: Physician    Filed: 2024  8:51 PM Date of Service: 2024 11:13 AM Status: Signed    : Evan Miranda MD (Physician)           [facility]  DISCHARGE SUMMARY     Liang Schmidt Patient Status:  Inpatient    1955 MRN [id number]   Location [facility] 8NE-A Attending Evan Miranda MD   Hosp Day # 1 PCP Ema Arora DO     Date of Admission: 2024  Date of Discharge: 2024  Discharge Disposition: Home or Self Care  Chief complaint:   Chief Complaint   Patient presents with    Chest Pain Angina         Discharge Diagnoses and Brief Synopsis:  #dyspnea/chest pain; possibly 2/2 afib and anemia;  given IV iron and taken off amlodipine, added to diltiazem.  No procedures at this time per GI.  Altace reduced given marginal BP; echo performed; see report.  Will need close f/u with cards in clinic.     #iron def anemia; continue OP IV iron     #trop elevation; suspect demand ischemia     #thrombocytopenia     # Aortic valve replacement, mechanical     # Acute kidney injury on chronic stage 2 renal failure.       # Type 2 diabetes    # Hypertension    # Depression    # Hyperlipidemia    # Peripheral neuropathy    #hx gastric bypass        Lab/Test results pending at Discharge:   none        Admission History of Present Illness (author of HPI not necessarily myself; see H&P author): Liang Schmidt is a 69 year old male with  history of iron deficiency anemia, type 2 diabetes, hypertension, chronic kidney disease, hyperlipidemia presents emergency room with 2 weeks of shortness of breath and started having some chest pains that mostly occur when she lays down.  No diaphoresis, nausea, vomiting.  No dizziness, lightheadedness, or syncope.       Lace+ Score: 69  59-90 High Risk  29-58 Medium Risk  0-28   Low Risk       TCM Follow-Up Recommendation:  LACE > 58: High Risk of readmission after discharge from the hospital.      Discharge Medication List:     Discharge Medications        START taking these medications        Instructions Prescription details   atorvastatin 20 MG Tabs  Commonly known as: Lipitor  Replaces: simvastatin 40 MG Tabs      Take 1 tablet (20 mg total) by mouth nightly.   Quantity: 30 tablet  Refills: 0     dilTIAZem  MG Cp24  Commonly known as: Cardizem CD  Start taking on: September 2, 2024      Take 1 capsule (120 mg total) by mouth daily.   Quantity: 30 capsule  Refills: 0            CHANGE how you take these medications        Instructions Prescription details   gabapentin 300 MG Caps  Commonly known as: Neurontin  What changed: when to take this      Take 1 capsule (300 mg total) by mouth nightly.   Quantity: 90 capsule  Refills: 3     ramipril 5 MG Caps  Commonly known as: Altace  Start taking on: September 2, 2024  What changed:   medication strength  how much to take      Take 1 capsule (5 mg total) by mouth daily.   Quantity: 30 capsule  Refills: 0            CONTINUE taking these medications        Instructions Prescription details   cetirizine 10 MG Tabs  Commonly known as: ZyrTEC      Take 1 tablet (10 mg total) by mouth daily.   Quantity: 90 tablet  Refills: 0     cyanocobalamin 1000 MCG/ML Soln  Commonly known as: Vitamin B12      Inject 1 mL (1,000 mcg total) into the muscle every 30 (thirty) days.   Quantity: 3 mL  Refills: 3     folic acid 1 MG Tabs  Commonly known as: Folvite      Take 1 tablet  (1 mg total) by mouth daily.   Quantity: 90 tablet  Refills: 3     hydroCHLOROthiazide 12.5 MG Tabs      Take 1 tablet (12.5 mg total) by mouth daily.   Quantity: 90 tablet  Refills: 0     metFORMIN 500 MG Tabs  Commonly known as: Glucophage      Take 2 tablets (1,000 mg total) by mouth daily with breakfast.   Quantity: 180 tablet  Refills: 0     sertraline 100 MG Tabs  Commonly known as: Zoloft      Take 2 tablets (200 mg total) by mouth daily.   Quantity: 180 tablet  Refills: 0     triamcinolone 0.1 % Crea  Commonly known as: Kenalog      Apply twice a day for 2 weeks, then take a break for 1 week, reapply if persists   Quantity: 80 g  Refills: 1     warfarin 5 MG Tabs  Commonly known as: Coumadin      Take 1 tablet (5 mg total) by mouth nightly.   Quantity: 90 tablet  Refills: 0            STOP taking these medications      amLODIPine 10 MG Tabs  Commonly known as: Norvasc        simvastatin 40 MG Tabs  Commonly known as: Zocor  Replaced by: atorvastatin 20 MG Tabs                  Where to Get Your Medications        Please  your prescriptions at the location directed by your doctor or nurse    Bring a paper prescription for each of these medications  atorvastatin 20 MG Tabs  dilTIAZem  MG Cp24  ramipril 5 MG Caps         [place]    Follow-up appointment:   Ema Arora DO  [address]  [facility] [address]  [phone]    Follow up in 1 week(s)      Elvin Carr MD  [address]  [facility] [address]  [phone]    Follow up in 2 week(s)      Jeffrey Johnson MD  [address] [id number] [phone]    Follow up in 2 week(s)      Appointments for Next 30 Days 9/1/2024 - 10/1/2024      None            Vital signs:  Temp:  [97.6 °F (36.4 °C)-98.8 °F (37.1 °C)] 97.9 °F (36.6 °C)  Pulse:  [64-98] 70  Resp:  [17-22] 18  BP: ()/(44-74) 103/65  SpO2:  [97 %-100 %] 100 %    Physical Exam:    General: No acute distress.    Respiratory: Clear to auscultation bilaterally. No wheezes. No rhonchi.  Cardiovascular: S1, S2. Irreg irreg.  Abdomen: Soft, nontender, nondistended.    Extremities: No edema.  -----------------------------------------------------------------------------------------------  PATIENT DISCHARGE INSTRUCTIONS: See electronic chart    Evan Wong MD    Time spent:   32 minutes      Electronically signed by Evan Wong MD on 9/1/2024  8:51 PM         REVIEWER COMMENTS

## 2024-09-04 ENCOUNTER — OFFICE VISIT (OUTPATIENT)
Dept: HEMATOLOGY/ONCOLOGY | Facility: HOSPITAL | Age: 69
End: 2024-09-04
Attending: INTERNAL MEDICINE
Payer: COMMERCIAL

## 2024-09-04 VITALS
DIASTOLIC BLOOD PRESSURE: 53 MMHG | OXYGEN SATURATION: 98 % | HEART RATE: 50 BPM | SYSTOLIC BLOOD PRESSURE: 94 MMHG | TEMPERATURE: 98 F | HEIGHT: 70 IN | WEIGHT: 233.81 LBS | BODY MASS INDEX: 33.47 KG/M2

## 2024-09-04 DIAGNOSIS — D50.9 IRON DEFICIENCY ANEMIA, UNSPECIFIED IRON DEFICIENCY ANEMIA TYPE: Primary | ICD-10-CM

## 2024-09-04 PROCEDURE — 96365 THER/PROPH/DIAG IV INF INIT: CPT

## 2024-09-05 ENCOUNTER — APPOINTMENT (OUTPATIENT)
Dept: HEMATOLOGY/ONCOLOGY | Facility: HOSPITAL | Age: 69
End: 2024-09-05
Attending: INTERNAL MEDICINE
Payer: COMMERCIAL

## 2024-09-05 ENCOUNTER — PATIENT OUTREACH (OUTPATIENT)
Dept: CASE MANAGEMENT | Age: 69
End: 2024-09-05

## 2024-09-05 NOTE — PROGRESS NOTES
Called pt to schedule a  hospital follow-up appt :    DM Request  Ema Arora, DO  [address] [address] [phone]  Follow up in 1 week(s)  No answer ; LVM to call back for assistance with scheduling

## 2024-09-06 NOTE — PROGRESS NOTES
2nd attempt :    Called pt to schedule a  hospital follow-up appt :     DM Request  Ema Arora, DO  [address]  [facility] [id number] [phone]  Follow up in 1 week(s)  No answer ; LVM to call back for assistance with scheduling      Closing encounter

## 2024-09-13 DIAGNOSIS — G25.81 RESTLESS LEG SYNDROME: ICD-10-CM

## 2024-09-13 DIAGNOSIS — I10 PRIMARY HYPERTENSION: ICD-10-CM

## 2024-09-13 DIAGNOSIS — E11.9 TYPE 2 DIABETES MELLITUS WITHOUT COMPLICATION, WITHOUT LONG-TERM CURRENT USE OF INSULIN (HCC): ICD-10-CM

## 2024-09-13 DIAGNOSIS — E53.8 VITAMIN B12 DEFICIENCY: ICD-10-CM

## 2024-09-13 DIAGNOSIS — L20.9 ATOPIC DERMATITIS, UNSPECIFIED TYPE: ICD-10-CM

## 2024-09-13 DIAGNOSIS — F33.42 RECURRENT MAJOR DEPRESSIVE DISORDER, IN FULL REMISSION (HCC): ICD-10-CM

## 2024-09-13 RX ORDER — GABAPENTIN 300 MG/1
300 CAPSULE ORAL NIGHTLY
Qty: 90 CAPSULE | Refills: 3 | Status: CANCELLED | OUTPATIENT
Start: 2024-09-13

## 2024-09-13 RX ORDER — CYANOCOBALAMIN 1000 UG/ML
1000 INJECTION, SOLUTION INTRAMUSCULAR; SUBCUTANEOUS
Qty: 3 ML | Refills: 3 | Status: SHIPPED | OUTPATIENT
Start: 2024-09-13 | End: 2024-10-30

## 2024-09-13 RX ORDER — FOLIC ACID 1 MG/1
1 TABLET ORAL DAILY
Qty: 90 TABLET | Refills: 3 | Status: SHIPPED | OUTPATIENT
Start: 2024-09-13

## 2024-09-13 RX ORDER — HYDROCHLOROTHIAZIDE 12.5 MG/1
12.5 TABLET ORAL DAILY
Qty: 90 TABLET | Refills: 0 | Status: CANCELLED | OUTPATIENT
Start: 2024-09-13

## 2024-09-13 RX ORDER — WARFARIN SODIUM 5 MG/1
5 TABLET ORAL NIGHTLY
Qty: 90 TABLET | Refills: 0 | Status: CANCELLED | OUTPATIENT
Start: 2024-09-13

## 2024-09-13 NOTE — TELEPHONE ENCOUNTER
Protocol FAIL    LOV: 08/19/24  RTC: 1 MONTH  Recent Labs:8/19/24    Upcoming OV   Future Appointments   Date Time Provider Department Center   10/1/2024  1:00 PM Elvin Carr MD [facility] HEM ONC [facility]   10/29/2024  1:00 PM [facility] CHEMO [facility]

## 2024-09-16 DIAGNOSIS — G25.81 RESTLESS LEG SYNDROME: ICD-10-CM

## 2024-09-16 DIAGNOSIS — I10 PRIMARY HYPERTENSION: ICD-10-CM

## 2024-09-16 RX ORDER — HYDROCHLOROTHIAZIDE 12.5 MG/1
12.5 TABLET ORAL DAILY
Qty: 90 TABLET | Refills: 0 | Status: SHIPPED | OUTPATIENT
Start: 2024-09-16

## 2024-09-16 RX ORDER — WARFARIN SODIUM 5 MG/1
5 TABLET ORAL NIGHTLY
Qty: 90 TABLET | Refills: 0 | Status: SHIPPED | OUTPATIENT
Start: 2024-09-16

## 2024-09-16 RX ORDER — RAMIPRIL 5 MG/1
5 CAPSULE ORAL DAILY
Qty: 90 CAPSULE | Refills: 0 | Status: SHIPPED | OUTPATIENT
Start: 2024-09-16 | End: 2024-09-17

## 2024-09-16 RX ORDER — CETIRIZINE HYDROCHLORIDE 10 MG/1
10 TABLET ORAL DAILY
Qty: 90 TABLET | Refills: 0 | Status: SHIPPED | OUTPATIENT
Start: 2024-09-16

## 2024-09-16 RX ORDER — GABAPENTIN 300 MG/1
300 CAPSULE ORAL DAILY
Qty: 90 CAPSULE | Refills: 0 | Status: SHIPPED | OUTPATIENT
Start: 2024-09-16

## 2024-09-16 RX ORDER — SERTRALINE HYDROCHLORIDE 100 MG/1
200 TABLET, FILM COATED ORAL DAILY
Qty: 180 TABLET | Refills: 0 | Status: SHIPPED | OUTPATIENT
Start: 2024-09-16 | End: 2024-09-17

## 2024-09-16 RX ORDER — DILTIAZEM HYDROCHLORIDE 120 MG/1
120 CAPSULE, COATED, EXTENDED RELEASE ORAL DAILY
Qty: 90 CAPSULE | Refills: 0 | Status: SHIPPED | OUTPATIENT
Start: 2024-09-16 | End: 2024-09-17

## 2024-09-16 RX ORDER — ATORVASTATIN CALCIUM 20 MG/1
20 TABLET, FILM COATED ORAL NIGHTLY
Qty: 90 TABLET | Refills: 0 | Status: SHIPPED | OUTPATIENT
Start: 2024-09-16 | End: 2024-09-17

## 2024-09-16 RX ORDER — TRIAMCINOLONE ACETONIDE 1 MG/G
CREAM TOPICAL
Qty: 80 G | Refills: 1 | Status: SHIPPED | OUTPATIENT
Start: 2024-09-16

## 2024-09-16 NOTE — TELEPHONE ENCOUNTER
Please check to see if patient has scheduled a follow up with the cardiologist. He is due for [facility] blood test to check for his kidneys    Ema Arora DO

## 2024-09-17 DIAGNOSIS — G25.81 RESTLESS LEG SYNDROME: ICD-10-CM

## 2024-09-17 DIAGNOSIS — E53.8 VITAMIN B12 DEFICIENCY: ICD-10-CM

## 2024-09-17 DIAGNOSIS — E11.9 TYPE 2 DIABETES MELLITUS WITHOUT COMPLICATION, WITHOUT LONG-TERM CURRENT USE OF INSULIN (HCC): ICD-10-CM

## 2024-09-17 DIAGNOSIS — I10 PRIMARY HYPERTENSION: ICD-10-CM

## 2024-09-17 DIAGNOSIS — F33.42 RECURRENT MAJOR DEPRESSIVE DISORDER, IN FULL REMISSION (HCC): ICD-10-CM

## 2024-09-18 ENCOUNTER — TELEPHONE (OUTPATIENT)
Dept: HEMATOLOGY/ONCOLOGY | Facility: HOSPITAL | Age: 69
End: 2024-09-18

## 2024-09-18 RX ORDER — CYANOCOBALAMIN 1000 UG/ML
1000 INJECTION, SOLUTION INTRAMUSCULAR; SUBCUTANEOUS
Qty: 3 ML | Refills: 3 | OUTPATIENT
Start: 2024-09-18

## 2024-09-18 RX ORDER — FOLIC ACID 1 MG/1
1 TABLET ORAL DAILY
Qty: 90 TABLET | Refills: 3 | OUTPATIENT
Start: 2024-09-18

## 2024-09-18 NOTE — TELEPHONE ENCOUNTER
Patient is calling because he thinks he will need an infusion on the appointment date. Called 9/18/24

## 2024-09-19 ENCOUNTER — HOSPITAL ENCOUNTER (INPATIENT)
Facility: HOSPITAL | Age: 69
LOS: 5 days | Discharge: HOME | End: 2024-09-24
Attending: EMERGENCY MEDICINE | Admitting: HOSPITALIST
Payer: COMMERCIAL

## 2024-09-19 ENCOUNTER — APPOINTMENT (OUTPATIENT)
Dept: GENERAL RADIOLOGY | Facility: HOSPITAL | Age: 69
End: 2024-09-19
Attending: EMERGENCY MEDICINE
Payer: COMMERCIAL

## 2024-09-19 DIAGNOSIS — D64.9 CHRONIC ANEMIA: ICD-10-CM

## 2024-09-19 DIAGNOSIS — J18.9 COMMUNITY ACQUIRED PNEUMONIA OF LEFT LOWER LOBE OF LUNG: Primary | ICD-10-CM

## 2024-09-19 DIAGNOSIS — R79.1 ELEVATED INR: ICD-10-CM

## 2024-09-19 DIAGNOSIS — K92.2 GASTROINTESTINAL HEMORRHAGE, UNSPECIFIED GASTROINTESTINAL HEMORRHAGE TYPE: ICD-10-CM

## 2024-09-19 LAB
ALBUMIN SERPL-MCNC: 3.9 G/DL (ref 3.2–4.8)
ALBUMIN/GLOB SERPL: 1.3 {RATIO} (ref 1–2)
ALP LIVER SERPL-CCNC: 100 U/L
ALT SERPL-CCNC: 46 U/L
ANION GAP SERPL CALC-SCNC: 10 MMOL/L (ref 0–18)
ANTIBODY SCREEN: NEGATIVE
AST SERPL-CCNC: 24 U/L (ref ?–34)
BASOPHILS # BLD AUTO: 0.06 X10(3) UL (ref 0–0.2)
BASOPHILS NFR BLD AUTO: 0.9 %
BILIRUB SERPL-MCNC: 0.3 MG/DL (ref 0.2–1.1)
BUN BLD-MCNC: 28 MG/DL (ref 9–23)
CALCIUM BLD-MCNC: 9.3 MG/DL (ref 8.7–10.4)
CHLORIDE SERPL-SCNC: 110 MMOL/L (ref 98–112)
CO2 SERPL-SCNC: 18 MMOL/L (ref 21–32)
CREAT BLD-MCNC: 1.72 MG/DL
EGFRCR SERPLBLD CKD-EPI 2021: 42 ML/MIN/1.73M2 (ref 60–?)
EOSINOPHIL # BLD AUTO: 0.26 X10(3) UL (ref 0–0.7)
EOSINOPHIL NFR BLD AUTO: 3.9 %
GLOBULIN PLAS-MCNC: 2.9 G/DL (ref 2–3.5)
GLUCOSE BLD-MCNC: 146 MG/DL (ref 70–99)
GLUCOSE BLD-MCNC: 161 MG/DL (ref 70–99)
HCT VFR BLD AUTO: 27.1 %
HGB BLD-MCNC: 7.6 G/DL
HGB BLD-MCNC: 8.8 G/DL
IMM GRANULOCYTES # BLD AUTO: 0.03 X10(3) UL (ref 0–1)
IMM GRANULOCYTES NFR BLD: 0.4 %
INR BLD: 8 (ref 0.8–1.2)
LACTATE SERPL-SCNC: 1.3 MMOL/L (ref 0.5–2)
LYMPHOCYTES # BLD AUTO: 1.09 X10(3) UL (ref 1–4)
LYMPHOCYTES NFR BLD AUTO: 16.2 %
MCH RBC QN AUTO: 26.2 PG (ref 26–34)
MCHC RBC AUTO-ENTMCNC: 32.5 G/DL (ref 31–37)
MCV RBC AUTO: 80.7 FL
MONOCYTES # BLD AUTO: 0.42 X10(3) UL (ref 0.1–1)
MONOCYTES NFR BLD AUTO: 6.2 %
NEUTROPHILS # BLD AUTO: 4.88 X10 (3) UL (ref 1.5–7.7)
NEUTROPHILS # BLD AUTO: 4.88 X10(3) UL (ref 1.5–7.7)
NEUTROPHILS NFR BLD AUTO: 72.4 %
OSMOLALITY SERPL CALC.SUM OF ELEC: 295 MOSM/KG (ref 275–295)
PLATELET # BLD AUTO: 136 10(3)UL (ref 150–450)
PLATELET MORPHOLOGY: NORMAL
PLATELETS.RETICULATED NFR BLD AUTO: 2.4 % (ref 0–7)
POTASSIUM SERPL-SCNC: 3.9 MMOL/L (ref 3.5–5.1)
PROT SERPL-MCNC: 6.8 G/DL (ref 5.7–8.2)
PROTHROMBIN TIME: 68.8 SECONDS (ref 11.6–14.8)
Q-T INTERVAL: 390 MS
QRS DURATION: 110 MS
QTC CALCULATION (BEZET): 482 MS
R AXIS: -34 DEGREES
RBC # BLD AUTO: 3.36 X10(6)UL
RH BLOOD TYPE: POSITIVE
SODIUM SERPL-SCNC: 138 MMOL/L (ref 136–145)
T AXIS: 93 DEGREES
TROPONIN I SERPL HS-MCNC: 26 NG/L
VENTRICULAR RATE: 92 BPM
WBC # BLD AUTO: 6.7 X10(3) UL (ref 4–11)

## 2024-09-19 PROCEDURE — 71045 X-RAY EXAM CHEST 1 VIEW: CPT | Performed by: EMERGENCY MEDICINE

## 2024-09-19 PROCEDURE — 99223 1ST HOSP IP/OBS HIGH 75: CPT | Performed by: HOSPITALIST

## 2024-09-19 RX ORDER — ACETAMINOPHEN 500 MG
500 TABLET ORAL EVERY 4 HOURS PRN
Status: DISCONTINUED | OUTPATIENT
Start: 2024-09-19 | End: 2024-09-24

## 2024-09-19 RX ORDER — HYDROCHLOROTHIAZIDE 12.5 MG/1
12.5 TABLET ORAL DAILY
Qty: 90 TABLET | Refills: 0 | Status: ON HOLD | OUTPATIENT
Start: 2024-09-19

## 2024-09-19 RX ORDER — SODIUM CHLORIDE 9 MG/ML
INJECTION, SOLUTION INTRAVENOUS CONTINUOUS
Status: DISCONTINUED | OUTPATIENT
Start: 2024-09-19 | End: 2024-09-21

## 2024-09-19 RX ORDER — NICOTINE POLACRILEX 4 MG
30 LOZENGE BUCCAL
Status: DISCONTINUED | OUTPATIENT
Start: 2024-09-19 | End: 2024-09-24

## 2024-09-19 RX ORDER — ONDANSETRON 2 MG/ML
4 INJECTION INTRAMUSCULAR; INTRAVENOUS EVERY 6 HOURS PRN
Status: DISCONTINUED | OUTPATIENT
Start: 2024-09-19 | End: 2024-09-24

## 2024-09-19 RX ORDER — ATORVASTATIN CALCIUM 20 MG/1
20 TABLET, FILM COATED ORAL NIGHTLY
Status: DISCONTINUED | OUTPATIENT
Start: 2024-09-19 | End: 2024-09-24

## 2024-09-19 RX ORDER — NICOTINE POLACRILEX 4 MG
15 LOZENGE BUCCAL
Status: DISCONTINUED | OUTPATIENT
Start: 2024-09-19 | End: 2024-09-24

## 2024-09-19 RX ORDER — ATORVASTATIN CALCIUM 20 MG/1
20 TABLET, FILM COATED ORAL NIGHTLY
Qty: 90 TABLET | Refills: 0 | Status: ON HOLD | OUTPATIENT
Start: 2024-09-19

## 2024-09-19 RX ORDER — DILTIAZEM HYDROCHLORIDE 120 MG/1
120 CAPSULE, COATED, EXTENDED RELEASE ORAL DAILY
Qty: 90 CAPSULE | Refills: 0 | Status: ON HOLD | OUTPATIENT
Start: 2024-09-19

## 2024-09-19 RX ORDER — SERTRALINE HYDROCHLORIDE 100 MG/1
200 TABLET, FILM COATED ORAL DAILY
Qty: 180 TABLET | Refills: 0 | Status: ON HOLD | OUTPATIENT
Start: 2024-09-19

## 2024-09-19 RX ORDER — RAMIPRIL 5 MG/1
5 CAPSULE ORAL DAILY
Qty: 90 CAPSULE | Refills: 0 | Status: ON HOLD | OUTPATIENT
Start: 2024-09-19

## 2024-09-19 RX ORDER — METOCLOPRAMIDE HYDROCHLORIDE 5 MG/ML
5 INJECTION INTRAMUSCULAR; INTRAVENOUS EVERY 8 HOURS PRN
Status: DISCONTINUED | OUTPATIENT
Start: 2024-09-19 | End: 2024-09-24

## 2024-09-19 RX ORDER — WARFARIN SODIUM 5 MG/1
5 TABLET ORAL NIGHTLY
Qty: 90 TABLET | Refills: 0 | Status: ON HOLD | OUTPATIENT
Start: 2024-09-19

## 2024-09-19 RX ORDER — DEXTROSE MONOHYDRATE 25 G/50ML
50 INJECTION, SOLUTION INTRAVENOUS
Status: DISCONTINUED | OUTPATIENT
Start: 2024-09-19 | End: 2024-09-24

## 2024-09-19 RX ORDER — GUAIFENESIN 600 MG/1
600 TABLET, EXTENDED RELEASE ORAL 2 TIMES DAILY
Status: DISCONTINUED | OUTPATIENT
Start: 2024-09-19 | End: 2024-09-24

## 2024-09-19 RX ORDER — GABAPENTIN 300 MG/1
300 CAPSULE ORAL DAILY
Qty: 90 CAPSULE | Refills: 0 | Status: ON HOLD | OUTPATIENT
Start: 2024-09-19

## 2024-09-19 NOTE — H&P
[facility]     Liang Schmidt Patient Status:  Emergency    1955 MRN [id number]   Location [facility] EMERGENCY DEPARTMENT Attending Tato Kitchen MD   Hosp Day # 0 PCP Ema Arora DO     Chief Complaint: Shortness of breath    Subjective:    History of Present Illness:     Liang Schmidt is a 69 year old male with a history of atrial fibrillation on Coumadin, aortic stenosis sp mechanical AVR, essential hypertension, dyslipidemia, chronic kidney disease and gastric bypass who presents with shortness of breath. Patient was recently admitted for dyspnea/chest pain d/t anemia. Patient states he has been short of breath and though he may need iron prompting ER evaluation. He admits to cough and recent URI. No fever.     Patient also states he has felt dizzy when getting up from seated position and with movement. He has epigastric abdominal pain and melena x 1 weeks with ~ 4 BM / day. Patient has been taking his Coumadin but not monitoring INR at home.     History/Other:    Past Medical History:  Past Medical History:    Anemia, chronic disease    Anesthesia complication    HX: OF AGGRESSION W/PAST PROCEDURES D/T INSUFFICIENT ANESTHESIA IN THE PAST?    Arthritis    Nothing good to say    Calculus of kidney    Nothing major    Cancer (HCC)    skin cancer    Cellulitis    Diabetes (HCC)    Essential hypertension    Hearing impairment    High blood pressure    Hyperlipidemia    Obesity    Osteoarthritis    Pancreatitis (HCC)    Sepsis (HCC)    Shortness of breath    INTERMITTENT SOB ON EXERTION    Visual impairment    CONTACTS/GLASSES     Past Surgical History:   Past Surgical History:   Procedure Laterality Date    Anast panc cyst-bowel,trang-en-y      Cabg      Cath transcatheter aortic valve replacement      Cholecystectomy  8 yrs ago    Following acute pancreatitis    Colonoscopy  Many times    Colonoscopy N/A 3/5/2024    Procedure: COLONOSCOPY;   Surgeon: Zenon Kelley DO;  Location:  ENDOSCOPY    Hemorrhoidectomy  1 yr ago    Not very successful to be repeated    Other surgical history  20 yrs ago    Aortic valve replacement. Metal valve now    Removal gallbladder      Replace aortic valve open      performed in [place]    Skin surgery      Tonsillectomy      Vasectomy  20 yrs ago      Family History:   No family history on file.  Social History:    reports that he quit smoking about 37 years ago. His smoking use included cigarettes. He started smoking about 57 years ago. He has a 20 pack-year smoking history. He has never used smokeless tobacco. He reports that he does not drink alcohol and does not use drugs.     Allergies: No Known Allergies    Medications:    Current Facility-Administered Medications on File Prior to Encounter   Medication Dose Route Frequency Provider Last Rate Last Admin    [COMPLETED] iron dextran (Infed) 1,000 mg in sodium chloride 0.9% 270 mL IVPB  1,000 mg Intravenous Once Elvin Carr MD   Stopped at 09/04/24 1500    [COMPLETED] iron dextran (Infed) 1,000 mg in sodium chloride 0.9% 270 mL IVPB  1,000 mg Intravenous Once Elvin Carr MD   Stopped at 08/20/24 1421    [COMPLETED] sodium chloride 0.9 % IV bolus 1,000 mL  1,000 mL Intravenous Once Danyell Morales MD   Stopped at 06/27/24 2102    [COMPLETED] ceFAZolin (Ancef) 2g in 10mL IV syringe premix  2 g Intravenous Once Danyell Morales MD   Stopped at 06/27/24 2217     Current Outpatient Medications on File Prior to Encounter   Medication Sig Dispense Refill    metFORMIN 500 MG Oral Tab Take 2 tablets (1,000 mg total) by mouth daily with breakfast. 180 tablet 0    sertraline 100 MG Oral Tab Take 2 tablets (200 mg total) by mouth daily. 180 tablet 0    dilTIAZem  MG Oral Capsule SR 24 Hr Take 1 capsule (120 mg total) by mouth daily. 90 capsule 0    ramipril 5 MG Oral Cap Take 1 capsule (5 mg total) by mouth daily. 90 capsule 0    atorvastatin 20 MG Oral Tab Take 1  tablet (20 mg total) by mouth nightly. 90 tablet 0    gabapentin 300 MG Oral Cap Take 1 capsule (300 mg total) by mouth daily. 90 capsule 0    warfarin 5 MG Oral Tab Take 1 tablet (5 mg total) by mouth nightly. 90 tablet 0    hydroCHLOROthiazide 12.5 MG Oral Tab Take 1 tablet (12.5 mg total) by mouth daily. 90 tablet 0    cetirizine 10 MG Oral Tab Take 1 tablet (10 mg total) by mouth daily. 90 tablet 0    triamcinolone 0.1 % External Cream Apply twice a day for 2 weeks, then take a break for 1 week, reapply if persists 80 g 1    folic acid 1 MG Oral Tab Take 1 tablet (1 mg total) by mouth daily. 90 tablet 3    cyanocobalamin 1000 MCG/ML Injection Solution Inject 1 mL (1,000 mcg total) into the muscle every 30 (thirty) days. 3 mL 3       Review of Systems:   A comprehensive review of systems was completed.    Pertinent positives and negatives noted in the HPI.    Objective:   Physical Exam:    /65   Pulse 75   Temp 97 °F (36.1 °C) (Temporal)   Resp 21   Ht 5' 10\" (1.778 m)   Wt 236 lb (107 kg)   SpO2 100%   BMI 33.86 kg/m²   General: No acute distress, Alert  Respiratory: Rhonchi noted  Cardiovascular: S1, S2.   Abdomen: Soft, mild epigastric tenderness, non-distended, positive bowel sounds  Neuro: No new focal deficits  Extremities: trace edema    Results:    Labs:      Labs Last 24 Hours:    Recent Labs   Lab 09/19/24  1615   RBC 3.36*   HGB 8.8*   HCT 27.1*   MCV 80.7   MCH 26.2   MCHC 32.5   NEPRELIM 4.88   WBC 6.7   .0*       Recent Labs   Lab 09/19/24  1615   *   BUN 28*   CREATSERUM 1.72*   EGFRCR 42*   CA 9.3   ALB 3.9      K 3.9      CO2 18.0*   ALKPHO 100   AST 24   ALT 46   BILT 0.3   TP 6.8       Lab Results   Component Value Date    INR 8.00 (HH) 09/19/2024    INR 4.13 (H) 09/01/2024    INR 3.73 (H) 08/31/2024       Recent Labs   Lab 09/19/24  1615   TROPHS 26       No results for input(s): \"TROP\", \"PBNP\" in the last 168 hours.    No results for input(s): \"PCT\" in  the last 168 hours.    Imaging: Imaging data reviewed in Epic.    Assessment & Plan:      #Hypotension suspect d/t hypovolemia d/t GIB  -Admit  -IVF  -Hold antihypertensives  -Orthostatics  -Monitor hemodynamics    #GIB, melena  #History of gastric bypass  -NPO  -IVF  -PPI IV BID  -Serial H&H  -GI consult     #Coagulopathy d/t Coumadin > Vit K given in ER  -Monitor INR - patient has a mechanical aortic valve    #Aortic stenosis sp mechanical AVR   #Essential hypertension  #Dyslipidemia  -Cardiology consult    #Atrial fibrillation on Coumadin  -Hold Coumadin  -Monitor INR  -Telemetry    #Dyspnea d/t pneumonia  -IV abx  -Antitussives as needed  -Check cultures, RVP    #Chronic kidney disease  -Monitor UOP, creatinine and electrolytes    #Anemia, as above    #DVT Px: INR > 2    Plan of care discussed with patient and ER.    Stephen Fisher MD    Supplementary Documentation:     The 21st Century Cures Act makes medical notes like these available to patients in the interest of transparency. Please be advised this is a medical document. Medical documents are intended to carry relevant information, facts as evident, and the clinical opinion of the practitioner. The medical note is intended as peer to peer communication and may appear blunt or direct. It is written in medical language and may contain abbreviations or verbiage that are unfamiliar.

## 2024-09-19 NOTE — ED INITIAL ASSESSMENT (HPI)
Pt to ED c/o REILLY that started this week, state he is having dark red tarry blood in stool for 10 days, hx of anemia with transfusions, states dizziness that worse with position changes

## 2024-09-19 NOTE — ED PROVIDER NOTES
Patient Seen in: [facility] Emergency Department      History     Chief Complaint   Patient presents with    Difficulty Breathing    GI Bleeding     Stated Complaint: abnormal labs, shortness of breath    Subjective:   HPI    69-year-old male who has a history of having anemia that gets transfusions of iron as well as at times needed blood transfusions says he has been more weak and short of breath especially with exertion worsening over the last 1 week.  In addition this he noticed that he is also having GI bleed at this time.  He is history of having chronic A-fib and is on Coumadin.  He is denying any headaches or dizziness.  No chest pain.  Denies any fevers, chills or cough.  He denies any nausea or vomiting.  Denies any dysuria.  He is denying any other complaints at this time.  He does receive iron transfusions.    Objective:   Past Medical History:    Anemia, chronic disease    Anesthesia complication    HX: OF AGGRESSION W/PAST PROCEDURES D/T INSUFFICIENT ANESTHESIA IN THE PAST?    Arthritis    Nothing good to say    Calculus of kidney    Nothing major    Cancer (HCC)    skin cancer    Cellulitis    Diabetes (HCC)    Essential hypertension    Hearing impairment    High blood pressure    Hyperlipidemia    Obesity    Osteoarthritis    Pancreatitis (HCC)    Sepsis (HCC)    Shortness of breath    INTERMITTENT SOB ON EXERTION    Visual impairment    CONTACTS/GLASSES              Past Surgical History:   Procedure Laterality Date    Anast panc cyst-bowel,trang-en-y      Cabg      Cath transcatheter aortic valve replacement      Cholecystectomy  8 yrs ago    Following acute pancreatitis    Colonoscopy  Many times    Colonoscopy N/A 3/5/2024    Procedure: COLONOSCOPY;  Surgeon: Zenon Kelley DO;  Location:  ENDOSCOPY    Hemorrhoidectomy  1 yr ago    Not very successful to be repeated    Other surgical history  20 yrs ago    Aortic valve replacement. Metal valve now    Removal gallbladder      Replace  aortic valve open      performed in [place]    Skin surgery      Tonsillectomy      Vasectomy  20 yrs ago                Social History     Socioeconomic History    Marital status: Single   Tobacco Use    Smoking status: Former     Current packs/day: 0.00     Average packs/day: 1 pack/day for 20.0 years (20.0 ttl pk-yrs)     Types: Cigarettes     Start date: 1967     Quit date: 1987     Years since quittin.7    Smokeless tobacco: Never    Tobacco comments:     No comment   Vaping Use    Vaping status: Never Used   Substance and Sexual Activity    Alcohol use: Never    Drug use: Never   Other Topics Concern    Caffeine Concern No    Exercise No    Seat Belt No    Special Diet No    Stress Concern No    Weight Concern Yes     Social Determinants of Health     Food Insecurity: No Food Insecurity (2024)    Food Insecurity     Food Insecurity: Never true   Transportation Needs: No Transportation Needs (2024)    Transportation Needs     Lack of Transportation: No   Housing Stability: Low Risk  (2024)    Housing Stability     Housing Instability: No              Review of Systems    Positive for stated Chief Complaint: Difficulty Breathing and GI Bleeding    Other systems are as noted in HPI.  Constitutional and vital signs reviewed.      All other systems reviewed and negative except as noted above.    Physical Exam     ED Triage Vitals [24 1555]   BP (!) 80/50   Pulse 78   Resp 18   Temp 97 °F (36.1 °C)   Temp src Temporal   SpO2 100 %   O2 Device None (Room air)       Current Vitals:   Vital Signs  BP: 113/60  Pulse: 68  Resp: 21  Temp: 97 °F (36.1 °C)  Temp src: Temporal  MAP (mmHg): 77    Oxygen Therapy  SpO2: 100 %  O2 Device: None (Room air)            Physical Exam    HEENT; NCAT, EOMI, throat clear, neck supple, no LAD, no JVD  Heart S1S2 RRR  lungs: CTAB  abd: Soft NT, ND,  NABS without rebound or guarding  Ext no C/C/E    ED Course     Labs Reviewed   CBC WITH DIFFERENTIAL WITH  PLATELET - Abnormal; Notable for the following components:       Result Value    RBC 3.36 (*)     HGB 8.8 (*)     HCT 27.1 (*)     .0 (*)     All other components within normal limits   COMP METABOLIC PANEL (14) - Abnormal; Notable for the following components:    Glucose 161 (*)     CO2 18.0 (*)     BUN 28 (*)     Creatinine 1.72 (*)     eGFR-Cr 42 (*)     All other components within normal limits   PROTHROMBIN TIME (PT) - Abnormal; Notable for the following components:    PT 68.8 (*)     INR 8.00 (*)     All other components within normal limits   RBC MORPHOLOGY SCAN - Abnormal; Notable for the following components:    RBC Morphology See morphology below (*)     Microcytosis 2+ (*)     Macrocytosis 2+ (*)     All other components within normal limits   TROPONIN I HIGH SENSITIVITY - Normal   LACTIC ACID, PLASMA   TYPE AND SCREEN    Narrative:     The following orders were created for panel order Type and screen.  Procedure                               Abnormality         Status                     ---------                               -----------         ------                     ABORH (Blood Type)[[id number]]                               Final result               Antibody Screen[[id number]]                                  Final result                 Please view results for these tests on the individual orders.   ABORH (BLOOD TYPE)   ANTIBODY SCREEN   RAINBOW DRAW LAVENDER   RAINBOW DRAW LIGHT GREEN   RAINBOW DRAW BLUE   BLOOD CULTURE   BLOOD CULTURE     EKG      Rate: 92  Rhythm: Atrial fibrillation  Reading:  with A-fib left axis deviation              ED Course as of 09/19/24 1905  ------------------------------------------------------------  Time: 09/19 1845  Comment: While here the patient awakened was 6.7 thousand.  Hemoglobin was 8.8 which is stable for this patient.  Blood cultures x 2 as well as lactic acid levels were taken.  He does have an elevated INR to 8.  The rest of his  electrolytes were unremarkable to CO2 of 18 and BUN/creatinine of 20 and 1.72.  The patient here was given IV Protonix, IV fluid and given azithromycin and Rocephin.  The patient did arrive here hypotensive with a blood pressure of 80/50 which improved with IV fluids.  At this time the patient will be admitted for further manage at this time.  Vitamin K has been given.     XR CHEST AP PORTABLE  (CPT=71045)    Result Date: 9/19/2024  PROCEDURE:  XR CHEST AP PORTABLE  (CPT=71045)  TECHNIQUE:  AP chest radiograph was obtained.  COMPARISON:  [facility] , XR, XR CHEST AP PORTABLE  (CPT=71045), 8/31/2024, 9:23 PM.  INDICATIONS:  abnormal labs, shortness of breath  PATIENT STATED HISTORY: (As transcribed by Technologist)  Patient offered no additional history at this time.    FINDINGS:  Median sternotomy changes and valve prosthesis noted.  Tortuosity of the thoracic aorta again noted.  Cardiomegaly with normal pulmonary vascularity.  Even trace shin of the right diaphragm.  No pleural effusion or pneumothorax.  There is mild  patchy opacity in the lower left lung that could represent area of developing pneumonia or atelectasis/scarring.  Clinical correlation recommended.            CONCLUSION:  There is mild patchy opacity in the lower left lung that could represent area of developing pneumonia or atelectasis/scarring.  Clinical correlation recommended.   LOCATION:  [facility]      Dictated by (CST): Chandrakant Pedroza MD on 9/19/2024 at 4:55 PM     Finalized by (CST): Chandrakant Pedroza MD on 9/19/2024 at 4:56 PM       Medications   sodium chloride 0.9 % IV bolus 500 mL (0 mL Intravenous Stopped 9/19/24 1721)     Followed by   sodium chloride 0.9% infusion ( Intravenous New Bag 9/19/24 1724)   cefTRIAXone (Rocephin) 2 g in sodium chloride 0.9% 100 mL IVPB-ADDV (2 g Intravenous New Bag 9/19/24 8728)   azithromycin (Zithromax) 500 mg in sodium chloride 0.9% 250mL IVPB premix (has no administration in time range)   phytonadione  (Aqua-[place]) 5 mg in sodium chloride 0.9% 50 mL IVPB (has no administration in time range)   pantoprazole (Protonix) 80 mg in sodium chloride 0.9% 100 mL IV bolus (0 mg Intravenous Stopped 9/19/24 2524)              MDM      Differential diagnosis includes pneumonia, anemia, GI bleed but not limited this..  Patient does have pneumonia.  His hemoglobin is stable.  Did arrive hypotensive which responded well to IV fluids.  Blood cultures and lactic acid levels were taken at this time.  Patient had received IV Rocephin and azithromycin as well as vitamin K.  Received Protonix as well.  This case discussed with the hospitalist patient mated for further care.      Critical Care Note:  The patient arrived with a condition with significant morbidity and mortality associated. The services I provided  were to promote improvement and reduce mortality specifically involving complex record review, complex medical decisions and interventions, and consultations outside the regular procedures and care normally rendered for 45 minutes of critical care time      This note was prepared using Dragon Medical voice recognition dictation software.  As a result errors may occur.  When identified to these areas have been corrected.  While every attempt is made to correct errors during dictation discrepancies may still exist.  Please contact if there are any errors.  Admission disposition: 9/19/2024  7:05 PM                                        Medical Decision Making      Disposition and Plan     Clinical Impression:  1. Community acquired pneumonia of left lower lobe of lung    2. Elevated INR    3. Gastrointestinal hemorrhage, unspecified gastrointestinal hemorrhage type    4. Chronic anemia         Disposition:  Admit  9/19/2024  7:05 pm    Follow-up:  No follow-up provider specified.        Medications Prescribed:  Current Discharge Medication List                            Hospital Problems       Present on Admission  Date  Reviewed: 8/20/2024            ICD-10-CM Noted POA    * (Principal) Community acquired pneumonia of left lower lobe of lung J18.9 9/19/2024 Unknown

## 2024-09-19 NOTE — PROGRESS NOTES
[facility] Pharmacy Note: Antimicrobial Weight Based Dose Adjustment for: ceftriaxone (ROCEPHIN)    Liang Schmidt is a 69 year old patient who has been prescribed ceftriaxone (ROCEPHIN) 1000 mg x 1 dose.    Estimated Creatinine Clearance: 41.9 mL/min (A) (based on SCr of 1.72 mg/dL (H)).  Wt Readings from Last 6 Encounters:   09/19/24 107 kg (236 lb)   09/04/24 106.1 kg (233 lb 12.8 oz)   09/01/24 105.9 kg (233 lb 7.5 oz)   08/20/24 108.7 kg (239 lb 9.6 oz)   08/19/24 108.4 kg (239 lb)   06/27/24 113.9 kg (251 lb)     Body mass index is 33.86 kg/m².    Based on this criteria and renal function, dose will be adjusted to ceftriaxone (ROCEPHIN) 2000 mg x 1 dose.    Thank you,    Venu Velazquez, PharmD  9/19/2024  6:41 PM

## 2024-09-20 ENCOUNTER — ANESTHESIA (OUTPATIENT)
Dept: ENDOSCOPY | Facility: HOSPITAL | Age: 69
End: 2024-09-20
Payer: COMMERCIAL

## 2024-09-20 ENCOUNTER — ANESTHESIA EVENT (OUTPATIENT)
Dept: ENDOSCOPY | Facility: HOSPITAL | Age: 69
End: 2024-09-20
Payer: COMMERCIAL

## 2024-09-20 LAB
ADENOVIRUS PCR:: NOT DETECTED
ALBUMIN SERPL-MCNC: 3.3 G/DL (ref 3.2–4.8)
ALBUMIN/GLOB SERPL: 1.4 {RATIO} (ref 1–2)
ALP LIVER SERPL-CCNC: 85 U/L
ALT SERPL-CCNC: 36 U/L
ANION GAP SERPL CALC-SCNC: 8 MMOL/L (ref 0–18)
AST SERPL-CCNC: 17 U/L (ref ?–34)
B PARAPERT DNA SPEC QL NAA+PROBE: NOT DETECTED
B PERT DNA SPEC QL NAA+PROBE: NOT DETECTED
BASOPHILS # BLD AUTO: 0.04 X10(3) UL (ref 0–0.2)
BASOPHILS NFR BLD AUTO: 0.7 %
BILIRUB SERPL-MCNC: 0.3 MG/DL (ref 0.2–1.1)
BUN BLD-MCNC: 29 MG/DL (ref 9–23)
C DIFF TOX B STL QL: NEGATIVE
C PNEUM DNA SPEC QL NAA+PROBE: NOT DETECTED
CALCIUM BLD-MCNC: 8.5 MG/DL (ref 8.7–10.4)
CHLORIDE SERPL-SCNC: 114 MMOL/L (ref 98–112)
CO2 SERPL-SCNC: 18 MMOL/L (ref 21–32)
CORONAVIRUS 229E PCR:: NOT DETECTED
CORONAVIRUS HKU1 PCR:: NOT DETECTED
CORONAVIRUS NL63 PCR:: NOT DETECTED
CORONAVIRUS OC43 PCR:: NOT DETECTED
CREAT BLD-MCNC: 1.62 MG/DL
EGFRCR SERPLBLD CKD-EPI 2021: 46 ML/MIN/1.73M2 (ref 60–?)
EOSINOPHIL # BLD AUTO: 0.23 X10(3) UL (ref 0–0.7)
EOSINOPHIL NFR BLD AUTO: 3.9 %
FLUAV RNA SPEC QL NAA+PROBE: NOT DETECTED
FLUBV RNA SPEC QL NAA+PROBE: NOT DETECTED
GLOBULIN PLAS-MCNC: 2.4 G/DL (ref 2–3.5)
GLUCOSE BLD-MCNC: 144 MG/DL (ref 70–99)
GLUCOSE BLD-MCNC: 153 MG/DL (ref 70–99)
GLUCOSE BLD-MCNC: 165 MG/DL (ref 70–99)
GLUCOSE BLD-MCNC: 181 MG/DL (ref 70–99)
GLUCOSE BLD-MCNC: 213 MG/DL (ref 70–99)
HCT VFR BLD AUTO: 23.3 %
HGB BLD-MCNC: 6.9 G/DL
HGB BLD-MCNC: 7.1 G/DL
HGB BLD-MCNC: 7.1 G/DL
HGB BLD-MCNC: 7.4 G/DL
IMM GRANULOCYTES # BLD AUTO: 0.03 X10(3) UL (ref 0–1)
IMM GRANULOCYTES NFR BLD: 0.5 %
INR BLD: 1.74 (ref 0.8–1.2)
L PNEUMO AG UR QL: NEGATIVE
LYMPHOCYTES # BLD AUTO: 1.05 X10(3) UL (ref 1–4)
LYMPHOCYTES NFR BLD AUTO: 17.8 %
MCH RBC QN AUTO: 25.8 PG (ref 26–34)
MCHC RBC AUTO-ENTMCNC: 30.5 G/DL (ref 31–37)
MCV RBC AUTO: 84.7 FL
METAPNEUMOVIRUS PCR:: NOT DETECTED
MONOCYTES # BLD AUTO: 0.36 X10(3) UL (ref 0.1–1)
MONOCYTES NFR BLD AUTO: 6.1 %
MRSA DNA SPEC QL NAA+PROBE: NEGATIVE
MYCOPLASMA PNEUMONIA PCR:: NOT DETECTED
NEUTROPHILS # BLD AUTO: 4.18 X10 (3) UL (ref 1.5–7.7)
NEUTROPHILS # BLD AUTO: 4.18 X10(3) UL (ref 1.5–7.7)
NEUTROPHILS NFR BLD AUTO: 71 %
OSMOLALITY SERPL CALC.SUM OF ELEC: 299 MOSM/KG (ref 275–295)
PARAINFLUENZA 1 PCR:: NOT DETECTED
PARAINFLUENZA 2 PCR:: NOT DETECTED
PARAINFLUENZA 3 PCR:: NOT DETECTED
PARAINFLUENZA 4 PCR:: NOT DETECTED
PLATELET # BLD AUTO: 96 10(3)UL (ref 150–450)
PLATELET MORPHOLOGY: NORMAL
POTASSIUM SERPL-SCNC: 3.4 MMOL/L (ref 3.5–5.1)
PROT SERPL-MCNC: 5.7 G/DL (ref 5.7–8.2)
PROTHROMBIN TIME: 20.5 SECONDS (ref 11.6–14.8)
RBC # BLD AUTO: 2.75 X10(6)UL
RHINOVIRUS/ENTERO PCR:: DETECTED
RSV RNA SPEC QL NAA+PROBE: NOT DETECTED
SARS-COV-2 RNA NPH QL NAA+NON-PROBE: NOT DETECTED
SODIUM SERPL-SCNC: 140 MMOL/L (ref 136–145)
STREP PNEUMO ANTIGEN, URINE: NEGATIVE
WBC # BLD AUTO: 5.9 X10(3) UL (ref 4–11)

## 2024-09-20 PROCEDURE — 30233N1 TRANSFUSION OF NONAUTOLOGOUS RED BLOOD CELLS INTO PERIPHERAL VEIN, PERCUTANEOUS APPROACH: ICD-10-PCS | Performed by: HOSPITALIST

## 2024-09-20 PROCEDURE — 0DJ08ZZ INSPECTION OF UPPER INTESTINAL TRACT, VIA NATURAL OR ARTIFICIAL OPENING ENDOSCOPIC: ICD-10-PCS | Performed by: INTERNAL MEDICINE

## 2024-09-20 PROCEDURE — 99233 SBSQ HOSP IP/OBS HIGH 50: CPT | Performed by: HOSPITALIST

## 2024-09-20 RX ORDER — LIDOCAINE HYDROCHLORIDE 10 MG/ML
INJECTION, SOLUTION EPIDURAL; INFILTRATION; INTRACAUDAL; PERINEURAL AS NEEDED
Status: DISCONTINUED | OUTPATIENT
Start: 2024-09-20 | End: 2024-09-20 | Stop reason: SURG

## 2024-09-20 RX ORDER — SODIUM CHLORIDE, SODIUM LACTATE, POTASSIUM CHLORIDE, CALCIUM CHLORIDE 600; 310; 30; 20 MG/100ML; MG/100ML; MG/100ML; MG/100ML
INJECTION, SOLUTION INTRAVENOUS CONTINUOUS
Status: DISCONTINUED | OUTPATIENT
Start: 2024-09-20 | End: 2024-09-21

## 2024-09-20 RX ORDER — SODIUM CHLORIDE 9 MG/ML
INJECTION, SOLUTION INTRAVENOUS ONCE
Status: COMPLETED | OUTPATIENT
Start: 2024-09-20 | End: 2024-09-20

## 2024-09-20 RX ORDER — NALOXONE HYDROCHLORIDE 0.4 MG/ML
0.08 INJECTION, SOLUTION INTRAMUSCULAR; INTRAVENOUS; SUBCUTANEOUS ONCE AS NEEDED
Status: DISCONTINUED | OUTPATIENT
Start: 2024-09-20 | End: 2024-09-20 | Stop reason: HOSPADM

## 2024-09-20 RX ADMIN — LIDOCAINE HYDROCHLORIDE 50 MG: 10 INJECTION, SOLUTION EPIDURAL; INFILTRATION; INTRACAUDAL; PERINEURAL at 15:37:00

## 2024-09-20 RX ADMIN — SODIUM CHLORIDE: 9 INJECTION, SOLUTION INTRAVENOUS at 15:47:00

## 2024-09-20 NOTE — ANESTHESIA POSTPROCEDURE EVALUATION
[facility]    Liang Schmidt Patient Status:  Observation   Age/Gender 69 year old male MRN [id number]   Location [facility] ENDOSCOPY [facility]IN [facility] Attending Sary Edward MD   Hosp Day # 0 PCP Ema Arora DO       Anesthesia Post-op Note    ESOPHAGOGASTRODUODENOSCOPY (EGD)    Procedure Summary       Date: 09/20/24 Room / Location:  ENDOSCOPY 03 / [facility] ENDOSCOPY    Anesthesia Start: 1533 Anesthesia Stop: 1547    Procedure: ESOPHAGOGASTRODUODENOSCOPY (EGD) Diagnosis: (mariginal ulcer)    Surgeons: Davonte Quick MD Anesthesiologist: Mark Hyde DO    Anesthesia Type: MAC ASA Status: 3            Anesthesia Type: MAC    Vitals Value Taken Time   /64 09/20/24 1612   Temp  09/20/24 1615   Pulse 68 09/20/24 1615   Resp 17 09/20/24 1615   SpO2 100 % 09/20/24 1615   Vitals shown include unfiled device data.    Patient Location: Endoscopy    Anesthesia Type: MAC    Airway Patency: patent    Postop Pain Control: adequate    Mental Status: preanesthetic baseline    Nausea/Vomiting: none    Cardiopulmonary/Hydration status: stable euvolemic    Complications: no apparent anesthesia related complications    Postop vital signs: stable    Dental Exam: Unchanged from Preop    Patient to be discharged from PACU when criteria met.

## 2024-09-20 NOTE — CONSULTS
[facility]  Consultation    Liang Saurabh Schmidt Patient Status:  Observation    1955 MRN [id number]   Location [facility]NW[facility]A Attending Sary Edward MD   Hosp Day # 0 PCP Ema Arora DO       Reason for consultation;  Medical cardiac management     HPI:  This is a 69 year old male patient with PMH of mechanical AVR, Afib, TIA, HTN, DM2 who presented with weakness, dizziness and black stools. Hb was found to be low, INR 8. He received Vit K in the ED. GI planning possible endoscopy. Cardiology were consulted to assist in medical management    MDM / Diagnostics reviewed & interpreted:  ECG shows Afib, LAFB  INR 8.0  TTE w/ LVEF 65%, mechanical AVR    Assessment:    Suspected GIB. Anemia.  Mechanical AVR  Afib  HTN  DM2    Plan:  -INR supratherapeuritc (8.0), reversed already in the ED. Received Vit K. Currently compensated cardiac status. Planning GI endoscopy to assess for GIB.  -Recommend restarting anticoagulation with heparin/coumadin as soon as safe. Pt is at high risk for thrombosis and should target INR 2.5 - 3.5    Discussed with patient.     Please call with questions. Thank you for your consult.    Level of care: C5      Pily Fiore MD  Interventional Cardiology  [facility] [place]    --------------------------------------------------------------------------------------------------------------------------------  ROS 10 systems reviewed, pertinent findings above.    History:  Past Medical History:    Anemia, chronic disease    Anesthesia complication    HX: OF AGGRESSION W/PAST PROCEDURES D/T INSUFFICIENT ANESTHESIA IN THE PAST?    Arthritis    Nothing good to say    Calculus of kidney    Nothing major    Cancer (HCC)    skin cancer    Cellulitis    Diabetes (HCC)    Essential hypertension    Hearing impairment    High blood pressure    Hyperlipidemia    Obesity    Osteoarthritis    Pancreatitis (HCC)    Sepsis (HCC)    Shortness of breath    INTERMITTENT  SOB ON EXERTION    Visual impairment    CONTACTS/GLASSES     Past Surgical History:   Procedure Laterality Date    Anast panc cyst-bowel,trang-en-y      Cabg      Cath transcatheter aortic valve replacement      Cholecystectomy  8 yrs ago    Following acute pancreatitis    Colonoscopy  Many times    Colonoscopy N/A 3/5/2024    Procedure: COLONOSCOPY;  Surgeon: Zenon Kelley DO;  Location:  ENDOSCOPY    Hemorrhoidectomy  1 yr ago    Not very successful to be repeated    Other surgical history  20 yrs ago    Aortic valve replacement. Metal valve now    Removal gallbladder      Replace aortic valve open      performed in [place]    Skin surgery      Tonsillectomy      Vasectomy  20 yrs ago     No family history on file.   reports that he quit smoking about 37 years ago. His smoking use included cigarettes. He started smoking about 57 years ago. He has a 20 pack-year smoking history. He has never used smokeless tobacco. He reports that he does not drink alcohol and does not use drugs.    Objective:   Temp: 97.8 °F (36.6 °C)  Pulse: 74  Resp: 19  BP: 88/58    Intake/Output:     Intake/Output Summary (Last 24 hours) at 9/20/2024 0909  Last data filed at 9/20/2024 0800  Gross per 24 hour   Intake 2528 ml   Output --   Net 2528 ml       Physical Exam:     General: NAD. Conversant.   HEENT: Normocephalic, atraumatic.  Neck: Supple.  Cardiac: Irreg irreg. Normal S1, S2 . Mechanical valve sound  Lungs: GAEB, no wheezing.  GI: Soft, non-distended  Extremities: Rt radial pulses 2+. No edema.  Skin: Warm and dry.      Pily Fiore MD  9/20/2024  9:09 AM

## 2024-09-20 NOTE — PROGRESS NOTES
NURSING ADMISSION NOTE      Patient admitted via Cart  Oriented to room 502  Safety precautions initiated.  Bed in low position.    Admission navigator completed.   Alert x4. Room air. NSR on tele. Voids. Up SBA- complaints of dizziness at times prior to admission. IVF infusing. Denies any c/o pain. No n/v/d. Updated on POC, all questions or concerns discussed at this time.       Cardiology, GI consulted  Call light in reach.

## 2024-09-20 NOTE — PLAN OF CARE
Pt is A&Ox4. Room air, O2 sating WNL. Tele-NSR/afib. Pt reported abdomen pain, declined pain medications. Denied nausea and shortness of breath. Orthostatics (+). GI consulted, scope done. RBC given, consent signed. C.diff (-). Pt updated on POC, all questions and concerns addressed, pt verbalized understanding. Safety precautions in place, no further needs at this time.    Problem: CARDIOVASCULAR - ADULT  Goal: Maintains optimal cardiac output and hemodynamic stability  Description: INTERVENTIONS:  - Monitor vital signs, rhythm, and trends  - Monitor for bleeding, hypotension and signs of decreased cardiac output  - Evaluate effectiveness of vasoactive medications to optimize hemodynamic stability  - Monitor arterial and/or venous puncture sites for bleeding and/or hematoma  - Assess quality of pulses, skin color and temperature  - Assess for signs of decreased coronary artery perfusion - ex. Angina  - Evaluate fluid balance, assess for edema, trend weights  Outcome: Progressing  Goal: Absence of cardiac arrhythmias or at baseline  Description: INTERVENTIONS:  - Continuous cardiac monitoring, monitor vital signs, obtain 12 lead EKG if indicated  - Evaluate effectiveness of antiarrhythmic and heart rate control medications as ordered  - Initiate emergency measures for life threatening arrhythmias  - Monitor electrolytes and administer replacement therapy as ordered  Outcome: Progressing     Problem: RESPIRATORY - ADULT  Goal: Achieves optimal ventilation and oxygenation  Description: INTERVENTIONS:  - Assess for changes in respiratory status  - Assess for changes in mentation and behavior  - Position to facilitate oxygenation and minimize respiratory effort  - Oxygen supplementation based on oxygen saturation or ABGs  - Provide Smoking Cessation handout, if applicable  - Encourage broncho-pulmonary hygiene including cough, deep breathe, Incentive Spirometry  - Assess the need for suctioning and perform as needed  -  Assess and instruct to report SOB or any respiratory difficulty  - Respiratory Therapy support as indicated  - Manage/alleviate anxiety  - Monitor for signs/symptoms of CO2 retention  Outcome: Progressing     Problem: GASTROINTESTINAL - ADULT  Goal: Minimal or absence of nausea and vomiting  Description: INTERVENTIONS:  - Maintain adequate hydration with IV or PO as ordered and tolerated  - Nasogastric tube to low intermittent suction as ordered  - Evaluate effectiveness of ordered antiemetic medications  - Provide nonpharmacologic comfort measures as appropriate  - Advance diet as tolerated, if ordered  - Obtain nutritional consult as needed  - Evaluate fluid balance  Outcome: Progressing  Goal: Maintains or returns to baseline bowel function  Description: INTERVENTIONS:  - Assess bowel function  - Maintain adequate hydration with IV or PO as ordered and tolerated  - Evaluate effectiveness of GI medications  - Encourage mobilization and activity  - Obtain nutritional consult as needed  - Establish a toileting routine/schedule  - Consider collaborating with pharmacy to review patient's medication profile  Outcome: Progressing     Problem: HEMATOLOGIC - ADULT  Goal: Maintains hematologic stability  Description: INTERVENTIONS  - Assess for signs and symptoms of bleeding or hemorrhage  - Monitor labs and vital signs for trends  - Administer supportive blood products/factors, fluids and medications as ordered and appropriate  - Administer supportive blood products/factors as ordered and appropriate  Outcome: Progressing  Goal: Free from bleeding injury  Description: (Example usage: patient with low platelets)  INTERVENTIONS:  - Avoid intramuscular injections, enemas and rectal medication administration  - Ensure safe mobilization of patient  - Hold pressure on venipuncture sites to achieve adequate hemostasis  - Assess for signs and symptoms of internal bleeding  - Monitor lab trends  - Patient is to report abnormal  signs of bleeding to staff  - Avoid use of toothpicks and dental floss  - Use electric shaver for shaving  - Use soft bristle tooth brush  - Limit straining and forceful nose blowing  Outcome: Progressing     Problem: PAIN - ADULT  Goal: Verbalizes/displays adequate comfort level or patient's stated pain goal  Description: INTERVENTIONS:  - Encourage pt to monitor pain and request assistance  - Assess pain using appropriate pain scale  - Administer analgesics based on type and severity of pain and evaluate response  - Implement non-pharmacological measures as appropriate and evaluate response  - Consider cultural and social influences on pain and pain management  - Manage/alleviate anxiety  - Utilize distraction and/or relaxation techniques  - Monitor for opioid side effects  - Notify MD/LIP if interventions unsuccessful or patient reports new pain  - Anticipate increased pain with activity and pre-medicate as appropriate  Outcome: Progressing     Problem: RISK FOR INFECTION - ADULT  Goal: Absence of fever/infection during anticipated neutropenic period  Description: INTERVENTIONS  - Monitor WBC  - Administer growth factors as ordered  - Implement neutropenic guidelines  Outcome: Progressing     Problem: SAFETY ADULT - FALL  Goal: Free from fall injury  Description: INTERVENTIONS:  - Assess pt frequently for physical needs  - Identify cognitive and physical deficits and behaviors that affect risk of falls.  - Santa Claus fall precautions as indicated by assessment.  - Educate pt/family on patient safety including physical limitations  - Instruct pt to call for assistance with activity based on assessment  - Modify environment to reduce risk of injury  - Provide assistive devices as appropriate  - Consider OT/PT consult to assist with strengthening/mobility  - Encourage toileting schedule  Outcome: Progressing     Problem: DISCHARGE PLANNING  Goal: Discharge to home or other facility with appropriate  resources  Description: INTERVENTIONS:  - Identify barriers to discharge w/pt and caregiver  - Include patient/family/discharge partner in discharge planning  - Arrange for needed discharge resources and transportation as appropriate  - Identify discharge learning needs (meds, wound care, etc)  - Arrange for interpreters to assist at discharge as needed  - Consider post-discharge preferences of patient/family/discharge partner  - Complete POLST form as appropriate  - Assess patient's ability to be responsible for managing their own health  - Refer to Case Management Department for coordinating discharge planning if the patient needs post-hospital services based on physician/LIP order or complex needs related to functional status, cognitive ability or social support system  Outcome: Progressing     Problem: Patient/Family Goals  Goal: Patient/Family Long Term Goal  Description: Patient's Long Term Goal: Discharge with adequate resources    Interventions:  - Identify barriers to discharge w/pt and caregiver  - Include patient/family/discharge partner in discharge planning  - Arrange for needed discharge resources and transportation as appropriate  - Identify discharge learning needs   - Consider post-discharge preferences of patient/family/discharge partner  - Assess patient's ability to be responsible for managing their own health  - Refer to Case Management Department for coordinating discharge planning if the patient needs post-hospital services  - See additional Care Plan goals for specific interventions  Outcome: Progressing  Goal: Patient/Family Short Term Goal  Description: Patient's Short Term Goal:  9/20: increase hgb    Interventions:   - consults  - orders per MD  - See additional Care Plan goals for specific interventions  Outcome: Progressing

## 2024-09-20 NOTE — PROGRESS NOTES
[facility]   part of [facility]     Hospitalist Progress Note     Liang Wiley Brayan Patient Status:  Observation    1955 MRN [id number]   Location [facility] 5NW-A Attending Sary Edward MD   Hosp Day # 0 PCP Ema Arora DO     Chief Complaint: sob    Subjective:     Patient with melena    Objective:    Review of Systems:   A comprehensive review of systems was completed; pertinent positive and negatives stated in subjective.    Vital signs:  Temp:  [97 °F (36.1 °C)-97.8 °F (36.6 °C)] 97.8 °F (36.6 °C)  Pulse:  [54-99] 83  Resp:  [15-25] 19  BP: ()/(41-82) 110/62  SpO2:  [100 %] 100 %    Physical Exam:    General: No acute distress  Respiratory: No wheezes, no rhonchi  Cardiovascular: S1, S2, regular rate and rhythm  Abdomen: Soft, Non-tender, non-distended, positive bowel sounds  Neuro: No new focal deficits.   Extremities: No edema      Diagnostic Data:    Labs:  Recent Labs   Lab 24  0335   WBC 6.7  --  5.9   HGB 8.8* 7.6* 7.1*  7.1*   MCV 80.7  --  84.7   .0*  --  96.0*   INR 8.00*  --  1.74*       Recent Labs   Lab 24  0335   * 153*   BUN 28* 29*   CREATSERUM 1.72* 1.62*   CA 9.3 8.5*   ALB 3.9 3.3    140   K 3.9 3.4*    114*   CO2 18.0* 18.0*   ALKPHO 100 85   AST 24 17   ALT 46 36   BILT 0.3 0.3   TP 6.8 5.7       Estimated Creatinine Clearance: 44.4 mL/min (A) (based on SCr of 1.62 mg/dL (H)).    Recent Labs   Lab 24  1615   TROPHS 26       Recent Labs   Lab 24  0335   PTP 68.8* 20.5*   INR 8.00* 1.74*                  Microbiology    No results found for this visit on 24.      Imaging: Reviewed in Epic.    Medications:    potassium chloride  40 mEq Intravenous Once    insulin aspart  1-68 Units Subcutaneous TID CC    insulin aspart  1-10 Units Subcutaneous TID AC and HS    azithromycin  500 mg Intravenous Q24H    cefTRIAXone  2 g Intravenous Q24H     guaiFENesin ER  600 mg Oral BID    pantoprazole  40 mg Intravenous Q12H    atorvastatin  20 mg Oral Nightly       Assessment & Plan:      #Hypotension suspect d/t hypovolemia d/t GIB  -Hold antihypertensives  -Monitor hemodynamics     #GIB, melena  #History of gastric bypass  -IVF  -PPI IV BID  -Serial H&H  -GI consult      #Coagulopathy d/t Coumadin > Vit K given in ER  -Monitor INR - patient has a mechanical aortic valve     #Aortic stenosis sp mechanical AVR   #Essential hypertension  #Dyslipidemia  -Cardiology consult     #Atrial fibrillation on Coumadin  -Hold Coumadin  -Monitor INR  -Telemetry     #Dyspnea d/t pneumonia  -IV abx  -Antitussives as needed  -Check cultures, RVP     #Chronic kidney disease  -Monitor UOP, creatinine and electrolytes     #Anemia, as above     #DVT Px: INR > 2      Sary Edward MD    Supplementary Documentation:     Quality:  DVT Mechanical Prophylaxis:        DVT Pharmacologic Prophylaxis   Medication   None                Code Status: Not on file  Vee: No urinary catheter in place  Vee Duration (in days):   Central line:    ADAMARIS:     Discharge is dependent on: progress  At this point Mr. Schmidt is expected to be discharge to: home    The 21st Century Cures Act makes medical notes like these available to patients in the interest of transparency. Please be advised this is a medical document. Medical documents are intended to carry relevant information, facts as evident, and the clinical opinion of the practitioner. The medical note is intended as peer to peer communication and may appear blunt or direct. It is written in medical language and may contain abbreviations or verbiage that are unfamiliar.

## 2024-09-20 NOTE — PLAN OF CARE
Liang Jacintoabdulaziz Schmidt Patient Status:  Observation    1955 MRN [id number]   Location [facility] 5NW-A Attending Stephen Fisher MD   Hosp Day # 0 PCP Ema Arora DO       Cardiology Nocturnal APN Note    Page Received: Primary RN    HPI:     Patient is a 69 year old male with PMH of HTN, aortic valve stenosis s/p mechanical aortic valve repair, atrial fibrillation-on warfarin, HLD, DM II, TIA, GERD, and anemia who presented to the ED with c/o weakness and dyspnea for the past few days. Pt also reported having GI bleeding. Hospitalized a couple of weeks ago for dyspnea that was multifactorial in setting of anemia and new onset atrial fibrillation. Denied chest pain, dizziness, lightheadedness or any other symptoms. Pt has a history of anemia and has received iron infusions. [facility] consulted for anticoagulation management. Echocardiogram completed 2024 showed normal left ventricular cavity size and systolic function. EF 65-70%. HPI obtained from chart review and information provided by ED physician.       ED Clinical Course    EKG: Atrial fibrillation-rate controlled    Labs: Cr 1.72, troponin negative    Imaging: CXR showed mild patchy opacity-possible pneumonia    Medications: Protonix, Vitamin K, and antibiotics      Assessment/Plan:    - Anticoagulation held for now  - Continue to monitor overnight on telemetry  - Formal cardiology consult to follow in AM.       TRAM Blackburn  [facility]  2024  3:06 AM

## 2024-09-20 NOTE — HISTORICAL OFFICE NOTE
[facility]  Outside Information  Continuity of Care Document  6/20/2023  Liang Schmidt - 67 y.o. Male; born [birth year]Summary of episode note, generated on Jul. 12, 2023July 12, 2023   CHIEF COMPLAINT    CHIEF COMPLAINT  Reason for Visit/Chief Complaint   follow up   Follow-up visit. Doing well. Still having trouble with hemorrhoidal bleeding. Also has basal cell skin cancer. Is going to have the skin cancer evaluated and will probably require additional colon surgery.Lungs are clear. He is in sinus rhythm. Valve sounds great.     PROBLEMS  Reconcile with Patient's ChartPROBLEMS  Problem Effective Dates Date resolved Problem Status   Pre-op evaluation, [SNOMED-CT: 179541554] 9/14/2022 - Active   Primary hypertension, [SNOMED-CT: 30697610] 7/26/2022 - Active   Hyperlipidemia associated with type 2 diabetes mellitus, [SNOMED-CT: 90193836] 7/26/2022 - Active   H/O heart valve replacement with mechanical valve, [SNOMED-CT: 06517215791890] 8/3/2022 - Active   Hyperlipidemia, [SNOMED-CT: 54683204] 8/3/2022 - Active     ENCOUNTER DIAGNOSIS    No data available    VITAL SIGNS    VITAL SIGNS  Date / Time: 6/20/2023   BP Systolic 116 mmHg   BP Diastolic 60 mmHg   Height 70 inches   Weight 280 lbs   Pulse Rate 78 bpm   BSA (Body Surface Area) 2.6 cc/m2   BMI (Body Mass Index) 40.2 cc/m2   Blood Pressure 116 / 60 mmHg     PHYSICAL EXAMINATION    PHYSICAL EXAMINATION  Header Details   Vitals Right Arm Sitting  / 60 mmHg, Pulse rate 78 bpm, Height in 5' 10\", BMI: 40.2, Weight in 279.99 lbs (or) 127 kgs, BSA : 2.56 cc/m²   General Appearance No Acute Distress   Head/Eyes/Ears/Nose/Mouth/Throat No icterus   Neck Normal carotid pulsations   Respiratory Lungs clear with normal breath sounds   Cardiovascular Regular rhythm.   Gastrointestinal Abdomen soft   Lower Extremities No edema   Skin Warm and dry   Neurologic / Psychiatric Non-focal     ALLERGIES, ADVERSE REACTIONS,  ALERTS    No data available    MEDICATIONS ADMINISTERED DURING VISIT    No data available    MEDICATIONS  Reconcile with Patient's ChartMEDICATIONS  Medication Start Date Route/Frequency Status   amLODIPine 10 MG Oral Tab, [RxNorm: 554943] 8/26/2022 Take 1 tablet (10 mg total) by mouth daily. Active   Calcium Carb-Cholecalciferol 500-400 MG-UNIT Oral Tab, [RxNorm: 184985] 8/26/2022 Take 1 tablet by mouth daily. Active   gabapentin 300 MG Oral Cap, [RxNorm: 605164] 8/26/2022 Take 1 capsule (300 mg total) by mouth nightly. Active   lansoprazole 30 MG Oral Capsule Delayed Release, [RxNorm: 221698] 8/26/2022 Take 1 capsule (30 mg total) by mouth every morning before breakfast. Active   metFORMIN 500 MG Oral Tab, [RxNorm: 681898] 8/26/2022 Take 1 tablet (500 mg total) by mouth daily with breakfast. Active   ramipril 10 MG Oral Cap, [RxNorm: 409788] 8/26/2022 Take 1 capsule (10 mg total) by mouth daily. Active   sertraline 100 MG Oral Tab, [RxNorm: 045726] 8/26/2022 Take 2 tablets (200 mg total) by mouth daily. Active   simvastatin 40 MG Oral Tab, [RxNorm: 158563] 8/26/2022 Take 1 tablet (40 mg total) by mouth nightly. Active   warfarin 10 MG Oral Tab, [RxNorm: 739590] 8/26/2022 Take 1 tablet (10 mg total) by mouth nightly. Active     ASSESSMENT    Compensated cardiac status. Prior mechanical aortic valve replacement. No known coronary disease he had as described risk factors including dyslipidemia, diabetes mellitus and hypertension.Overall doing well.Liang is at acceptable cardiac risk to proceed with colon surgery as required. As in the past have given him the okay to hold his Coumadin 5 days prior to the procedure. He can resume his Coumadin postprocedure when his bleeding risk has declined. We are continuing to target his INR between 2 and 2.5 to try to minimize his bleeding risk.I will see him in 1 year. He should plan on having a follow-up transthoracic echocardiogram performed before that visit.     FAMILY  HISTORY    No data available    GENERAL STATUS    No data available    PAST MEDICAL HISTORY    PAST MEDICAL HISTORY  Problem Date diagonsed Date resolved Status   Pre-op evaluation, [SNOMED-CT: 573052975] 9/14/2022 - Active   Primary hypertension, [SNOMED-CT: 10846864] 7/26/2022 - Active   Hyperlipidemia associated with type 2 diabetes mellitus, [SNOMED-CT: 83125599] 7/26/2022 - Active   Hyperlipidemia, [SNOMED-CT: 14785344] 8/3/2022 - Active     HISTORY OF PRESENT ILLNESS    Follow-up visit. Doing well. Still having trouble with hemorrhoidal bleeding. Also has basal cell skin cancer. Is going to have the skin cancer evaluated and will probably require additional colon surgery.Lungs are clear. He is in sinus rhythm. Valve sounds great.     IMMUNIZATIONS    No data available    PLAN OF CARE    PLAN OF CARE  Planned Care Date   Echocardiography - Complete 1/1/1900   Referral Visit - Ema Arora ([email or web address]) : 1/1/1900   Follow up visit - Jeffrey Johnson 1/1/1900     PROCEDURES    No data available    LAB RESULTS    No data available    REVIEW OF SYSTEMS    REVIEW OF SYSTEMS  Header Details   Constitutional No history of Weight Loss   Eyes No history of Double vision   ENT No history of Bloody nose (epistaxis)   Gastrointestinal No history of Abdominal pain   Cardiovascular No history of Chest pain   Genitourinary No history of Hematuria   Musculoskeletal No history of Muscle weakness   Respiratory No history of Hemoptysis   Neurological No history of Ataxia   Endocrine No history of Orthostatic symptoms   Psychiatric No history of Drug abuse   Hem/Lymphatic No history of Blood clots   Allergy Immunology No history of Hives   Integumentary Skin changes     SOCIAL HISTORY    SOCIAL HISTORY  Social History Element Description Effective Dates   Smoking status Never smoked -     FUNCTIONAL STATUS    No data available    MEDICAL EQUIPMENT    No data available    Goals Sections    No data available     REASON FOR REFERRAL           Health Concerns Section    No data available    COGNITIVE/MENTAL STATUS    No data available    Patient Demographics    Patient Demographics  Patient Address Patient Name Communication   [address] [place] [address] Liang Schmidt [phone] (Home)     Patient Demographics  Language Race / Ethnicity Marital Status   English (Preferred) White / Unknown      Document Information    Primary Care Provider Other Service Providers Document Coverage Dates   Alex Murphy  NPI: [id number]  [phone] (Work)  [address] [place] [id number]  [place] [address]  Interpreting Physicians  [facility]  [phone] (Work)  [address] [place] [address] Oh AUGUSTK Malathi  NPI: [id number]  [phone] (Work)  [address] [place] [address]  [place] [address]  Nurses     Juni Singh  NPI: [id number]  [phone] (Work)  [address] [place] [address]  [place] [address]  Nurses Jun. 20, 2023June 20, 2023      Organization   [facility]  [phone] (Work)  [address] [place] [address] [id number] [place] [address]     Encounter Providers Encounter Date    Jun. 20, 2023June 20, 2023     Legal Authenticator    Jeffrey Johnson  NPI: [id number]  [phone] (Work)  [address] [place] [address] [id number] [place] [address]

## 2024-09-20 NOTE — ED QUICK NOTES
Orders for admission, patient is aware of plan and ready to go upstairs. Any questions, please call ED ADRIANA Ferrara at extension [phone].     Patient Covid vaccination status: Fully vaccinated     COVID Test Ordered in ED: None    COVID Suspicion at Admission: N/A    Running Infusions:    sodium chloride 125 mL/hr at 09/19/24 1724    phytonadione    Mental Status/LOC at time of transport: A&Ox4    Other pertinent information:   CIWA score: N/A   NIH score:  N/A

## 2024-09-20 NOTE — OPERATIVE REPORT
Operative Report-Esophagogastroduodenoscopy       PREOPERATIVE DIAGNOSIS/INDICATION:  Melena  Acute blood loss anemia  POSTOPERTATIVE DIAGNOSIS:  Marginal erosions limited to anastomosis-no active bleeding  Otherwise normal post Antonio-n-Y anatomy  PROCEDURE PERFORMED: EGD  INFORMED CONSENT: Once a brief history and physical examination was performed, the risks, benefits and alternatives to the procedure were discussed with the patient and/or family and informed consent was obtained.  The risks of sedation, perforation, missed lesions and need for surgery were all discussed.  Patient expressed understanding of the risks and agreed to proceed.    PROCEDURE DESCRIPTION:  The patient was then brought to the endoscopy suite where his/her pulse, pulse oximetry and blood pressure were monitored. He/she was placed in the left lateral decubitus position and deep sedation was administered. Once adequate sedation was achieved, a bite block was placed and a lubricated tip of an Olympus video upper endoscope was inserted through the oropharynx and gently manipulated through the esophagus into the stomach and the distal duodenum. Upon withdrawal of the endoscope, careful visualization of the mucosa was performed.   FINDINGS:  ESOPHAGUS:Normal in course and caliber, no mucosal erosions, ulcers  EGJ: Located at 35 cm otherwise normal, .  STOMACH:Small  5 cm gastric remnant with Antonio en Y anatomy.  Intense erythema limited to the anastomosis with several small punctate erosion and one thin sliver of semi-circumferential erosion. No visible vessel or flat spots..   SMALL BOWEL: Afferent and efferent limbs of small bowel normal to the extent examined  THERAPEUTICS: None  RECOMMENDATIONS:   Above findings can explain melena and mild drop in hemoglobin in the setting of coumadin coagulopathy   PPI daily x 8 weeks  Avoid NSAIDs  General diet  OK to resume Coumadin in am.  .  CC Report:     Davonte Quick  MD  9/20/2024  3:45 PM  Ema Arora DO

## 2024-09-20 NOTE — ANESTHESIA PREPROCEDURE EVALUATION
PRE-OP EVALUATION    Patient Name: Liang Schmidt    Admit Diagnosis: Elevated INR [R79.1]  Chronic anemia [D64.9]  Gastrointestinal hemorrhage, unspecified gastrointestinal hemorrhage type [K92.2]  Community acquired pneumonia of left lower lobe of lung [J18.9]    Pre-op Diagnosis: Melena    ESOPHAGOGASTRODUODENOSCOPY (EGD)    Anesthesia Procedure: ESOPHAGOGASTRODUODENOSCOPY (EGD)    Surgeons and Role:     * Davonte Quick MD - Primary    Pre-op vitals reviewed.  Temp: 97.4 °F (36.3 °C)  Pulse: 68  Resp: 16  BP: 133/52  SpO2: 100 %  Body mass index is 32.61 kg/m².    Current medications reviewed.  Hospital Medications:   [COMPLETED] potassium chloride 40 mEq in 250mL sodium chloride 0.9% IVPB premix  40 mEq Intravenous Once    [COMPLETED] sodium chloride 0.9% infusion   Intravenous Once    [COMPLETED] sodium chloride 0.9 % IV bolus 500 mL  500 mL Intravenous Once    Followed by    sodium chloride 0.9% infusion   Intravenous Continuous    [COMPLETED] pantoprazole (Protonix) 80 mg in sodium chloride 0.9% 100 mL IV bolus  80 mg Intravenous Once    [COMPLETED] cefTRIAXone (Rocephin) 2 g in sodium chloride 0.9% 100 mL IVPB-ADDV  2 g Intravenous Once    [COMPLETED] azithromycin (Zithromax) 500 mg in sodium chloride 0.9% 250mL IVPB premix  500 mg Intravenous Once    glucose (Dex4) 15 GM/59ML oral liquid 15 g  15 g Oral Q15 Min PRN    Or    glucose (Glutose) 40% oral gel 15 g  15 g Oral Q15 Min PRN    Or    glucose-vitamin C (Dex-4) chewable tab 4 tablet  4 tablet Oral Q15 Min PRN    Or    dextrose 50% injection 50 mL  50 mL Intravenous Q15 Min PRN    Or    glucose (Dex4) 15 GM/59ML oral liquid 30 g  30 g Oral Q15 Min PRN    Or    glucose (Glutose) 40% oral gel 30 g  30 g Oral Q15 Min PRN    Or    glucose-vitamin C (Dex-4) chewable tab 8 tablet  8 tablet Oral Q15 Min PRN    sodium chloride 0.9% infusion   Intravenous Continuous    acetaminophen (Tylenol Extra Strength) tab 500 mg  500 mg Oral Q4H PRN     ondansetron (Zofran) 4 MG/2ML injection 4 mg  4 mg Intravenous Q6H PRN    metoclopramide (Reglan) 5 mg/mL injection 5 mg  5 mg Intravenous Q8H PRN    insulin aspart (NovoLOG) 100 Units/mL FlexPen 1-68 Units  1-68 Units Subcutaneous TID CC    insulin aspart (NovoLOG) 100 Units/mL FlexPen 1-10 Units  1-10 Units Subcutaneous TID AC and HS    azithromycin (Zithromax) 500 mg in sodium chloride 0.9% 250mL IVPB premix  500 mg Intravenous Q24H    cefTRIAXone (Rocephin) 2 g in sodium chloride 0.9% 100 mL IVPB-ADDV  2 g Intravenous Q24H    guaiFENesin ER (Mucinex) 12 hr tab 600 mg  600 mg Oral BID    [COMPLETED] phytonadione (Aqua-Mephton) 5 mg in sodium chloride 0.9% 50 mL IVPB  5 mg Intravenous Once    pantoprazole (Protonix) 40 mg in sodium chloride 0.9% PF 10 mL IV push  40 mg Intravenous Q12H    atorvastatin (Lipitor) tab 20 mg  20 mg Oral Nightly       Outpatient Medications:     Medications Prior to Admission   Medication Sig Dispense Refill Last Dose    metFORMIN 500 MG Oral Tab Take 2 tablets (1,000 mg total) by mouth daily with breakfast. 180 tablet 0 9/19/2024    sertraline 100 MG Oral Tab Take 2 tablets (200 mg total) by mouth daily. 180 tablet 0 9/19/2024    dilTIAZem  MG Oral Capsule SR 24 Hr Take 1 capsule (120 mg total) by mouth daily. 90 capsule 0 9/19/2024    ramipril 5 MG Oral Cap Take 1 capsule (5 mg total) by mouth daily. 90 capsule 0 9/19/2024    atorvastatin 20 MG Oral Tab Take 1 tablet (20 mg total) by mouth nightly. 90 tablet 0 9/18/2024    gabapentin 300 MG Oral Cap Take 1 capsule (300 mg total) by mouth daily. 90 capsule 0 9/19/2024    warfarin 5 MG Oral Tab Take 1 tablet (5 mg total) by mouth nightly. 90 tablet 0 9/18/2024    hydroCHLOROthiazide 12.5 MG Oral Tab Take 1 tablet (12.5 mg total) by mouth daily. 90 tablet 0 9/19/2024    cetirizine 10 MG Oral Tab Take 1 tablet (10 mg total) by mouth daily. 90 tablet 0 9/19/2024    triamcinolone 0.1 % External Cream Apply twice a day for 2 weeks,  then take a break for 1 week, reapply if persists 80 g 1 Past Week    folic acid 1 MG Oral Tab Take 1 tablet (1 mg total) by mouth daily. 90 tablet 3 2024    cyanocobalamin 1000 MCG/ML Injection Solution Inject 1 mL (1,000 mcg total) into the muscle every 30 (thirty) days. 3 mL 3 Past Month       Allergies: Patient has no known allergies.      Anesthesia Evaluation        Anesthetic Complications           GI/Hepatic/Renal      (+) GERD                           Cardiovascular                  (+) hypertension             (+) valvular problems/murmurs and AS    (+) dysrhythmias and atrial fibrillation                  Endo/Other      (+) diabetes                            Pulmonary               (+) shortness of breath            Neuro/Psych                 (+) neuromuscular disease                     Past Surgical History:   Procedure Laterality Date    Anast panc cyst-bowel,trang-en-y      Cabg      Cath transcatheter aortic valve replacement      Cholecystectomy  8 yrs ago    Following acute pancreatitis    Colonoscopy  Many times    Colonoscopy N/A 3/5/2024    Procedure: COLONOSCOPY;  Surgeon: Zenon Kelley DO;  Location:  ENDOSCOPY    Hemorrhoidectomy  1 yr ago    Not very successful to be repeated    Other surgical history  20 yrs ago    Aortic valve replacement. Metal valve now    Removal gallbladder      Replace aortic valve open      performed in [place]    Skin surgery      Tonsillectomy      Vasectomy  20 yrs ago     Social History     Socioeconomic History    Marital status: Single   Tobacco Use    Smoking status: Former     Current packs/day: 0.00     Average packs/day: 1 pack/day for 20.0 years (20.0 ttl pk-yrs)     Types: Cigarettes     Start date: 1967     Quit date: 1987     Years since quittin.7    Smokeless tobacco: Never    Tobacco comments:     No comment   Vaping Use    Vaping status: Never Used   Substance and Sexual Activity    Alcohol use: Never    Drug use: Never    Other Topics Concern    Caffeine Concern No    Exercise No    Seat Belt No    Special Diet No    Stress Concern No    Weight Concern Yes     History   Drug Use Unknown     Available pre-op labs reviewed.  Lab Results   Component Value Date    WBC 5.9 09/20/2024    RBC 2.75 (L) 09/20/2024    HGB 6.9 (LL) 09/20/2024    HCT 23.3 (L) 09/20/2024    MCV 84.7 09/20/2024    MCH 25.8 (L) 09/20/2024    MCHC 30.5 (L) 09/20/2024    RDW 25.5 09/01/2024    PLT 96.0 (L) 09/20/2024     Lab Results   Component Value Date     09/20/2024    K 3.4 (L) 09/20/2024     (H) 09/20/2024    CO2 18.0 (L) 09/20/2024    BUN 29 (H) 09/20/2024    CREATSERUM 1.62 (H) 09/20/2024     (H) 09/20/2024    CA 8.5 (L) 09/20/2024     Lab Results   Component Value Date    INR 1.74 (H) 09/20/2024         Airway      Mallampati: II  Mouth opening: >3 FB  TM distance: 4 - 6 cm  Neck ROM: full Cardiovascular      Rhythm: irregular  Rate: normal     Dental             Pulmonary      Breath sounds clear to auscultation bilaterally.               Other findings              ASA: 3   Plan: MAC  NPO status verified and patient meets guidelines.          Plan/risks discussed with: patient (Risks discussed including nausea, vomiting, dental damage, stroke and heart attack.  Patient understands plan and wishes to proceed.)                Present on Admission:  **None**

## 2024-09-20 NOTE — CONSULTS
Consultation Note        Liang Schmidt Patient Status:  Observation    1955 MRN [id number]   Location [facility] 5NW-A Attending Sary Edward MD   Hosp Day # 0 PCP Ema Arora DO       Reason for Consultation   Melena  Acute blood loss anemia       History of Present Illness   Mr. Liang Schmidt is a 69 year-old male with a history of Trang en Y bypass, stage III CKD, chronic anemia, follows with hematology and has received IV iron infusions. He was recently admitted 3 weeks with symptomatic anemia. Seen by our service, rectal exam in ED revealed brown stool, occult + blood, but endoscopies deferred due to recent negative endoscopic evaluation (see below)    Had one IV iron since discharge. Started Coumadin, and notes for past 3-4 days having dark melenic stool 3-4 x day. Frequency is unchanged from baseline, but color is now black    In ER, INR noted to be 8, Hgb down from 8's to 6.9, receiving one unit pRBCs. Reports mild epigastric discomfort. Denies NSAIDs.    EGD/Colonoscopy 3/2024 for similar presentation   EGD - mild gastritis and trang-en-y gastric bypass anatomy. Biopsies were negative for celiac disease.   Colon - multiple colon polyps which were removed, diverticulosis, internal and external hemorrhoids.             PMH/MEDS/ALLERGIES/SH/FH:     Past Medical History:    Anemia, chronic disease    Anesthesia complication    HX: OF AGGRESSION W/PAST PROCEDURES D/T INSUFFICIENT ANESTHESIA IN THE PAST?    Arthritis    Nothing good to say    Calculus of kidney    Nothing major    Cancer (HCC)    skin cancer    Cellulitis    Diabetes (HCC)    Essential hypertension    Hearing impairment    High blood pressure    Hyperlipidemia    Obesity    Osteoarthritis    Pancreatitis (HCC)    Sepsis (HCC)    Shortness of breath    INTERMITTENT SOB ON EXERTION    Visual impairment    CONTACTS/GLASSES      Past Surgical History:   Procedure Laterality Date    Anast panc  cyst-bowel,trang-en-y      Cabg      Cath transcatheter aortic valve replacement      Cholecystectomy  8 yrs ago    Following acute pancreatitis    Colonoscopy  Many times    Colonoscopy N/A 3/5/2024    Procedure: COLONOSCOPY;  Surgeon: Zenon Kelley DO;  Location:  ENDOSCOPY    Hemorrhoidectomy  1 yr ago    Not very successful to be repeated    Other surgical history  20 yrs ago    Aortic valve replacement. Metal valve now    Removal gallbladder      Replace aortic valve open      performed in [place]    Skin surgery      Tonsillectomy      Vasectomy  20 yrs ago        No recently discontinued medications to reconcile   No current facility-administered medications on file prior to encounter.     Current Outpatient Medications on File Prior to Encounter   Medication Sig Dispense Refill    metFORMIN 500 MG Oral Tab Take 2 tablets (1,000 mg total) by mouth daily with breakfast. 180 tablet 0    sertraline 100 MG Oral Tab Take 2 tablets (200 mg total) by mouth daily. 180 tablet 0    dilTIAZem  MG Oral Capsule SR 24 Hr Take 1 capsule (120 mg total) by mouth daily. 90 capsule 0    ramipril 5 MG Oral Cap Take 1 capsule (5 mg total) by mouth daily. 90 capsule 0    atorvastatin 20 MG Oral Tab Take 1 tablet (20 mg total) by mouth nightly. 90 tablet 0    gabapentin 300 MG Oral Cap Take 1 capsule (300 mg total) by mouth daily. 90 capsule 0    warfarin 5 MG Oral Tab Take 1 tablet (5 mg total) by mouth nightly. 90 tablet 0    hydroCHLOROthiazide 12.5 MG Oral Tab Take 1 tablet (12.5 mg total) by mouth daily. 90 tablet 0    cetirizine 10 MG Oral Tab Take 1 tablet (10 mg total) by mouth daily. 90 tablet 0    triamcinolone 0.1 % External Cream Apply twice a day for 2 weeks, then take a break for 1 week, reapply if persists 80 g 1    folic acid 1 MG Oral Tab Take 1 tablet (1 mg total) by mouth daily. 90 tablet 3    cyanocobalamin 1000 MCG/ML Injection Solution Inject 1 mL (1,000 mcg total) into the muscle every 30  (thirty) days. 3 mL 3       Current Allergies: No Known Allergies    Social History     Occupational History    Not on file   Tobacco Use    Smoking status: Former     Current packs/day: 0.00     Average packs/day: 1 pack/day for 20.0 years (20.0 ttl pk-yrs)     Types: Cigarettes     Start date: 1967     Quit date: 1987     Years since quittin.7    Smokeless tobacco: Never    Tobacco comments:     No comment   Vaping Use    Vaping status: Never Used   Substance and Sexual Activity    Alcohol use: Never    Drug use: Never    Sexual activity: Not on file       Marital Status:    Lives alone?:    Occupation:   Taking fiber supplements?:    Tattoos?:   Body piercing?:   High risk sexual behavior?:    Advance Directive:          No family history on file.           MEDICATIONS      [COMPLETED] potassium chloride 40 mEq in 250mL sodium chloride 0.9% IVPB premix  40 mEq Intravenous Once    sodium chloride 0.9% infusion   Intravenous Once    [COMPLETED] sodium chloride 0.9 % IV bolus 500 mL  500 mL Intravenous Once    Followed by    sodium chloride 0.9% infusion   Intravenous Continuous    [COMPLETED] pantoprazole (Protonix) 80 mg in sodium chloride 0.9% 100 mL IV bolus  80 mg Intravenous Once    [COMPLETED] cefTRIAXone (Rocephin) 2 g in sodium chloride 0.9% 100 mL IVPB-ADDV  2 g Intravenous Once    [COMPLETED] azithromycin (Zithromax) 500 mg in sodium chloride 0.9% 250mL IVPB premix  500 mg Intravenous Once    glucose (Dex4) 15 GM/59ML oral liquid 15 g  15 g Oral Q15 Min PRN    Or    glucose (Glutose) 40% oral gel 15 g  15 g Oral Q15 Min PRN    Or    glucose-vitamin C (Dex-4) chewable tab 4 tablet  4 tablet Oral Q15 Min PRN    Or    dextrose 50% injection 50 mL  50 mL Intravenous Q15 Min PRN    Or    glucose (Dex4) 15 GM/59ML oral liquid 30 g  30 g Oral Q15 Min PRN    Or    glucose (Glutose) 40% oral gel 30 g  30 g Oral Q15 Min PRN    Or    glucose-vitamin C (Dex-4) chewable tab 8 tablet  8 tablet Oral Q15 Min  PRN    sodium chloride 0.9% infusion   Intravenous Continuous    acetaminophen (Tylenol Extra Strength) tab 500 mg  500 mg Oral Q4H PRN    ondansetron (Zofran) 4 MG/2ML injection 4 mg  4 mg Intravenous Q6H PRN    metoclopramide (Reglan) 5 mg/mL injection 5 mg  5 mg Intravenous Q8H PRN    insulin aspart (NovoLOG) 100 Units/mL FlexPen 1-68 Units  1-68 Units Subcutaneous TID CC    insulin aspart (NovoLOG) 100 Units/mL FlexPen 1-10 Units  1-10 Units Subcutaneous TID AC and HS    azithromycin (Zithromax) 500 mg in sodium chloride 0.9% 250mL IVPB premix  500 mg Intravenous Q24H    cefTRIAXone (Rocephin) 2 g in sodium chloride 0.9% 100 mL IVPB-ADDV  2 g Intravenous Q24H    guaiFENesin ER (Mucinex) 12 hr tab 600 mg  600 mg Oral BID    [COMPLETED] phytonadione (Aqua-Mephton) 5 mg in sodium chloride 0.9% 50 mL IVPB  5 mg Intravenous Once    pantoprazole (Protonix) 40 mg in sodium chloride 0.9% PF 10 mL IV push  40 mg Intravenous Q12H    atorvastatin (Lipitor) tab 20 mg  20 mg Oral Nightly              ROS:     A comprehensive 14 point review of systems was completed.  Pertinent positives and negatives noted in the the HPI.          Physical Exam     Vital signs:  BP (!) 88/58 (BP Location: Left arm)   Pulse 74   Temp 97.8 °F (36.6 °C) (Axillary)   Resp 19   Ht 5' 10\" (1.778 m)   Wt 227 lb 4.7 oz (103.1 kg)   SpO2 100%   BMI 32.61 kg/m²         Physical Exam        General: Appears alert, oriented x 3 and in no acute distress.  HEENT: Normal. No scleral icterus.   NECK: Supple. No neck vein distention. Thyroid not enlarged. No lymphadenopathy.  CV: S1 and S2 normal. No murmurs or gallops.  LUNGS: Clear to percussion and auscultation.  ABDOMEN: Soft and non-distended. Non-tender. No masses. Bowel sounds are present.  BACK: No CVA tenderness.  EXTREMITIES: No edema, cyanosis or clubbing.  SKIN: Warm and dry.         IMAGING/LABS       Labs:   Lab Results   Component Value Date    WBC 5.9 09/20/2024    HGB 6.9 09/20/2024     HCT 23.3 09/20/2024    PLT 96.0 09/20/2024    CREATSERUM 1.62 09/20/2024    BUN 29 09/20/2024     09/20/2024    K 3.4 09/20/2024     09/20/2024    CO2 18.0 09/20/2024     09/20/2024    CA 8.5 09/20/2024    ALB 3.3 09/20/2024    ALKPHO 85 09/20/2024    BILT 0.3 09/20/2024    AST 17 09/20/2024    ALT 36 09/20/2024    INR 1.74 09/20/2024    PTP 20.5 09/20/2024     Recent Labs   Lab 09/19/24  1615 09/20/24  0335   * 153*   BUN 28* 29*   CREATSERUM 1.72* 1.62*   CA 9.3 8.5*    140   K 3.9 3.4*    114*   CO2 18.0* 18.0*     Recent Labs   Lab 09/20/24  0335 09/20/24  0836   RBC 2.75*  --    HGB 7.1*  7.1* 6.9*   HCT 23.3*  --    MCV 84.7  --    MCH 25.8*  --    MCHC 30.5*  --    NEPRELIM 4.18  --    WBC 5.9  --    PLT 96.0*  --        Recent Labs   Lab 09/19/24  1615 09/20/24  0335   ALT 46 36   AST 24 17       Imaging:   XR CHEST AP PORTABLE  (CPT=71045)  Narrative: PROCEDURE:  XR CHEST AP PORTABLE  (CPT=71045)     TECHNIQUE:  AP chest radiograph was obtained.     COMPARISON:  [facility] , XR, XR CHEST AP PORTABLE  (CPT=71045), 8/31/2024, 9:23 PM.     INDICATIONS:  abnormal labs, shortness of breath     PATIENT STATED HISTORY: (As transcribed by Technologist)  Patient offered no additional history at this time.          FINDINGS:  Median sternotomy changes and valve prosthesis noted.  Tortuosity of the thoracic aorta again noted.  Cardiomegaly with normal pulmonary vascularity.  Even trace shin of the right diaphragm.  No pleural effusion or pneumothorax.  There is mild   patchy opacity in the lower left lung that could represent area of developing pneumonia or atelectasis/scarring.  Clinical correlation recommended.                   Impression: CONCLUSION:  There is mild patchy opacity in the lower left lung that could represent area of developing pneumonia or atelectasis/scarring.  Clinical correlation recommended.        LOCATION:  [id number]                 Dictated by (CST):  Chandrakant Pedroza MD on 9/19/2024 at 4:55 PM       Finalized by (CST): Chandrakant Pedroza MD on 9/19/2024 at 4:56 PM               IMPRESSION:     70 y/o male with a h/o Antonio-n-y bypass, chronic anemia with recent melena on Coumadin with INR of 8, r/o UGI bleed from marginal ulcer, esophagitis, AVM, overall mild drop in hemoglobin with stable hemodynamics.  Recent reassuring endoscopic evaluation           PLAN:     IV PPI bid  Urgent EGD today. R/B/A discussed with patient who agrees to proceed as planned.   Reversal of Coumadin done - INR of 1.8         Davonte Quick MD  1:00 PM  9/20/2024  [facility]  [phone]

## 2024-09-21 LAB
APTT PPP: 49.1 SECONDS (ref 23–36)
ERYTHROCYTE [DISTWIDTH] IN BLOOD BY AUTOMATED COUNT: 23.7 %
GLUCOSE BLD-MCNC: 126 MG/DL (ref 70–99)
GLUCOSE BLD-MCNC: 166 MG/DL (ref 70–99)
GLUCOSE BLD-MCNC: 186 MG/DL (ref 70–99)
GLUCOSE BLD-MCNC: 186 MG/DL (ref 70–99)
HCT VFR BLD AUTO: 22.9 %
HGB BLD-MCNC: 7.3 G/DL
INR BLD: 1.21 (ref 0.8–1.2)
MCH RBC QN AUTO: 26.6 PG (ref 26–34)
MCHC RBC AUTO-ENTMCNC: 31.9 G/DL (ref 31–37)
MCV RBC AUTO: 83.6 FL
PLATELET # BLD AUTO: 98 10(3)UL (ref 150–450)
POTASSIUM SERPL-SCNC: 3.8 MMOL/L (ref 3.5–5.1)
PROTHROMBIN TIME: 15.4 SECONDS (ref 11.6–14.8)
RBC # BLD AUTO: 2.74 X10(6)UL
WBC # BLD AUTO: 4.2 X10(3) UL (ref 4–11)

## 2024-09-21 PROCEDURE — 99233 SBSQ HOSP IP/OBS HIGH 50: CPT | Performed by: HOSPITALIST

## 2024-09-21 RX ORDER — WARFARIN SODIUM 5 MG/1
5 TABLET ORAL NIGHTLY
Status: DISCONTINUED | OUTPATIENT
Start: 2024-09-21 | End: 2024-09-22

## 2024-09-21 RX ORDER — SERTRALINE HYDROCHLORIDE 100 MG/1
200 TABLET, FILM COATED ORAL DAILY
Status: DISCONTINUED | OUTPATIENT
Start: 2024-09-21 | End: 2024-09-24

## 2024-09-21 RX ORDER — HEPARIN SODIUM 1000 [USP'U]/ML
5000 INJECTION, SOLUTION INTRAVENOUS; SUBCUTANEOUS ONCE
Status: COMPLETED | OUTPATIENT
Start: 2024-09-21 | End: 2024-09-21

## 2024-09-21 RX ORDER — HEPARIN SODIUM AND DEXTROSE 10000; 5 [USP'U]/100ML; G/100ML
INJECTION INTRAVENOUS CONTINUOUS
Status: DISCONTINUED | OUTPATIENT
Start: 2024-09-21 | End: 2024-09-23

## 2024-09-21 RX ORDER — HEPARIN SODIUM AND DEXTROSE 10000; 5 [USP'U]/100ML; G/100ML
1000 INJECTION INTRAVENOUS ONCE
Status: COMPLETED | OUTPATIENT
Start: 2024-09-21 | End: 2024-09-21

## 2024-09-21 RX ORDER — GABAPENTIN 300 MG/1
300 CAPSULE ORAL DAILY
Status: DISCONTINUED | OUTPATIENT
Start: 2024-09-21 | End: 2024-09-24

## 2024-09-21 RX ORDER — DILTIAZEM HYDROCHLORIDE 120 MG/1
120 CAPSULE, EXTENDED RELEASE ORAL DAILY
Status: DISCONTINUED | OUTPATIENT
Start: 2024-09-21 | End: 2024-09-24

## 2024-09-21 NOTE — PLAN OF CARE
Patient is alert and oriented x4. Wdl on RA. NS/afib on tele. VSS. Q shift orthostatic bps. IVF infusing. QID accu checks.   Problem: CARDIOVASCULAR - ADULT  Goal: Maintains optimal cardiac output and hemodynamic stability  Description: INTERVENTIONS:  - Monitor vital signs, rhythm, and trends  - Monitor for bleeding, hypotension and signs of decreased cardiac output  - Evaluate effectiveness of vasoactive medications to optimize hemodynamic stability  - Monitor arterial and/or venous puncture sites for bleeding and/or hematoma  - Assess quality of pulses, skin color and temperature  - Assess for signs of decreased coronary artery perfusion - ex. Angina  - Evaluate fluid balance, assess for edema, trend weights  Outcome: Progressing  Goal: Absence of cardiac arrhythmias or at baseline  Description: INTERVENTIONS:  - Continuous cardiac monitoring, monitor vital signs, obtain 12 lead EKG if indicated  - Evaluate effectiveness of antiarrhythmic and heart rate control medications as ordered  - Initiate emergency measures for life threatening arrhythmias  - Monitor electrolytes and administer replacement therapy as ordered  Outcome: Progressing     Problem: RESPIRATORY - ADULT  Goal: Achieves optimal ventilation and oxygenation  Description: INTERVENTIONS:  - Assess for changes in respiratory status  - Assess for changes in mentation and behavior  - Position to facilitate oxygenation and minimize respiratory effort  - Oxygen supplementation based on oxygen saturation or ABGs  - Provide Smoking Cessation handout, if applicable  - Encourage broncho-pulmonary hygiene including cough, deep breathe, Incentive Spirometry  - Assess the need for suctioning and perform as needed  - Assess and instruct to report SOB or any respiratory difficulty  - Respiratory Therapy support as indicated  - Manage/alleviate anxiety  - Monitor for signs/symptoms of CO2 retention  Outcome: Progressing     Problem: GASTROINTESTINAL - ADULT  Goal:  Minimal or absence of nausea and vomiting  Description: INTERVENTIONS:  - Maintain adequate hydration with IV or PO as ordered and tolerated  - Nasogastric tube to low intermittent suction as ordered  - Evaluate effectiveness of ordered antiemetic medications  - Provide nonpharmacologic comfort measures as appropriate  - Advance diet as tolerated, if ordered  - Obtain nutritional consult as needed  - Evaluate fluid balance  Outcome: Progressing  Goal: Maintains or returns to baseline bowel function  Description: INTERVENTIONS:  - Assess bowel function  - Maintain adequate hydration with IV or PO as ordered and tolerated  - Evaluate effectiveness of GI medications  - Encourage mobilization and activity  - Obtain nutritional consult as needed  - Establish a toileting routine/schedule  - Consider collaborating with pharmacy to review patient's medication profile  Outcome: Progressing     Problem: HEMATOLOGIC - ADULT  Goal: Maintains hematologic stability  Description: INTERVENTIONS  - Assess for signs and symptoms of bleeding or hemorrhage  - Monitor labs and vital signs for trends  - Administer supportive blood products/factors, fluids and medications as ordered and appropriate  - Administer supportive blood products/factors as ordered and appropriate  Outcome: Progressing  Goal: Free from bleeding injury  Description: (Example usage: patient with low platelets)  INTERVENTIONS:  - Avoid intramuscular injections, enemas and rectal medication administration  - Ensure safe mobilization of patient  - Hold pressure on venipuncture sites to achieve adequate hemostasis  - Assess for signs and symptoms of internal bleeding  - Monitor lab trends  - Patient is to report abnormal signs of bleeding to staff  - Avoid use of toothpicks and dental floss  - Use electric shaver for shaving  - Use soft bristle tooth brush  - Limit straining and forceful nose blowing  Outcome: Progressing     Problem: PAIN - ADULT  Goal:  Verbalizes/displays adequate comfort level or patient's stated pain goal  Description: INTERVENTIONS:  - Encourage pt to monitor pain and request assistance  - Assess pain using appropriate pain scale  - Administer analgesics based on type and severity of pain and evaluate response  - Implement non-pharmacological measures as appropriate and evaluate response  - Consider cultural and social influences on pain and pain management  - Manage/alleviate anxiety  - Utilize distraction and/or relaxation techniques  - Monitor for opioid side effects  - Notify MD/LIP if interventions unsuccessful or patient reports new pain  - Anticipate increased pain with activity and pre-medicate as appropriate  Outcome: Progressing     Problem: RISK FOR INFECTION - ADULT  Goal: Absence of fever/infection during anticipated neutropenic period  Description: INTERVENTIONS  - Monitor WBC  - Administer growth factors as ordered  - Implement neutropenic guidelines  Outcome: Progressing     Problem: SAFETY ADULT - FALL  Goal: Free from fall injury  Description: INTERVENTIONS:  - Assess pt frequently for physical needs  - Identify cognitive and physical deficits and behaviors that affect risk of falls.  - [place] fall precautions as indicated by assessment.  - Educate pt/family on patient safety including physical limitations  - Instruct pt to call for assistance with activity based on assessment  - Modify environment to reduce risk of injury  - Provide assistive devices as appropriate  - Consider OT/PT consult to assist with strengthening/mobility  - Encourage toileting schedule  Outcome: Progressing     Problem: DISCHARGE PLANNING  Goal: Discharge to home or other facility with appropriate resources  Description: INTERVENTIONS:  - Identify barriers to discharge w/pt and caregiver  - Include patient/family/discharge partner in discharge planning  - Arrange for needed discharge resources and transportation as appropriate  - Identify discharge  learning needs (meds, wound care, etc)  - Arrange for interpreters to assist at discharge as needed  - Consider post-discharge preferences of patient/family/discharge partner  - Complete POLST form as appropriate  - Assess patient's ability to be responsible for managing their own health  - Refer to Case Management Department for coordinating discharge planning if the patient needs post-hospital services based on physician/LIP order or complex needs related to functional status, cognitive ability or social support system  Outcome: Progressing     Problem: Patient/Family Goals  Goal: Patient/Family Long Term Goal  Description: Patient's Long Term Goal: Discharge with adequate resources    Interventions:  - Identify barriers to discharge w/pt and caregiver  - Include patient/family/discharge partner in discharge planning  - Arrange for needed discharge resources and transportation as appropriate  - Identify discharge learning needs   - Consider post-discharge preferences of patient/family/discharge partner  - Assess patient's ability to be responsible for managing their own health  - Refer to Case Management Department for coordinating discharge planning if the patient needs post-hospital services  - See additional Care Plan goals for specific interventions  Outcome: Progressing  Goal: Patient/Family Short Term Goal  Description: Patient's Short Term Goal:  9/20: increase hgb  9/20 noc: maintain hgb >7.0    Interventions:   - consults  - orders per MD  -labs  - blood transfusion as needed  - See additional Care Plan goals for specific interventions  Outcome: Progressing

## 2024-09-21 NOTE — PROGRESS NOTES
[facility]   part of [facility]     Cardiology Progress Note        Liang Schmidt Patient Status:  Observation    1955 MRN [id number]   Location [facility] 5NW-A Attending Sary Edward MD   Hosp Day # 0 PCP Ema Arora,      Subjective/ROS:  Feeling ok. No chest pain. Still with some wheezing and mild sob.     Objective:  /50 (BP Location: Left arm)   Pulse 62   Temp 97.6 °F (36.4 °C) (Oral)   Resp 17   Ht 5' 10\" (1.778 m)   Wt 227 lb 4.7 oz (103.1 kg)   SpO2 100%   BMI 32.61 kg/m²     Temp (24hrs), Av.8 °F (36.6 °C), Min:97.4 °F (36.3 °C), Max:98.9 °F (37.2 °C)      Tele  Afib with controlled vr    Intake/Output:    Intake/Output Summary (Last 24 hours) at 2024 0912  Last data filed at 2024 0637  Gross per 24 hour   Intake 3374.33 ml   Output --   Net 3374.33 ml       Patient Weight for the past 72 hrs:   Weight   24 1555 236 lb (107 kg)   24 2224 227 lb 4.7 oz (103.1 kg)   24 0646 227 lb 4.7 oz (103.1 kg)        Physical Exam:    Gen: alert, oriented x 3, NAD  Heent: pupils equal, reactive. Mucous membranes moist.   Neck: no jvd  Cardiac: irregularly irregular, normal S1,prosthetic S2  Lungs: diffuse wheezing bilaterally.  Abd: soft, NT/ND +bs  Ext: no edema  Skin: Warm, dry  Neuro: No focal deficits    Labs:  Lab Results   Component Value Date    WBC 4.2 2024    HGB 7.3 2024    HCT 22.9 2024    PLT 98.0 2024    K 3.8 2024    INR 1.21 2024       CXR: Reviewed. There is mild patchy opacity in the lower left lung that could represent area of developing pneumonia or atelectasis/scarring.  Clinical correlation recommended.     Medications:     insulin aspart  1-68 Units Subcutaneous TID CC    insulin aspart  1-10 Units Subcutaneous TID AC and HS    azithromycin  500 mg Intravenous Q24H    cefTRIAXone  2 g Intravenous Q24H    guaiFENesin ER  600 mg Oral BID    pantoprazole  40 mg Intravenous  Q12H    atorvastatin  20 mg Oral Nightly      lactated ringers      sodium chloride Stopped (09/20/24 1600)    sodium chloride 100 mL/hr at 09/21/24 0633       Assessment:    Anemia, suspected GIB in setting of supratherapeutic INR  Egd results noted. Plan for PPI for 8 weeks, no nsaids. Cleared to resume coumadin today.   Mechanical AVR (placed about 20 yrs ago in [place]) anticoagulated with warfarin- INR on admission 8.0, now 1.21 following reversal with vitamin k given in er  Pt admits to non-adherance with checking INRs despite having a machine at home  PAF- currently in afib with controlled vr, historically on warfarin  Possible pna  ckd    Plan:     Discussed extreme importance of following INRs at home  Resume warfarin. Reviewed with Dr Cotter, given INR is now 1.2, recommend bridging with heparin if ok with GI- will reach out to them to discuss  Discussed with Dr Ludwig, he is ok with heparin    Discussed plan of care with patient      Brittany Moore, NP  [phone]      Note reviewed and labs reviewed. Agree with above assessment and plan.  MDM per me.  69-year-old male who presented with anemia suspected to be secondary to GI blood loss.  He also had a very supratherapeutic INR which was reversed in the emergency room.  He has underlying history of mechanical AVR and has been on warfarin for long-term anticoagulation.  INR now is 1.21.  Ideally, would recommend bridging via heparin to ideal INR goal.  Will discuss with GI.  Will continue to follow.        Jefferson Cotter DO  Cardiologist  [facility]  9/21/2024 12:55 PM      Note to the patient: The 21st Century Cures Act makes medical notes like these available to patients in the interest of transparency. However, be advised that this is a medical document. It is intended as peer to peer communication. It is written in medical language and may contain abbreviations or verbiage that are unfamiliar. It may appear blunt or direct. Medical  documents are intended to carry relevant information, facts as evident, and clinical opinion of the practitioner.     Disclaimer: Components of this note were documented using voice recognition system and are subject to errors not corrected at proofreading. Contact the author of this note for any clarifications.

## 2024-09-21 NOTE — PROGRESS NOTES
Inpatient Follow up Note    Liang Wiley Brayan Patient Status:  Inpatient    1955 MRN [id number]   Location [facility] 5NW-A Attending Sary Edward MD   Hosp Day # 2 PCP Ema Arora DO     Reason for Consultation   melena     Subjective   He reports his stool is turning brown, denies nausea, vomiting, abdominal pain. Hgb stable.             Objective:     /50 (BP Location: Left arm)   Pulse 62   Temp 97.6 °F (36.4 °C) (Oral)   Resp 17   Ht 5' 10\" (1.778 m)   Wt 227 lb 4.7 oz (103.1 kg)   SpO2 100%   BMI 32.61 kg/m²   Gen: AAOx3  CV: RRR with normal S1 / S2  Resp: CTA bilaterally  Abd: (+)BS, soft, non-tender, non-distended; no rebound or guarding  Ext: No edema or cyanosis  Skin: Warm and dry     Labs/Imaging     LABRCNTIP[PGLU:5@  Recent Labs   Lab 24  0335 24  0657   INR 8.00* 1.74* 1.21*         Recent Labs   Lab 246 24  0335 24  0836 24  1632 24  0657   WBC 6.7  --  5.9  --   --  4.2   HGB 8.8* 7.6* 7.1*  7.1* 6.9* 7.4* 7.3*   .0*  --  96.0*  --   --  98.0*       Recent Labs   Lab 24  0335 24  0657    140  --    K 3.9 3.4* 3.8    114*  --    CO2 18.0* 18.0*  --    BUN 28* 29*  --    CREATSERUM 1.72* 1.62*  --        Recent Labs   Lab 24  0335   ALT 46 36   AST 24 17     XR CHEST AP PORTABLE  (CPT=71045)    Result Date: 2024  CONCLUSION:  There is mild patchy opacity in the lower left lung that could represent area of developing pneumonia or atelectasis/scarring.  Clinical correlation recommended.   LOCATION:  [facility]      Dictated by (CST): Chandrakant Pedroza MD on 2024 at 4:55 PM     Finalized by (CST): Chandrakant Pedroza MD on 2024 at 4:56 PM      AST   Date/Time Value Ref Range Status   2024 03:35 AM 17 <34 U/L Final     ALT   Date/Time Value Ref Range Status   2024 03:35 AM 36 10 - 49 U/L Final      BUN   Date/Time Value Ref Range Status   09/20/2024 03:35 AM 29 (H) 9 - 23 mg/dL Final     9/20 EGD: FINDINGS:  ESOPHAGUS:Normal in course and caliber, no mucosal erosions, ulcers  EGJ: Located at 35 cm otherwise normal, .  STOMACH:Small  5 cm gastric remnant with Antonio en Y anatomy.  Intense erythema limited to the anastomosis with several small punctate erosion and one thin sliver of semi-circumferential erosion. No visible vessel or flat spots..   SMALL BOWEL: Afferent and efferent limbs of small bowel normal to the extent examined  THERAPEUTICS: None         Assessment   Mr Schmidt is a 70 y/o male with a h/o Antonio-n-y bypass, chronic anemia with recent melena on Coumadin with INR of 8, with EGD on 9/20 showing erythema and erosions at the gastrojejunal anastomosis. This is likely the cause of the melena. His stool is turning brown and his Hgb is stable. His warfarin was reversed on admission.       Plan   -ok for regular diet from GI perspective  -ok for anticoagulation from GI perspective  -daily CBC  -pantoprazole 40mg daily  -avoid NSAIDs       Dustin Ludwig MD  10:27 AM  9/21/2024  [facility]  [phone]

## 2024-09-21 NOTE — PROGRESS NOTES
09/21/24 0621 09/21/24 0626 09/21/24 0628   Vital Signs   Temp 97.9 °F (36.6 °C) 97.9 °F (36.6 °C) 98.9 °F (37.2 °C)   Temp src Oral Oral Oral   Pulse  --   --  75   Heart Rate Source Monitor Monitor Monitor   Resp 18 17 17   Respiratory Quality Normal Normal Normal   /61 125/59 106/57   MAP (mmHg) 77 78 68   BP Location Left arm Left arm Left arm   BP Method Automatic Automatic Automatic   Patient Position Lying Sitting Standing   Oxygen Therapy   O2 Device None (Room air)  --   --

## 2024-09-21 NOTE — PROGRESS NOTES
[facility]   part of [facility]     Hospitalist Progress Note     Liang Saurabh Schmidt Patient Status:  Observation    1955 MRN [id number]   Location [facility] 5NW-A Attending Sary Edward MD   Hosp Day # 0 PCP Ema Arora DO     Chief Complaint: sob    Subjective:     Patient with melena  S/p egd    Marginal erosions limited to anastomosis-no active bleeding  Otherwise normal post Antonio-n-Y anatomy    Objective:    Review of Systems:   A comprehensive review of systems was completed; pertinent positive and negatives stated in subjective.    Vital signs:  Temp:  [97.4 °F (36.3 °C)-98.9 °F (37.2 °C)] 97.6 °F (36.4 °C)  Pulse:  [60-79] 62  Resp:  [13-19] 17  BP: (106-145)/() 128/50  SpO2:  [100 %] 100 %    Physical Exam:    General: No acute distress  Respiratory: No wheezes, no rhonchi  Cardiovascular: S1, S2, regular rate and rhythm  Abdomen: Soft, Non-tender, non-distended, positive bowel sounds  Neuro: No new focal deficits.   Extremities: No edema      Diagnostic Data:    Labs:  Recent Labs   Lab 24  03324  0836 24  1632 24  0657   WBC 6.7  --  5.9  --   --  4.2   HGB 8.8* 7.6* 7.1*  7.1* 6.9* 7.4* 7.3*   MCV 80.7  --  84.7  --   --  83.6   .0*  --  96.0*  --   --  98.0*   INR 8.00*  --  1.74*  --   --  1.21*       Recent Labs   Lab 24  0335 24  0657   * 153*  --    BUN 28* 29*  --    CREATSERUM 1.72* 1.62*  --    CA 9.3 8.5*  --    ALB 3.9 3.3  --     140  --    K 3.9 3.4* 3.8    114*  --    CO2 18.0* 18.0*  --    ALKPHO 100 85  --    AST 24 17  --    ALT 46 36  --    BILT 0.3 0.3  --    TP 6.8 5.7  --        Estimated Creatinine Clearance: 44.4 mL/min (A) (based on SCr of 1.62 mg/dL (H)).    Recent Labs   Lab 24  1615   TROPHS 26       Recent Labs   Lab 24  1615 24  0335 24  0657   PTP 68.8* 20.5* 15.4*   INR 8.00* 1.74* 1.21*                   Microbiology    Hospital Encounter on 09/19/24   1. Blood Culture     Status: None (Preliminary result)    Collection Time: 09/19/24  6:52 PM    Specimen: Blood,peripheral   Result Value Ref Range    Blood Culture Result No Growth 1 Day N/A         Imaging: Reviewed in Epic.    Medications:    insulin aspart  1-68 Units Subcutaneous TID CC    insulin aspart  1-10 Units Subcutaneous TID AC and HS    azithromycin  500 mg Intravenous Q24H    cefTRIAXone  2 g Intravenous Q24H    guaiFENesin ER  600 mg Oral BID    pantoprazole  40 mg Intravenous Q12H    atorvastatin  20 mg Oral Nightly       Assessment & Plan:      #Hypotension   suspect d/t hypovolemia d/t GIB  -Hold antihypertensives  -resolved     #GIB, melena  #History of gastric bypass  -IVF  -PPI IV BID  -s/p egd 9/20     #Coagulopathy d/t Coumadin > Vit K given in ER  -Monitor INR - patient has a mechanical aortic valve     #Aortic stenosis sp mechanical AVR   #Essential hypertension  #Dyslipidemia  -Cardiology consult     #Atrial fibrillation on Coumadin  -Hold Coumadin  -Monitor INR  -Telemetry     #Dyspnea d/t pneumonia  -IV abx  -Antitussives as needed  -Check cultures, RVP     #Chronic kidney disease  -Monitor UOP, creatinine and electrolytes     #Anemia, as above     #DVT Px: INR > 2      Sary Edward MD    Supplementary Documentation:     Quality:  DVT Mechanical Prophylaxis:        DVT Pharmacologic Prophylaxis   Medication   None                Code Status: Full Code  Vee: No urinary catheter in place  Vee Duration (in days):   Central line:    ADAMARIS:     Discharge is dependent on: progress  At this point Mr. Schmidt is expected to be discharge to: home    The 21st Century Cures Act makes medical notes like these available to patients in the interest of transparency. Please be advised this is a medical document. Medical documents are intended to carry relevant information, facts as evident, and the clinical opinion of the practitioner. The  medical note is intended as peer to peer communication and may appear blunt or direct. It is written in medical language and may contain abbreviations or verbiage that are unfamiliar.

## 2024-09-21 NOTE — PLAN OF CARE
Patient is AO x 4. Maintains O2 sats o room air. Denies pain/discomfort. AFIB/SR on tele - Heparin gtt initiated for warfarin bridge, see cards notes. Okay per GI to resume ACs. Saline locked. Good appetite for meals. Continent of b/b. Patient updated on POC, rounded on routinely.     Problem: CARDIOVASCULAR - ADULT  Goal: Maintains optimal cardiac output and hemodynamic stability  Description: INTERVENTIONS:  - Monitor vital signs, rhythm, and trends  - Monitor for bleeding, hypotension and signs of decreased cardiac output  - Evaluate effectiveness of vasoactive medications to optimize hemodynamic stability  - Monitor arterial and/or venous puncture sites for bleeding and/or hematoma  - Assess quality of pulses, skin color and temperature  - Assess for signs of decreased coronary artery perfusion - ex. Angina  - Evaluate fluid balance, assess for edema, trend weights  Outcome: Progressing  Goal: Absence of cardiac arrhythmias or at baseline  Description: INTERVENTIONS:  - Continuous cardiac monitoring, monitor vital signs, obtain 12 lead EKG if indicated  - Evaluate effectiveness of antiarrhythmic and heart rate control medications as ordered  - Initiate emergency measures for life threatening arrhythmias  - Monitor electrolytes and administer replacement therapy as ordered  Outcome: Progressing     Problem: RESPIRATORY - ADULT  Goal: Achieves optimal ventilation and oxygenation  Description: INTERVENTIONS:  - Assess for changes in respiratory status  - Assess for changes in mentation and behavior  - Position to facilitate oxygenation and minimize respiratory effort  - Oxygen supplementation based on oxygen saturation or ABGs  - Provide Smoking Cessation handout, if applicable  - Encourage broncho-pulmonary hygiene including cough, deep breathe, Incentive Spirometry  - Assess the need for suctioning and perform as needed  - Assess and instruct to report SOB or any respiratory difficulty  - Respiratory Therapy  support as indicated  - Manage/alleviate anxiety  - Monitor for signs/symptoms of CO2 retention  Outcome: Progressing     Problem: GASTROINTESTINAL - ADULT  Goal: Minimal or absence of nausea and vomiting  Description: INTERVENTIONS:  - Maintain adequate hydration with IV or PO as ordered and tolerated  - Nasogastric tube to low intermittent suction as ordered  - Evaluate effectiveness of ordered antiemetic medications  - Provide nonpharmacologic comfort measures as appropriate  - Advance diet as tolerated, if ordered  - Obtain nutritional consult as needed  - Evaluate fluid balance  Outcome: Progressing  Goal: Maintains or returns to baseline bowel function  Description: INTERVENTIONS:  - Assess bowel function  - Maintain adequate hydration with IV or PO as ordered and tolerated  - Evaluate effectiveness of GI medications  - Encourage mobilization and activity  - Obtain nutritional consult as needed  - Establish a toileting routine/schedule  - Consider collaborating with pharmacy to review patient's medication profile  Outcome: Progressing     Problem: HEMATOLOGIC - ADULT  Goal: Maintains hematologic stability  Description: INTERVENTIONS  - Assess for signs and symptoms of bleeding or hemorrhage  - Monitor labs and vital signs for trends  - Administer supportive blood products/factors, fluids and medications as ordered and appropriate  - Administer supportive blood products/factors as ordered and appropriate  Outcome: Progressing  Goal: Free from bleeding injury  Description: (Example usage: patient with low platelets)  INTERVENTIONS:  - Avoid intramuscular injections, enemas and rectal medication administration  - Ensure safe mobilization of patient  - Hold pressure on venipuncture sites to achieve adequate hemostasis  - Assess for signs and symptoms of internal bleeding  - Monitor lab trends  - Patient is to report abnormal signs of bleeding to staff  - Avoid use of toothpicks and dental floss  - Use electric  shaver for shaving  - Use soft bristle tooth brush  - Limit straining and forceful nose blowing  Outcome: Progressing     Problem: PAIN - ADULT  Goal: Verbalizes/displays adequate comfort level or patient's stated pain goal  Description: INTERVENTIONS:  - Encourage pt to monitor pain and request assistance  - Assess pain using appropriate pain scale  - Administer analgesics based on type and severity of pain and evaluate response  - Implement non-pharmacological measures as appropriate and evaluate response  - Consider cultural and social influences on pain and pain management  - Manage/alleviate anxiety  - Utilize distraction and/or relaxation techniques  - Monitor for opioid side effects  - Notify MD/LIP if interventions unsuccessful or patient reports new pain  - Anticipate increased pain with activity and pre-medicate as appropriate  Outcome: Progressing     Problem: RISK FOR INFECTION - ADULT  Goal: Absence of fever/infection during anticipated neutropenic period  Description: INTERVENTIONS  - Monitor WBC  - Administer growth factors as ordered  - Implement neutropenic guidelines  Outcome: Progressing     Problem: SAFETY ADULT - FALL  Goal: Free from fall injury  Description: INTERVENTIONS:  - Assess pt frequently for physical needs  - Identify cognitive and physical deficits and behaviors that affect risk of falls.  - [place] fall precautions as indicated by assessment.  - Educate pt/family on patient safety including physical limitations  - Instruct pt to call for assistance with activity based on assessment  - Modify environment to reduce risk of injury  - Provide assistive devices as appropriate  - Consider OT/PT consult to assist with strengthening/mobility  - Encourage toileting schedule  Outcome: Progressing     Problem: DISCHARGE PLANNING  Goal: Discharge to home or other facility with appropriate resources  Description: INTERVENTIONS:  - Identify barriers to discharge w/pt and caregiver  - Include  patient/family/discharge partner in discharge planning  - Arrange for needed discharge resources and transportation as appropriate  - Identify discharge learning needs (meds, wound care, etc)  - Arrange for interpreters to assist at discharge as needed  - Consider post-discharge preferences of patient/family/discharge partner  - Complete POLST form as appropriate  - Assess patient's ability to be responsible for managing their own health  - Refer to Case Management Department for coordinating discharge planning if the patient needs post-hospital services based on physician/LIP order or complex needs related to functional status, cognitive ability or social support system  Outcome: Progressing     Problem: Patient/Family Goals  Goal: Patient/Family Long Term Goal  Description: Patient's Long Term Goal: Discharge with adequate resources    Interventions:  - Identify barriers to discharge w/pt and caregiver  - Include patient/family/discharge partner in discharge planning  - Arrange for needed discharge resources and transportation as appropriate  - Identify discharge learning needs   - Consider post-discharge preferences of patient/family/discharge partner  - Assess patient's ability to be responsible for managing their own health  - Refer to Case Management Department for coordinating discharge planning if the patient needs post-hospital services  - See additional Care Plan goals for specific interventions  Outcome: Progressing  Goal: Patient/Family Short Term Goal  Description: Patient's Short Term Goal:  9/20: increase hgb  9/20 noc: maintain hgb >7.0    Interventions:   - consults  - orders per MD  -labs  - blood transfusion as needed  - See additional Care Plan goals for specific interventions  Outcome: Progressing

## 2024-09-22 LAB
ANION GAP SERPL CALC-SCNC: 6 MMOL/L (ref 0–18)
APTT PPP: 43.7 SECONDS (ref 23–36)
APTT PPP: 50 SECONDS (ref 23–36)
BLOOD TYPE BARCODE: 5100
BUN BLD-MCNC: 12 MG/DL (ref 9–23)
CALCIUM BLD-MCNC: 8.7 MG/DL (ref 8.7–10.4)
CHLORIDE SERPL-SCNC: 112 MMOL/L (ref 98–112)
CO2 SERPL-SCNC: 22 MMOL/L (ref 21–32)
CREAT BLD-MCNC: 1.26 MG/DL
EGFRCR SERPLBLD CKD-EPI 2021: 62 ML/MIN/1.73M2 (ref 60–?)
ERYTHROCYTE [DISTWIDTH] IN BLOOD BY AUTOMATED COUNT: 23.9 %
GLUCOSE BLD-MCNC: 143 MG/DL (ref 70–99)
GLUCOSE BLD-MCNC: 172 MG/DL (ref 70–99)
GLUCOSE BLD-MCNC: 182 MG/DL (ref 70–99)
GLUCOSE BLD-MCNC: 187 MG/DL (ref 70–99)
GLUCOSE BLD-MCNC: 221 MG/DL (ref 70–99)
HCT VFR BLD AUTO: 24.5 %
HGB BLD-MCNC: 7.6 G/DL
INR BLD: 1.05 (ref 0.8–1.2)
MCH RBC QN AUTO: 26.5 PG (ref 26–34)
MCHC RBC AUTO-ENTMCNC: 31 G/DL (ref 31–37)
MCV RBC AUTO: 85.4 FL
OSMOLALITY SERPL CALC.SUM OF ELEC: 292 MOSM/KG (ref 275–295)
PLATELET # BLD AUTO: 113 10(3)UL (ref 150–450)
PLATELET # BLD AUTO: 113 10(3)UL (ref 150–450)
PLATELETS.RETICULATED NFR BLD AUTO: 3.9 % (ref 0–7)
PLATELETS.RETICULATED NFR BLD AUTO: 3.9 % (ref 0–7)
POTASSIUM SERPL-SCNC: 3.5 MMOL/L (ref 3.5–5.1)
PROTHROMBIN TIME: 13.8 SECONDS (ref 11.6–14.8)
RBC # BLD AUTO: 2.87 X10(6)UL
SODIUM SERPL-SCNC: 140 MMOL/L (ref 136–145)
UNIT VOLUME: 350 ML
WBC # BLD AUTO: 4.2 X10(3) UL (ref 4–11)

## 2024-09-22 PROCEDURE — 99232 SBSQ HOSP IP/OBS MODERATE 35: CPT | Performed by: HOSPITALIST

## 2024-09-22 RX ORDER — POTASSIUM CHLORIDE 1500 MG/1
40 TABLET, EXTENDED RELEASE ORAL EVERY 4 HOURS
Status: COMPLETED | OUTPATIENT
Start: 2024-09-22 | End: 2024-09-22

## 2024-09-22 NOTE — PROGRESS NOTES
Progress Note  Liang Saurabh Schmidt Patient Status:  Inpatient    1955 MRN [id number]   Location [facility] 5NW-A Attending Sary Edward MD   Hosp Day # 3 PCP Ema Arora DO     Subjective:  Pt denies complaint; no further bleeding noted. Occasional cough.     Objective:  /56 (BP Location: Left arm)   Pulse 60   Temp 97.5 °F (36.4 °C) (Oral)   Resp 17   Ht 5' 10\" (1.778 m)   Wt 227 lb 4.7 oz (103.1 kg)   SpO2 100%   BMI 32.61 kg/m²     Telemetry: SB 1st AVB 50's    Intake/Output:    Intake/Output Summary (Last 24 hours) at 2024 1641  Last data filed at 2024 0622  Gross per 24 hour   Intake 543.5 ml   Output --   Net 543.5 ml       Last 3 Weights   24 0646 227 lb 4.7 oz (103.1 kg)   24 2224 227 lb 4.7 oz (103.1 kg)   24 1555 236 lb (107 kg)   24 1345 233 lb 12.8 oz (106.1 kg)   24 0146 233 lb 7.5 oz (105.9 kg)   24 2046 239 lb (108.4 kg)       Labs:  Recent Labs   Lab 24  1615 24  0335 24  0657 24  0828   * 153*  --  143*   BUN 28* 29*  --  12   CREATSERUM 1.72* 1.62*  --  1.26   EGFRCR 42* 46*  --  62   CA 9.3 8.5*  --  8.7    140  --  140   K 3.9 3.4* 3.8 3.5    114*  --  112   CO2 18.0* 18.0*  --  22.0     Recent Labs   Lab 24  1615 24  2126 24  0335 24  0836 24  1632 24  0657 24  0828   RBC 3.36*  --  2.75*  --   --  2.74* 2.87*   HGB 8.8*   < > 7.1*  7.1*   < > 7.4* 7.3* 7.6*   HCT 27.1*  --  23.3*  --   --  22.9* 24.5*   MCV 80.7  --  84.7  --   --  83.6 85.4   MCH 26.2  --  25.8*  --   --  26.6 26.5   MCHC 32.5  --  30.5*  --   --  31.9 31.0   RDW  --   --   --   --   --  23.7 23.9   NEPRELIM 4.88  --  4.18  --   --   --   --    WBC 6.7  --  5.9  --   --  4.2 4.2   .0*  --  96.0*  --   --  98.0* 113.0*  113.0*    < > = values in this interval not displayed.         Recent Labs   Lab 24  1615   TROPHS 26       Diagnostics:  No  results found.   Review of Systems   Constitutional: Negative.   Cardiovascular:  Negative for chest pain, dyspnea on exertion, leg swelling and orthopnea.   Respiratory:  Positive for cough and wheezing. Negative for shortness of breath.    Gastrointestinal:  Negative for abdominal pain, hematochezia, melena, nausea and vomiting.       Physical Exam:    Gen: alert, oriented x 3, NAD  Heent: pupils equal, reactive. Mucous membranes moist.   Neck: no jvd  Cardiac: regular rate and rhythm, normal S1,S2, + valve click  Lungs: wheezing bilaterally  Abd: soft, NT/ND +bs  Ext: no edema  Skin: Warm, dry  Neuro: No focal deficits      Medications:     potassium chloride  40 mEq Oral Q4H    gabapentin  300 mg Oral Daily    sertraline  200 mg Oral Daily    dilTIAZem ER  120 mg Oral Daily    warfarin  5 mg Oral Nightly    insulin aspart  1-68 Units Subcutaneous TID CC    insulin aspart  1-10 Units Subcutaneous TID AC and HS    cefTRIAXone  2 g Intravenous Q24H    guaiFENesin ER  600 mg Oral BID    pantoprazole  40 mg Intravenous Q12H    atorvastatin  20 mg Oral Nightly      continuous dose heparin 1,200 Units/hr (09/22/24 1416)         Assessment:  Acute Anemia/GIB, Supratherapeutic INR  Hgb 6.9 s/p PRBC, INR 8.00 on admit   S/P PRBC and Vit K in ER  Now Hgb stable in 7's  S/P EGD w/gastric erosion - likely source of bleeding in s/o supratherapeutic INR  No further signs of bleeding  GI ok with anticoagulation  Hx Remote Mechanical AVR (~20 years ago in [place])  INR 1.05 today (goal 2.5-3.5 with mech valve + PAF)  On warfarin 5mg po nightly - IV heparin bridge  Hx managed INR on his own at home - admits to non-compliance  Paroxysmal Atrial Fibrillation  LVEF 65-70%  Currently SB/SR  A/C with warfarin  Rhinovirus/Possible PNA  On IV antibx  Pulm following    Plan:  Continue IV heparin gtt until INR is therapeutic  Increase warfarin to 7.5mg po nightly   Discussed importance of adherence to INR goal and careful monitoring. He is  willing to follow with [facility] warfarin clinic to help with INR management.   Monitor H&H closely    Plan of care discussed with patient, RN.    TRAM Lal  9/22/2024  4:41 PM  [phone] [facility]  [phone] [facility]

## 2024-09-22 NOTE — PROGRESS NOTES
[facility]   part of [facility]     Hospitalist Progress Note     Liang Jacintoabdulaziz Schmidt Patient Status:  Observation    1955 MRN [id number]   Location [facility] 5NW-A Attending Sary Edward MD   Hosp Day # 3 PCP Ema Arora DO     Chief Complaint: sob    Subjective:     Patient with melena at home  None today  S/p egd    Marginal erosions limited to anastomosis-no active bleeding  Otherwise normal post Antonio-n-Y anatomy    Objective:    Review of Systems:   A comprehensive review of systems was completed; pertinent positive and negatives stated in subjective.    Vital signs:  Temp:  [97.4 °F (36.3 °C)-98.2 °F (36.8 °C)] 98.2 °F (36.8 °C)  Pulse:  [57-71] 65  Resp:  [17-18] 18  BP: (131-143)/(42-60) 131/52  SpO2:  [100 %] 100 %    Physical Exam:    General: No acute distress  Respiratory: No wheezes, no rhonchi  Cardiovascular: S1, S2, regular rate and rhythm  Abdomen: Soft, Non-tender, non-distended, positive bowel sounds  Neuro: No new focal deficits.   Extremities: No edema      Diagnostic Data:    Labs:  Recent Labs   Lab 24  161246 24  0335 24  0836 24  1632 24  0657 24  0828   WBC 6.7  --  5.9  --   --  4.2 4.2   HGB 8.8*   < > 7.1*  7.1* 6.9* 7.4* 7.3* 7.6*   MCV 80.7  --  84.7  --   --  83.6 85.4   .0*  --  96.0*  --   --  98.0* 113.0*  113.0*   INR 8.00*  --  1.74*  --   --  1.21* 1.05    < > = values in this interval not displayed.       Recent Labs   Lab 24  1615 24  0335 24  0657 24  0828   * 153*  --  143*   BUN 28* 29*  --  12   CREATSERUM 1.72* 1.62*  --  1.26   CA 9.3 8.5*  --  8.7   ALB 3.9 3.3  --   --     140  --  140   K 3.9 3.4* 3.8 3.5    114*  --  112   CO2 18.0* 18.0*  --  22.0   ALKPHO 100 85  --   --    AST 24 17  --   --    ALT 46 36  --   --    BILT 0.3 0.3  --   --    TP 6.8 5.7  --   --        Estimated Creatinine Clearance: 57.1 mL/min (based on SCr of  1.26 mg/dL).    Recent Labs   Lab 09/19/24  1615   TROPHS 26       Recent Labs   Lab 09/20/24  0335 09/21/24  0657 09/22/24  0828   PTP 20.5* 15.4* 13.8   INR 1.74* 1.21* 1.05                  Microbiology    Hospital Encounter on 09/19/24   1. Blood Culture     Status: None (Preliminary result)    Collection Time: 09/19/24  6:52 PM    Specimen: Blood,peripheral   Result Value Ref Range    Blood Culture Result No Growth 2 Days N/A         Imaging: Reviewed in Epic.    Medications:    gabapentin  300 mg Oral Daily    sertraline  200 mg Oral Daily    dilTIAZem ER  120 mg Oral Daily    warfarin  5 mg Oral Nightly    insulin aspart  1-68 Units Subcutaneous TID CC    insulin aspart  1-10 Units Subcutaneous TID AC and HS    cefTRIAXone  2 g Intravenous Q24H    guaiFENesin ER  600 mg Oral BID    pantoprazole  40 mg Intravenous Q12H    atorvastatin  20 mg Oral Nightly       Assessment & Plan:      #Hypotension   suspect d/t hypovolemia d/t GIB  -Hold antihypertensives  -resolved     #GIB, melena  #History of gastric bypass  -IVF  -PPI IV BID  -s/p egd 9/20     #Coagulopathy d/t Coumadin > Vit K given in ER  -Monitor INR - patient has a mechanical aortic valve     #Aortic stenosis sp mechanical AVR   #Essential hypertension  #Dyslipidemia  -Cardiology consult     #Atrial fibrillation on Coumadin  -Hold Coumadin  -Monitor INR  -Telemetry     #Dyspnea d/t pneumonia  -IV abx  -Antitussives as needed  -Check cultures, RVP     #Chronic kidney disease  -Monitor UOP, creatinine and electrolytes     #Anemia, as above     #DVT Px: INR > 2      Sary Edward MD    Supplementary Documentation:     Quality:  DVT Mechanical Prophylaxis:        DVT Pharmacologic Prophylaxis   Medication    warfarin (Coumadin) tab 5 mg    heparin (Porcine) 33258 units/250mL infusion ACS/AFIB CONTINUOUS                Code Status: Full Code  Vee: No urinary catheter in place  Vee Duration (in days):   Central line:    ADAMARIS:     Discharge is dependent on:  progress  At this point Mr. Schmidt is expected to be discharge to: home    The 21st Century Cures Act makes medical notes like these available to patients in the interest of transparency. Please be advised this is a medical document. Medical documents are intended to carry relevant information, facts as evident, and the clinical opinion of the practitioner. The medical note is intended as peer to peer communication and may appear blunt or direct. It is written in medical language and may contain abbreviations or verbiage that are unfamiliar.

## 2024-09-22 NOTE — PLAN OF CARE
Problem: CARDIOVASCULAR - ADULT  Goal: Absence of cardiac arrhythmias or at baseline  Description: INTERVENTIONS:  - Continuous cardiac monitoring, monitor vital signs, obtain 12 lead EKG if indicated  - Evaluate effectiveness of antiarrhythmic and heart rate control medications as ordered  - Initiate emergency measures for life threatening arrhythmias  - Monitor electrolytes and administer replacement therapy as ordered  Outcome: Progressing     Problem: GASTROINTESTINAL - ADULT  Goal: Minimal or absence of nausea and vomiting  Description: INTERVENTIONS:  - Maintain adequate hydration with IV or PO as ordered and tolerated  - Nasogastric tube to low intermittent suction as ordered  - Evaluate effectiveness of ordered antiemetic medications  - Provide nonpharmacologic comfort measures as appropriate  - Advance diet as tolerated, if ordered  - Obtain nutritional consult as needed  - Evaluate fluid balance  Outcome: Progressing

## 2024-09-22 NOTE — PROGRESS NOTES
Inpatient Follow up Note    Liang Schmidt Patient Status:  Inpatient    1955 MRN [id number]   Location [facility] 5NW-A Attending Sary Edward MD   Hosp Day # 3 PCP Ema Arora DO     Reason for Consultation   Melena, gastric erythema/erosions     Subjective   His stool is brown and Hgb is stable. Denies abdominal pain, tolerating po.           Objective:     /56 (BP Location: Left arm)   Pulse 60   Temp 97.5 °F (36.4 °C) (Oral)   Resp 17   Ht 5' 10\" (1.778 m)   Wt 227 lb 4.7 oz (103.1 kg)   SpO2 100%   BMI 32.61 kg/m²   Gen: AAOx3  CV: RRR with normal S1 / S2  Resp: CTA bilaterally  Abd: (+)BS, soft, non-tender, non-distended; no rebound or guarding  Ext: No edema or cyanosis  Skin: Warm and dry     Labs/Imaging     LABRCNTIP[PGLU:5@  Recent Labs   Lab 24  0335 24  0657 24  0828   INR 8.00* 1.74* 1.21* 1.05         Recent Labs   Lab 246 24  0335 24  0836 24  1632 24  0657 24  0828   WBC 6.7  --  5.9  --   --  4.2 4.2   HGB 8.8*   < > 7.1*  7.1* 6.9* 7.4* 7.3* 7.6*   .0*  --  96.0*  --   --  98.0* 113.0*  113.0*    < > = values in this interval not displayed.       Recent Labs   Lab 24  0335 24  0657 24  0828    140  --  140   K 3.9 3.4* 3.8 3.5    114*  --  112   CO2 18.0* 18.0*  --  22.0   BUN 28* 29*  --  12   CREATSERUM 1.72* 1.62*  --  1.26       Recent Labs   Lab 24  1615 24  0335   ALT 46 36   AST 24 17     XR CHEST AP PORTABLE  (CPT=71045)    Result Date: 2024  CONCLUSION:  There is mild patchy opacity in the lower left lung that could represent area of developing pneumonia or atelectasis/scarring.  Clinical correlation recommended.   LOCATION:  [facility]      Dictated by (CST): Chandrakant Pedroza MD on 2024 at 4:55 PM     Finalized by (CST): Chandrakant Pedroza MD on 2024 at 4:56 PM      AST    Date/Time Value Ref Range Status   09/20/2024 03:35 AM 17 <34 U/L Final     ALT   Date/Time Value Ref Range Status   09/20/2024 03:35 AM 36 10 - 49 U/L Final     BUN   Date/Time Value Ref Range Status   09/22/2024 08:28 AM 12 9 - 23 mg/dL Final     9/20 EGD: FINDINGS:  ESOPHAGUS:Normal in course and caliber, no mucosal erosions, ulcers  EGJ: Located at 35 cm otherwise normal, .  STOMACH:Small  5 cm gastric remnant with Antonio en Y anatomy.  Intense erythema limited to the anastomosis with several small punctate erosion and one thin sliver of semi-circumferential erosion. No visible vessel or flat spots..   SMALL BOWEL: Afferent and efferent limbs of small bowel normal to the extent examined  THERAPEUTICS: None         Assessment   Mr Schmidt is a 68 y/o male with a h/o Antonio-n-y bypass, chronic anemia with recent melena on Coumadin with INR of 8, with EGD on 9/20 showing erythema and erosions at the gastrojejunal anastomosis. This is likely the cause of the melena. His stool is brown and his Hgb is stable. His warfarin was reversed on admission.        Plan   -ok for regular diet from GI perspective  -ok for anticoagulation from GI perspective  -pantoprazole 40mg daily  -avoid NSAIDs    GI will sign off now, re-consult with questions/concerns. We will help arrange outpatient GI f/u     Dustin Ludwig MD  2:28 PM  9/22/2024  [facility]  [phone]

## 2024-09-22 NOTE — PLAN OF CARE
Patient is alert and oriented x4. Wdl on RA. NS/SB on tele. VSS. Q shift orthostatic BPs. Heparin gtt infusing at 10ml/hr. Coumadin restarted tonight. QID accu checks. On IV abx. POC discussed with pt, pt verbalzies understanding.   Problem: CARDIOVASCULAR - ADULT  Goal: Maintains optimal cardiac output and hemodynamic stability  Description: INTERVENTIONS:  - Monitor vital signs, rhythm, and trends  - Monitor for bleeding, hypotension and signs of decreased cardiac output  - Evaluate effectiveness of vasoactive medications to optimize hemodynamic stability  - Monitor arterial and/or venous puncture sites for bleeding and/or hematoma  - Assess quality of pulses, skin color and temperature  - Assess for signs of decreased coronary artery perfusion - ex. Angina  - Evaluate fluid balance, assess for edema, trend weights  Outcome: Progressing  Goal: Absence of cardiac arrhythmias or at baseline  Description: INTERVENTIONS:  - Continuous cardiac monitoring, monitor vital signs, obtain 12 lead EKG if indicated  - Evaluate effectiveness of antiarrhythmic and heart rate control medications as ordered  - Initiate emergency measures for life threatening arrhythmias  - Monitor electrolytes and administer replacement therapy as ordered  Outcome: Progressing     Problem: RESPIRATORY - ADULT  Goal: Achieves optimal ventilation and oxygenation  Description: INTERVENTIONS:  - Assess for changes in respiratory status  - Assess for changes in mentation and behavior  - Position to facilitate oxygenation and minimize respiratory effort  - Oxygen supplementation based on oxygen saturation or ABGs  - Provide Smoking Cessation handout, if applicable  - Encourage broncho-pulmonary hygiene including cough, deep breathe, Incentive Spirometry  - Assess the need for suctioning and perform as needed  - Assess and instruct to report SOB or any respiratory difficulty  - Respiratory Therapy support as indicated  - Manage/alleviate anxiety  -  Monitor for signs/symptoms of CO2 retention  Outcome: Progressing     Problem: GASTROINTESTINAL - ADULT  Goal: Minimal or absence of nausea and vomiting  Description: INTERVENTIONS:  - Maintain adequate hydration with IV or PO as ordered and tolerated  - Nasogastric tube to low intermittent suction as ordered  - Evaluate effectiveness of ordered antiemetic medications  - Provide nonpharmacologic comfort measures as appropriate  - Advance diet as tolerated, if ordered  - Obtain nutritional consult as needed  - Evaluate fluid balance  Outcome: Progressing  Goal: Maintains or returns to baseline bowel function  Description: INTERVENTIONS:  - Assess bowel function  - Maintain adequate hydration with IV or PO as ordered and tolerated  - Evaluate effectiveness of GI medications  - Encourage mobilization and activity  - Obtain nutritional consult as needed  - Establish a toileting routine/schedule  - Consider collaborating with pharmacy to review patient's medication profile  Outcome: Progressing     Problem: HEMATOLOGIC - ADULT  Goal: Maintains hematologic stability  Description: INTERVENTIONS  - Assess for signs and symptoms of bleeding or hemorrhage  - Monitor labs and vital signs for trends  - Administer supportive blood products/factors, fluids and medications as ordered and appropriate  - Administer supportive blood products/factors as ordered and appropriate  Outcome: Progressing  Goal: Free from bleeding injury  Description: (Example usage: patient with low platelets)  INTERVENTIONS:  - Avoid intramuscular injections, enemas and rectal medication administration  - Ensure safe mobilization of patient  - Hold pressure on venipuncture sites to achieve adequate hemostasis  - Assess for signs and symptoms of internal bleeding  - Monitor lab trends  - Patient is to report abnormal signs of bleeding to staff  - Avoid use of toothpicks and dental floss  - Use electric shaver for shaving  - Use soft bristle tooth brush  -  Limit straining and forceful nose blowing  Outcome: Progressing     Problem: PAIN - ADULT  Goal: Verbalizes/displays adequate comfort level or patient's stated pain goal  Description: INTERVENTIONS:  - Encourage pt to monitor pain and request assistance  - Assess pain using appropriate pain scale  - Administer analgesics based on type and severity of pain and evaluate response  - Implement non-pharmacological measures as appropriate and evaluate response  - Consider cultural and social influences on pain and pain management  - Manage/alleviate anxiety  - Utilize distraction and/or relaxation techniques  - Monitor for opioid side effects  - Notify MD/LIP if interventions unsuccessful or patient reports new pain  - Anticipate increased pain with activity and pre-medicate as appropriate  Outcome: Progressing     Problem: RISK FOR INFECTION - ADULT  Goal: Absence of fever/infection during anticipated neutropenic period  Description: INTERVENTIONS  - Monitor WBC  - Administer growth factors as ordered  - Implement neutropenic guidelines  Outcome: Progressing     Problem: SAFETY ADULT - FALL  Goal: Free from fall injury  Description: INTERVENTIONS:  - Assess pt frequently for physical needs  - Identify cognitive and physical deficits and behaviors that affect risk of falls.  - [place] fall precautions as indicated by assessment.  - Educate pt/family on patient safety including physical limitations  - Instruct pt to call for assistance with activity based on assessment  - Modify environment to reduce risk of injury  - Provide assistive devices as appropriate  - Consider OT/PT consult to assist with strengthening/mobility  - Encourage toileting schedule  Outcome: Progressing     Problem: DISCHARGE PLANNING  Goal: Discharge to home or other facility with appropriate resources  Description: INTERVENTIONS:  - Identify barriers to discharge w/pt and caregiver  - Include patient/family/discharge partner in discharge  planning  - Arrange for needed discharge resources and transportation as appropriate  - Identify discharge learning needs (meds, wound care, etc)  - Arrange for interpreters to assist at discharge as needed  - Consider post-discharge preferences of patient/family/discharge partner  - Complete POLST form as appropriate  - Assess patient's ability to be responsible for managing their own health  - Refer to Case Management Department for coordinating discharge planning if the patient needs post-hospital services based on physician/LIP order or complex needs related to functional status, cognitive ability or social support system  Outcome: Progressing     Problem: Patient/Family Goals  Goal: Patient/Family Long Term Goal  Description: Patient's Long Term Goal: Discharge with adequate resources    Interventions:  - Identify barriers to discharge w/pt and caregiver  - Include patient/family/discharge partner in discharge planning  - Arrange for needed discharge resources and transportation as appropriate  - Identify discharge learning needs   - Consider post-discharge preferences of patient/family/discharge partner  - Assess patient's ability to be responsible for managing their own health  - Refer to Case Management Department for coordinating discharge planning if the patient needs post-hospital services  - See additional Care Plan goals for specific interventions  Outcome: Progressing  Goal: Patient/Family Short Term Goal  Description: Patient's Short Term Goal:  9/20: increase hgb  9/20 noc: maintain hgb >7.0      Interventions:   - consults  - orders per MD  -labs  - blood transfusion as needed  - See additional Care Plan goals for specific interventions  Outcome: Progressing

## 2024-09-23 PROBLEM — Z79.01 ANTICOAGULATED ON WARFARIN: Chronic | Status: ACTIVE | Noted: 2024-09-23

## 2024-09-23 PROBLEM — Z95.2 HISTORY OF MECHANICAL AORTIC VALVE REPLACEMENT: Status: ACTIVE | Noted: 2022-08-03

## 2024-09-23 LAB
APTT PPP: 44.9 SECONDS (ref 23–36)
APTT PPP: 55.6 SECONDS (ref 23–36)
BASOPHILS # BLD AUTO: 0.02 X10(3) UL (ref 0–0.2)
BASOPHILS NFR BLD AUTO: 0.5 %
EOSINOPHIL # BLD AUTO: 0.18 X10(3) UL (ref 0–0.7)
EOSINOPHIL NFR BLD AUTO: 4.8 %
ERYTHROCYTE [DISTWIDTH] IN BLOOD BY AUTOMATED COUNT: 23.9 %
GLUCOSE BLD-MCNC: 122 MG/DL (ref 70–99)
GLUCOSE BLD-MCNC: 171 MG/DL (ref 70–99)
GLUCOSE BLD-MCNC: 192 MG/DL (ref 70–99)
GLUCOSE BLD-MCNC: 208 MG/DL (ref 70–99)
HCT VFR BLD AUTO: 21.9 %
HGB BLD-MCNC: 7 G/DL
IMM GRANULOCYTES # BLD AUTO: 0.03 X10(3) UL (ref 0–1)
IMM GRANULOCYTES NFR BLD: 0.8 %
INR BLD: 1.15 (ref 0.8–1.2)
LYMPHOCYTES # BLD AUTO: 0.58 X10(3) UL (ref 1–4)
LYMPHOCYTES NFR BLD AUTO: 15.5 %
MCH RBC QN AUTO: 26.8 PG (ref 26–34)
MCHC RBC AUTO-ENTMCNC: 32 G/DL (ref 31–37)
MCV RBC AUTO: 83.9 FL
MONOCYTES # BLD AUTO: 0.19 X10(3) UL (ref 0.1–1)
MONOCYTES NFR BLD AUTO: 5.1 %
NEUTROPHILS # BLD AUTO: 2.73 X10 (3) UL (ref 1.5–7.7)
NEUTROPHILS # BLD AUTO: 2.73 X10(3) UL (ref 1.5–7.7)
NEUTROPHILS NFR BLD AUTO: 73.3 %
PLATELET # BLD AUTO: 101 10(3)UL (ref 150–450)
POTASSIUM SERPL-SCNC: 3.8 MMOL/L (ref 3.5–5.1)
PROTHROMBIN TIME: 14.8 SECONDS (ref 11.6–14.8)
RBC # BLD AUTO: 2.61 X10(6)UL
WBC # BLD AUTO: 3.7 X10(3) UL (ref 4–11)

## 2024-09-23 PROCEDURE — 99232 SBSQ HOSP IP/OBS MODERATE 35: CPT | Performed by: HOSPITALIST

## 2024-09-23 RX ORDER — ENOXAPARIN SODIUM 150 MG/ML
1 INJECTION SUBCUTANEOUS EVERY 12 HOURS SCHEDULED
Status: DISCONTINUED | OUTPATIENT
Start: 2024-09-23 | End: 2024-09-24

## 2024-09-23 NOTE — PLAN OF CARE
Patient AAOx4. Room air. Tele NSR. Coumadin. Heparin drip. Plan to switch from heparin drip to lovenox tonight. Nursing communication order in. Carb controlled diet QID accucheck. Up ad duyen. Daily weight. Patient rounded on routinely. Patient updated on plan of care.    Heparin drip increased to 14ml/hour, repeat draw therapeutic, next draw tomorrow AM.     Orthostatic Bps Q shift ordered, completed and MD paged.      Problem: CARDIOVASCULAR - ADULT  Goal: Maintains optimal cardiac output and hemodynamic stability  Description: INTERVENTIONS:  - Monitor vital signs, rhythm, and trends  - Monitor for bleeding, hypotension and signs of decreased cardiac output  - Evaluate effectiveness of vasoactive medications to optimize hemodynamic stability  - Monitor arterial and/or venous puncture sites for bleeding and/or hematoma  - Assess quality of pulses, skin color and temperature  - Assess for signs of decreased coronary artery perfusion - ex. Angina  - Evaluate fluid balance, assess for edema, trend weights  Outcome: Progressing  Goal: Absence of cardiac arrhythmias or at baseline  Description: INTERVENTIONS:  - Continuous cardiac monitoring, monitor vital signs, obtain 12 lead EKG if indicated  - Evaluate effectiveness of antiarrhythmic and heart rate control medications as ordered  - Initiate emergency measures for life threatening arrhythmias  - Monitor electrolytes and administer replacement therapy as ordered  Outcome: Progressing     Problem: RESPIRATORY - ADULT  Goal: Achieves optimal ventilation and oxygenation  Description: INTERVENTIONS:  - Assess for changes in respiratory status  - Assess for changes in mentation and behavior  - Position to facilitate oxygenation and minimize respiratory effort  - Oxygen supplementation based on oxygen saturation or ABGs  - Provide Smoking Cessation handout, if applicable  - Encourage broncho-pulmonary hygiene including cough, deep breathe, Incentive Spirometry  - Assess the  need for suctioning and perform as needed  - Assess and instruct to report SOB or any respiratory difficulty  - Respiratory Therapy support as indicated  - Manage/alleviate anxiety  - Monitor for signs/symptoms of CO2 retention  Outcome: Progressing     Problem: GASTROINTESTINAL - ADULT  Goal: Minimal or absence of nausea and vomiting  Description: INTERVENTIONS:  - Maintain adequate hydration with IV or PO as ordered and tolerated  - Nasogastric tube to low intermittent suction as ordered  - Evaluate effectiveness of ordered antiemetic medications  - Provide nonpharmacologic comfort measures as appropriate  - Advance diet as tolerated, if ordered  - Obtain nutritional consult as needed  - Evaluate fluid balance  Outcome: Progressing  Goal: Maintains or returns to baseline bowel function  Description: INTERVENTIONS:  - Assess bowel function  - Maintain adequate hydration with IV or PO as ordered and tolerated  - Evaluate effectiveness of GI medications  - Encourage mobilization and activity  - Obtain nutritional consult as needed  - Establish a toileting routine/schedule  - Consider collaborating with pharmacy to review patient's medication profile  Outcome: Progressing     Problem: HEMATOLOGIC - ADULT  Goal: Maintains hematologic stability  Description: INTERVENTIONS  - Assess for signs and symptoms of bleeding or hemorrhage  - Monitor labs and vital signs for trends  - Administer supportive blood products/factors, fluids and medications as ordered and appropriate  - Administer supportive blood products/factors as ordered and appropriate  Outcome: Progressing  Goal: Free from bleeding injury  Description: (Example usage: patient with low platelets)  INTERVENTIONS:  - Avoid intramuscular injections, enemas and rectal medication administration  - Ensure safe mobilization of patient  - Hold pressure on venipuncture sites to achieve adequate hemostasis  - Assess for signs and symptoms of internal bleeding  - Monitor  lab trends  - Patient is to report abnormal signs of bleeding to staff  - Avoid use of toothpicks and dental floss  - Use electric shaver for shaving  - Use soft bristle tooth brush  - Limit straining and forceful nose blowing  Outcome: Progressing     Problem: PAIN - ADULT  Goal: Verbalizes/displays adequate comfort level or patient's stated pain goal  Description: INTERVENTIONS:  - Encourage pt to monitor pain and request assistance  - Assess pain using appropriate pain scale  - Administer analgesics based on type and severity of pain and evaluate response  - Implement non-pharmacological measures as appropriate and evaluate response  - Consider cultural and social influences on pain and pain management  - Manage/alleviate anxiety  - Utilize distraction and/or relaxation techniques  - Monitor for opioid side effects  - Notify MD/LIP if interventions unsuccessful or patient reports new pain  - Anticipate increased pain with activity and pre-medicate as appropriate  Outcome: Progressing     Problem: RISK FOR INFECTION - ADULT  Goal: Absence of fever/infection during anticipated neutropenic period  Description: INTERVENTIONS  - Monitor WBC  - Administer growth factors as ordered  - Implement neutropenic guidelines  Outcome: Progressing     Problem: SAFETY ADULT - FALL  Goal: Free from fall injury  Description: INTERVENTIONS:  - Assess pt frequently for physical needs  - Identify cognitive and physical deficits and behaviors that affect risk of falls.  - [place] fall precautions as indicated by assessment.  - Educate pt/family on patient safety including physical limitations  - Instruct pt to call for assistance with activity based on assessment  - Modify environment to reduce risk of injury  - Provide assistive devices as appropriate  - Consider OT/PT consult to assist with strengthening/mobility  - Encourage toileting schedule  Outcome: Progressing     Problem: DISCHARGE PLANNING  Goal: Discharge to home or other  facility with appropriate resources  Description: INTERVENTIONS:  - Identify barriers to discharge w/pt and caregiver  - Include patient/family/discharge partner in discharge planning  - Arrange for needed discharge resources and transportation as appropriate  - Identify discharge learning needs (meds, wound care, etc)  - Arrange for interpreters to assist at discharge as needed  - Consider post-discharge preferences of patient/family/discharge partner  - Complete POLST form as appropriate  - Assess patient's ability to be responsible for managing their own health  - Refer to Case Management Department for coordinating discharge planning if the patient needs post-hospital services based on physician/LIP order or complex needs related to functional status, cognitive ability or social support system  Outcome: Progressing     Problem: Patient/Family Goals  Goal: Patient/Family Long Term Goal  Description: Patient's Long Term Goal: Discharge with adequate resources    Interventions:  - Identify barriers to discharge w/pt and caregiver  - Include patient/family/discharge partner in discharge planning  - Arrange for needed discharge resources and transportation as appropriate  - Identify discharge learning needs   - Consider post-discharge preferences of patient/family/discharge partner  - Assess patient's ability to be responsible for managing their own health  - Refer to Case Management Department for coordinating discharge planning if the patient needs post-hospital services  - See additional Care Plan goals for specific interventions  Outcome: Progressing  Goal: Patient/Family Short Term Goal  Description: Patient's Short Term Goal:  9/20: increase hgb  9/20 noc: maintain hgb >7.0  9/22 nocs: discharge tomorrow     Interventions:   - consults  - orders per MD  -labs  - blood transfusion as needed  - See additional Care Plan goals for specific interventions  Outcome: Progressing

## 2024-09-23 NOTE — DIETARY NOTE
[facility]   part of [facility]   CLINICAL NUTRITION    Liang Schmidt     Admitting diagnosis:  Elevated INR [R79.1]  Chronic anemia [D64.9]  Gastrointestinal hemorrhage, unspecified gastrointestinal hemorrhage type [K92.2]  Community acquired pneumonia of left lower lobe of lung [J18.9]    Ht: 177.8 cm (5' 10\")  Wt: 106.9 kg (235 lb 10.8 oz).   Body mass index is 33.82 kg/m².  IBW: 75.5 kg    Wt Readings from Last 6 Encounters:   09/23/24 106.9 kg (235 lb 10.8 oz)   09/04/24 106.1 kg (233 lb 12.8 oz)   09/01/24 105.9 kg (233 lb 7.5 oz)   08/20/24 108.7 kg (239 lb 9.6 oz)   08/19/24 108.4 kg (239 lb)   06/27/24 113.9 kg (251 lb)        Labs reviewed  Meds: Novolog 4 units, protonix    Diet:       Procedures    Regular/General diet Calorie Restriction/Carb Controlled: 1800 kcal/60 grams; Sodium Restriction: 2 GM NA; Is Patient on Accuchecks? Yes     Percent Meals Eaten (last 3 days)       Date/Time Percent Meals Eaten (%)    09/20/24 0900 0 %     Percent Meals Eaten (%): NPO at 09/20/24 0900    09/20/24 1547 0 %     Percent Meals Eaten (%): NPO at 09/20/24 1547    09/20/24 1819 100 %              Pt chart reviewed d/t A1c of 8.8. Pt reported not getting DM education in the past despite having it for a long time. Pt was given handouts about counting carbs, the plate method, checking labels, what food contain carbs, anc portion control. Went over examples of meals how to reach 60-75 g of carbs each meal. Pt had questions about CGM and recommended doing outpatient classes at a diabetes center. He was also give a handout with snack ideas. All questions were answered at time of visit and pat seemed very receptive to information. Pt reported being compliant with medications but was not aware of what foods affected his blood glucose levels.     PMH: CKD, DVT, afib, aortic stenosis, HTN, DL, gastric bypass, T2DM    Patient reports good appetite at this time.  Nursing notes reports Percent Meals Eaten  (%): 100 % intake for last meal.  Tolerating po diet without diarrhea, emesis, or constipation.   No significant weight changes noted.     Patient is at low nutrition risk at this time.    Please consult if patient status changes or nutrition issues arise.    Jayme Magana  Dietetic Intern  [phone]

## 2024-09-23 NOTE — PLAN OF CARE
Problem: PAIN - ADULT  Goal: Verbalizes/displays adequate comfort level or patient's stated pain goal  Description: INTERVENTIONS:  - Encourage pt to monitor pain and request assistance  - Assess pain using appropriate pain scale  - Administer analgesics based on type and severity of pain and evaluate response  - Implement non-pharmacological measures as appropriate and evaluate response  - Consider cultural and social influences on pain and pain management  - Manage/alleviate anxiety  - Utilize distraction and/or relaxation techniques  - Monitor for opioid side effects  - Notify MD/LIP if interventions unsuccessful or patient reports new pain  - Anticipate increased pain with activity and pre-medicate as appropriate  Outcome: Progressing     Problem: RISK FOR INFECTION - ADULT  Goal: Absence of fever/infection during anticipated neutropenic period  Description: INTERVENTIONS  - Monitor WBC  - Administer growth factors as ordered  - Implement neutropenic guidelines  Outcome: Progressing     Problem: SAFETY ADULT - FALL  Goal: Free from fall injury  Description: INTERVENTIONS:  - Assess pt frequently for physical needs  - Identify cognitive and physical deficits and behaviors that affect risk of falls.  - [place] fall precautions as indicated by assessment.  - Educate pt/family on patient safety including physical limitations  - Instruct pt to call for assistance with activity based on assessment  - Modify environment to reduce risk of injury  - Provide assistive devices as appropriate  - Consider OT/PT consult to assist with strengthening/mobility  - Encourage toileting schedule  Outcome: Progressing     Problem: DISCHARGE PLANNING  Goal: Discharge to home or other facility with appropriate resources  Description: INTERVENTIONS:  - Identify barriers to discharge w/pt and caregiver  - Include patient/family/discharge partner in discharge planning  - Arrange for needed discharge resources and transportation as  appropriate  - Identify discharge learning needs (meds, wound care, etc)  - Arrange for interpreters to assist at discharge as needed  - Consider post-discharge preferences of patient/family/discharge partner  - Complete POLST form as appropriate  - Assess patient's ability to be responsible for managing their own health  - Refer to Case Management Department for coordinating discharge planning if the patient needs post-hospital services based on physician/LIP order or complex needs related to functional status, cognitive ability or social support system  Outcome: Progressing     Problem: Patient/Family Goals  Goal: Patient/Family Short Term Goal  Description: Patient's Short Term Goal:  9/20: increase hgb  9/20 noc: maintain hgb >7.0  9/22 nocs: discharge tomorrow     Interventions:   - consults  - orders per MD  -labs  - blood transfusion as needed  - See additional Care Plan goals for specific interventions  Outcome: Progressing       Patient received tonight alert and oriented x4, vss, denies pain.  He is on room air,denies SOB, breath sounds are diminished.  Tele sr.  Heparin gtt infusing as ordered, site is without incidence.  Discussed poc, he verbalized understanding.  Safety and comfort measures provided, will monitor.

## 2024-09-23 NOTE — PROGRESS NOTES
09/23/24 1814 09/23/24 1816 09/23/24 1818   Vital Signs   Pulse 57 60 77   /56 144/63 117/50   MAP (mmHg) 72 83 (!) 62   BP Location Left arm Left arm Left arm   BP Method Automatic Automatic Automatic   Patient Position Lying Sitting Sitting

## 2024-09-23 NOTE — PROGRESS NOTES
Progress Note  Liang Jacintoabdulaziz Schmidt Patient Status:  Inpatient    1955 MRN [id number]   Location [facility] 5NW-A Attending Sary Edward MD   Hosp Day # 4 PCP Ema Arora DO     Subjective:  He is resting comfortably on room air. INR 1.15 today. Denies chest pain or shortness of breath. He admittedly was not monitoring home INRs prior to arrival and he will not do this again.     Objective:  /57 (BP Location: Left arm)   Pulse 70   Temp 97.9 °F (36.6 °C) (Oral)   Resp 18   Ht 5' 10\" (1.778 m)   Wt 235 lb 10.8 oz (106.9 kg)   SpO2 100%   BMI 33.82 kg/m²     Intake/Output:    Intake/Output Summary (Last 24 hours) at 2024 1001  Last data filed at 2024 0453  Gross per 24 hour   Intake 255 ml   Output --   Net 255 ml       Last 3 Weights   24 0420 235 lb 10.8 oz (106.9 kg)   24 0646 227 lb 4.7 oz (103.1 kg)   24 2224 227 lb 4.7 oz (103.1 kg)   24 1555 236 lb (107 kg)   24 1345 233 lb 12.8 oz (106.1 kg)   24 0146 233 lb 7.5 oz (105.9 kg)   24 2046 239 lb (108.4 kg)       Labs:  Recent Labs   Lab 24  1615 24  0335 24  0657 24  0828 24  0024   * 153*  --  143*  --    BUN 28* 29*  --  12  --    CREATSERUM 1.72* 1.62*  --  1.26  --    EGFRCR 42* 46*  --  62  --    CA 9.3 8.5*  --  8.7  --     140  --  140  --    K 3.9 3.4* 3.8 3.5 3.8    114*  --  112  --    CO2 18.0* 18.0*  --  22.0  --      Recent Labs   Lab 24  1615 24  2126 24  0335 24  0836 24  0657 24  0828 24  0721   RBC 3.36*  --  2.75*  --  2.74* 2.87* 2.61*   HGB 8.8*   < > 7.1*  7.1*   < > 7.3* 7.6* 7.0*   HCT 27.1*  --  23.3*  --  22.9* 24.5* 21.9*   MCV 80.7  --  84.7  --  83.6 85.4 83.9   MCH 26.2  --  25.8*  --  26.6 26.5 26.8   MCHC 32.5  --  30.5*  --  31.9 31.0 32.0   RDW  --   --   --   --  23.7 23.9 23.9   NEPRELIM 4.88  --  4.18  --   --   --  2.73   WBC 6.7  --  5.9  --   4.2 4.2 3.7*   .0*  --  96.0*  --  98.0* 113.0*  113.0* 101.0*    < > = values in this interval not displayed.         Recent Labs   Lab 09/19/24  1615   TROPHS 26       Diagnostics:   Telemetry: NSR    CXR  CONCLUSION:  There is mild patchy opacity in the lower left lung that could represent area of developing pneumonia or atelectasis/scarring.     TTE 9/1/24  1. Left ventricle: The cavity size was normal. Wall thickness was mildly      increased. Systolic function was normal. The estimated ejection fraction      was 65-70%.   2. Left atrium: The left atrial volume was mildly increased.   3. Aortic valve: A mechanical prosthetic valve is present by history yet the      leaflet anatomy/disc anatomy is difficult to define. The sewing ring had      no rocking motion and showed no evidence of dehiscence. Transvalvular      velocity was increased. There was mild perivalvular regurgitation. The      peak systolic velocity was 3.8m/sec. The acceleration time was 70ms. The      mean systolic gradient was 28mm Hg.   4. Ascending aorta: The ascending aorta was mildly dilated at 4.2cm      diameter.   5. Mitral valve: There was mild regurgitation.   6. Pulmonary arteries: Systolic pressure was within the normal range, in the      range of 20mm Hg to 25mm Hg.   Impressions:  No previous study was available for comparison.         Review of Systems   Cardiovascular:  Negative for chest pain.   Respiratory:  Negative for shortness of breath.        Physical Exam:  General: Alert and oriented in no apparent distress.  HEENT: Pupils equal. Mucous membranes moist.   Neck: No JVD  Cardiac:  Normal S1 S2, Regular. No murmur  Lungs: Clear without wheezes or crackles. On room air.   Abdomen: Soft, non-tender, ND  Extremities: trace LE edema  Neurologic: No focal deficits. Normal affect.  Skin: Warm and dry,    Medications:   warfarin  7.5 mg Oral Nightly    gabapentin  300 mg Oral Daily    sertraline  200 mg Oral Daily     dilTIAZem ER  120 mg Oral Daily    insulin aspart  1-68 Units Subcutaneous TID CC    insulin aspart  1-10 Units Subcutaneous TID AC and HS    cefTRIAXone  2 g Intravenous Q24H    guaiFENesin ER  600 mg Oral BID    pantoprazole  40 mg Intravenous Q12H    atorvastatin  20 mg Oral Nightly      continuous dose heparin 1,400 Units/hr (09/23/24 0810)       Assessment:    Acute anemia, GI bleed in the setting of supratherapeutic INR  Hgb 6.9 initially, s/p PRBC. INR 8 on arrival which was reversed with vit K on 9/19  EGD with gastric erosion felt to be source of bleed in the setting of elevated INR. GI started on PPI. Bleeding resolved. Hgb trending 7.  Coumadin resumed 9/21  History of mechanical AVR (~20 years ago in [place])  INR goal 2.5-3.5. On heparin gtt bridge  pLVEF 65-70%  He has been managing INR on his own at home and has been noncompliant with monitoring INRs. He now understands importance of monitoring.  Paroxysmal atrial fibrillation  Currently NSR. On diltiazem 120 mg daily  Anticoagulated on coumadin  Rhinovirus, possible pneumonia  IV antibiotics  History of gastric bypass    Plan:    Hgb 7.0 this morning. Hgb has been trending 7 range. Monitor closely and may need to consider additional blood. On IV PPI per GI as felt due to gastric erosion and supratherapeutic INR. Anticoagulation has been resumed  INR 1.15 today with coumadin resumed 9/21 and on heparin gtt to bridge. Goal INR 2.5-3.5. received vit K 5 mg on 9/19.      Plan of care discussed with patient, RN.    RHIANNON Dallas  9/23/2024  10:01 AM    Seen and examined.  Agree with above, crisp valve click on exam.  Will switch from IV heparin to lovenox for over-lap until INR therapeutic (in addition to mechanical valve, pt has history of PAF and a TIA).  Check INR and CBC in am.  D/W pt and nursing.  Cardiac MDM reviewed with RHIANNON Hayden.    HEATHER Johnson MD  2

## 2024-09-23 NOTE — PROGRESS NOTES
[facility]   part of [facility]     Hospitalist Progress Note     Liang Jacintoabdulaziz Schmidt Patient Status:  Observation    1955 MRN [id number]   Location [facility] 5NW-A Attending Sary Edward MD   Hosp Day # 4 PCP Ema Arora DO     Chief Complaint: sob    Subjective:     Patient with melena at home  None today  S/p egd    Marginal erosions limited to anastomosis-no active bleeding  Otherwise normal post Antonio-n-Y anatomy    Objective:    Review of Systems:   A comprehensive review of systems was completed; pertinent positive and negatives stated in subjective.    Vital signs:  Temp:  [97.4 °F (36.3 °C)-97.9 °F (36.6 °C)] 97.9 °F (36.6 °C)  Pulse:  [60-70] 62  Resp:  [17-18] 18  BP: (130-153)/(50-64) 140/57  SpO2:  [91 %-100 %] 100 %    Physical Exam:    General: No acute distress  Respiratory: No wheezes, no rhonchi  Cardiovascular: S1, S2, regular rate and rhythm  Abdomen: Soft, Non-tender, non-distended, positive bowel sounds  Neuro: No new focal deficits.   Extremities: No edema      Diagnostic Data:    Labs:  Recent Labs   Lab 246 24  0335 24  0836 24  1632 24  0657 24  0828 24  0721   WBC 6.7  --  5.9  --   --  4.2 4.2 3.7*   HGB 8.8*   < > 7.1*  7.1* 6.9* 7.4* 7.3* 7.6* 7.0*   MCV 80.7  --  84.7  --   --  83.6 85.4 83.9   .0*  --  96.0*  --   --  98.0* 113.0*  113.0* 101.0*   INR 8.00*  --  1.74*  --   --  1.21* 1.05 1.15    < > = values in this interval not displayed.       Recent Labs   Lab 24  0335 24  0657 24  0828 24  0024   * 153*  --  143*  --    BUN 28* 29*  --  12  --    CREATSERUM 1.72* 1.62*  --  1.26  --    CA 9.3 8.5*  --  8.7  --    ALB 3.9 3.3  --   --   --     140  --  140  --    K 3.9 3.4* 3.8 3.5 3.8    114*  --  112  --    CO2 18.0* 18.0*  --  22.0  --    ALKPHO 100 85  --   --   --    AST 24 17  --   --   --    ALT 46 36  --   --    --    BILT 0.3 0.3  --   --   --    TP 6.8 5.7  --   --   --        Estimated Creatinine Clearance: 57.1 mL/min (based on SCr of 1.26 mg/dL).    Recent Labs   Lab 09/19/24  1615   TROPHS 26       Recent Labs   Lab 09/21/24  0657 09/22/24  0828 09/23/24  0721   PTP 15.4* 13.8 14.8   INR 1.21* 1.05 1.15                  Microbiology    Hospital Encounter on 09/19/24   1. Blood Culture     Status: None (Preliminary result)    Collection Time: 09/19/24  6:52 PM    Specimen: Blood,peripheral   Result Value Ref Range    Blood Culture Result No Growth 3 Days N/A         Imaging: Reviewed in Epic.    Medications:    enoxaparin  1 mg/kg Subcutaneous 2 times per day    warfarin  7.5 mg Oral Nightly    gabapentin  300 mg Oral Daily    sertraline  200 mg Oral Daily    dilTIAZem ER  120 mg Oral Daily    insulin aspart  1-68 Units Subcutaneous TID CC    insulin aspart  1-10 Units Subcutaneous TID AC and HS    cefTRIAXone  2 g Intravenous Q24H    guaiFENesin ER  600 mg Oral BID    pantoprazole  40 mg Intravenous Q12H    atorvastatin  20 mg Oral Nightly       Assessment & Plan:      #Hypotension   suspect d/t hypovolemia d/t GIB  -Hold antihypertensives  -resolved     #GIB, melena  #History of gastric bypass  -IVF  -PPI IV BID  -s/p egd 9/20     #Coagulopathy d/t Coumadin > Vit K given in ER  -Monitor INR - patient has a mechanical aortic valve     #Aortic stenosis sp mechanical AVR   #Essential hypertension  #Dyslipidemia  -Cardiology consult     #Atrial fibrillation on Coumadin  -Hold Coumadin  -Monitor INR  -Telemetry     #Dyspnea d/t pneumonia  -IV abx  -Antitussives as needed  -Check cultures, RVP     #Chronic kidney disease  -Monitor UOP, creatinine and electrolytes     #Anemia, as above     #DVT Px: INR > 2      Sary Edward MD    Supplementary Documentation:     Quality:  DVT Mechanical Prophylaxis:        DVT Pharmacologic Prophylaxis   Medication    enoxaparin (Lovenox) 120 MG/0.8ML SUBQ injection 105 mg    warfarin  (Coumadin) tab 7.5 mg    heparin (Porcine) 41105 units/250mL infusion ACS/AFIB CONTINUOUS                Code Status: Full Code  Vee: No urinary catheter in place  Vee Duration (in days):   Central line:    ADAMARIS: 9/23/2024    Discharge is dependent on: progress  At this point Mr. Schmidt is expected to be discharge to: home    The 21st Century Cures Act makes medical notes like these available to patients in the interest of transparency. Please be advised this is a medical document. Medical documents are intended to carry relevant information, facts as evident, and the clinical opinion of the practitioner. The medical note is intended as peer to peer communication and may appear blunt or direct. It is written in medical language and may contain abbreviations or verbiage that are unfamiliar.

## 2024-09-24 VITALS
BODY MASS INDEX: 33.74 KG/M2 | HEART RATE: 57 BPM | OXYGEN SATURATION: 100 % | SYSTOLIC BLOOD PRESSURE: 138 MMHG | RESPIRATION RATE: 17 BRPM | WEIGHT: 235.69 LBS | DIASTOLIC BLOOD PRESSURE: 58 MMHG | TEMPERATURE: 98 F | HEIGHT: 70 IN

## 2024-09-24 LAB
APTT PPP: 40.6 SECONDS (ref 23–36)
DEPRECATED HBV CORE AB SER IA-ACNC: 109 NG/ML
ERYTHROCYTE [DISTWIDTH] IN BLOOD BY AUTOMATED COUNT: 24 %
GLUCOSE BLD-MCNC: 125 MG/DL (ref 70–99)
GLUCOSE BLD-MCNC: 128 MG/DL (ref 70–99)
GLUCOSE BLD-MCNC: 134 MG/DL (ref 70–99)
HCT VFR BLD AUTO: 22 %
HGB BLD-MCNC: 7.1 G/DL
INR BLD: 1.4 (ref 0.8–1.2)
IRON SATN MFR SERPL: 11 %
IRON SERPL-MCNC: 38 UG/DL
MCH RBC QN AUTO: 27.5 PG (ref 26–34)
MCHC RBC AUTO-ENTMCNC: 32.3 G/DL (ref 31–37)
MCV RBC AUTO: 85.3 FL
PLATELET # BLD AUTO: 93 10(3)UL (ref 150–450)
PROTHROMBIN TIME: 17.2 SECONDS (ref 11.6–14.8)
RBC # BLD AUTO: 2.58 X10(6)UL
TOTAL IRON BINDING CAPACITY: 353 UG/DL (ref 250–425)
TRANSFERRIN SERPL-MCNC: 286 MG/DL (ref 215–365)
WBC # BLD AUTO: 3.2 X10(3) UL (ref 4–11)

## 2024-09-24 PROCEDURE — 99238 HOSP IP/OBS DSCHRG MGMT 30/<: CPT | Performed by: HOSPITALIST

## 2024-09-24 RX ORDER — PANTOPRAZOLE SODIUM 40 MG/1
40 TABLET, DELAYED RELEASE ORAL DAILY
Qty: 30 TABLET | Refills: 0 | Status: SHIPPED | OUTPATIENT
Start: 2024-09-24

## 2024-09-24 RX ORDER — PANTOPRAZOLE SODIUM 40 MG/1
40 TABLET, DELAYED RELEASE ORAL DAILY
Qty: 60 TABLET | Refills: 0 | Status: SHIPPED | OUTPATIENT
Start: 2024-09-24 | End: 2024-09-24

## 2024-09-24 RX ORDER — WARFARIN SODIUM 5 MG/1
5 TABLET ORAL NIGHTLY
Qty: 90 TABLET | Refills: 0 | Status: SHIPPED | OUTPATIENT
Start: 2024-09-24

## 2024-09-24 RX ORDER — ENOXAPARIN SODIUM 150 MG/ML
1 INJECTION SUBCUTANEOUS EVERY 12 HOURS SCHEDULED
Qty: 8 ML | Refills: 1 | Status: SHIPPED | OUTPATIENT
Start: 2024-09-24

## 2024-09-24 NOTE — PLAN OF CARE
Heparin drip stopped and discontinued per order from hospitalist and cards, handoff given to PM RN.

## 2024-09-24 NOTE — PLAN OF CARE
Problem: CARDIOVASCULAR - ADULT  Goal: Maintains optimal cardiac output and hemodynamic stability  Description: INTERVENTIONS:  - Monitor vital signs, rhythm, and trends  - Monitor for bleeding, hypotension and signs of decreased cardiac output  - Evaluate effectiveness of vasoactive medications to optimize hemodynamic stability  - Monitor arterial and/or venous puncture sites for bleeding and/or hematoma  - Assess quality of pulses, skin color and temperature  - Assess for signs of decreased coronary artery perfusion - ex. Angina  - Evaluate fluid balance, assess for edema, trend weights  Outcome: Progressing  Goal: Absence of cardiac arrhythmias or at baseline  Description: INTERVENTIONS:  - Continuous cardiac monitoring, monitor vital signs, obtain 12 lead EKG if indicated  - Evaluate effectiveness of antiarrhythmic and heart rate control medications as ordered  - Initiate emergency measures for life threatening arrhythmias  - Monitor electrolytes and administer replacement therapy as ordered  Outcome: Progressing     Problem: RESPIRATORY - ADULT  Goal: Achieves optimal ventilation and oxygenation  Description: INTERVENTIONS:  - Assess for changes in respiratory status  - Assess for changes in mentation and behavior  - Position to facilitate oxygenation and minimize respiratory effort  - Oxygen supplementation based on oxygen saturation or ABGs  - Provide Smoking Cessation handout, if applicable  - Encourage broncho-pulmonary hygiene including cough, deep breathe, Incentive Spirometry  - Assess the need for suctioning and perform as needed  - Assess and instruct to report SOB or any respiratory difficulty  - Respiratory Therapy support as indicated  - Manage/alleviate anxiety  - Monitor for signs/symptoms of CO2 retention  Outcome: Progressing     Problem: GASTROINTESTINAL - ADULT  Goal: Minimal or absence of nausea and vomiting  Description: INTERVENTIONS:  - Maintain adequate hydration with IV or PO as  ordered and tolerated  - Nasogastric tube to low intermittent suction as ordered  - Evaluate effectiveness of ordered antiemetic medications  - Provide nonpharmacologic comfort measures as appropriate  - Advance diet as tolerated, if ordered  - Obtain nutritional consult as needed  - Evaluate fluid balance  Outcome: Progressing  Goal: Maintains or returns to baseline bowel function  Description: INTERVENTIONS:  - Assess bowel function  - Maintain adequate hydration with IV or PO as ordered and tolerated  - Evaluate effectiveness of GI medications  - Encourage mobilization and activity  - Obtain nutritional consult as needed  - Establish a toileting routine/schedule  - Consider collaborating with pharmacy to review patient's medication profile  Outcome: Progressing     Problem: HEMATOLOGIC - ADULT  Goal: Maintains hematologic stability  Description: INTERVENTIONS  - Assess for signs and symptoms of bleeding or hemorrhage  - Monitor labs and vital signs for trends  - Administer supportive blood products/factors, fluids and medications as ordered and appropriate  - Administer supportive blood products/factors as ordered and appropriate  Outcome: Progressing  Goal: Free from bleeding injury  Description: (Example usage: patient with low platelets)  INTERVENTIONS:  - Avoid intramuscular injections, enemas and rectal medication administration  - Ensure safe mobilization of patient  - Hold pressure on venipuncture sites to achieve adequate hemostasis  - Assess for signs and symptoms of internal bleeding  - Monitor lab trends  - Patient is to report abnormal signs of bleeding to staff  - Avoid use of toothpicks and dental floss  - Use electric shaver for shaving  - Use soft bristle tooth brush  - Limit straining and forceful nose blowing  Outcome: Progressing     Problem: RISK FOR INFECTION - ADULT  Goal: Absence of fever/infection during anticipated neutropenic period  Description: INTERVENTIONS  - Monitor WBC  -  Administer growth factors as ordered  - Implement neutropenic guidelines  Outcome: Progressing     Problem: SAFETY ADULT - FALL  Goal: Free from fall injury  Description: INTERVENTIONS:  - Assess pt frequently for physical needs  - Identify cognitive and physical deficits and behaviors that affect risk of falls.  - [place] fall precautions as indicated by assessment.  - Educate pt/family on patient safety including physical limitations  - Instruct pt to call for assistance with activity based on assessment  - Modify environment to reduce risk of injury  - Provide assistive devices as appropriate  - Consider OT/PT consult to assist with strengthening/mobility  - Encourage toileting schedule  Outcome: Progressing     Problem: DISCHARGE PLANNING  Goal: Discharge to home or other facility with appropriate resources  Description: INTERVENTIONS:  - Identify barriers to discharge w/pt and caregiver  - Include patient/family/discharge partner in discharge planning  - Arrange for needed discharge resources and transportation as appropriate  - Identify discharge learning needs (meds, wound care, etc)  - Arrange for interpreters to assist at discharge as needed  - Consider post-discharge preferences of patient/family/discharge partner  - Complete POLST form as appropriate  - Assess patient's ability to be responsible for managing their own health  - Refer to Case Management Department for coordinating discharge planning if the patient needs post-hospital services based on physician/LIP order or complex needs related to functional status, cognitive ability or social support system  Outcome: Progressing     Problem: Patient/Family Goals  Goal: Patient/Family Long Term Goal  Description: Patient's Long Term Goal: Discharge with adequate resources    Interventions:  - Identify barriers to discharge w/pt and caregiver  - Include patient/family/discharge partner in discharge planning  - Arrange for needed discharge resources and  transportation as appropriate  - Identify discharge learning needs   - Consider post-discharge preferences of patient/family/discharge partner  - Assess patient's ability to be responsible for managing their own health  - Refer to Case Management Department for coordinating discharge planning if the patient needs post-hospital services  - See additional Care Plan goals for specific interventions  Outcome: Progressing  Goal: Patient/Family Short Term Goal  Description: Patient's Short Term Goal:  9/20: increase hgb  9/20 noc: maintain hgb >7.0  9/22 nocs: discharge tomorrow   9/23NOC: Possible discharge tomorrow Bridging coumadin with lovenox    Interventions:   - consults  - orders per MD  -labs  - blood transfusion as needed  - See additional Care Plan goals for specific interventions  Outcome: Progressing

## 2024-09-24 NOTE — PLAN OF CARE
NURSING DISCHARGE NOTE    Discharged Home via Wheelchair.  Accompanied by Family member and RN  Belongings Taken by patient/family.    Pt received A&Ox4. Afebrile. VSS. Denies pain. Hgb 7.1, iron 38 - ferric gluconate given per MAR. Bridging with coumadin and lovenox - pt to check INR 9/26. Cleared to dc by Etta LOYOLA. Rx delivered by meds to beds. Pt educated on administering lovenox injections, demonstrated use appropriately. Dc instructions given at bedside. Verbalized understanding. PIVs removed.

## 2024-09-24 NOTE — PROGRESS NOTES
[facility]   part of [facility]     Hospitalist Progress Note     Liang Jacintoabdulaziz Schmidt Patient Status:  Observation    1955 MRN [id number]   Location [facility] 5NW-A Attending Sary Edward MD   Hosp Day # 5 PCP Ema Arora DO     Chief Complaint: sob    Subjective:     Patient  feels ready for dc breathing improving     Objective:    Review of Systems:   A comprehensive review of systems was completed; pertinent positive and negatives stated in subjective.    Vital signs:  Temp:  [97.7 °F (36.5 °C)-98 °F (36.7 °C)] 98 °F (36.7 °C)  Pulse:  [52-92] 61  Resp:  [16-20] 16  BP: (117-155)/(48-72) 120/58  SpO2:  [98 %-100 %] 100 %    Physical Exam:    General: No acute distress  AO   Respiratory: No wheezes, no rhonchi  course bilat   Cardiovascular: S1, S2, regular rate and rhythm + click   Abdomen: Soft, Non-tender, non-distended, positive bowel sounds  Neuro: No new focal deficits.   Extremities: No edema      Diagnostic Data:    Labs:  Recent Labs   Lab 24  0335 24  0836 24  1632 24  0657 24  0828 24  0721 24  0729   WBC 5.9  --   --  4.2 4.2 3.7* 3.2*   HGB 7.1*  7.1*   < > 7.4* 7.3* 7.6* 7.0* 7.1*   MCV 84.7  --   --  83.6 85.4 83.9 85.3   PLT 96.0*  --   --  98.0* 113.0*  113.0* 101.0* 93.0*   INR 1.74*  --   --  1.21* 1.05 1.15 1.40*    < > = values in this interval not displayed.       Recent Labs   Lab 24  1615 24  0335 24  0657 24  0828 24  0024   * 153*  --  143*  --    BUN 28* 29*  --  12  --    CREATSERUM 1.72* 1.62*  --  1.26  --    CA 9.3 8.5*  --  8.7  --    ALB 3.9 3.3  --   --   --     140  --  140  --    K 3.9 3.4* 3.8 3.5 3.8    114*  --  112  --    CO2 18.0* 18.0*  --  22.0  --    ALKPHO 100 85  --   --   --    AST 24 17  --   --   --    ALT 46 36  --   --   --    BILT 0.3 0.3  --   --   --    TP 6.8 5.7  --   --   --        Estimated Creatinine Clearance: 57.1 mL/min  (based on SCr of 1.26 mg/dL).    Recent Labs   Lab 09/19/24  1615   TROPHS 26       Recent Labs   Lab 09/22/24  0828 09/23/24  0721 09/24/24  0729   PTP 13.8 14.8 17.2*   INR 1.05 1.15 1.40*                  Microbiology    Hospital Encounter on 09/19/24   1. Blood Culture     Status: None (Preliminary result)    Collection Time: 09/19/24  6:52 PM    Specimen: Blood,peripheral   Result Value Ref Range    Blood Culture Result No Growth 4 Days N/A         Imaging: Reviewed in Epic.    Medications:    enoxaparin  1 mg/kg Subcutaneous 2 times per day    warfarin  7.5 mg Oral Nightly    gabapentin  300 mg Oral Daily    sertraline  200 mg Oral Daily    dilTIAZem ER  120 mg Oral Daily    insulin aspart  1-68 Units Subcutaneous TID CC    insulin aspart  1-10 Units Subcutaneous TID AC and HS    guaiFENesin ER  600 mg Oral BID    pantoprazole  40 mg Intravenous Q12H    atorvastatin  20 mg Oral Nightly       Assessment & Plan:      #Hypotension   resolved   suspect d/t hypovolemia d/t GIB  -resume home  antihypertensives  -resolved     #GIB, melena  resolved   #History of gastric bypass  -PPI daily po per GI  ordered   -s/p egd 9/20     #Coagulopathy d/t Coumadin > Vit K given in ER  -Monitor INR - patient has a mechanical aortic valve  On lovenox, coumadin   INR Thurs  pt follows w/ [facility] has his own machine  Needs lovenox teaching- RN to do   Check iron levels  follows w/ Dr Carr  may need bag of iron prior to dc today      #Aortic stenosis sp mechanical AVR   #Essential hypertension  #Dyslipidemia  -Cardiology cleared for dc      #Atrial fibrillation on Coumadin  - resumed  Coumadin  -Monitor INR  -Telemetry     #Dyspnea d/t pneumonia/ rhinovirus   -Antitussives as needed     #Chronic kidney disease  -stable      #Anemia, as above  Check iron/ ferritin   Follows w/ Dr Carr      #DVT Px: INR > 2  Dc today instruction for lovenox  INR thurs     Mary mckinney np   Supplementary Documentation:     Quality:  DVT  Mechanical Prophylaxis:        DVT Pharmacologic Prophylaxis   Medication    enoxaparin (Lovenox) 120 MG/0.8ML SUBQ injection 105 mg    warfarin (Coumadin) tab 7.5 mg                Code Status: Full Code  Vee: No urinary catheter in place  Vee Duration (in days):   Central line:    ADAMARIS: 9/24/2024    Discharge is dependent on: progress  At this point Mr. Schmidt is expected to be discharge to: home    The 21st Century Cures Act makes medical notes like these available to patients in the interest of transparency. Please be advised this is a medical document. Medical documents are intended to carry relevant information, facts as evident, and the clinical opinion of the practitioner. The medical note is intended as peer to peer communication and may appear blunt or direct. It is written in medical language and may contain abbreviations or verbiage that are unfamiliar.

## 2024-09-24 NOTE — PAYOR COMM NOTE
--------------  ADMISSION REVIEW     Payor: MUKESH CHOICE  Subscriber #:  [id number]  Authorization Number: [id number]    Admit date: 9/19/24  Admit time:  9:16 PM       REVIEW DOCUMENTATION:     ED Provider Notes        ED Provider Notes signed by Tato Kitchen MD at 9/19/2024  7:05 PM       Author: Tato Kitchen MD Service: -- Author Type: Physician    Filed: 9/19/2024  7:05 PM Date of Service: 9/19/2024  4:16 PM Status: Addendum    : Tato Kitchen MD (Physician)    Related Notes: Original Note by Tato Kitchen MD (Physician) filed at 9/19/2024  6:49 PM           Patient Seen in: [facility]      History     Chief Complaint   Patient presents with    Difficulty Breathing    GI Bleeding     Stated Complaint: abnormal labs, shortness of breath    Subjective:   HPI    69-year-old male who has a history of having anemia that gets transfusions of iron as well as at times needed blood transfusions says he has been more weak and short of breath especially with exertion worsening over the last 1 week.  In addition this he noticed that he is also having GI bleed at this time.  He is history of having chronic A-fib and is on Coumadin.  He is denying any headaches or dizziness.  No chest pain.  Denies any fevers, chills or cough.  He denies any nausea or vomiting.  Denies any dysuria.  He is denying any other complaints at this time.  He does receive iron transfusions.    Objective:   Past Medical History:    Anemia, chronic disease    Anesthesia complication    HX: OF AGGRESSION W/PAST PROCEDURES D/T INSUFFICIENT ANESTHESIA IN THE PAST?    Arthritis    Nothing good to say    Calculus of kidney    Nothing major    Cancer (HCC)    skin cancer    Cellulitis    Diabetes (HCC)    Essential hypertension    Hearing impairment    High blood pressure    Hyperlipidemia    Obesity    Osteoarthritis    Pancreatitis (HCC)    Sepsis (HCC)    Shortness of breath    INTERMITTENT SOB ON EXERTION     Visual impairment    CONTACTS/GLASSES              Past Surgical History:   Procedure Laterality Date    Anast panc cyst-bowel,trang-en-y      Cabg      Cath transcatheter aortic valve replacement      Cholecystectomy  8 yrs ago    Following acute pancreatitis    Colonoscopy  Many times    Colonoscopy N/A 3/5/2024    Procedure: COLONOSCOPY;  Surgeon: Zenon Kelley DO;  Location:  ENDOSCOPY    Hemorrhoidectomy  1 yr ago    Not very successful to be repeated    Other surgical history  20 yrs ago    Aortic valve replacement. Metal valve now    Removal gallbladder      Replace aortic valve open      performed in [place]    Skin surgery      Tonsillectomy      Vasectomy  20 yrs ago       Review of Systems    Physical Exam     ED Triage Vitals [09/19/24 1555]   BP (!) 80/50   Pulse 78   Resp 18   Temp 97 °F (36.1 °C)   Temp src Temporal   SpO2 100 %   O2 Device None (Room air)       Current Vitals:   Vital Signs  BP: 113/60  Pulse: 68  Resp: 21  Temp: 97 °F (36.1 °C)  Temp src: Temporal  MAP (mmHg): 77    Oxygen Therapy  SpO2: 100 %  O2 Device: None (Room air)      Physical Exam    HEENT; NCAT, EOMI, throat clear, neck supple, no LAD, no JVD  Heart S1S2 RRR  lungs: CTAB  abd: Soft NT, ND,  NABS without rebound or guarding  Ext no C/C/E    ED Course     Labs Reviewed   CBC WITH DIFFERENTIAL WITH PLATELET - Abnormal; Notable for the following components:       Result Value    RBC 3.36 (*)     HGB 8.8 (*)     HCT 27.1 (*)     .0 (*)     All other components within normal limits   COMP METABOLIC PANEL (14) - Abnormal; Notable for the following components:    Glucose 161 (*)     CO2 18.0 (*)     BUN 28 (*)     Creatinine 1.72 (*)     eGFR-Cr 42 (*)     All other components within normal limits   PROTHROMBIN TIME (PT) - Abnormal; Notable for the following components:    PT 68.8 (*)     INR 8.00 (*)     All other components within normal limits   RBC MORPHOLOGY SCAN - Abnormal; Notable for the following  components:    RBC Morphology See morphology below (*)     Microcytosis 2+ (*)     Macrocytosis 2+ (*)     All other components within normal limits   TROPONIN I HIGH SENSITIVITY - Normal   LACTIC ACID, PLASMA   TYPE AND SCREEN     EKG      Rate: 92  Rhythm: Atrial fibrillation  Reading:  with A-fib left axis deviation            ED Course as of 09/19/24 1905  ------------------------------------------------------------  Time: 09/19 1845  Comment: While here the patient awakened was 6.7 thousand.  Hemoglobin was 8.8 which is stable for this patient.  Blood cultures x 2 as well as lactic acid levels were taken.  He does have an elevated INR to 8.  The rest of his electrolytes were unremarkable to CO2 of 18 and BUN/creatinine of 20 and 1.72.  The patient here was given IV Protonix, IV fluid and given azithromycin and Rocephin.  The patient did arrive here hypotensive with a blood pressure of 80/50 which improved with IV fluids.  At this time the patient will be admitted for further manage at this time.  Vitamin K has been given.     XR CHEST AP PORTABLE  (CPT=71045)    Result Date: 9/19/2024  PROCEDURE:  XR CHEST AP PORTABLE  (CPT=71045)  TECHNIQUE:  AP chest radiograph was obtained.  COMPARISON:  [facility] , XR, XR CHEST AP PORTABLE  (CPT=71045), 8/31/2024, 9:23 PM.  INDICATIONS:  abnormal labs, shortness of breath  PATIENT STATED HISTORY: (As transcribed by Technologist)  Patient offered no additional history at this time.    FINDINGS:  Median sternotomy changes and valve prosthesis noted.  Tortuosity of the thoracic aorta again noted.  Cardiomegaly with normal pulmonary vascularity.  Even trace shin of the right diaphragm.  No pleural effusion or pneumothorax.  There is mild  patchy opacity in the lower left lung that could represent area of developing pneumonia or atelectasis/scarring.  Clinical correlation recommended.            CONCLUSION:  There is mild patchy opacity in the lower left lung that could  represent area of developing pneumonia or atelectasis/scarring.  Clinical correlation recommended.   LOCATION:  [facility]      Dictated by (CST): Chandrakant Pedroza MD on 9/19/2024 at 4:55 PM     Finalized by (CST): Chandrakant Pedroza MD on 9/19/2024 at 4:56 PM       Medications   sodium chloride 0.9 % IV bolus 500 mL (0 mL Intravenous Stopped 9/19/24 1721)     Followed by   sodium chloride 0.9% infusion ( Intravenous New Bag 9/19/24 1724)   cefTRIAXone (Rocephin) 2 g in sodium chloride 0.9% 100 mL IVPB-ADDV (2 g Intravenous New Bag 9/19/24 1858)   azithromycin (Zithromax) 500 mg in sodium chloride 0.9% 250mL IVPB premix (has no administration in time range)   phytonadione (Aqua-Mephton) 5 mg in sodium chloride 0.9% 50 mL IVPB (has no administration in time range)   pantoprazole (Protonix) 80 mg in sodium chloride 0.9% 100 mL IV bolus (0 mg Intravenous Stopped 9/19/24 1734)         MDM      Differential diagnosis includes pneumonia, anemia, GI bleed but not limited this..  Patient does have pneumonia.  His hemoglobin is stable.  Did arrive hypotensive which responded well to IV fluids.  Blood cultures and lactic acid levels were taken at this time.  Patient had received IV Rocephin and azithromycin as well as vitamin K.  Received Protonix as well.  This case discussed with the hospitalist patient mated for further care.           Medical Decision Making      Disposition and Plan     Clinical Impression:  1. Community acquired pneumonia of left lower lobe of lung    2. Elevated INR    3. Gastrointestinal hemorrhage, unspecified gastrointestinal hemorrhage type    4. Chronic anemia         Disposition:  Admit     Present on Admission  Date Reviewed: 8/20/2024            ICD-10-CM Noted POA    * (Principal) Community acquired pneumonia of left lower lobe of lung J18.9 9/19/2024 Unknown           9/19 H&P    Chief Complaint: Shortness of breath        Subjective:  History of Present Illness:      Liang Jacintoabdulaziz Johnsonome is a 69  year old male with a history of atrial fibrillation on Coumadin, aortic stenosis sp mechanical AVR, essential hypertension, dyslipidemia, chronic kidney disease and gastric bypass who presents with shortness of breath. Patient was recently admitted for dyspnea/chest pain d/t anemia. Patient states he has been short of breath and though he may need iron prompting ER evaluation. He admits to cough and recent URI. No fever.      Patient also states he has felt dizzy when getting up from seated position and with movement. He has epigastric abdominal pain and melena x 1 weeks with ~ 4 BM / day. Patient has been taking his Coumadin but not monitoring INR at home.            History/Other:         Review of Systems:   A comprehensive review of systems was completed.    Pertinent positives and negatives noted in the HPI.           Objective:  Physical Exam:    /65   Pulse 75   Temp 97 °F (36.1 °C) (Temporal)   Resp 21   Ht 5' 10\" (1.778 m)   Wt 236 lb (107 kg)   SpO2 100%   BMI 33.86 kg/m²   General: No acute distress, Alert  Respiratory: Rhonchi noted  Cardiovascular: S1, S2.   Abdomen: Soft, mild epigastric tenderness, non-distended, positive bowel sounds  Neuro: No new focal deficits  Extremities: trace edema            Recent Labs   Lab 09/19/24  1615   RBC 3.36*   HGB 8.8*   HCT 27.1*   MCV 80.7   MCH 26.2   MCHC 32.5   NEPRELIM 4.88   WBC 6.7   .0*             Recent Labs   Lab 09/19/24  1615   *   BUN 28*   CREATSERUM 1.72*   EGFRCR 42*   CA 9.3   ALB 3.9      K 3.9      CO2 18.0*   ALKPHO 100   AST 24   ALT 46   BILT 0.3   TP 6.8               Lab Results   Component Value Date     INR 8.00 (HH) 09/19/2024     INR 4.13 (H) 09/01/2024     INR 3.73 (H) 08/31/2024             Recent Labs   Lab 09/19/24  1615   TROPHS 26         Assessment & Plan:  #Hypotension suspect d/t hypovolemia d/t GIB  -Admit  -IVF  -Hold antihypertensives  -Orthostatics  -Monitor hemodynamics     #GIB,  melena  #History of gastric bypass  -NPO  -IVF  -PPI IV BID  -Serial H&H  -GI consult      #Coagulopathy d/t Coumadin > Vit K given in ER  -Monitor INR - patient has a mechanical aortic valve     #Aortic stenosis sp mechanical AVR   #Essential hypertension  #Dyslipidemia  -Cardiology consult     #Atrial fibrillation on Coumadin  -Hold Coumadin  -Monitor INR  -Telemetry     #Dyspnea d/t pneumonia  -IV abx  -Antitussives as needed  -Check cultures, RVP     #Chronic kidney disease  -Monitor UOP, creatinine and electrolytes     #Anemia, as above     #DVT Px: INR > 2     Plan of care discussed with patient and ER.     Stephen Fisher MD        9/20 IM     Chief Complaint: sob        Subjective:  Patient with melena           Objective:  Review of Systems:   A comprehensive review of systems was completed; pertinent positive and negatives stated in subjective.     Vital signs:  Temp:  [97 °F (36.1 °C)-97.8 °F (36.6 °C)] 97.8 °F (36.6 °C)  Pulse:  [54-99] 83  Resp:  [15-25] 19  BP: ()/(41-82) 110/62  SpO2:  [100 %] 100 %     Physical Exam:    General: No acute distress  Respiratory: No wheezes, no rhonchi  Cardiovascular: S1, S2, regular rate and rhythm  Abdomen: Soft, Non-tender, non-distended, positive bowel sounds  Neuro: No new focal deficits.   Extremities: No edema        Diagnostic Data:    Labs:        Recent Labs   Lab 09/19/24  1615 09/19/24 2126 09/20/24  0335   WBC 6.7  --  5.9   HGB 8.8* 7.6* 7.1*  7.1*   MCV 80.7  --  84.7   .0*  --  96.0*   INR 8.00*  --  1.74*              Recent Labs   Lab 09/19/24  1615 09/20/24  0335   * 153*   BUN 28* 29*   CREATSERUM 1.72* 1.62*   CA 9.3 8.5*   ALB 3.9 3.3    140   K 3.9 3.4*    114*   CO2 18.0* 18.0*   ALKPHO 100 85   AST 24 17   ALT 46 36   BILT 0.3 0.3   TP 6.8 5.7         Estimated Creatinine Clearance: 44.4 mL/min (A) (based on SCr of 1.62 mg/dL (H)).         Recent Labs   Lab 09/19/24  1615   TROPHS 26              Recent Labs    Lab 09/19/24  1615 09/20/24  0335   PTP 68.8* 20.5*   INR 8.00* 1.74*             Medications:   Scheduled Medications    potassium chloride  40 mEq Intravenous Once    insulin aspart  1-68 Units Subcutaneous TID CC    insulin aspart  1-10 Units Subcutaneous TID AC and HS    azithromycin  500 mg Intravenous Q24H    cefTRIAXone  2 g Intravenous Q24H    guaiFENesin ER  600 mg Oral BID    pantoprazole  40 mg Intravenous Q12H    atorvastatin  20 mg Oral Nightly                Assessment & Plan:  #Hypotension suspect d/t hypovolemia d/t GIB  -Hold antihypertensives  -Monitor hemodynamics     #GIB, melena  #History of gastric bypass  -IVF  -PPI IV BID  -Serial H&H  -GI consult      #Coagulopathy d/t Coumadin > Vit K given in ER  -Monitor INR - patient has a mechanical aortic valve     #Aortic stenosis sp mechanical AVR   #Essential hypertension  #Dyslipidemia  -Cardiology consult     #Atrial fibrillation on Coumadin  -Hold Coumadin  -Monitor INR  -Telemetry     #Dyspnea d/t pneumonia  -IV abx  -Antitussives as needed  -Check cultures, RVP     #Chronic kidney disease  -Monitor UOP, creatinine and electrolytes     #Anemia, as above     #DVT Px: INR > 2     9/20 GI consult       Reason for Consultation   Melena  Acute blood loss anemia         History of Present Illness   Mr. Liang Schmidt is a 69 year-old male with a history of Antonio en Y bypass, stage III CKD, chronic anemia, follows with hematology and has received IV iron infusions. He was recently admitted 3 weeks with symptomatic anemia. Seen by our service, rectal exam in ED revealed brown stool, occult + blood, but endoscopies deferred due to recent negative endoscopic evaluation (see below)     Had one IV iron since discharge. Started Coumadin, and notes for past 3-4 days having dark melenic stool 3-4 x day. Frequency is unchanged from baseline, but color is now black     In ER, INR noted to be 8, Hgb down from 8's to 6.9, receiving one unit pRBCs. Reports  mild epigastric discomfort. Denies NSAIDs.     EGD/Colonoscopy 3/2024 for similar presentation   EGD - mild gastritis and trang-en-y gastric bypass anatomy. Biopsies were negative for celiac disease.   Colon - multiple colon polyps which were removed, diverticulosis, internal and external hemorrhoids.                MEDICATIONS      Current Medications    [COMPLETED] potassium chloride 40 mEq in 250mL sodium chloride 0.9% IVPB premix  40 mEq Intravenous Once    sodium chloride 0.9% infusion   Intravenous Once    [COMPLETED] sodium chloride 0.9 % IV bolus 500 mL  500 mL Intravenous Once     Followed by    sodium chloride 0.9% infusion   Intravenous Continuous    [COMPLETED] pantoprazole (Protonix) 80 mg in sodium chloride 0.9% 100 mL IV bolus  80 mg Intravenous Once    [COMPLETED] cefTRIAXone (Rocephin) 2 g in sodium chloride 0.9% 100 mL IVPB-ADDV  2 g Intravenous Once    [COMPLETED] azithromycin (Zithromax) 500 mg in sodium chloride 0.9% 250mL IVPB premix  500 mg Intravenous Once    glucose (Dex4) 15 GM/59ML oral liquid 15 g  15 g Oral Q15 Min PRN     Or    glucose (Glutose) 40% oral gel 15 g  15 g Oral Q15 Min PRN     Or    glucose-vitamin C (Dex-4) chewable tab 4 tablet  4 tablet Oral Q15 Min PRN     Or    dextrose 50% injection 50 mL  50 mL Intravenous Q15 Min PRN     Or    glucose (Dex4) 15 GM/59ML oral liquid 30 g  30 g Oral Q15 Min PRN     Or    glucose (Glutose) 40% oral gel 30 g  30 g Oral Q15 Min PRN     Or    glucose-vitamin C (Dex-4) chewable tab 8 tablet  8 tablet Oral Q15 Min PRN    sodium chloride 0.9% infusion   Intravenous Continuous    acetaminophen (Tylenol Extra Strength) tab 500 mg  500 mg Oral Q4H PRN    ondansetron (Zofran) 4 MG/2ML injection 4 mg  4 mg Intravenous Q6H PRN    metoclopramide (Reglan) 5 mg/mL injection 5 mg  5 mg Intravenous Q8H PRN    insulin aspart (NovoLOG) 100 Units/mL FlexPen 1-68 Units  1-68 Units Subcutaneous TID CC    insulin aspart (NovoLOG) 100 Units/mL FlexPen 1-10  Units  1-10 Units Subcutaneous TID AC and HS    azithromycin (Zithromax) 500 mg in sodium chloride 0.9% 250mL IVPB premix  500 mg Intravenous Q24H    cefTRIAXone (Rocephin) 2 g in sodium chloride 0.9% 100 mL IVPB-ADDV  2 g Intravenous Q24H    guaiFENesin ER (Mucinex) 12 hr tab 600 mg  600 mg Oral BID    [COMPLETED] phytonadione (Aqua-Mephton) 5 mg in sodium chloride 0.9% 50 mL IVPB  5 mg Intravenous Once    pantoprazole (Protonix) 40 mg in sodium chloride 0.9% PF 10 mL IV push  40 mg Intravenous Q12H    atorvastatin (Lipitor) tab 20 mg  20 mg Oral Nightly                             Physical Exam      Vital signs:  BP (!) 88/58 (BP Location: Left arm)   Pulse 74   Temp 97.8 °F (36.6 °C) (Axillary)   Resp 19   Ht 5' 10\" (1.778 m)   Wt 227 lb 4.7 oz (103.1 kg)   SpO2 100%   BMI 32.61 kg/m²      Physical Exam         General: Appears alert, oriented x 3 and in no acute distress.  HEENT: Normal. No scleral icterus.   NECK: Supple. No neck vein distention. Thyroid not enlarged. No lymphadenopathy.  CV: S1 and S2 normal. No murmurs or gallops.  LUNGS: Clear to percussion and auscultation.  ABDOMEN: Soft and non-distended. Non-tender. No masses. Bowel sounds are present.  BACK: No CVA tenderness.  EXTREMITIES: No edema, cyanosis or clubbing.  SKIN: Warm and dry.            IMAGING/LABS         Labs:         Lab Results   Component Value Date     WBC 5.9 09/20/2024     HGB 6.9 09/20/2024     HCT 23.3 09/20/2024     PLT 96.0 09/20/2024     CREATSERUM 1.62 09/20/2024     BUN 29 09/20/2024      09/20/2024     K 3.4 09/20/2024      09/20/2024     CO2 18.0 09/20/2024      09/20/2024     CA 8.5 09/20/2024     ALB 3.3 09/20/2024     ALKPHO 85 09/20/2024     BILT 0.3 09/20/2024     AST 17 09/20/2024     ALT 36 09/20/2024     INR 1.74 09/20/2024     PTP 20.5 09/20/2024            Recent Labs    Lab 09/19/24  1615 09/20/24  0335    * 153*    BUN 28* 29*    CREATSERUM 1.72* 1.62*    CA 9.3 8.5*    NA  138 140    K 3.9 3.4*     114*    CO2 18.0* 18.0*             Recent Labs    Lab 09/20/24  0335 09/20/24  0836    RBC 2.75*  --     HGB 7.1*  7.1* 6.9*    HCT 23.3*  --     MCV 84.7  --     MCH 25.8*  --     MCHC 30.5*  --     NEPRELIM 4.18  --     WBC 5.9  --     PLT 96.0*  --                 Recent Labs    Lab 09/19/24  1615 09/20/24  0335    ALT 46 36    AST 24 17         Imaging:   XR CHEST AP PORTABLE  (CPT=71045)  Narrative: PROCEDURE:  XR CHEST AP PORTABLE  (CPT=71045)     TECHNIQUE:  AP chest radiograph was obtained.     COMPARISON:  [facility] , XR, XR CHEST AP PORTABLE  (CPT=71045), 8/31/2024, 9:23 PM.     INDICATIONS:  abnormal labs, shortness of breath     PATIENT STATED HISTORY: (As transcribed by Technologist)  Patient offered no additional history at this time.          FINDINGS:  Median sternotomy changes and valve prosthesis noted.  Tortuosity of the thoracic aorta again noted.  Cardiomegaly with normal pulmonary vascularity.  Even trace shin of the right diaphragm.  No pleural effusion or pneumothorax.  There is mild   patchy opacity in the lower left lung that could represent area of developing pneumonia or atelectasis/scarring.  Clinical correlation recommended.        Impression: CONCLUSION:  There is mild patchy opacity in the lower left lung that could represent area of developing pneumonia or atelectasis/scarring.  Clinical correlation recommended.     LOCATION:  [facility]              IMPRESSION:      70 y/o male with a h/o Antonio-n-y bypass, chronic anemia with recent melena on Coumadin with INR of 8, r/o UGI bleed from marginal ulcer, esophagitis, AVM, overall mild drop in hemoglobin with stable hemodynamics.  Recent reassuring endoscopic evaluation               PLAN:      IV PPI bid  Urgent EGD today. R/B/A discussed with patient who agrees to proceed as planned.   Reversal of Coumadin done - INR of 1.8               9/20  operative procedure  0/2024  3:38 PM ESOPHAGOGASTRODUODENOSCOPY  (EGD)    Davonte Quick MD               Signed                                  Operative Report-Esophagogastroduodenoscopy    PREOPERATIVE DIAGNOSIS/INDICATION:  Melena  Acute blood loss anemia  POSTOPERTATIVE DIAGNOSIS:  Marginal erosions limited to anastomosis-no active bleeding  Otherwise normal post Antonio-n-Y anatomy  PROCEDURE PERFORMED: EGD  INFORMED CONSENT: Once a brief history and physical examination was performed, the risks, benefits and alternatives to the procedure were discussed with the patient and/or family and informed consent was obtained.  The risks of sedation, perforation, missed lesions and need for surgery were all discussed.  Patient expressed understanding of the risks and agreed to proceed.     PROCEDURE DESCRIPTION:  The patient was then brought to the endoscopy suite where his/her pulse, pulse oximetry and blood pressure were monitored. He/she was placed in the left lateral decubitus position and deep sedation was administered. Once adequate sedation was achieved, a bite block was placed and a lubricated tip of an Olympus video upper endoscope was inserted through the oropharynx and gently manipulated through the esophagus into the stomach and the distal duodenum. Upon withdrawal of the endoscope, careful visualization of the mucosa was performed.   FINDINGS:  ESOPHAGUS:Normal in course and caliber, no mucosal erosions, ulcers  EGJ: Located at 35 cm otherwise normal, .  STOMACH:Small  5 cm gastric remnant with Antonio en Y anatomy.  Intense erythema limited to the anastomosis with several small punctate erosion and one thin sliver of semi-circumferential erosion. No visible vessel or flat spots..   SMALL BOWEL: Afferent and efferent limbs of small bowel normal to the extent examined  THERAPEUTICS: None  RECOMMENDATIONS:   Above findings can explain melena and mild drop in hemoglobin in the setting of coumadin coagulopathy   PPI daily x 8 weeks  Avoid NSAIDs  General diet  OK to  resume Coumadin in am.                9/21  IM    Chief Complaint: sob        Subjective:  Patient with melena  S/p egd     Marginal erosions limited to anastomosis-no active bleeding  Otherwise normal post Antonio-n-Y anatomy         Objective:  Review of Systems:   A comprehensive review of systems was completed; pertinent positive and negatives stated in subjective.     Vital signs:  Temp:  [97.4 °F (36.3 °C)-98.9 °F (37.2 °C)] 97.6 °F (36.4 °C)  Pulse:  [60-79] 62  Resp:  [13-19] 17  BP: (106-145)/() 128/50  SpO2:  [100 %] 100 %     Physical Exam:    General: No acute distress  Respiratory: No wheezes, no rhonchi  Cardiovascular: S1, S2, regular rate and rhythm  Abdomen: Soft, Non-tender, non-distended, positive bowel sounds  Neuro: No new focal deficits.   Extremities: No edema        Diagnostic Data:    Labs:           Recent Labs   Lab 09/19/24 1615 09/19/24 2126 09/20/24  0335 09/20/24  0836 09/20/24  1632 09/21/24  0657   WBC 6.7  --  5.9  --   --  4.2   HGB 8.8* 7.6* 7.1*  7.1* 6.9* 7.4* 7.3*   MCV 80.7  --  84.7  --   --  83.6   .0*  --  96.0*  --   --  98.0*   INR 8.00*  --  1.74*  --   --  1.21*               Recent Labs   Lab 09/19/24 1615 09/20/24  0335 09/21/24  0657   * 153*  --    BUN 28* 29*  --    CREATSERUM 1.72* 1.62*  --    CA 9.3 8.5*  --    ALB 3.9 3.3  --     140  --    K 3.9 3.4* 3.8    114*  --    CO2 18.0* 18.0*  --    ALKPHO 100 85  --    AST 24 17  --    ALT 46 36  --    BILT 0.3 0.3  --    TP 6.8 5.7  --          Estimated Creatinine Clearance: 44.4 mL/min (A) (based on SCr of 1.62 mg/dL (H)).         Recent Labs   Lab 09/19/24  1615   TROPHS 26               Recent Labs   Lab 09/19/24  1615 09/20/24  0335 09/21/24  0657   PTP 68.8* 20.5* 15.4*   INR 8.00* 1.74* 1.21*               Medications:   Scheduled Medications    insulin aspart  1-68 Units Subcutaneous TID CC    insulin aspart  1-10 Units Subcutaneous TID AC and HS    azithromycin  500 mg  Intravenous Q24H    cefTRIAXone  2 g Intravenous Q24H    guaiFENesin ER  600 mg Oral BID    pantoprazole  40 mg Intravenous Q12H    atorvastatin  20 mg Oral Nightly                  Assessment & Plan:  #Hypotension   suspect d/t hypovolemia d/t GIB  -Hold antihypertensives  -resolved     #GIB, melena  #History of gastric bypass  -IVF  -PPI IV BID  -s/p egd 9/20     #Coagulopathy d/t Coumadin > Vit K given in ER  -Monitor INR - patient has a mechanical aortic valve     #Aortic stenosis sp mechanical AVR   #Essential hypertension  #Dyslipidemia  -Cardiology consult     #Atrial fibrillation on Coumadin  -Hold Coumadin  -Monitor INR  -Telemetry     #Dyspnea d/t pneumonia  -IV abx  -Antitussives as needed  -Check cultures, RVP     #Chronic kidney disease  -Monitor UOP, creatinine and electrolytes     #Anemia, as above     9/22 IM    Chief Complaint: sob        Subjective:  Patient with melena at home  None today  S/p egd     Marginal erosions limited to anastomosis-no active bleeding  Otherwise normal post Antonio-n-Y anatomy           Objective:  Review of Systems:   A comprehensive review of systems was completed; pertinent positive and negatives stated in subjective.     Vital signs:  Temp:  [97.4 °F (36.3 °C)-98.2 °F (36.8 °C)] 98.2 °F (36.8 °C)  Pulse:  [57-71] 65  Resp:  [17-18] 18  BP: (131-143)/(42-60) 131/52  SpO2:  [100 %] 100 %     Physical Exam:    General: No acute distress  Respiratory: No wheezes, no rhonchi  Cardiovascular: S1, S2, regular rate and rhythm  Abdomen: Soft, Non-tender, non-distended, positive bowel sounds  Neuro: No new focal deficits.   Extremities: No edema        Diagnostic Data:    Labs:            Recent Labs   Lab 09/19/24  1615 09/19/24  2126 09/20/24  0335 09/20/24  0836 09/20/24  1632 09/21/24  0657 09/22/24  0828   WBC 6.7  --  5.9  --   --  4.2 4.2   HGB 8.8*   < > 7.1*  7.1* 6.9* 7.4* 7.3* 7.6*   MCV 80.7  --  84.7  --   --  83.6 85.4   .0*  --  96.0*  --   --  98.0* 113.0*   113.0*   INR 8.00*  --  1.74*  --   --  1.21* 1.05    < > = values in this interval not displayed.                Recent Labs   Lab 09/19/24  1615 09/20/24  0335 09/21/24  0657 09/22/24  0828   * 153*  --  143*   BUN 28* 29*  --  12   CREATSERUM 1.72* 1.62*  --  1.26   CA 9.3 8.5*  --  8.7   ALB 3.9 3.3  --   --     140  --  140   K 3.9 3.4* 3.8 3.5    114*  --  112   CO2 18.0* 18.0*  --  22.0   ALKPHO 100 85  --   --    AST 24 17  --   --    ALT 46 36  --   --    BILT 0.3 0.3  --   --    TP 6.8 5.7  --   --          Estimated Creatinine Clearance: 57.1 mL/min (based on SCr of 1.26 mg/dL).         Recent Labs   Lab 09/19/24  1615   TROPHS 26               Recent Labs   Lab 09/20/24  0335 09/21/24  0657 09/22/24  0828   PTP 20.5* 15.4* 13.8   INR 1.74* 1.21* 1.05             Assessment & Plan:  #Hypotension   suspect d/t hypovolemia d/t GIB  -Hold antihypertensives  -resolved     #GIB, melena  #History of gastric bypass  -IVF  -PPI IV BID  -s/p egd 9/20     #Coagulopathy d/t Coumadin > Vit K given in ER  -Monitor INR - patient has a mechanical aortic valve     #Aortic stenosis sp mechanical AVR   #Essential hypertension  #Dyslipidemia  -Cardiology consult     #Atrial fibrillation on Coumadin  -Hold Coumadin  -Monitor INR  -Telemetry     #Dyspnea d/t pneumonia  -IV abx  -Antitussives as needed  -Check cultures, RVP     #Chronic kidney disease  -Monitor UOP, creatinine and electrolytes     9/23 IM    Subjective:    S/p egd     Marginal erosions limited to anastomosis-no active bleeding  Otherwise normal post Antonio-n-Y anatomy         Objective:  Review of Systems:   A comprehensive review of systems was completed; pertinent positive and negatives stated in subjective.     Vital signs:  Temp:  [97.4 °F (36.3 °C)-97.9 °F (36.6 °C)] 97.9 °F (36.6 °C)  Pulse:  [60-70] 62  Resp:  [17-18] 18  BP: (130-153)/(50-64) 140/57  SpO2:  [91 %-100 %] 100 %     Physical Exam:    General: No acute  distress  Respiratory: No wheezes, no rhonchi  Cardiovascular: S1, S2, regular rate and rhythm  Abdomen: Soft, Non-tender, non-distended, positive bowel sounds  Neuro: No new focal deficits.   Extremities: No edema        Diagnostic Data:    Labs:             Recent Labs   Lab 09/19/24 1615 09/19/24 2126 09/20/24  0335 09/20/24  0836 09/20/24  1632 09/21/24  0657 09/22/24  0828 09/23/24  0721   WBC 6.7  --  5.9  --   --  4.2 4.2 3.7*   HGB 8.8*   < > 7.1*  7.1* 6.9* 7.4* 7.3* 7.6* 7.0*   MCV 80.7  --  84.7  --   --  83.6 85.4 83.9   .0*  --  96.0*  --   --  98.0* 113.0*  113.0* 101.0*   INR 8.00*  --  1.74*  --   --  1.21* 1.05 1.15    < > = values in this interval not displayed.                 Recent Labs   Lab 09/19/24 1615 09/20/24 0335 09/21/24  0657 09/22/24  0828 09/23/24  0024   * 153*  --  143*  --    BUN 28* 29*  --  12  --    CREATSERUM 1.72* 1.62*  --  1.26  --    CA 9.3 8.5*  --  8.7  --    ALB 3.9 3.3  --   --   --     140  --  140  --    K 3.9 3.4* 3.8 3.5 3.8    114*  --  112  --    CO2 18.0* 18.0*  --  22.0  --    ALKPHO 100 85  --   --   --    AST 24 17  --   --   --    ALT 46 36  --   --   --    BILT 0.3 0.3  --   --   --    TP 6.8 5.7  --   --   --          Estimated Creatinine Clearance: 57.1 mL/min (based on SCr of 1.26 mg/dL).         Recent Labs   Lab 09/19/24  1615   TROPHS 26               Recent Labs   Lab 09/21/24  0657 09/22/24  0828 09/23/24  0721   PTP 15.4* 13.8 14.8   INR 1.21* 1.05 1.15              Assessment & Plan:  #Hypotension   suspect d/t hypovolemia d/t GIB  -Hold antihypertensives  -resolved     #GIB, melena  #History of gastric bypass  -IVF  -PPI IV BID  -s/p egd 9/20     #Coagulopathy d/t Coumadin > Vit K given in ER  -Monitor INR - patient has a mechanical aortic valve     #Aortic stenosis sp mechanical AVR   #Essential hypertension  #Dyslipidemia  -Cardiology consult     #Atrial fibrillation on Coumadin  -Hold Coumadin  -Monitor  INR  -Telemetry     #Dyspnea d/t pneumonia  -IV abx  -Antitussives as needed  -Check cultures, RVP     #Chronic kidney disease  -Monitor UOP, creatinine and electrolytes     #Anemia, as above     #DVT Px: INR > 2           MEDICATIONS ADMINISTERED IN LAST 1 DAY:  atorvastatin (Lipitor) tab 20 mg       Date Action Dose Route User    9/23/2024 2002 Given 20 mg Oral Kamille Edwards RN          cefTRIAXone (Rocephin) 2 g in sodium chloride 0.9% 100 mL IVPB-ADDV       Date Action Dose Route User    9/23/2024 1800 New Bag 2 g Intravenous Annie Marroquin RN          enoxaparin (Lovenox) 120 MG/0.8ML SUBQ injection 105 mg       Date Action Dose Route User    9/23/2024 2054 Given 105 mg Subcutaneous (Left Lower Abdomen) Radha Meade RN          guaiFENesin ER (Mucinex) 12 hr tab 600 mg       Date Action Dose Route User    9/23/2024 2002 Given 600 mg Oral Kamille Edwards RN          insulin aspart (NovoLOG) 100 Units/mL FlexPen 1-68 Units       Date Action Dose Route User    9/23/2024 1758 Given 1 Units Subcutaneous (Left Upper Arm) Annie Marroquin RN          insulin aspart (NovoLOG) 100 Units/mL FlexPen 1-10 Units       Date Action Dose Route User    9/23/2024 1758 Given 1 Units Subcutaneous (Left Upper Arm) Annie Marroquin RN    9/23/2024 1258 Given 1 Units Subcutaneous (Left Upper Arm) Annie Marroquin RN          pantoprazole (Protonix) 40 mg in sodium chloride 0.9% PF 10 mL IV push       Date Action Dose Route User    9/24/2024 0513 Given 40 mg Intravenous Kamille Edwards RN    9/23/2024 1800 Given 40 mg Intravenous Annie Marroquin RN          warfarin (Coumadin) tab 7.5 mg       Date Action Dose Route User    9/23/2024 2002 Given 7.5 mg Oral Kamille Edwards RN            azithromycin (Zithromax) 500 mg in sodium chloride 0.9% 250mL IVPB premix  Dose: 500 mg  Freq: Every 24 hours Route: IV  Last Dose: 500 mg (09/21/24 2140)  Start: 09/20/24 1800 End: 09/21/24 2240   Admin Instructions:   (Pharmacist may adjust total  duration to 3 days if prior doses received.)   Consider alternative antibiotic if baseline QTc >500 ms   Order specific questions:            1819 SG-New Bag      2140 MN-New Bag                azithromycin (Zithromax) 500 mg in sodium chloride 0.9% 250mL IVPB premix  Dose: 500 mg  Freq: Once Route: IV  Last Dose: Stopped (09/19/24 2029)  Start: 09/19/24 1833 End: 09/19/24 2029   Admin Instructions:   Consider alternative antibiotic if baseline QTc >500 ms   Order specific questions:           1929 MJ-New Bag     2029 MJ-Stopped                cefTRIAXone (Rocephin) 2 g in sodium chloride 0.9% 100 mL IVPB-ADDV  Dose: 2 g  Freq: Once Route: IV  Last Dose: Stopped (09/19/24 1928)  Start: 09/19/24 1833 End: 09/19/24 1928   Admin Instructions:   Ceftriaxone must NOT be administered simultaneously with calcium containing IV solutions. Includes Y-site as well.  In patients other than neonates ceftriaxone and calcium containing products may administered sequentially, provided the line is flushed in between administrations.   Order specific questions:           1858 MJ-New Bag     1928 MJ-Stopped                                               pantoprazole (Protonix) 80 mg in sodium chloride 0.9% 100 mL IV bolus  Dose: 80 mg  Freq: Once Route: IV  Last Dose: Stopped (09/19/24 1734)  Start: 09/19/24 1655 End: 09/19/24 1734        1719 MJ-New Bag     1734 MJ-Stopped             phytonadione (Aqua-Mephton) 5 mg in sodium chloride 0.9% 50 mL IVPB  Dose: 5 mg  Freq: Once Route: IV  Last Dose: Stopped (09/20/24 0708)  Start: 09/19/24 1848 End: 09/20/24 0708   Order specific questions:           2040 MJ-New Bag [C]     2051 MJ-Handoff      0708 SG-Stopped            potassium chloride (Klor-Con M20) tab 40 mEq  Dose: 40 mEq  Freq: Every 4 hours Route: OR  Start: 09/22/24 1630 End: 09/22/24 2010   Admin Instructions:   Do not crush           1639 JS-Given     2010 LM-Given          potassium chloride 40 mEq in 250mL sodium chloride  0.9% IVPB premix  Dose: 40 mEq  Freq: Once Route: IV  Last Dose: Stopped (09/20/24 1500)  Start: 09/20/24 0730 End: 09/20/24 1500         0844 SG-New Bag     1500 SG-Stopped                               sodium chloride 0.9 % IV bolus 500 mL  Dose: 500 mL  Freq: Once Route: IV  Last Dose: Stopped (09/19/24 1721)  Start: 09/19/24 1615 End: 09/19/24 1721        1621 MJ-New Bag     1721 MJ-Stopped             Followed by   sodium chloride 0.9% infusion  Rate: 125 mL/hr  Freq: Continuous Route: IV  Last Dose: Stopped (09/20/24 1600)  Start: 09/19/24 1716 End: 09/21/24 0942        1724 MJ-New Bag     2051 MJ-Handoff      (0708 SG)-Not Given [C]     1533 AK-Continued by Anesthesia     1547 AK-Anesthesia Volume Adjustment     1600 SG-Stopped      0900 HO-Stopped     0942-D/C'd                                       sodium chloride 0.9% infusion  Rate: 100 mL/hr  Freq: Continuous Route: IV  Last Dose: Stopped (09/21/24 0900)  Start: 09/19/24 2130 End: 09/21/24 0959        2253 AA-New Bag      0849 SG-New Bag     2039 MN-New Bag      0633 MN-New Bag     0900 HO-Stopped     0959-D/C'd          sodium chloride 0.9 % IV bolus 500 mL  Dose: 500 mL  Freq: Once Route: IV  Last Dose: Stopped (09/19/24 1721)  Start: 09/19/24 1615 End: 09/19/24 1721        1621 MJ-New Bag     1721 MJ-Stopped             Followed by   sodium chloride 0.9% infusion  Rate: 125 mL/hr  Freq: Continuous Route: IV  Last Dose: Stopped (09/20/24 1600)  Start: 09/19/24 1716 End: 09/21/24 0942        1724 MJ-New Bag     2051 MJ-Handoff      (0708 SG)-Not Given [C]     1533 AK-Continued by Anesthesia     1547 AK-Anesthesia Volume Adjustment     1600 SG-Stopped      0900 HO-Stopped     0942-D/C'd                         Vitals (last day)       Date/Time Temp Pulse Resp BP SpO2 Weight O2 Device O2 Flow Rate (L/min) [facility]    09/24/24 0723 98 °F (36.7 °C) 61 16 120/58 100 % -- None (Room air) -- LR    09/24/24 0515 -- 78 -- 135/60 100 % -- None (Room air) -- LM     09/24/24 0514 -- 67 -- 149/66 100 % -- None (Room air) --     09/24/24 0510 97.9 °F (36.6 °C) 62 18 155/63 100 % -- None (Room air) --     09/23/24 2323 98 °F (36.7 °C) 52 18 142/48 100 % -- None (Room air) --     09/23/24 2000 97.8 °F (36.6 °C) 54 18 136/72 100 % -- None (Room air) --     09/23/24 1822 -- 58 -- 150/55 -- -- -- --     09/23/24 1818 -- 77 -- 117/50 100 % -- -- --     09/23/24 1816 -- 60 -- 144/63 98 % -- -- --     09/23/24 1814 -- 57 -- 127/56 100 % -- -- --     09/23/24 1744 97.7 °F (36.5 °C) -- 20 148/68 -- -- -- --     09/23/24 1744 -- 92 -- -- 100 % -- None (Room air) --     09/23/24 1156 97.9 °F (36.6 °C) -- 18 140/57 -- -- -- --     09/23/24 0821 97.9 °F (36.6 °C) -- 18 147/57 -- -- -- --     09/23/24 0821 -- 62 -- -- 100 % -- None (Room air) --     09/23/24 0700 -- -- -- 147/57 -- -- -- --     09/23/24 0420 97.9 °F (36.6 °C) 70 18 153/58 100 % 235 lb 10.8 oz (106.9 kg) -- -- DC            09/20/24 1555 -- 76 15 145/93 Abnormal  100 % -- None (Room air) -- LC   09/20/24 1550 -- 71 18 138/59 100 % -- -- 2 L/min LC   09/20/24 1545 -- 79 17 138/119 Abnormal  100 % -- Nasal cannula 4 L/min    09/20/24 1512 97.4 °F (36.3 °C) 68 16 133/52 100 % -- None (Room air) --    09/20/24 1412 97.4 °F (36.3 °C) 66 16 137/55 100 % -- None (Room air) --    09/20/24 1310 98.3 °F (36.8 °C) 63 19 119/50 100 % -- None (Room air) --    09/20/24 1256 97.6 °F (36.4 °C) 66 18 121/47 100 % -- None (Room air) --    09/20/24 0850 -- 74 -- 88/58 Abnormal  100 % -- -- --    09/20/24 0849 -- 79 -- 128/68 100 % -- -- --        09/19/24 2218 97.4 °F (36.3 °C) -- -- -- -- -- -- --    09/19/24 2045 -- 99 19 -- 100 % -- None (Room air) --    09/19/24 2030 -- 72 17 100/50 100 % -- None (Room air) --    09/19/24 2000 -- 65 20 100/47 100 % -- None (Room air) --    09/19/24 1930 -- 75 21 121/65 100 % -- None (Room air) --    09/19/24 1900 -- 75 16 121/82 100 % -- None (Room air) --     09/19/24 1830 -- 68 21 113/60 100 % -- None (Room air) --        09/19/24 1730--6410560/78970 %--None (Room air)--09/19/24 1700--0696522/40733 %--None (Room air)--09/19/24 1630--408907/98932 %--None (Room air)--09/19/24 1612------------None (Room air)--09/19/24 1600--228438/51 Abnormal 100 %--None (Room air)--09/19/24 664247 °F (36.1 °C)775069/50 Abnormal 100 %236 lb (107 kg)None (Room air)--EM               Blood Transfusion Record       Product Unit Status Volume Start End            Transfuse RBC       24  [id number]  [id number] Completed 09/20/24 1515 283.33 mL 09/20/24 1256 09/20/24 1512               [facility] Reference Range & Units 09/23/24 07:21 09/24/24 07:29   WBC 4.0 - 11.0 x10(3) uL 3.7 (L) 3.2 (L) (P)   Hemoglobin 13.0 - 17.5 g/dL 7.0 (L) 7.1 (L) (P)   Hematocrit 39.0 - 53.0 % 21.9 (L) 22.0 (L) (P)   Platelet Count 150.0 - 450.0 10(3)uL 101.0 (L) 93.0 (L) (P)   RBC 3.80 - 5.80 x10(6)uL 2.61 (L) 2.58 (L) (P)   (L): Data is abnormally low  (P): Preliminary

## 2024-09-24 NOTE — PROGRESS NOTES
Problem: PNA/ GIB    Data: Pt is A&Ox4. . RA. O2 sats WNL. On tele. Heparin gtt stopped at shift change with YESIKA WRIGHT. Briding coumadin with lovenox. Voids. Pt denied any rectal bleeding or bloody BM's. NO c/o of N/V/D.     Intervention: Lovenox started. Coumadin given. Possible discharge [facility]?    Edu:  Pt updated on POC. VSS so far. Call light within reach.

## 2024-09-24 NOTE — PROGRESS NOTES
[facility] Cardiology Progress Note    Liang Saurabh Schmidt Patient Status:  Inpatient    1955 MRN [id number]   Location [facility] 5NW-A Attending Sary Edward MD   Hosp Day # 5 PCP Ema Arora DO     Subjective:  No acute events overnight  'I feel fine, but I know Im not\".   Up walkng in room, dizziness has resolved  Still with non-productive cough     Objective:  /58 (BP Location: Right arm)   Pulse 61   Temp 98 °F (36.7 °C) (Oral)   Resp 16   Ht 5' 10\" (1.778 m)   Wt 235 lb 10.8 oz (106.9 kg)   SpO2 100%   BMI 33.82 kg/m²     Telemetry: nsr       Intake/Output:    Intake/Output Summary (Last 24 hours) at 2024 1141  Last data filed at 2024 1800  Gross per 24 hour   Intake 240 ml   Output --   Net 240 ml       Last 3 Weights   24 0420 235 lb 10.8 oz (106.9 kg)   24 0646 227 lb 4.7 oz (103.1 kg)   24 2224 227 lb 4.7 oz (103.1 kg)   24 1555 236 lb (107 kg)   24 1345 233 lb 12.8 oz (106.1 kg)   24 0146 233 lb 7.5 oz (105.9 kg)   24 2046 239 lb (108.4 kg)       Labs:  Recent Labs   Lab 24  1615 24  0335 24  0657 24  0828 24  0024   * 153*  --  143*  --    BUN 28* 29*  --  12  --    CREATSERUM 1.72* 1.62*  --  1.26  --    EGFRCR 42* 46*  --  62  --    CA 9.3 8.5*  --  8.7  --     140  --  140  --    K 3.9 3.4* 3.8 3.5 3.8    114*  --  112  --    CO2 18.0* 18.0*  --  22.0  --      Recent Labs   Lab 24  1615 24  2126 24  0335 24  0836 24  0828 24  0721 24  0729   RBC 3.36*  --  2.75*   < > 2.87* 2.61* 2.58*   HGB 8.8*   < > 7.1*  7.1*   < > 7.6* 7.0* 7.1*   HCT 27.1*  --  23.3*   < > 24.5* 21.9* 22.0*   MCV 80.7  --  84.7   < > 85.4 83.9 85.3   MCH 26.2  --  25.8*   < > 26.5 26.8 27.5   MCHC 32.5  --  30.5*   < > 31.0 32.0 32.3   RDW  --   --   --    < > 23.9 23.9 24.0   NEPRELIM 4.88  --  4.18  --   --  2.73  --    WBC 6.7  --  5.9    < > 4.2 3.7* 3.2*   .0*  --  96.0*   < > 113.0*  113.0* 101.0* 93.0*    < > = values in this interval not displayed.         Recent Labs   Lab 09/19/24  1615   TROPHS 26     Lab Results   Component Value Date    INR 1.40 (H) 09/24/2024    INR 1.15 09/23/2024    INR 1.05 09/22/2024      Diagnostics:             Physical Exam:    Physical Exam  Constitutional:       General: He is not in acute distress.  Cardiovascular:      Rate and Rhythm: Normal rate and regular rhythm.   Pulmonary:      Effort: Pulmonary effort is normal.      Comments: Upper airway expiratory wheeze   Musculoskeletal:      Comments: Trace munira ankle edema    Skin:     General: Skin is warm and dry.   Neurological:      Mental Status: He is alert and oriented to person, place, and time.         Medications:   enoxaparin  1 mg/kg Subcutaneous 2 times per day    warfarin  7.5 mg Oral Nightly    gabapentin  300 mg Oral Daily    sertraline  200 mg Oral Daily    dilTIAZem ER  120 mg Oral Daily    insulin aspart  1-68 Units Subcutaneous TID CC    insulin aspart  1-10 Units Subcutaneous TID AC and HS    guaiFENesin ER  600 mg Oral BID    pantoprazole  40 mg Intravenous Q12H    atorvastatin  20 mg Oral Nightly         Assessment:  68yo presented with weakness and black stools.  Had been non-compliant  with INR monitoring routine, and INR 8 on admission, along with Hgb 6.9. s/p Vit K     GIB- in setting of INR 8 , and gastric erosion, Hgb maintaining ~7gm on lovenox bridge   Mechanical AVR ( 20yrs old)- pLVEF with mild perivalvular AI  PAF- SR on dilt, coumadin   H/o TIA  Rhinovirus/Pna- abx, on RA  H/o gastric bypass   Chronic ASHLEY Anemia- gets iron infusion, last 8/20/24, scheduled for next 10*/1  B12 def    Plan:    Coumadin bridge with Lovenox given history of TIA with Pafib.  In addition had mechanical AVR. Goal INR 2.5-3.5  INR 1.4 today,  Coumadin 7.5mg again tonight and then down to 5mg at bedtime. Was taking 5mg at bedtime prior to  admission.   INR Thursday  Will arrange follow up with [facility] coumadin clinic   Will defer to hospitalist for potentially giving his next scheudle IV iron and B12 inpatient, is scheduled outpatient for 10/1  Ok to discharge from cardiology perspective     Rachna Vidal, APRN  9/24/2024  11:41 AM  Ph [phone] ([place])  Ph [phone] ([place])      Seen and examined.  Agree with exam and assessment as above.  INR starting to climb.  Will hopefully only need 1-2 days of overlap.  D/W pt in detail.  Cardiac MDM reviewed in detail with ALYSSA Vidal.    HEATHER Johnson MD  2

## 2024-09-24 NOTE — DISCHARGE INSTRUCTIONS
INR due on Thursday 9/26. Please call results to  [facility] coumadin clinic [phone]

## 2024-09-25 ENCOUNTER — PATIENT OUTREACH (OUTPATIENT)
Dept: CASE MANAGEMENT | Age: 69
End: 2024-09-25

## 2024-09-25 NOTE — DISCHARGE SUMMARY
[facility]     Liang Schmidt Patient Status:  Inpatient    1955 MRN [id number]   Location [facility] 5NW-A Attending No att. providers found   Hosp Day # 5 PCP Ema Arora DO     Date of Admission: 2024  Date of Discharge:  2024     Discharge Disposition: Home or Self Care    Discharge Diagnosis:  Hypotension suspected due to hypovolemia from GI bleed  GI bleed melena status post EGD  History of gastric bypass  Coagulopathy secondary to Coumadin was given vitamin K in the ER, Coumadin Lovenox resumed  Aortic stenosis status post mechanical AVR  Essential hypertension  Dyslipidemia  Atrial fibrillation on Coumadin  Dyspnea diagnosed with pneumonia rhinovirus completed antibiotics  Chronic kidney disease  Iron deficiency anemia  Remote DVT on Coumadin    History of Present Illness:     Liang Schmidt is a 69 year old male with a history of atrial fibrillation on Coumadin, aortic stenosis sp mechanical AVR, essential hypertension, dyslipidemia, chronic kidney disease and gastric bypass who presents with shortness of breath. Patient was recently admitted for dyspnea/chest pain d/t anemia. Patient states he has been short of breath and though he may need iron prompting ER evaluation. He admits to cough and recent URI. No fever.      Patient also states he has felt dizzy when getting up from seated position and with movement. He has epigastric abdominal pain and melena x 1 weeks with ~ 4 BM / day. Patient has been taking his Coumadin but not monitoring INR at home.     Brief Synopsis:   69-year-old male presented with dizziness shortness of breath melena x 1 week.  INR was found to be 8 has mechanical valve self checks at home.  Vitamin K was given in the ER.  Patient was admitted consults with cardiology and GI were obtained.  Patient needed 1 unit packed RBCs transfused.  Patient was seen by GI recommended EGD was also started on Protonix IV.   EGD  was performed found to have some erosions.  Recommend continuing PPI for 8 weeks.  Hemoglobin is been stabilized.  His antihypertensives were held blood pressure is now better.  He has been followed by cardiology he was resumed on Coumadin and Lovenox.  Patient's will resume his cardiac medications.  He follows with [facility] Coumadin clinic.  Instruction was given for Lovenox injections prior to discharge.  Will need follow-up with cardiology PCP and GI.  Patient also tested positive for rhinovirus this admission was initially started on antibiotics those have been stopped his shortness of breath was due to lower hemoglobin rhinovirus.  Symptomatic treatment.  Patient is now on room air feeling much better tolerating diet no more melanotic stools    Lace+ Score: 84  59-90 High Risk  29-58 Medium Risk  0-28   Low Risk  Patient was referred to the [facility].    TCM Follow-Up Recommendation:  LACE 29-58: Moderate Risk of readmission after discharge from the hospital.      Procedures during hospitalization:   EGD  ESOPHAGUS:Normal in course and caliber, no mucosal erosions, ulcers  EGJ: Located at 35 cm otherwise normal, .  STOMACH:Small  5 cm gastric remnant with Antonio en Y anatomy.  Intense erythema limited to the anastomosis with several small punctate erosion and one thin sliver of semi-circumferential erosion. No visible vessel or flat spots..   SMALL BOWEL: Afferent and efferent limbs of small bowel normal to the extent examined        Incidental or significant findings and recommendations (brief descriptions):  Home on Lovenox Coumadin 7.5 mg on 9/24 then resume 5 mg nightly  INR being done at home due 9/26-report to [facility] Coumadin clinic  Has completed antibiotics  PPI 40 mg daily for 8 weeks  Follow-up with cardiology, PCP and GI  Respiratory viral panel was positive for rhinovirus completed antibiotics  Received dose of IV iron on day of discharge  Iron saturation level low on day of  discharge  Received 1 unit packed RBCs this admission  Hemoglobin 7.1 on day of discharge  INR 1.40 on day of discharge      Lab/Test results pending at Discharge:   None    Consultants:  GI, cardiology    Discharge Medication List:     Discharge Medications        START taking these medications        Instructions Prescription details   enoxaparin 120 MG/0.8ML Sosy  Commonly known as: Lovenox      Inject 0.71 mL (106.5 mg total) into the skin every 12 (twelve) hours.   Quantity: 8 mL  Refills: 1     pantoprazole 40 MG Tbec  Commonly known as: Protonix      Take 1 tablet (40 mg total) by mouth daily.   Quantity: 30 tablet  Refills: 0            CHANGE how you take these medications        Instructions Prescription details   warfarin 5 MG Tabs  Commonly known as: Coumadin  What changed: additional instructions      Take 1 tablet (5 mg total) by mouth nightly. 7.5mg tonight 9/24, then 5mg all other nights   Quantity: 90 tablet  Refills: 0            CONTINUE taking these medications        Instructions Prescription details   atorvastatin 20 MG Tabs  Commonly known as: Lipitor      Take 1 tablet (20 mg total) by mouth nightly.   Quantity: 90 tablet  Refills: 0     cetirizine 10 MG Tabs  Commonly known as: ZyrTEC      Take 1 tablet (10 mg total) by mouth daily.   Quantity: 90 tablet  Refills: 0     cyanocobalamin 1000 MCG/ML Soln  Commonly known as: Vitamin B12      Inject 1 mL (1,000 mcg total) into the muscle every 30 (thirty) days.   Quantity: 3 mL  Refills: 3     dilTIAZem  MG Cp24  Commonly known as: Cardizem CD      Take 1 capsule (120 mg total) by mouth daily.   Quantity: 90 capsule  Refills: 0     folic acid 1 MG Tabs  Commonly known as: Folvite      Take 1 tablet (1 mg total) by mouth daily.   Quantity: 90 tablet  Refills: 3     gabapentin 300 MG Caps  Commonly known as: Neurontin      Take 1 capsule (300 mg total) by mouth daily.   Quantity: 90 capsule  Refills: 0     hydroCHLOROthiazide 12.5 MG Tabs       Take 1 tablet (12.5 mg total) by mouth daily.   Quantity: 90 tablet  Refills: 0     metFORMIN 500 MG Tabs  Commonly known as: Glucophage      Take 2 tablets (1,000 mg total) by mouth daily with breakfast.   Quantity: 180 tablet  Refills: 0     ramipril 5 MG Caps  Commonly known as: Altace      Take 1 capsule (5 mg total) by mouth daily.   Quantity: 90 capsule  Refills: 0     sertraline 100 MG Tabs  Commonly known as: Zoloft      Take 2 tablets (200 mg total) by mouth daily.   Quantity: 180 tablet  Refills: 0     triamcinolone 0.1 % Crea  Commonly known as: Kenalog      Apply twice a day for 2 weeks, then take a break for 1 week, reapply if persists   Quantity: 80 g  Refills: 1               Where to Get Your Medications        These medications were sent to [place] [address] [place] [facility] [address] [phone], [phone]  [address] [facility] [address]      Phone: [phone]   pantoprazole 40 MG Tbec       These medications were sent to [facility] - [address], [facility] [address] [phone], [phone]  [address] [facility], [place] [address], [facility] [address]      Phone: [phone]   enoxaparin 120 MG/0.8ML Sosy  warfarin 5 MG Tabs         [place] reviewed[place]ed    Follow-up appointment:   DIABETIC EDUCATION CLASSES  [address]  [address]  [facility] [address]    Follow up      Ema Arora DO  [address]  [facility] [address]  [facility] [id number] [phone]    Schedule an appointment as soon as possible for a visit      [facility]  [address]  [facility] [address] [phone]  Schedule an appointment as soon as possible for a visit  Hospitalization follow up    [facility]  [address]  [facility] [address] [phone]  Schedule an appointment as soon as possible for a visit      Ema Arora DO  [address]  [facility] [address]  [facility]  [address]  [phone]    Follow up in 1 week(s)      Dustin Ludwig MD  [address] [facility]   [facility] [address]  [phone]    Follow up      Jeffrey Johnson MD  [address] [facility]  [address] [facility] [address]  [phone]    Follow up in 1 month(s)  office will call to schedule follow up    Elvin Carr MD  [address]  [facility] [address]  [phone]    Follow up      Appointments for Next 30 Days 9/24/2024 - 10/24/2024        Date Arrival Time Visit Type Length Department Provider     10/1/2024  1:00 PM  MYCHART FOLLOW UP SPECIALTY [8419] 15 min [facility] [place] Elvin Carr MD    Patient Instructions:     Contact your primary care provider if your insurance requires a referral.    Please arrive 15 minutes prior to your scheduled appointment. Be sure to bring your current Insurance card, Photo ID, and medication bottles or a list of your current medications.        Location Instructions:     **IF YOU NEED LABWORK OR AN INFUSION ALONG WITH YOUR APPOINTMENT, YOU MUST CALL TO SCHEDULE.**  Your appointment is on the [facility] in the Cancer Center. The address is [address] [place] [address]. Please register at the [facility]  on the second floor.  Masks are optional for all patients and visitors, unless otherwise indicated.               10/1/2024  1:30 PM  INFUSION [1980] 60 min [facility] [place] RN3    Patient Instructions:         Location Instructions:     Your appointment is on the [facility] in the Cancer Pres[facility]. The address is [address] [place] [address]. Please register at the [facility]  on the second floor.  Masks are optional for all patients and visitors, unless otherwise indicated. No care partners/visitors under 18 years of age are allowed in the infusion room.                      Vital signs:  Temp:  [97.9 °F (36.6 °C)-98 °F (36.7 °C)] 97.9 °F (36.6 °C)  Pulse:  [52-78]  57  Resp:  [16-18] 17  BP: (120-155)/(48-66) 138/58  SpO2:  [100 %] 100 %    Physical Exam:      General: No acute distress  AO   Respiratory: No wheezes, no rhonchi  course bilat   Cardiovascular: S1, S2, regular rate and rhythm + click   Abdomen: Soft, Non-tender, non-distended, positive bowel sounds  Neuro: No new focal deficits.   Extremities: No edema    -----------------------------------------------------------------------------------------------  PATIENT DISCHARGE INSTRUCTIONS: See electronic chart    Mary Nathan NP    Total time spent on discharge planning:  x minutes     The 21st Century Cures Act makes medical notes like these available to patients in the interest of transparency. Please be advised this is a medical document. Medical documents are intended to carry relevant information, facts as evident, and the clinical opinion of the practitioner. The medical note is intended as peer to peer communication and may appear blunt or direct. It is written in medical language and may contain abbreviations or verbiage that are unfamiliar.     Addendum:    Agree with above note except as documented below.      Gen: NAD  CVS: s1s2  Resp: CTA  Abd: soft    #Hypotension suspected due to hypovolemia from GI bleed  GI bleed melena status post EGD  History of gastric bypass  Coagulopathy secondary to Coumadin was given vitamin K in the ER, Coumadin Lovenox resumed  Aortic stenosis status post mechanical AVR  Essential hypertension  Dyslipidemia  Atrial fibrillation on Coumadin  Dyspnea diagnosed with pneumonia rhinovirus completed antibiotics  Chronic kidney disease  Iron deficiency anemia  Remote DVT on Coumadin      Pt seen and examined independently. Chart reviewed. Labs and imaging over the last 24 hours have been personally reviewed.  I personally made/approved 100% of the management plan for this patient and take full responsibility for the patient management.   Note has been reviewed by me and modified  as needed.  Exam and Impression/ Recs as noted above.  D/w staff.    Sary [facility] MD

## 2024-09-25 NOTE — PROGRESS NOTES
Observe. Transitional Care Management   Discharge Date: 24  Contact Date: 2024    Assessment:  TCM Initial Assessment    General:  Assessment completed with: Patient  Patient Subjective: I'm doing well. I do want to discuss with Dr. Arora about that kathryn, the constant monitoring.  Chief Complaint: undefined Community acquired pneumonia of left lower lobe of lung  Verify patient name and  with patient/ caregiver: Yes    Hospital Stay/Discharge:  Tell me what you understand of why you were in the hospital or emergency department: shortness of breath  Prior to leaving the hospital were your Discharge Instructions reviewed with you?: Yes  Did you receive a copy of your written Discharge Instructions?: Yes  What questions do you have about your Discharge Instructions?: none  Do you feel better or worse since you left the hospital or emergency department?: Better    Follow - Up Appointment:  Do you have a follow-up appointment?: Yes  Date: 24  Physician: PCP  Are there any barriers to getting to your follow-up appointment?: No    Home Health/DME:  Prior to leaving the hospital was Home Health (HH) arranged for you?: No     Prior to leaving the hospital or emergency department was Durable Medical Equipment (DME), medical supplies, or infusions arranged for you?: No  Are DME/medical supply/infusions needs identified by staff during this assessment?: No     Medications/Diet:  Did any of your medications change, during or after your hospital stay or ED visit?: Yes  Do you have your new or updated medications?: Yes  Do you understand what your medications are for and possible side effects?: Yes  Are there any reasons that keep you from taking your medication as prescribed?: No  Any concerns about medication refills?: No    Were you given a different diet per your Discharge Instructions?: No     Questions/Concerns:  Do you have any questions or concerns that have not been discussed?: No         Nursing Interventions:  NCM discussed post hospitalization with the patient. He states that he is doing well. He denies having any fever, n/v/c/d, sob, pain, lightheadedness, HA. He states that he had a normal bm. He does not check his bp. He states that he will check his bs. He does want to discuss the Carlos with PCP for constant bs monitoring. NCM scheduled appt for tomorrow. He denied having any questions or concerns at this time.     Medication Review:   Current Outpatient Medications   Medication Sig Dispense Refill    pantoprazole 40 MG Oral Tab EC Take 1 tablet (40 mg total) by mouth daily. 30 tablet 0    enoxaparin 120 MG/0.8ML Injection Solution Prefilled Syringe Inject 0.71 mL (106.5 mg total) into the skin every 12 (twelve) hours. 8 mL 1    warfarin 5 MG Oral Tab Take 1 tablet (5 mg total) by mouth nightly. 7.5mg tonight 9/24, then 5mg all other nights 90 tablet 0    metFORMIN 500 MG Oral Tab Take 2 tablets (1,000 mg total) by mouth daily with breakfast. 180 tablet 0    sertraline 100 MG Oral Tab Take 2 tablets (200 mg total) by mouth daily. 180 tablet 0    dilTIAZem  MG Oral Capsule SR 24 Hr Take 1 capsule (120 mg total) by mouth daily. 90 capsule 0    ramipril 5 MG Oral Cap Take 1 capsule (5 mg total) by mouth daily. 90 capsule 0    atorvastatin 20 MG Oral Tab Take 1 tablet (20 mg total) by mouth nightly. 90 tablet 0    gabapentin 300 MG Oral Cap Take 1 capsule (300 mg total) by mouth daily. 90 capsule 0    hydroCHLOROthiazide 12.5 MG Oral Tab Take 1 tablet (12.5 mg total) by mouth daily. 90 tablet 0    cetirizine 10 MG Oral Tab Take 1 tablet (10 mg total) by mouth daily. 90 tablet 0    triamcinolone 0.1 % External Cream Apply twice a day for 2 weeks, then take a break for 1 week, reapply if persists 80 g 1    folic acid 1 MG Oral Tab Take 1 tablet (1 mg total) by mouth daily. 90 tablet 3    cyanocobalamin 1000 MCG/ML Injection Solution Inject 1 mL (1,000 mcg total) into the muscle every 30 (thirty) days. 3 mL 3     Did  patient review medications using current pill bottles and not just a medication list?  No  Discharge medications reviewed/discussed/and reconciled against outpatient medications with patient.  Any changes or updates to medications sent to primary care provider.  Patient Declined, pt states that daughter takes care of medications.     SDOH:   Transportation Needs: No Transportation Needs (9/19/2024)    Transportation Needs     Lack of Transportation: No     Car Seat: Not on file         Diagnosis specifics:  Warfarin/Coumadin:   Next PT/INR Date: tomorrow   Current Warfarin Dose: 5mg nightly   Does patient know who to follow-up with Warfarin/Coumadin management?  yes     Who manages Warfarin/Coumadin? [facility]    Who is monitoring PT/INR?  (Coumadin Clinic, HH, PCP, cardiology, etc) pt checks at home.   NCM Reviewed next PT/INR appointment with pt:  Yes  Interventions:    Follow-up Appointments:  Your appointments       Date & Time Appointment Department (Center)    Oct 01, 2024 1:00 PM CDT Follow Up Visit with Elvin Carr MD [facility] [place] ([facility])    Contact your primary care provider if your insurance requires a referral.    Please arrive 15 minutes prior to your scheduled appointment. Be sure to bring your current Insurance card, Photo ID, and medication bottles or a list of your current medications.        Oct 01, 2024 1:30 PM CDT Infusion Visit with CHERELLE ZELAYA RN3 [facility] ([facility]        Oct 29, 2024 1:00 PM CDT PERIPHERAL LAB with CHERELLE COOK [facility]              [facility]  [address]  [facility] [address]  [phone] [facility]  [address]  [facility]  [address]  [phone]            [facility]  Was TCC Ordered: Yes  Was TCC Scheduled: No, Explain prefers to see PCP       Primary Care Provider (If no TCC appointment)  Does patient already have a PCP appointment scheduled? No  Nurse Care Manager Scheduled PCP office TCM appointment with patient      Specialist  Does the patient have any other follow-up appointment(s) that need to be scheduled? Yes   -If yes: Nurse Care Manager reviewed upcoming specialist appointments with patient: Yes   -Does the patient need assistance scheduling appointment(s): No, pt states that he will schedule specialist appts.     CCM referral placed:  No    Book By Date: 10/8/24

## 2024-09-26 ENCOUNTER — OFFICE VISIT (OUTPATIENT)
Dept: INTERNAL MEDICINE CLINIC | Facility: CLINIC | Age: 69
End: 2024-09-26
Payer: COMMERCIAL

## 2024-09-26 VITALS
OXYGEN SATURATION: 100 % | TEMPERATURE: 98 F | DIASTOLIC BLOOD PRESSURE: 56 MMHG | BODY MASS INDEX: 33.33 KG/M2 | WEIGHT: 232.81 LBS | HEART RATE: 73 BPM | RESPIRATION RATE: 18 BRPM | SYSTOLIC BLOOD PRESSURE: 116 MMHG | HEIGHT: 70 IN

## 2024-09-26 DIAGNOSIS — J18.9 COMMUNITY ACQUIRED PNEUMONIA OF LEFT LOWER LOBE OF LUNG: Primary | ICD-10-CM

## 2024-09-26 DIAGNOSIS — F33.42 RECURRENT MAJOR DEPRESSIVE DISORDER, IN FULL REMISSION (HCC): ICD-10-CM

## 2024-09-26 DIAGNOSIS — K92.2 GASTROINTESTINAL HEMORRHAGE, UNSPECIFIED GASTROINTESTINAL HEMORRHAGE TYPE: ICD-10-CM

## 2024-09-26 DIAGNOSIS — E11.43 TYPE 2 DIABETES MELLITUS WITH DIABETIC AUTONOMIC NEUROPATHY, WITHOUT LONG-TERM CURRENT USE OF INSULIN (HCC): ICD-10-CM

## 2024-09-26 DIAGNOSIS — Z95.2 HISTORY OF MECHANICAL AORTIC VALVE REPLACEMENT: ICD-10-CM

## 2024-09-26 DIAGNOSIS — E11.65 TYPE 2 DIABETES MELLITUS WITH HYPERGLYCEMIA, WITHOUT LONG-TERM CURRENT USE OF INSULIN (HCC): ICD-10-CM

## 2024-09-26 DIAGNOSIS — D50.9 IRON DEFICIENCY ANEMIA, UNSPECIFIED IRON DEFICIENCY ANEMIA TYPE: ICD-10-CM

## 2024-09-26 DIAGNOSIS — I10 PRIMARY HYPERTENSION: ICD-10-CM

## 2024-09-26 DIAGNOSIS — I48.0 PAROXYSMAL ATRIAL FIBRILLATION (HCC): ICD-10-CM

## 2024-09-26 PROBLEM — D63.1 ANEMIA OF CHRONIC RENAL FAILURE, STAGE 3 (MODERATE) (HCC): Status: RESOLVED | Noted: 2024-09-01 | Resolved: 2024-09-26

## 2024-09-26 PROBLEM — C44.90 SKIN CANCER: Status: RESOLVED | Noted: 2023-07-25 | Resolved: 2024-09-26

## 2024-09-26 PROBLEM — N17.9 AKI (ACUTE KIDNEY INJURY) (HCC): Status: RESOLVED | Noted: 2024-09-01 | Resolved: 2024-09-26

## 2024-09-26 PROBLEM — N18.30 ANEMIA OF CHRONIC RENAL FAILURE, STAGE 3 (MODERATE) (HCC): Status: RESOLVED | Noted: 2024-09-01 | Resolved: 2024-09-26

## 2024-09-26 PROBLEM — R06.00 DYSPNEA: Status: RESOLVED | Noted: 2024-09-01 | Resolved: 2024-09-26

## 2024-09-26 PROBLEM — N17.9 AKI (ACUTE KIDNEY INJURY): Status: RESOLVED | Noted: 2024-09-01 | Resolved: 2024-09-26

## 2024-09-26 PROBLEM — R79.89 TROPONIN LEVEL ELEVATED: Status: RESOLVED | Noted: 2024-09-01 | Resolved: 2024-09-26

## 2024-09-26 PROBLEM — I21.4 NSTEMI (NON-ST ELEVATED MYOCARDIAL INFARCTION) (HCC): Status: RESOLVED | Noted: 2024-09-01 | Resolved: 2024-09-26

## 2024-09-26 PROBLEM — Z79.01 ANTICOAGULATED ON WARFARIN: Chronic | Status: RESOLVED | Noted: 2024-09-23 | Resolved: 2024-09-26

## 2024-09-26 PROBLEM — D64.9 ANEMIA: Status: RESOLVED | Noted: 2024-08-31 | Resolved: 2024-09-26

## 2024-09-26 PROCEDURE — 99214 OFFICE O/P EST MOD 30 MIN: CPT | Performed by: INTERNAL MEDICINE

## 2024-09-26 RX ORDER — BLOOD-GLUCOSE SENSOR
1 EACH MISCELLANEOUS
Refills: 0 | Status: CANCELLED | OUTPATIENT
Start: 2024-09-26

## 2024-09-26 RX ORDER — BLOOD-GLUCOSE,RECEIVER,CONT
EACH MISCELLANEOUS
Refills: 0 | Status: CANCELLED | OUTPATIENT
Start: 2024-09-26

## 2024-09-26 RX ORDER — BLOOD-GLUCOSE SENSOR
1 EACH MISCELLANEOUS CONTINUOUS
Qty: 3 EACH | Refills: 0 | Status: SHIPPED | OUTPATIENT
Start: 2024-09-26

## 2024-09-26 NOTE — PROGRESS NOTES
Patient Office Visit    ASSESSMENT AND PLAN:   1. Community acquired pneumonia of left lower lobe of lung  Note: Pneumonia thought to be due to RSV virus.  Recommended patient to get the RSV vaccine to when he is feeling better.  He is up-to-date with his pneumococcal vaccines    2. Gastrointestinal hemorrhage, unspecified gastrointestinal hemorrhage type  Note: Continue with pantoprazole for at least a total of 8 weeks and he needs to follow-up with the GI doctor    3. History of mechanical aortic valve replacement  Note: Patient understands the importance of monitoring his INR levels.  He is closely following up with the [facility].  His last INR was 2.2 and he will continue the Lovenox until his INR is 2.5    4. Iron deficiency anemia, unspecified iron deficiency anemia type  Note: He has checked his hemoglobin levels at home and the last one was 11.8.  He will continue follow-up with the hematologist    5. Paroxysmal atrial fibrillation (HCC)  Note: Continue diltiazem and Coumadin    6. Primary hypertension  Note: Blood pressure well-controlled on current regimen.  Will continue.    7. Recurrent major depressive disorder, in full remission (HCC)  Note: Patient prefers to be on sertraline 200 mg and he is not interested in lowering the dose    8. Type 2 diabetes mellitus with diabetic autonomic neuropathy, without long-term current use of insulin ([facility])  Note: Continue gabapentin and will see if freestyle js is approved  - Continuous Glucose Sensor (FREESTYLE JS 3 PLUS SENSOR) Does not apply Misc; 1 Device continuous.  Dispense: 3 each; Refill: 0  - EDUCATION-OP REFERRAL TO DIAB NUTRITION MANAGEMENT 3 HRS    9. Type 2 diabetes mellitus with hyperglycemia, without long-term current use of insulin ([facility])  Note: AGGIE has resolved and will continue with metformin.  Will provide the glucose sensor as well and the diabetic test strips.  Referral to the diabetes clinic provided for nutritional changes.  If sugars  still elevated then we will consider Jardiance or glipizide based on his insurance  - Diabetic Test Strips and Supplies  - EDUCATION-OP REFERRAL TO DIAB NUTRITION MANAGEMENT 3 HRS    RTC in 6 weeks    Patient/Caregiver Education: Patient/Caregiver Education: There are no barriers to learning. Medical education done. Outcome: Patient verbalizes understanding. Patient is notified to call with any questions, complications, allergies, or worsening or changing symptoms.  Patient is to call with any side effects or complications from the treatments as a result of today.      Reviewed Past Medical History and   Patient Active Problem List   Diagnosis    Iron deficiency anemia    Recurrent major depressive disorder, in full remission (HCC)    Primary hypertension    Hyperlipidemia associated with type 2 diabetes mellitus (HCC)    Gastroesophageal reflux disease without esophagitis    History of mechanical aortic valve replacement    Type 2 diabetes mellitus with hyperglycemia, without long-term current use of insulin (HCC)    Thrombocytopenia (HCC)    Morbid (severe) obesity due to excess calories (HCC)    History of TIA (transient ischemic attack)    S/P gastric bypass    History of pancreatitis    Type 2 diabetes mellitus with diabetic autonomic neuropathy, without long-term current use of insulin (HCC)    History of basal cell cancer    Peripheral polyneuropathy    Paroxysmal atrial fibrillation (HCC)    Community acquired pneumonia of left lower lobe of lung    Elevated INR    Gastrointestinal hemorrhage, unspecified gastrointestinal hemorrhage type    Chronic anemia       No orders of the defined types were placed in this encounter.    Requested Prescriptions     Signed Prescriptions Disp Refills    Continuous Glucose Sensor (FREESTYLE JS 3 PLUS SENSOR) Does not apply Misc 3 each 0     Si Device continuous.         Ema Arora DO  CC:  Chief Complaint   Patient presents with    Follow - Up         HPI:    Liang Schmidt is a 69-year-old male who presents for a hospital follow-up.  He is doing much better after his recent hospitalization    GI bleed/melena: Has resolved and he has been compliant with the pantoprazole  Diabetes: He is trying to be careful about what he eats.  He is also requesting a freestyle kathryn to monitor his sugars  Elevated INR: He has been compliant with checking his INR levels and last night it was 2.2.  He is also following up closely with the [facility] clinic.  Pneumonia: It was due to the RSV virus but he feels a lot better now.    Past Medical History:    Anemia, chronic disease    Anesthesia complication    HX: OF AGGRESSION W/PAST PROCEDURES D/T INSUFFICIENT ANESTHESIA IN THE PAST?    Anticoagulated on warfarin    Arthritis    Nothing good to say    Calculus of kidney    Nothing major    Cancer (HCC)    skin cancer    Cellulitis    Diabetes (HCC)    Essential hypertension    Hearing impairment    High blood pressure    Hyperlipidemia    Obesity    Osteoarthritis    Pancreatitis (HCC)    Sepsis (HCC)    Shortness of breath    INTERMITTENT SOB ON EXERTION    Visual impairment    CONTACTS/GLASSES       Past Surgical History:   Procedure Laterality Date    Anast panc cyst-bowel,trang-en-y      Cabg      Cath transcatheter aortic valve replacement      Cholecystectomy  8 yrs ago    Following acute pancreatitis    Colonoscopy  Many times    Colonoscopy N/A 3/5/2024    Procedure: COLONOSCOPY;  Surgeon: Zenon Kelley DO;  Location:  ENDOSCOPY    Hemorrhoidectomy  1 yr ago    Not very successful to be repeated    Other surgical history  20 yrs ago    Aortic valve replacement. Metal valve now    Removal gallbladder      Replace aortic valve open      performed in [place]    Skin surgery      Tonsillectomy      Vasectomy  20 yrs ago       Social History:  Social History     Socioeconomic History    Marital status: Single   Tobacco Use    Smoking status: Former     Current packs/day:  0.00     Average packs/day: 1 pack/day for 20.0 years (20.0 ttl pk-yrs)     Types: Cigarettes     Start date: 1967     Quit date: 1987     Years since quittin.7    Smokeless tobacco: Never    Tobacco comments:     No comment   Vaping Use    Vaping status: Never Used   Substance and Sexual Activity    Alcohol use: Never    Drug use: Never   Other Topics Concern    Caffeine Concern No    Exercise No    Seat Belt No    Special Diet No    Stress Concern No    Weight Concern Yes     Social Determinants of Health     Food Insecurity: No Food Insecurity (2024)    Food Insecurity     Food Insecurity: Never true   Transportation Needs: No Transportation Needs (2024)    Transportation Needs     Lack of Transportation: No   Housing Stability: Low Risk  (2024)    Housing Stability     Housing Instability: No     Family History:  History reviewed. No pertinent family history.  Allergies:  No Known Allergies  Current Meds:  Current Outpatient Medications on File Prior to Visit   Medication Sig Dispense Refill    pantoprazole 40 MG Oral Tab EC Take 1 tablet (40 mg total) by mouth daily. 30 tablet 0    enoxaparin 120 MG/0.8ML Injection Solution Prefilled Syringe Inject 0.71 mL (106.5 mg total) into the skin every 12 (twelve) hours. 8 mL 1    warfarin 5 MG Oral Tab Take 1 tablet (5 mg total) by mouth nightly. 7.5mg tonight , then 5mg all other nights 90 tablet 0    metFORMIN 500 MG Oral Tab Take 2 tablets (1,000 mg total) by mouth daily with breakfast. 180 tablet 0    sertraline 100 MG Oral Tab Take 2 tablets (200 mg total) by mouth daily. 180 tablet 0    dilTIAZem  MG Oral Capsule SR 24 Hr Take 1 capsule (120 mg total) by mouth daily. 90 capsule 0    ramipril 5 MG Oral Cap Take 1 capsule (5 mg total) by mouth daily. 90 capsule 0    atorvastatin 20 MG Oral Tab Take 1 tablet (20 mg total) by mouth nightly. 90 tablet 0    gabapentin 300 MG Oral Cap Take 1 capsule (300 mg total) by mouth daily. 90  capsule 0    hydroCHLOROthiazide 12.5 MG Oral Tab Take 1 tablet (12.5 mg total) by mouth daily. 90 tablet 0    cetirizine 10 MG Oral Tab Take 1 tablet (10 mg total) by mouth daily. 90 tablet 0    triamcinolone 0.1 % External Cream Apply twice a day for 2 weeks, then take a break for 1 week, reapply if persists 80 g 1    folic acid 1 MG Oral Tab Take 1 tablet (1 mg total) by mouth daily. 90 tablet 3    cyanocobalamin 1000 MCG/ML Injection Solution Inject 1 mL (1,000 mcg total) into the muscle every 30 (thirty) days. 3 mL 3     No current facility-administered medications on file prior to visit.         REVIEW OF SYSTEMS   Constitutional: no fatigue normal energy no weight changes   HENT: normal sinuses and no mouth issues   Eyes: . normal vision no eye pain   Respiratory: normal respirations no cough   Cardiovascular: no CP, or palpitations   Gastrointestinal: normal bowels and no abd pains   Genitourinary:  normal urination no hematuria, no frequency   Musculoskeletal: no pains in arms/legs, normal range of motion   Skin: no rashes or skin lesions that are new   Neurological:  no weakness, no numbness, normal gait   Hematological:  no bruises or bleeding   Psychiatric/Behavioral: normal mood no anxiety normal behavior     /56 (BP Location: Left arm, Patient Position: Sitting, Cuff Size: adult)   Pulse 73   Temp 98.2 °F (36.8 °C) (Temporal)   Resp 18   Ht 5' 10\" (1.778 m)   Wt 232 lb 12.8 oz (105.6 kg)   SpO2 100%   BMI 33.40 kg/m²     PHYSICAL EXAM:   Constitutional: Vital signs reviewed as noted, well developed, in no acute distress.   HENT: NCAT  Eyes: pupils reactive bilaterally  Cardiovascular: nl s1 s2 no m/r/g, mechanical click heard  Pulmonary/Chest: CTA bilaterally with no wheezes  Extremities: +1 edema bilaterally   Neurological:  no weakness in UE and LE, reflexes are normal  Psychiatric:normal mood

## 2024-09-27 DIAGNOSIS — E11.43 TYPE 2 DIABETES MELLITUS WITH DIABETIC AUTONOMIC NEUROPATHY, WITHOUT LONG-TERM CURRENT USE OF INSULIN (HCC): ICD-10-CM

## 2024-09-27 RX ORDER — BLOOD-GLUCOSE SENSOR
1 EACH MISCELLANEOUS CONTINUOUS
Qty: 3 EACH | Refills: 0 | OUTPATIENT
Start: 2024-09-27

## 2024-09-27 NOTE — PAYOR COMM NOTE
--------------  DISCHARGE REVIEW    Payor: [employer]  Subscriber #:  [id number]  Authorization Number: [id number]    Admit date: 9/19/24  Admit time:   9:16 PM  Discharge Date: 9/24/2024  6:20 PM     Admitting Physician: Stephen Fisher MD  Attending Physician:  No att. providers found  Primary Care Physician: Ema Arora DO       Discharge Summary Notes    No notes of this type exist for this encounter.         REVIEWER COMMENTS

## 2024-09-30 NOTE — TELEPHONE ENCOUNTER
Pt says two PA's submitted by pharmacy as well and that's why it was denied. Pt says it needs to be resubmitted and marked urgent.

## 2024-09-30 NOTE — TELEPHONE ENCOUNTER
Called pt to explained why PA was denied.  Advice pt to drop off the script at pharmacy Dr. Arora gave him to get the diabetic supplies.

## 2024-10-01 ENCOUNTER — OFFICE VISIT (OUTPATIENT)
Dept: HEMATOLOGY/ONCOLOGY | Facility: HOSPITAL | Age: 69
End: 2024-10-01
Attending: INTERNAL MEDICINE
Payer: COMMERCIAL

## 2024-10-01 VITALS
SYSTOLIC BLOOD PRESSURE: 103 MMHG | HEIGHT: 70 IN | DIASTOLIC BLOOD PRESSURE: 65 MMHG | OXYGEN SATURATION: 100 % | RESPIRATION RATE: 18 BRPM | TEMPERATURE: 97 F | WEIGHT: 223.81 LBS | BODY MASS INDEX: 32.04 KG/M2 | HEART RATE: 103 BPM

## 2024-10-01 VITALS — SYSTOLIC BLOOD PRESSURE: 109 MMHG | DIASTOLIC BLOOD PRESSURE: 65 MMHG | HEART RATE: 93 BPM

## 2024-10-01 DIAGNOSIS — D50.9 IRON DEFICIENCY ANEMIA, UNSPECIFIED IRON DEFICIENCY ANEMIA TYPE: Primary | ICD-10-CM

## 2024-10-01 DIAGNOSIS — Z95.2 HISTORY OF MECHANICAL AORTIC VALVE REPLACEMENT: ICD-10-CM

## 2024-10-01 DIAGNOSIS — E53.8 VITAMIN B12 DEFICIENCY: ICD-10-CM

## 2024-10-01 DIAGNOSIS — E53.8 FOLIC ACID DEFICIENCY: ICD-10-CM

## 2024-10-01 PROCEDURE — 96365 THER/PROPH/DIAG IV INF INIT: CPT

## 2024-10-01 PROCEDURE — 99215 OFFICE O/P EST HI 40 MIN: CPT | Performed by: INTERNAL MEDICINE

## 2024-10-01 RX ORDER — BLOOD SUGAR DIAGNOSTIC
STRIP MISCELLANEOUS
Status: ON HOLD | COMMUNITY
Start: 2024-09-26

## 2024-10-01 RX ORDER — BLOOD-GLUCOSE METER
EACH MISCELLANEOUS
Status: ON HOLD | COMMUNITY
Start: 2024-09-26

## 2024-10-01 RX ORDER — LANCETS
EACH MISCELLANEOUS
Status: ON HOLD | COMMUNITY
Start: 2024-09-26

## 2024-10-01 NOTE — PROGRESS NOTES
Pt here for Infed . Pt denies any issues or concerns.      Ordering Provider: dr acosta  Order Exp: ongoing     Pt tolerated infusion without difficulty or complaint. Reviewed next apt date/time: 2 weeks and 4 weeks with labs (no need to wait for results prior to iron infusion per dr acosta)      Education Record  Learner:  Patient  Disease / Diagnosis: ASHLEY  Barriers / Limitations:  None  Method:  Brief focused  General Topics:  Plan of care reviewed  Outcome:  Shows understanding

## 2024-10-01 NOTE — PROGRESS NOTES
Education Record    Learner:  Patient    Disease / Diagnosis: Anemia    Barriers / Limitations:  None   Comments:    Method:  Discussion   Comments:    General Topics:  Side effects and symptom management and Plan of care reviewed   Comments:    Outcome:  Shows understanding   Comments:    Here for follow up and iron infusion. Still having mild, intermittent rectal bleeding with hemorrhoid. Feeling breathless. Feeling fatigued and occasionally dizzy.

## 2024-10-01 NOTE — PROGRESS NOTES
Hematology/Oncology Clinic Follow Up Visit    Patient Name: Liang Schmidt  Medical Record Number: [id number]    YOB: 1955   PCP: Ema Arora DO  Other providers: Dr Juan Cedeno    Reason for Consultation:  Liang Schmidt was seen today for the diagnosis of anemia.    Hematologic History:  8/2022: Labs noted iron deficiency, vitamin B12 and folic acid deficiency. S/p IV iron dextran 1000mg, IM vit B12, folic acid repletion  10/2022: 1000mg IV infed  09/2023: 1000mg IV infed  3/2024: 200mg IV venofer x 2  8/2024: 1000mg IV infed  9/4/24: 1000mg IV infed    History of Present Illness:      68 y/o M PMH grade 4 hemorrhoidal disease s/p 2 hemorrhoidectomies, hx AVR on long term warfarin, hx trang-en-Y here for follow-up.    - had EGD on 9/20/24, noting intense erythema limited to anastomosis with several small punctuate erosion and one thin sliver of semi circumferential erosion  - he still has persistent slow intermittent hemorrhoidal bleeding  - notes persistent fatigue, shortness of breath    Past Medical History:  Past Medical History:    Anemia, chronic disease    Anesthesia complication    HX: OF AGGRESSION W/PAST PROCEDURES D/T INSUFFICIENT ANESTHESIA IN THE PAST?    Anticoagulated on warfarin    Arthritis    Nothing good to say    Calculus of kidney    Nothing major    Cancer (HCC)    skin cancer    Cellulitis    Diabetes (HCC)    Essential hypertension    Hearing impairment    High blood pressure    Hyperlipidemia    Obesity    Osteoarthritis    Pancreatitis (HCC)    Sepsis (HCC)    Shortness of breath    INTERMITTENT SOB ON EXERTION    Visual impairment    CONTACTS/GLASSES     Past Surgical History:   Procedure Laterality Date    Anast panc cyst-bowel,trang-en-y      Cabg      Cath transcatheter aortic valve replacement      Cholecystectomy  8 yrs ago    Following acute pancreatitis    Colonoscopy  Many times    Colonoscopy N/A 3/5/2024    Procedure: COLONOSCOPY;   Surgeon: Zenon Kelley DO;  Location:  ENDOSCOPY    Hemorrhoidectomy  1 yr ago    Not very successful to be repeated    Other surgical history  20 yrs ago    Aortic valve replacement. Metal valve now    Removal gallbladder      Replace aortic valve open      performed in [place]    Skin surgery      Tonsillectomy      Vasectomy  20 yrs ago       Home Medications:  No outpatient medications have been marked as taking for the 10/1/24 encounter (Office Visit) with Elvin Carr MD.       Allergies:   No Known Allergies    Psychosocial History:  Social History     Socioeconomic History    Marital status: Single     Spouse name: Not on file    Number of children: Not on file    Years of education: Not on file    Highest education level: Not on file   Occupational History    Not on file   Tobacco Use    Smoking status: Former     Current packs/day: 0.00     Average packs/day: 1 pack/day for 20.0 years (20.0 ttl pk-yrs)     Types: Cigarettes     Start date: 1967     Quit date: 1987     Years since quittin.7    Smokeless tobacco: Never    Tobacco comments:     No comment   Vaping Use    Vaping status: Never Used   Substance and Sexual Activity    Alcohol use: Never    Drug use: Never    Sexual activity: Not on file   Other Topics Concern    Caffeine Concern No    Exercise No    Seat Belt No    Special Diet No    Stress Concern No    Weight Concern Yes   Social History Narrative    Not on file     Social Determinants of Health     Financial Resource Strain: Not on file   Food Insecurity: No Food Insecurity (2024)    Food Insecurity     Food Insecurity: Never true   Transportation Needs: No Transportation Needs (2024)    Transportation Needs     Lack of Transportation: No     Car Seat: Not on file   Physical Activity: Not on file   Stress: Not on file   Social Connections: Not on file   Housing Stability: Low Risk  (2024)    Housing Stability     Housing Instability: No     Housing  Instability Emergency: Not on file     Crib or Bassinette: Not on file       Family Medical History:  No family history on file.    Review of Systems:  A 10-point ROS was done with pertinent positives and negative per the HPI    Vital Signs:  Height: 177.8 cm (5' 10\") (10/01 1314)  Weight: 101.5 kg (223 lb 12.8 oz) (10/01 1314)  BSA (Calculated - sq m): 2.19 sq meters (10/01 1314)  Pulse: 103 (10/01 1314)  BP: 103/65 (10/01 1314)  Temp: 97.4 °F (36.3 °C) (10/01 1314)  Do Not Use - Resp Rate: --  SpO2: 100 % (10/01 1314)    Wt Readings from Last 6 Encounters:   10/01/24 101.5 kg (223 lb 12.8 oz)   09/26/24 105.6 kg (232 lb 12.8 oz)   09/23/24 106.9 kg (235 lb 10.8 oz)   09/04/24 106.1 kg (233 lb 12.8 oz)   09/01/24 105.9 kg (233 lb 7.5 oz)   08/20/24 108.7 kg (239 lb 9.6 oz)       Physical Examination:  General: Patient is alert and oriented, not in acute distress  Psych: Mood and affect are appropriate  Eyes: EOMI, PERRL  ENT: Oropharynx is clear, no adenopathy  CV: no LE edema  Respiratory: Non-labored respirations  GI/Abd: Soft, non-tender   Neurological: Grossly intact   Skin: no rashes or petechiae      Laboratory:  Lab Results   Component Value Date    WBC 3.2 (L) 09/24/2024    WBC 3.7 (L) 09/23/2024    WBC 4.2 09/22/2024    HGB 7.1 (L) 09/24/2024    HGB 7.0 (L) 09/23/2024    HGB 7.6 (L) 09/22/2024    HCT 22.0 (L) 09/24/2024    MCV 85.3 09/24/2024    MCH 27.5 09/24/2024    MCHC 32.3 09/24/2024    RDW 24.0 09/24/2024    PLT 93.0 (L) 09/24/2024    .0 (L) 09/23/2024    .0 (L) 09/22/2024    .0 (L) 09/22/2024     Lab Results   Component Value Date     (H) 09/22/2024    BUN 12 09/22/2024    CREATSERUM 1.26 09/22/2024    CREATSERUM 1.62 (H) 09/20/2024    CREATSERUM 1.72 (H) 09/19/2024    ANIONGAP 6 09/22/2024    CA 8.7 09/22/2024    OSMOCALC 292 09/22/2024    ALKPHO 85 09/20/2024    AST 17 09/20/2024    ALT 36 09/20/2024    BILT 0.3 09/20/2024    TP 5.7 09/20/2024    ALB 3.3 09/20/2024     GLOBULIN 2.4 09/20/2024     09/22/2024    K 3.8 09/23/2024     09/22/2024    CO2 22.0 09/22/2024     Lab Results   Component Value Date    PTT 40.6 (H) 09/24/2024    INR 1.40 (H) 09/24/2024       Impression & Plan:     Iron deficiency anemia  - Discussed with Devante that he is a mess; he has persistent iron deficiency anemia secondary to chronic blood loss from both his hemorrhoidal bleeding and anastomotic ulcers which is exacerbated by the fact that he is on warfarin which cannot be stopped because of his mechanical valve. This same mechanical valve is likely causing also some mechanical hemolysis of his red blood cells as well as his platelets. With chronic thrombocytopenia, this exacerbates his bleeding. In addition, he has a gastric bypass that prevents him from absorbing iron so he will need to be on intravenous iron for life  - he is persistently iron deficient. will plan IV iron dextran 1000mg [id number]  - check CBC/iron/tibc/ferritin [id number] to trend CBC and iron levels  - if we cannot keep up with his GI bleeding despite frequency of intravenous iron, and he is still need transfusions, he will likely need to start EPO for symptomatic anemia    Vitamin B12 deficiency  Folic acid deficiency  - continue daily folic acid 1mg daily as well as monthly vitamin B12 lifelong    Thrombocytopenia  - likely caused by mechanical valve destruction. Mild, chronic, can monitor for now.   - vit B12 and folate stores are replete. Prior CT A/P showed normal liver. CMP normal liver function.     Hx mechanical valve surgery  - on coumadin with INRs monitored by PCP. Goal INR 2.5-3.5    Follow up: 4 weeks    Elvin Carr  Hematology/Medical Oncology  [facility]

## 2024-10-06 ENCOUNTER — APPOINTMENT (OUTPATIENT)
Dept: GENERAL RADIOLOGY | Facility: HOSPITAL | Age: 69
End: 2024-10-06
Attending: EMERGENCY MEDICINE
Payer: COMMERCIAL

## 2024-10-06 ENCOUNTER — HOSPITAL ENCOUNTER (INPATIENT)
Facility: HOSPITAL | Age: 69
LOS: 4 days | Discharge: HOME | End: 2024-10-11
Attending: EMERGENCY MEDICINE | Admitting: HOSPITALIST
Payer: COMMERCIAL

## 2024-10-06 ENCOUNTER — APPOINTMENT (OUTPATIENT)
Dept: CT IMAGING | Facility: HOSPITAL | Age: 69
End: 2024-10-06
Attending: EMERGENCY MEDICINE
Payer: COMMERCIAL

## 2024-10-06 ENCOUNTER — APPOINTMENT (OUTPATIENT)
Dept: CT IMAGING | Facility: HOSPITAL | Age: 69
End: 2024-10-06
Attending: INTERNAL MEDICINE
Payer: COMMERCIAL

## 2024-10-06 DIAGNOSIS — R79.1 SUBTHERAPEUTIC INTERNATIONAL NORMALIZED RATIO (INR): ICD-10-CM

## 2024-10-06 DIAGNOSIS — Z79.01 ANTICOAGULATED: ICD-10-CM

## 2024-10-06 DIAGNOSIS — N17.9 AKI (ACUTE KIDNEY INJURY) (HCC): Primary | ICD-10-CM

## 2024-10-06 DIAGNOSIS — D64.9 ANEMIA, UNSPECIFIED TYPE: ICD-10-CM

## 2024-10-06 DIAGNOSIS — E87.5 HYPERKALEMIA: ICD-10-CM

## 2024-10-06 LAB
ALBUMIN SERPL-MCNC: 3.7 G/DL (ref 3.2–4.8)
ALBUMIN/GLOB SERPL: 1.1 {RATIO} (ref 1–2)
ALP LIVER SERPL-CCNC: 101 U/L
ALT SERPL-CCNC: 54 U/L
ANION GAP SERPL CALC-SCNC: 6 MMOL/L (ref 0–18)
ANION GAP SERPL CALC-SCNC: 8 MMOL/L (ref 0–18)
APTT PPP: 39.6 SECONDS (ref 23–36)
AST SERPL-CCNC: 40 U/L (ref ?–34)
BASOPHILS # BLD AUTO: 0.06 X10(3) UL (ref 0–0.2)
BASOPHILS NFR BLD AUTO: 0.8 %
BILIRUB SERPL-MCNC: 0.2 MG/DL (ref 0.2–1.1)
BUN BLD-MCNC: 19 MG/DL (ref 9–23)
BUN BLD-MCNC: 22 MG/DL (ref 9–23)
C DIFF TOX B STL QL: NEGATIVE
CALCIUM BLD-MCNC: 9.1 MG/DL (ref 8.7–10.4)
CALCIUM BLD-MCNC: 9.6 MG/DL (ref 8.7–10.4)
CHLORIDE SERPL-SCNC: 110 MMOL/L (ref 98–112)
CHLORIDE SERPL-SCNC: 112 MMOL/L (ref 98–112)
CO2 SERPL-SCNC: 16 MMOL/L (ref 21–32)
CO2 SERPL-SCNC: 17 MMOL/L (ref 21–32)
CREAT BLD-MCNC: 2.13 MG/DL
CREAT BLD-MCNC: 2.21 MG/DL
EGFRCR SERPLBLD CKD-EPI 2021: 31 ML/MIN/1.73M2 (ref 60–?)
EGFRCR SERPLBLD CKD-EPI 2021: 33 ML/MIN/1.73M2 (ref 60–?)
EOSINOPHIL # BLD AUTO: 0.12 X10(3) UL (ref 0–0.7)
EOSINOPHIL NFR BLD AUTO: 1.5 %
ERYTHROCYTE [DISTWIDTH] IN BLOOD BY AUTOMATED COUNT: 21.4 %
ERYTHROCYTE [DISTWIDTH] IN BLOOD BY AUTOMATED COUNT: 21.5 %
GLOBULIN PLAS-MCNC: 3.4 G/DL (ref 2–3.5)
GLUCOSE BLD-MCNC: 136 MG/DL (ref 70–99)
GLUCOSE BLD-MCNC: 143 MG/DL (ref 70–99)
GLUCOSE BLD-MCNC: 175 MG/DL (ref 70–99)
GLUCOSE BLD-MCNC: 230 MG/DL (ref 70–99)
HCT VFR BLD AUTO: 28.2 %
HCT VFR BLD AUTO: 30.2 %
HGB BLD-MCNC: 8.9 G/DL
HGB BLD-MCNC: 9.5 G/DL
IMM GRANULOCYTES # BLD AUTO: 0.02 X10(3) UL (ref 0–1)
IMM GRANULOCYTES NFR BLD: 0.3 %
INR BLD: 1.62 (ref 0.8–1.2)
LIPASE SERPL-CCNC: 66 U/L (ref 12–53)
LYMPHOCYTES # BLD AUTO: 1.4 X10(3) UL (ref 1–4)
LYMPHOCYTES NFR BLD AUTO: 17.7 %
MCH RBC QN AUTO: 27.3 PG (ref 26–34)
MCH RBC QN AUTO: 27.5 PG (ref 26–34)
MCHC RBC AUTO-ENTMCNC: 31.5 G/DL (ref 31–37)
MCHC RBC AUTO-ENTMCNC: 31.6 G/DL (ref 31–37)
MCV RBC AUTO: 86.5 FL
MCV RBC AUTO: 87.3 FL
MONOCYTES # BLD AUTO: 0.44 X10(3) UL (ref 0.1–1)
MONOCYTES NFR BLD AUTO: 5.6 %
NEUTROPHILS # BLD AUTO: 5.86 X10 (3) UL (ref 1.5–7.7)
NEUTROPHILS # BLD AUTO: 5.86 X10(3) UL (ref 1.5–7.7)
NEUTROPHILS NFR BLD AUTO: 74.1 %
OSMOLALITY SERPL CALC.SUM OF ELEC: 285 MOSM/KG (ref 275–295)
OSMOLALITY SERPL CALC.SUM OF ELEC: 289 MOSM/KG (ref 275–295)
PLATELET # BLD AUTO: 168 10(3)UL (ref 150–450)
PLATELET # BLD AUTO: 188 10(3)UL (ref 150–450)
PLATELETS.RETICULATED NFR BLD AUTO: 1.6 % (ref 0–7)
PLATELETS.RETICULATED NFR BLD AUTO: 2.3 % (ref 0–7)
POTASSIUM SERPL-SCNC: 5.2 MMOL/L (ref 3.5–5.1)
POTASSIUM SERPL-SCNC: 5.3 MMOL/L (ref 3.5–5.1)
PROT SERPL-MCNC: 7.1 G/DL (ref 5.7–8.2)
PROTHROMBIN TIME: 19.4 SECONDS (ref 11.6–14.8)
RBC # BLD AUTO: 3.26 X10(6)UL
RBC # BLD AUTO: 3.46 X10(6)UL
SODIUM SERPL-SCNC: 134 MMOL/L (ref 136–145)
SODIUM SERPL-SCNC: 135 MMOL/L (ref 136–145)
TROPONIN I SERPL HS-MCNC: 18 NG/L
WBC # BLD AUTO: 6.7 X10(3) UL (ref 4–11)
WBC # BLD AUTO: 7.9 X10(3) UL (ref 4–11)

## 2024-10-06 PROCEDURE — 74176 CT ABD & PELVIS W/O CONTRAST: CPT | Performed by: EMERGENCY MEDICINE

## 2024-10-06 PROCEDURE — 71045 X-RAY EXAM CHEST 1 VIEW: CPT | Performed by: EMERGENCY MEDICINE

## 2024-10-06 PROCEDURE — 71250 CT THORAX DX C-: CPT | Performed by: INTERNAL MEDICINE

## 2024-10-06 PROCEDURE — 99223 1ST HOSP IP/OBS HIGH 75: CPT | Performed by: INTERNAL MEDICINE

## 2024-10-06 RX ORDER — METOCLOPRAMIDE HYDROCHLORIDE 5 MG/ML
5 INJECTION INTRAMUSCULAR; INTRAVENOUS EVERY 8 HOURS PRN
Status: DISCONTINUED | OUTPATIENT
Start: 2024-10-06 | End: 2024-10-11

## 2024-10-06 RX ORDER — HEPARIN SODIUM AND DEXTROSE 10000; 5 [USP'U]/100ML; G/100ML
1000 INJECTION INTRAVENOUS ONCE
Status: COMPLETED | OUTPATIENT
Start: 2024-10-06 | End: 2024-10-06

## 2024-10-06 RX ORDER — SODIUM CHLORIDE 9 MG/ML
1000 INJECTION, SOLUTION INTRAVENOUS ONCE
Status: COMPLETED | OUTPATIENT
Start: 2024-10-06 | End: 2024-10-06

## 2024-10-06 RX ORDER — HYDROMORPHONE HYDROCHLORIDE 1 MG/ML
0.5 INJECTION, SOLUTION INTRAMUSCULAR; INTRAVENOUS; SUBCUTANEOUS EVERY 30 MIN PRN
Status: DISCONTINUED | OUTPATIENT
Start: 2024-10-06 | End: 2024-10-11

## 2024-10-06 RX ORDER — CETIRIZINE HYDROCHLORIDE 10 MG/1
10 TABLET ORAL DAILY
Status: DISCONTINUED | OUTPATIENT
Start: 2024-10-07 | End: 2024-10-11

## 2024-10-06 RX ORDER — PANTOPRAZOLE SODIUM 40 MG/1
40 TABLET, DELAYED RELEASE ORAL
Status: DISCONTINUED | OUTPATIENT
Start: 2024-10-07 | End: 2024-10-11

## 2024-10-06 RX ORDER — ATORVASTATIN CALCIUM 20 MG/1
20 TABLET, FILM COATED ORAL NIGHTLY
Status: DISCONTINUED | OUTPATIENT
Start: 2024-10-06 | End: 2024-10-11

## 2024-10-06 RX ORDER — HEPARIN SODIUM AND DEXTROSE 10000; 5 [USP'U]/100ML; G/100ML
INJECTION INTRAVENOUS CONTINUOUS
Status: DISCONTINUED | OUTPATIENT
Start: 2024-10-06 | End: 2024-10-11

## 2024-10-06 RX ORDER — ONDANSETRON 2 MG/ML
4 INJECTION INTRAMUSCULAR; INTRAVENOUS EVERY 6 HOURS PRN
Status: DISCONTINUED | OUTPATIENT
Start: 2024-10-06 | End: 2024-10-11

## 2024-10-06 RX ORDER — FOLIC ACID 1 MG/1
1 TABLET ORAL DAILY
Status: DISCONTINUED | OUTPATIENT
Start: 2024-10-07 | End: 2024-10-11

## 2024-10-06 RX ORDER — SODIUM CHLORIDE 9 MG/ML
INJECTION, SOLUTION INTRAVENOUS CONTINUOUS
Status: ACTIVE | OUTPATIENT
Start: 2024-10-06 | End: 2024-10-07

## 2024-10-06 RX ORDER — HEPARIN SODIUM AND DEXTROSE 10000; 5 [USP'U]/100ML; G/100ML
18 INJECTION INTRAVENOUS ONCE
Status: DISCONTINUED | OUTPATIENT
Start: 2024-10-06 | End: 2024-10-06

## 2024-10-06 RX ORDER — GABAPENTIN 300 MG/1
300 CAPSULE ORAL DAILY
Status: DISCONTINUED | OUTPATIENT
Start: 2024-10-07 | End: 2024-10-11

## 2024-10-06 RX ORDER — SODIUM CHLORIDE 9 MG/ML
INJECTION, SOLUTION INTRAVENOUS CONTINUOUS
Status: ACTIVE | OUTPATIENT
Start: 2024-10-06 | End: 2024-10-06

## 2024-10-06 RX ORDER — HEPARIN SODIUM 1000 [USP'U]/ML
80 INJECTION, SOLUTION INTRAVENOUS; SUBCUTANEOUS ONCE
Status: DISCONTINUED | OUTPATIENT
Start: 2024-10-06 | End: 2024-10-06

## 2024-10-06 RX ORDER — HEPARIN SODIUM 1000 [USP'U]/ML
5000 INJECTION, SOLUTION INTRAVENOUS; SUBCUTANEOUS ONCE
Status: COMPLETED | OUTPATIENT
Start: 2024-10-06 | End: 2024-10-06

## 2024-10-06 RX ORDER — DILTIAZEM HYDROCHLORIDE 120 MG/1
120 CAPSULE, EXTENDED RELEASE ORAL DAILY
Status: DISCONTINUED | OUTPATIENT
Start: 2024-10-07 | End: 2024-10-11

## 2024-10-06 RX ORDER — SERTRALINE HYDROCHLORIDE 100 MG/1
200 TABLET, FILM COATED ORAL DAILY
Status: DISCONTINUED | OUTPATIENT
Start: 2024-10-07 | End: 2024-10-11

## 2024-10-06 RX ORDER — SODIUM PHOSPHATE, DIBASIC AND SODIUM PHOSPHATE, MONOBASIC 7; 19 G/230ML; G/230ML
1 ENEMA RECTAL ONCE AS NEEDED
Status: DISCONTINUED | OUTPATIENT
Start: 2024-10-06 | End: 2024-10-11

## 2024-10-06 RX ORDER — ECHINACEA PURPUREA EXTRACT 125 MG
1 TABLET ORAL
Status: DISCONTINUED | OUTPATIENT
Start: 2024-10-06 | End: 2024-10-11

## 2024-10-06 RX ORDER — SUCRALFATE ORAL 1 G/10ML
1 SUSPENSION ORAL
Status: DISCONTINUED | OUTPATIENT
Start: 2024-10-06 | End: 2024-10-11

## 2024-10-06 RX ORDER — SODIUM CHLORIDE 9 MG/ML
INJECTION, SOLUTION INTRAVENOUS CONTINUOUS
Status: DISCONTINUED | OUTPATIENT
Start: 2024-10-06 | End: 2024-10-06

## 2024-10-06 RX ORDER — SENNOSIDES 8.6 MG
17.2 TABLET ORAL NIGHTLY PRN
Status: DISCONTINUED | OUTPATIENT
Start: 2024-10-06 | End: 2024-10-11

## 2024-10-06 RX ORDER — BISACODYL 10 MG
10 SUPPOSITORY, RECTAL RECTAL
Status: DISCONTINUED | OUTPATIENT
Start: 2024-10-06 | End: 2024-10-11

## 2024-10-06 RX ORDER — MELATONIN
3 NIGHTLY PRN
Status: DISCONTINUED | OUTPATIENT
Start: 2024-10-06 | End: 2024-10-11

## 2024-10-06 RX ORDER — HEPARIN SODIUM AND DEXTROSE 10000; 5 [USP'U]/100ML; G/100ML
INJECTION INTRAVENOUS CONTINUOUS
Status: DISCONTINUED | OUTPATIENT
Start: 2024-10-06 | End: 2024-10-06

## 2024-10-06 RX ORDER — ONDANSETRON 2 MG/ML
4 INJECTION INTRAMUSCULAR; INTRAVENOUS ONCE
Status: COMPLETED | OUTPATIENT
Start: 2024-10-06 | End: 2024-10-06

## 2024-10-06 RX ORDER — POLYETHYLENE GLYCOL 3350 17 G/17G
17 POWDER, FOR SOLUTION ORAL DAILY PRN
Status: DISCONTINUED | OUTPATIENT
Start: 2024-10-06 | End: 2024-10-11

## 2024-10-06 RX ORDER — ACETAMINOPHEN 500 MG
500 TABLET ORAL EVERY 4 HOURS PRN
Status: DISCONTINUED | OUTPATIENT
Start: 2024-10-06 | End: 2024-10-11

## 2024-10-06 NOTE — H&P
[facility]     Liang Schmidt Patient Status:  Observation    1955 MRN [id number]   Location [facility] 3NW-A Attending José Manjarrez MD   Hosp Day # 0 PCP Ema Arora DO     Chief Complaint: generalized abdominal pain, REILLY    Subjective:    History of Present Illness:     Liang Schmidt is a 69 year old male with history of mechanical AVR (INR goal 2.5 - 3.5), pafib on coumadin, TIA, DMII, HTN, HLD, chronic iron deficiency anemia, hx of GIB, hx of gastric bypass, depression, peripheral neuropathy presented with generalized abdominal pain, REILLY.     Patient was admitted 2024 - 2024 with acute anemia 2/2 GIB in setting of supra therapeutic INR.  S/p EGD with gastric erosion felt to be source of bleed. He was placed on lovenox bridge and warfarin was restarted at discharge. Of note, he also tested positive for rhino/enterovirus.  Since discharge he developed new upper abdominal pain in a bandlike distribution. Its a constant, dull pain that wakes him up at night. He reports poor oral intake of fluids  and liquids due to pain exacerbation. NO known relieving factors. Continues to have intermittent melenic stools. Today he was so tired and had severe ROMANO. He reports increased cough with sputum production. Sputum is green color. Denies fevers, chills, weight loss, LUTS.     ED work up with acute kidney injury.    History/Other:    Past Medical History:  Past Medical History:    Anemia, chronic disease    Anesthesia complication    HX: OF AGGRESSION W/PAST PROCEDURES D/T INSUFFICIENT ANESTHESIA IN THE PAST?    Anticoagulated on warfarin    Arthritis    Nothing good to say    Calculus of kidney    Nothing major    Cancer (HCC)    skin cancer    Cellulitis    Diabetes (HCC)    Essential hypertension    Hearing impairment    High blood pressure    Hyperlipidemia    Obesity    Osteoarthritis    Pancreatitis (HCC)    Sepsis (HCC)    Shortness  of breath    INTERMITTENT SOB ON EXERTION    Visual impairment    CONTACTS/GLASSES     Past Surgical History:   Past Surgical History:   Procedure Laterality Date    Anast panc cyst-bowel,trang-en-y      Cabg      Cath transcatheter aortic valve replacement      Cholecystectomy  8 yrs ago    Following acute pancreatitis    Colonoscopy  Many times    Colonoscopy N/A 3/5/2024    Procedure: COLONOSCOPY;  Surgeon: Zenon Kelley DO;  Location:  ENDOSCOPY    Hemorrhoidectomy  1 yr ago    Not very successful to be repeated    Other surgical history  20 yrs ago    Aortic valve replacement. Metal valve now    Removal gallbladder      Replace aortic valve open      performed in [place]    Skin surgery      Tonsillectomy      Vasectomy  20 yrs ago      Family History:   No family history on file.  Social History:    reports that he quit smoking about 37 years ago. His smoking use included cigarettes. He started smoking about 57 years ago. He has a 20 pack-year smoking history. He has never used smokeless tobacco. He reports that he does not drink alcohol and does not use drugs.     Allergies: No Known Allergies    Medications:    Current Facility-Administered Medications on File Prior to Encounter   Medication Dose Route Frequency Provider Last Rate Last Admin    [COMPLETED] iron dextran (Infed) 1,000 mg in sodium chloride 0.9% 270 mL IVPB  1,000 mg Intravenous Once Elvin Carr MD   Stopped at 10/01/24 1445    [COMPLETED] sodium ferric gluconate (Ferrlecit) 125 mg in sodium chloride 0.9% 100mL IVPB premix  125 mg Intravenous Once Mary Nathan  mL/hr at 09/24/24 1621 125 mg at 09/24/24 1621    [COMPLETED] potassium chloride (Klor-Con M20) tab 40 mEq  40 mEq Oral Q4H Sary [facility] MD   40 mEq at 09/22/24 2010    [COMPLETED] heparin (Porcine) 1000 UNIT/ML injection - BOLUS IV 5,000 Units  5,000 Units Intravenous Once Brittany Moore APN   5,000 Units at 09/21/24 1044    [COMPLETED] heparin (Porcine) 69804  units/250 mL infusion (ACS/AFIB) INITIAL DOSE  1,000 Units/hr Intravenous Once Brittany Moore APN 10 mL/hr at 09/21/24 1047 1,000 Units/hr at 09/21/24 1047    [COMPLETED] potassium chloride 40 mEq in 250mL sodium chloride 0.9% IVPB premix  40 mEq Intravenous Once Sary [facility] MD   Stopped at 09/20/24 1500    [COMPLETED] sodium chloride 0.9% infusion   Intravenous Once Sary [facility] MD   Stopped at 09/20/24 1900    [COMPLETED] sodium chloride 0.9 % IV bolus 500 mL  500 mL Intravenous Once Tato Kitchen MD   Stopped at 09/19/24 1721    [COMPLETED] pantoprazole (Protonix) 80 mg in sodium chloride 0.9% 100 mL IV bolus  80 mg Intravenous Once Tato Kitchen MD   Stopped at 09/19/24 1734    [COMPLETED] cefTRIAXone (Rocephin) 2 g in sodium chloride 0.9% 100 mL IVPB-ADDV  2 g Intravenous Once Tato Kitchen MD   Stopped at 09/19/24 1928    [COMPLETED] azithromycin (Zithromax) 500 mg in sodium chloride 0.9% 250mL IVPB premix  500 mg Intravenous Once Tato Kitchen MD   Stopped at 09/19/24 2029    [COMPLETED] azithromycin (Zithromax) 500 mg in sodium chloride 0.9% 250mL IVPB premix  500 mg Intravenous Q24H Stephen Fisher  mL/hr at 09/21/24 2140 500 mg at 09/21/24 2140    [COMPLETED] cefTRIAXone (Rocephin) 2 g in sodium chloride 0.9% 100 mL IVPB-ADDV  2 g Intravenous Q24H Stephen Fisher  mL/hr at 09/23/24 1800 2 g at 09/23/24 1800    [COMPLETED] phytonadione (Aqua-Mephton) 5 mg in sodium chloride 0.9% 50 mL IVPB  5 mg Intravenous Once Tato Kitchen MD   Stopped at 09/20/24 0708    [COMPLETED] iron dextran (Infed) 1,000 mg in sodium chloride 0.9% 270 mL IVPB  1,000 mg Intravenous Once Elvin Carr MD   Stopped at 09/04/24 1500    [COMPLETED] iron dextran (Infed) 1,000 mg in sodium chloride 0.9% 270 mL IVPB  1,000 mg Intravenous Once Elvin Carr MD   Stopped at 08/20/24 1421     Current Outpatient Medications on File Prior to Encounter   Medication Sig Dispense Refill    pantoprazole 40 MG Oral  Tab EC Take 1 tablet (40 mg total) by mouth daily. 30 tablet 0    warfarin 5 MG Oral Tab Take 1 tablet (5 mg total) by mouth nightly. 7.5mg tonight 9/24, then 5mg all other nights 90 tablet 0    metFORMIN 500 MG Oral Tab Take 2 tablets (1,000 mg total) by mouth daily with breakfast. 180 tablet 0    sertraline 100 MG Oral Tab Take 2 tablets (200 mg total) by mouth daily. 180 tablet 0    dilTIAZem  MG Oral Capsule SR 24 Hr Take 1 capsule (120 mg total) by mouth daily. 90 capsule 0    ramipril 5 MG Oral Cap Take 1 capsule (5 mg total) by mouth daily. 90 capsule 0    atorvastatin 20 MG Oral Tab Take 1 tablet (20 mg total) by mouth nightly. 90 tablet 0    gabapentin 300 MG Oral Cap Take 1 capsule (300 mg total) by mouth daily. 90 capsule 0    hydroCHLOROthiazide 12.5 MG Oral Tab Take 1 tablet (12.5 mg total) by mouth daily. 90 tablet 0    cetirizine 10 MG Oral Tab Take 1 tablet (10 mg total) by mouth daily. 90 tablet 0    folic acid 1 MG Oral Tab Take 1 tablet (1 mg total) by mouth daily. 90 tablet 3    Blood Glucose Monitoring Suppl (ACCU-CHEK GUIDE) w/Device Does not apply Kit       Glucose Blood (ACCU-CHEK GUIDE) In Vitro Strip       Accu-Chek Softclix Lancets Does not apply Misc       triamcinolone 0.1 % External Cream Apply twice a day for 2 weeks, then take a break for 1 week, reapply if persists 80 g 1    cyanocobalamin 1000 MCG/ML Injection Solution Inject 1 mL (1,000 mcg total) into the muscle every 30 (thirty) days. 3 mL 3       Review of Systems:   A comprehensive review of systems was completed.    Pertinent positives and negatives noted in the HPI.    Objective:   Physical Exam:    BP 94/52 (BP Location: Right arm)   Pulse 62   Temp 98.4 °F (36.9 °C) (Oral)   Resp 16   Wt 223 lb (101.2 kg)   SpO2 99%   BMI 32.00 kg/m²   General: Chronically ill appearing. Pale  Respiratory: No rhonchi, no wheezes  Cardiovascular: S1, S2. Regular rate and rhythm  Abdomen: Soft, Non-tender, non-distended, positive  bowel sounds  Neuro: No new focal deficits  Extremities: No edema    Results:    Labs:      Labs Last 24 Hours:    Recent Labs   Lab 10/06/24  1308   RBC 3.46*   HGB 9.5*   HCT 30.2*   MCV 87.3   MCH 27.5   MCHC 31.5   RDW 21.4   NEPRELIM 5.86   WBC 7.9   .0       Recent Labs   Lab 10/06/24  1308   *   BUN 22   CREATSERUM 2.21*   EGFRCR 31*   CA 9.6   ALB 3.7   *   K 5.2*      CO2 16.0*   ALKPHO 101   AST 40*   ALT 54*   BILT 0.2   TP 7.1       Lab Results   Component Value Date    INR 1.62 (H) 10/06/2024    INR 1.40 (H) 09/24/2024    INR 1.15 09/23/2024       Recent Labs   Lab 10/06/24  1308   TROPHS 18       No results for input(s): \"TROP\", \"PBNP\" in the last 168 hours.    No results for input(s): \"PCT\" in the last 168 hours.    Imaging: Imaging data reviewed in Epic.    Assessment & Plan:      #Upper abdominal pain   Possibly related to anastomotic erosions noted on recent EGD with intense erythema. Lipase 66 and no pancreatic inflammation on CT.   -Continue home protonix  -Trial sucralfate   -Advance diet; npo after midnight     #AGGIE likely pre-renal in setting of poor intake  -IVF and monitor     #Mass like enlargement with hypo attenuation within the uncinate process of pancreas  -GI and onc consult  -NPO after midnight in case of procedure tomorrow   -CA 19-9, CEA    #Lungs nodules, possible mets    #Suspicious left renal mass   -MR kidney non urgently     #Increased cough, ROMANO  ROMANO is likely multifactorial in setting of anemia, recent viral URI. New lung nodules/mets on CT chest  -Check sputum culture  -Monitor off antibiotics for now     #Chronic iron deficiency anemia  #Recent GIB  -Trend hgb     #Mechanical AVR  Goal INR 2.5 - 3.5  -INR subtherapeutic on admission  -Heparin drip given possible need for procedure    #Pafib - heparin drip     #Hx of TIA    #HTN  #HLD    #Vitamin B12 deficiency  #Folic acid deficiency   -Folic acid, on monthly b12 injections     #Thrombocytopenia -  mechanical valve distruction. Mild. Monitor     #Hx of gastric bypass    #Depression   #Peripheral neuropathy     #Obesity, BMI 32    Plan of care discussed with patient and RN    Carola Lino MD    Supplementary Documentation:     The 21st Century Cures Act makes medical notes like these available to patients in the interest of transparency. Please be advised this is a medical document. Medical documents are intended to carry relevant information, facts as evident, and the clinical opinion of the practitioner. The medical note is intended as peer to peer communication and may appear blunt or direct. It is written in medical language and may contain abbreviations or verbiage that are unfamiliar.

## 2024-10-06 NOTE — ED QUICK NOTES
Orders for admission, patient is aware of plan and ready to go upstairs. Any questions, please call ED RN Michele at extension [phone].     Patient Covid vaccination status: Fully vaccinated     COVID Test Ordered in ED: None    COVID Suspicion at Admission: N/A    Running Infusions:  sodium chloride @ 125ml    Mental Status/LOC at time of transport: a/ox4    Other pertinent information:   CIWA score: N/A   NIH score:  N/A

## 2024-10-06 NOTE — PROGRESS NOTES
NURSING ADMISSION NOTE      Patient admitted via Cart  Oriented to room.  Safety precautions initiated.  Bed in low position.  Call light in reach.    Patient arrived to floor in stable condition. Skin check completed with Maria Victoria WRIGHT. No skin breakdown noted to bottom. 2 toe wounds noted to R foot, covered with bandaids by patient.

## 2024-10-06 NOTE — ED PROVIDER NOTES
Patient Seen in: [facility]      History     Chief Complaint   Patient presents with    Abdomen/Flank Pain     Stated Complaint: abd pain, sob    Subjective:   HPI  ED History source : Patient  69-year-old male hospitalized recently for pneumonia and an upper GI bleed (ulcerations at the anastomosis site of his gastric bypass per EMR) presents to the emergency department complaining of bandlike upper abdominal pain 7/10 in severity since discharged home from the hospital associated with persistent and not improving shortness of breath despite completion of antibiotic therapy.  No fever.  No vomiting.  States his stools are still dark and he is having 3 to 4/day.  He is taking fluids well but states eating increases the pain severity.  Patient is anticoagulated on warfarin for a metallic heart valve.    Objective:     Past Medical History:    Anemia, chronic disease    Anesthesia complication    HX: OF AGGRESSION W/PAST PROCEDURES D/T INSUFFICIENT ANESTHESIA IN THE PAST?    Anticoagulated on warfarin    Arthritis    Nothing good to say    Calculus of kidney    Nothing major    Cancer (HCC)    skin cancer    Cellulitis    Diabetes (HCC)    Essential hypertension    Hearing impairment    High blood pressure    Hyperlipidemia    Obesity    Osteoarthritis    Pancreatitis (HCC)    Sepsis (HCC)    Shortness of breath    INTERMITTENT SOB ON EXERTION    Visual impairment    CONTACTS/GLASSES              Past Surgical History:   Procedure Laterality Date    Anast panc cyst-bowel,trang-en-y      Cabg      Cath transcatheter aortic valve replacement      Cholecystectomy  8 yrs ago    Following acute pancreatitis    Colonoscopy  Many times    Colonoscopy N/A 3/5/2024    Procedure: COLONOSCOPY;  Surgeon: Zenon Kelley DO;  Location:  ENDOSCOPY    Hemorrhoidectomy  1 yr ago    Not very successful to be repeated    Other surgical history  20 yrs ago    Aortic valve replacement. Metal valve now     Removal gallbladder      Replace aortic valve open      performed in [place]    Skin surgery      Tonsillectomy      Vasectomy  20 yrs ago                Social History     Socioeconomic History    Marital status: Single   Tobacco Use    Smoking status: Former     Current packs/day: 0.00     Average packs/day: 1 pack/day for 20.0 years (20.0 ttl pk-yrs)     Types: Cigarettes     Start date: 1967     Quit date: 1987     Years since quittin.7    Smokeless tobacco: Never    Tobacco comments:     No comment   Vaping Use    Vaping status: Never Used   Substance and Sexual Activity    Alcohol use: Never    Drug use: Never   Other Topics Concern    Caffeine Concern No    Exercise No    Seat Belt No    Special Diet No    Stress Concern No    Weight Concern Yes     Social Determinants of Health     Food Insecurity: No Food Insecurity (2024)    Food Insecurity     Food Insecurity: Never true   Transportation Needs: No Transportation Needs (2024)    Transportation Needs     Lack of Transportation: No   Housing Stability: Low Risk  (2024)    Housing Stability     Housing Instability: No                  Physical Exam     ED Triage Vitals   BP 10/06/24 1258 127/78   Pulse 10/06/24 1257 105   Resp 10/06/24 1257 20   Temp 10/06/24 1421 97.9 °F (36.6 °C)   Temp src 10/06/24 1421 Oral   SpO2 10/06/24 1257 100 %   O2 Device 10/06/24 1257 None (Room air)       Current Vitals:   Vital Signs  BP: 117/66  Pulse: 82  Resp: 20  Temp: 97.9 °F (36.6 °C)  Temp src: Oral  MAP (mmHg): 81    Oxygen Therapy  SpO2: 100 %  O2 Device: None (Room air)        Physical Exam  General Appearance: This is an older male lying on a gurney.  Vital signs were reviewed per nurses notes.  Monitor reveals a sinus rhythm rate of 100.  Respirations are unlabored.  Pulse oximetry is 100% on room air.  Blood pressure is 127/78.  HEENT: Normocephalic/atraumatic.  Anicteric sclera.  Oral mucosa is pasty.  Oropharynx is normal.  Neck: No  adenopathy or thyromegaly.  Lungs are clear to auscultation.  Heart exam: Normal S1-S2 without extra sounds or murmurs.  Regular rate and rhythm.  Abdomen: NABS.  Flat, soft and nontender without masses or rebound.  Skin is dry without rashes.  Occasional healing bruises.  Extremities are atraumatic.  Neuroexam: Alert and oriented x 4.  Speech is fluent.  Moving all 4 extremities well.    ED Course     Labs Reviewed   COMP METABOLIC PANEL (14) - Abnormal; Notable for the following components:       Result Value    Glucose 230 (*)     Sodium 134 (*)     Potassium 5.2 (*)     CO2 16.0 (*)     Creatinine 2.21 (*)     eGFR-Cr 31 (*)     AST 40 (*)     ALT 54 (*)     All other components within normal limits   CBC WITH DIFFERENTIAL WITH PLATELET - Abnormal; Notable for the following components:    RBC 3.46 (*)     HGB 9.5 (*)     HCT 30.2 (*)     All other components within normal limits   LIPASE - Abnormal; Notable for the following components:    Lipase 66 (*)     All other components within normal limits   PROTHROMBIN TIME (PT) - Abnormal; Notable for the following components:    PT 19.4 (*)     INR 1.62 (*)     All other components within normal limits   TROPONIN I HIGH SENSITIVITY - Normal   URINALYSIS WITH CULTURE REFLEX     EKG    Rate, intervals and axes as noted on EKG Report.  Rate: 81  Rhythm: Atrial flutter  Reading: Variable block.  Left axis deviation.  Septal infarct, age undetermined.  Abnormal EKG.  Agree with EKG report.         Intravenous access was obtained.  Laboratory studies were drawn.  IV fluids, analgesics and antiemetics were administered including Protonix.    Creatinine is 2.2 which is more than 1 point higher than prior value a few weeks ago.  Hemoglobin however is higher than previously and BUN is only modestly elevated suggesting this is most consistent with dehydration and not GI bleed.    XR CHEST AP PORTABLE  (CPT=71045)    Result Date: 10/6/2024  PROCEDURE:  XR CHEST AP PORTABLE   (CPT=71045)  TECHNIQUE:  AP chest radiograph was obtained.  COMPARISON:  [facility] , XR, XR CHEST AP PORTABLE  (CPT=71045), 9/19/2024, 4:38 PM.  INDICATIONS:  abd pain, sob, here two weeks ago, similar symptoms  PATIENT STATED HISTORY: (As transcribed by Technologist)  Patient states abdominal pain and shortness of breath for two weeks.              CONCLUSION:    Stable elevated right diaphragm.  Stable borderline heart size.  No sign of CHF, pneumonia, pleural effusion, pneumothorax.  LOCATION:  [facility]      Dictated by (CST): Arcadio Penn MD on 10/06/2024 at 2:47 PM     Finalized by (CST): Arcadio Penn MD on 10/06/2024 at 2:47 PM       CT ABDOMEN+PELVIS(CPT=74176)    Result Date: 10/6/2024  PROCEDURE:  CT ABDOMEN+PELVIS (CPT=74176)  COMPARISON:  [facility], CT, CT ABDOMEN+PELVIS(CONTRAST ONLY)(CPT=74177), 9/08/2022, 9:29 AM.  INDICATIONS:  abd pain, sob, here two weeks ago, similar symptoms  TECHNIQUE:  Unenhanced multislice CT scanning was performed from the dome of the diaphragm to the pubic symphysis.  Dose reduction techniques were used. Dose information is transmitted to the ACR (American College of Radiology) NRDR (National Radiology Data Registry) which includes the Dose Index Registry.  PATIENT STATED HISTORY: (As transcribed by Technologist)  Patient is here with abdominal pain.    FINDINGS:  This scan performed without IV or oral contrast, with limitations thereof.    LIVER:  Subtle nodular features could reflect cirrhosis..  BILIARY:  Cholecystectomy.  PANCREAS:  Assessment limited on contrast, but there may be a subtle low-attenuation lesion about 15 mm uncinate process pancreas series 4, image 43, series 2, image 55.  SPLEEN:  Unremarkable.  KIDNEYS:  No acute abnormality.  ADRENALS:  Assessment limited without contrast.  Bilateral kidney stones are present, the greatest burden lower pole left kidney and central left kidney, but no sign of hydronephrosis either kidney.  Numerous masses are seen  projecting from the kidneys bilaterally, the largest and most hyperdense arises from the lower pole left kidney measuring 33 mm and measures 35 Hounsfield units.  AORTA/VASCULAR:  No aortic aneurysm. There are atherosclerotic changes including calcification involving the aorta, but no evidence for aneurysm involving the aorta.  RETROPERITONEUM:  Unremarkable.  BOWEL/MESENTERY:  Small hiatal hernia.  Surgical changes in the epigastric region around the distal esophagus and proximal stomach with surgical clips present.  Enteric staples are seen within some loops of bowel left upper quadrant.  No sign of bowel obstruction, ascites, free air, colitis or diverticulitis.  No excessive stool or rectal fecal impaction.  ABDOMINAL WALL:  Unremarkable.  URINARY BLADDER:  Unremarkable.  LYMPH NODES PELVIS:  Unremarkable.  PELVIC ORGANS:  No acute process.  LUNG BASES:  Numerous noncalcified pulmonary nodules are demonstrated at the lung base bilaterally, including portions of the visualized left upper lobe, right and left lower lobes, middle lobe.  These were not present on the prior CT scan of the abdomen  showing lower chest and range in size approximately 4-9 mm.  No sign of pleural effusion or lobar pneumonia.  Coronary artery calcifications are seen.  No pericardial effusion present.  BONES:  No acute abnormality. Note is made of degenerative changes present in the spine.            CONCLUSION:   1. New nodules at the lung base bilateral, noncalcified, concerning for metastatic disease.  These were not present previously.  Possible subtle low-attenuation lesion uncinate process pancreas, early malignant lesion pancreas not excluded.  No sign of acute pancreatitis.  No sign of bowel obstruction, ascites or free air.  Postsurgical changes of the stomach/gastroesophageal junction and bowel.  2. Assessment kidneys limited without contrast, with numerous masses, most of which will represent cysts, but the largest mass is  hyperdense and projects from the lower pole left kidney, cannot exclude a solid mass lesion in the lower pole left kidney.  Nonobstructing kidney stones.    LOCATION:  [facility]   Dictated by (CST): Arcadio Penn MD on 10/06/2024 at 2:22 PM     Finalized by (CST): Arcadio Penn MD on 10/06/2024 at 2:29 PM       I personally reviewed the images myself and went over results with patient.       Independent interpretation of imaging : CT abdomen and pelvis without contrast and chest x-ray and noted no acute cardiopulmonary abnormality.  Possible metastatic lesions in the lower lungs.  No acute intra-abdominal or retroperitoneal abnormality that was new and would explain the patient's symptoms.  Test results and treatment plan were discussed with the patient.  IV fluids were initiated for elevated creatinine.  [facility] hospitalist Dr. Ferrell was contacted via "[employer]" regarding patient's hospitalization.       MDM      #1.  Acute kidney injury.  Creatinine is up from 1.2-2.2.  Most plausible explanation is dehydration.  IV fluids initiated.  2.  Anticoagulated.  Mechanical heart valve.  3.  Subtherapeutic INR between 1 and 2.  3.  Hyperkalemia.  4.  Anemia.    Differential diagnosis included : Abdominal pain secondary to peptic ulcer disease.  Excluded options include diverticulitis, appendicitis, bowel obstruction.            Admission disposition: 10/6/2024  2:50 PM           Medical Decision Making      Disposition and Plan     Clinical Impression:  1. AGGIE (acute kidney injury) (HCC)    2. Anticoagulated    3. Subtherapeutic international normalized ratio (INR)    4. Hyperkalemia    5. Anemia, unspecified type         Disposition:  Admit  10/6/2024  2:50 pm    Follow-up:  No follow-up provider specified.        Medications Prescribed:  Current Discharge Medication List              Supplementary Documentation:         Hospital Problems       Present on Admission  Date Reviewed: 10/1/2024            ICD-10-CM Noted  POA    * (Principal) AGGIE (acute kidney injury) ([facility]) N17.9 10/6/2024 Unknown

## 2024-10-07 PROBLEM — K86.89 PANCREATIC MASS (HCC): Status: ACTIVE | Noted: 2024-10-07

## 2024-10-07 LAB
ALBUMIN SERPL-MCNC: 3.6 G/DL (ref 3.2–4.8)
ALBUMIN/GLOB SERPL: 1.5 {RATIO} (ref 1–2)
ALP LIVER SERPL-CCNC: 89 U/L
ALT SERPL-CCNC: 50 U/L
ANION GAP SERPL CALC-SCNC: 6 MMOL/L (ref 0–18)
APTT PPP: 55.5 SECONDS (ref 23–36)
APTT PPP: 66.1 SECONDS (ref 23–36)
AST SERPL-CCNC: 56 U/L (ref ?–34)
ATRIAL RATE: 245 BPM
BILIRUB SERPL-MCNC: 0.2 MG/DL (ref 0.2–1.1)
BILIRUB UR QL STRIP.AUTO: NEGATIVE
BUN BLD-MCNC: 18 MG/DL (ref 9–23)
CALCIUM BLD-MCNC: 8.6 MG/DL (ref 8.7–10.4)
CANCER AG19-9 SERPL-ACNC: 4.2 U/ML (ref ?–35)
CEA SERPL-MCNC: 44.3 NG/ML (ref ?–5)
CHLORIDE SERPL-SCNC: 113 MMOL/L (ref 98–112)
CLARITY UR REFRACT.AUTO: CLEAR
CO2 SERPL-SCNC: 16 MMOL/L (ref 21–32)
CREAT BLD-MCNC: 1.91 MG/DL
EGFRCR SERPLBLD CKD-EPI 2021: 37 ML/MIN/1.73M2 (ref 60–?)
ERYTHROCYTE [DISTWIDTH] IN BLOOD BY AUTOMATED COUNT: 21.4 %
GLOBULIN PLAS-MCNC: 2.4 G/DL (ref 2–3.5)
GLUCOSE BLD-MCNC: 118 MG/DL (ref 70–99)
GLUCOSE BLD-MCNC: 127 MG/DL (ref 70–99)
GLUCOSE UR STRIP.AUTO-MCNC: NORMAL MG/DL
HCT VFR BLD AUTO: 26.2 %
HGB BLD-MCNC: 8.3 G/DL
INR BLD: 1.97 (ref 0.8–1.2)
KETONES UR STRIP.AUTO-MCNC: NEGATIVE MG/DL
LEUKOCYTE ESTERASE UR QL STRIP.AUTO: 25
MAGNESIUM SERPL-MCNC: 1.5 MG/DL (ref 1.6–2.6)
MCH RBC QN AUTO: 28.3 PG (ref 26–34)
MCHC RBC AUTO-ENTMCNC: 31.7 G/DL (ref 31–37)
MCV RBC AUTO: 89.4 FL
NITRITE UR QL STRIP.AUTO: NEGATIVE
OSMOLALITY SERPL CALC.SUM OF ELEC: 283 MOSM/KG (ref 275–295)
PH UR STRIP.AUTO: 5 [PH] (ref 5–8)
PHOSPHATE SERPL-MCNC: 3.3 MG/DL (ref 2.4–5.1)
PLATELET # BLD AUTO: 111 10(3)UL (ref 150–450)
POTASSIUM SERPL-SCNC: 4.4 MMOL/L (ref 3.5–5.1)
PROT SERPL-MCNC: 6 G/DL (ref 5.7–8.2)
PROT UR STRIP.AUTO-MCNC: 20 MG/DL
PROTHROMBIN TIME: 22.6 SECONDS (ref 11.6–14.8)
Q-T INTERVAL: 384 MS
QRS DURATION: 104 MS
QTC CALCULATION (BEZET): 446 MS
R AXIS: -42 DEGREES
RBC # BLD AUTO: 2.93 X10(6)UL
RBC UR QL AUTO: NEGATIVE
SODIUM SERPL-SCNC: 135 MMOL/L (ref 136–145)
SP GR UR STRIP.AUTO: 1.01 (ref 1–1.03)
T AXIS: 40 DEGREES
UROBILINOGEN UR STRIP.AUTO-MCNC: NORMAL MG/DL
VENTRICULAR RATE: 81 BPM
WBC # BLD AUTO: 5 X10(3) UL (ref 4–11)

## 2024-10-07 PROCEDURE — 99233 SBSQ HOSP IP/OBS HIGH 50: CPT | Performed by: INTERNAL MEDICINE

## 2024-10-07 PROCEDURE — 99255 IP/OBS CONSLTJ NEW/EST HI 80: CPT | Performed by: INTERNAL MEDICINE

## 2024-10-07 RX ORDER — MAGNESIUM OXIDE 400 MG/1
800 TABLET ORAL ONCE
Status: COMPLETED | OUTPATIENT
Start: 2024-10-07 | End: 2024-10-07

## 2024-10-07 NOTE — CONSULTS
Hematology/Oncology Initial Consultation Note    Patient Name: Liang Schmidt  Medical Record Number: [id number]    YOB: 1955   Date of Consultation: 10/7/2024   Physician requesting consultation: Dr Manjarrez    Reason for Consultation:  Liang Schmidt was seen today for the diagnosis of anemia, pancreatic mass    History of Present Illness:      70 y/o M PMH grade 4 hemorrhoidal disease s/p 2 hemorrhoidectomies, hx AVR on long term warfarin, hx trang-en-Y admitted for upper abdominal pain, found to have pancreatic mass and new lung nodules.    - 10/6/24 CT C/A/P without contrast with bilateral lung nodules, pancreatic mass at uncinate process  - notes epigastric site of pain has been slowly getting worse over last few weeks  - poor appetite, has been eating/drinking poorly  - still has intermittent blood in stool  - recently got IV infed, hgb 9.5->8.3 (likely component of hemoconcentration earlier from dehydration) with plts 188->111k    Past Medical History:  Past Medical History:    Anemia, chronic disease    Anesthesia complication    HX: OF AGGRESSION W/PAST PROCEDURES D/T INSUFFICIENT ANESTHESIA IN THE PAST?    Anticoagulated on warfarin    Arthritis    Nothing good to say    Calculus of kidney    Nothing major    Cancer (HCC)    skin cancer    Cellulitis    Diabetes (HCC)    Essential hypertension    Hearing impairment    High blood pressure    Hyperlipidemia    Obesity    Osteoarthritis    Pancreatitis (HCC)    Sepsis (HCC)    Shortness of breath    INTERMITTENT SOB ON EXERTION    Visual impairment    CONTACTS/GLASSES       Past Surgical History:   Procedure Laterality Date    Anast panc cyst-bowel,trang-en-y      Cabg      Cath transcatheter aortic valve replacement      Cholecystectomy  8 yrs ago    Following acute pancreatitis    Colonoscopy  Many times    Colonoscopy N/A 3/5/2024    Procedure: COLONOSCOPY;  Surgeon: Zenon Kelley DO;  Location:  ENDOSCOPY     Hemorrhoidectomy  1 yr ago    Not very successful to be repeated    Other surgical history  20 yrs ago    Aortic valve replacement. Metal valve now    Removal gallbladder      Replace aortic valve open      performed in [place]    Skin surgery      Tonsillectomy      Vasectomy  20 yrs ago       Home Medications:  Current Facility-Administered Medications on File Prior to Encounter   Medication Dose Route Frequency Provider Last Rate Last Admin    [COMPLETED] iron dextran (Infed) 1,000 mg in sodium chloride 0.9% 270 mL IVPB  1,000 mg Intravenous Once Elvin Carr MD   Stopped at 10/01/24 1445    [COMPLETED] sodium ferric gluconate (Ferrlecit) 125 mg in sodium chloride 0.9% 100mL IVPB premix  125 mg Intravenous Once Mary Nathan  mL/hr at 09/24/24 1621 125 mg at 09/24/24 1621    [COMPLETED] potassium chloride (Klor-Con M20) tab 40 mEq  40 mEq Oral Q4H Sary Edward MD   40 mEq at 09/22/24 2010    [COMPLETED] heparin (Porcine) 1000 UNIT/ML injection - BOLUS IV 5,000 Units  5,000 Units Intravenous Once Brittany Moore APN   5,000 Units at 09/21/24 1044    [COMPLETED] heparin (Porcine) 79555 units/250 mL infusion (ACS/AFIB) INITIAL DOSE  1,000 Units/hr Intravenous Once Brittany Moore APN 10 mL/hr at 09/21/24 1047 1,000 Units/hr at 09/21/24 1047    [COMPLETED] potassium chloride 40 mEq in 250mL sodium chloride 0.9% IVPB premix  40 mEq Intravenous Once Sary Edward MD   Stopped at 09/20/24 1500    [COMPLETED] sodium chloride 0.9% infusion   Intravenous Once Sary Edward MD   Stopped at 09/20/24 1900    [COMPLETED] sodium chloride 0.9 % IV bolus 500 mL  500 mL Intravenous Once Tato Kitchen MD   Stopped at 09/19/24 1721    [COMPLETED] pantoprazole (Protonix) 80 mg in sodium chloride 0.9% 100 mL IV bolus  80 mg Intravenous Once Tato Kitchen MD   Stopped at 09/19/24 1734    [COMPLETED] cefTRIAXone (Rocephin) 2 g in sodium chloride 0.9% 100 mL IVPB-ADDV  2 g Intravenous Once Tato Kitchen MD    Stopped at 09/19/24 1928    [COMPLETED] azithromycin (Zithromax) 500 mg in sodium chloride 0.9% 250mL IVPB premix  500 mg Intravenous Once Tato Kitchen MD   Stopped at 09/19/24 2029    [COMPLETED] azithromycin (Zithromax) 500 mg in sodium chloride 0.9% 250mL IVPB premix  500 mg Intravenous Q24H Stephen Fisher  mL/hr at 09/21/24 2140 500 mg at 09/21/24 2140    [COMPLETED] cefTRIAXone (Rocephin) 2 g in sodium chloride 0.9% 100 mL IVPB-ADDV  2 g Intravenous Q24H Stephen Fisher  mL/hr at 09/23/24 1800 2 g at 09/23/24 1800    [COMPLETED] phytonadione (Aqua-[place]ep[place]) 5 mg in sodium chloride 0.9% 50 mL IVPB  5 mg Intravenous Once Tato Kitchen MD   Stopped at 09/20/24 0708     Current Outpatient Medications on File Prior to Encounter   Medication Sig Dispense Refill    pantoprazole 40 MG Oral Tab EC Take 1 tablet (40 mg total) by mouth daily. 30 tablet 0    warfarin 5 MG Oral Tab Take 1 tablet (5 mg total) by mouth nightly. 7.5mg tonight 9/24, then 5mg all other nights 90 tablet 0    metFORMIN 500 MG Oral Tab Take 2 tablets (1,000 mg total) by mouth daily with breakfast. 180 tablet 0    sertraline 100 MG Oral Tab Take 2 tablets (200 mg total) by mouth daily. 180 tablet 0    dilTIAZem  MG Oral Capsule SR 24 Hr Take 1 capsule (120 mg total) by mouth daily. 90 capsule 0    ramipril 5 MG Oral Cap Take 1 capsule (5 mg total) by mouth daily. 90 capsule 0    atorvastatin 20 MG Oral Tab Take 1 tablet (20 mg total) by mouth nightly. 90 tablet 0    gabapentin 300 MG Oral Cap Take 1 capsule (300 mg total) by mouth daily. 90 capsule 0    hydroCHLOROthiazide 12.5 MG Oral Tab Take 1 tablet (12.5 mg total) by mouth daily. 90 tablet 0    cetirizine 10 MG Oral Tab Take 1 tablet (10 mg total) by mouth daily. 90 tablet 0    folic acid 1 MG Oral Tab Take 1 tablet (1 mg total) by mouth daily. 90 tablet 3    Blood Glucose Monitoring Suppl (ACCU-CHEK GUIDE) w/Device Does not apply Kit       Glucose Blood (ACCU-CHEK  GUIDE) In Vitro Strip       Accu-Chek Softclix Lancets Does not apply Misc       triamcinolone 0.1 % External Cream Apply twice a day for 2 weeks, then take a break for 1 week, reapply if persists 80 g 1    cyanocobalamin 1000 MCG/ML Injection Solution Inject 1 mL (1,000 mcg total) into the muscle every 30 (thirty) days. 3 mL 3       Current Inpatient Medications:  Inpatient Meds:   sodium ferric gluconate  125 mg Intravenous Daily    atorvastatin  20 mg Oral Nightly    cetirizine  10 mg Oral Daily    dilTIAZem ER  120 mg Oral Daily    folic acid  1 mg Oral Daily    gabapentin  300 mg Oral Daily    pantoprazole  40 mg Oral QAM AC    sertraline  200 mg Oral Daily    sucralfate  1 g Oral TID AC and HS (2200)      sodium chloride 100 mL/hr at 10/07/24 0208    continuous dose heparin 1,000 Units/hr (10/06/24 2330)       PRN Meds:    HYDROmorphone    influenza virus vaccine PF    acetaminophen    melatonin    polyethylene glycol (PEG 3350)    sennosides    bisacodyl    fleet enema    ondansetron    metoclopramide    sodium chloride    glycerin-hypromellose-    Allergies:   No Known Allergies    Psychosocial History:  Social History     Social History Narrative    Not on file     Social History     Socioeconomic History    Marital status: Single   Tobacco Use    Smoking status: Former     Current packs/day: 0.00     Average packs/day: 1 pack/day for 20.0 years (20.0 ttl pk-yrs)     Types: Cigarettes     Start date: 1967     Quit date: 1987     Years since quittin.7    Smokeless tobacco: Never    Tobacco comments:     No comment   Vaping Use    Vaping status: Never Used   Substance and Sexual Activity    Alcohol use: Never    Drug use: Never   Other Topics Concern    Caffeine Concern No    Exercise No    Seat Belt No    Special Diet No    Stress Concern No    Weight Concern Yes     Social Determinants of Health     Food Insecurity: No Food Insecurity (10/6/2024)    Food Insecurity     Food Insecurity:  Never true   Transportation Needs: No Transportation Needs (10/6/2024)    Transportation Needs     Lack of Transportation: No   Housing Stability: Low Risk  (10/6/2024)    Housing Stability     Housing Instability: No       Family Medical History:  No family history on file.    Review of Systems:  A 10-point ROS was done with pertinent positives and negative per the HPI    Vital Signs:  Height: --  Weight: 101.2 kg (223 lb) (10/06 1526)  BSA (Calculated - sq m): --  Pulse: 80 (10/07 0853)  BP: 105/56 (10/07 0853)  Temp: 97.8 °F (36.6 °C) (10/07 0853)  Do Not Use - Resp Rate: --  SpO2: 100 % (10/07 0853)      Wt Readings from Last 6 Encounters:   10/06/24 101.2 kg (223 lb)   10/01/24 101.5 kg (223 lb 12.8 oz)   09/26/24 105.6 kg (232 lb 12.8 oz)   09/23/24 106.9 kg (235 lb 10.8 oz)   09/04/24 106.1 kg (233 lb 12.8 oz)   09/01/24 105.9 kg (233 lb 7.5 oz)         Physical Examination:  General: Patient is alert and oriented, not in acute distress  Psych: Mood and affect are appropriate  Eyes: EOMI, PERRL  ENT: Oropharynx is clear, no adenopathy  CV: no LE edema  Respiratory: Non-labored respirations  GI/Abd: Soft, non-tender   Neurological: Grossly intact   Skin: no rashes or petechiae    Laboratory:  Recent Labs   Lab 10/06/24  1308 10/06/24  1743 10/07/24  0533   WBC 7.9 6.7 5.0   HGB 9.5* 8.9* 8.3*   HCT 30.2* 28.2* 26.2*   .0 168.0 111.0*   MCV 87.3 86.5 89.4   RDW 21.4 21.5 21.4   NEPRELIM 5.86  --   --        Recent Labs   Lab 10/06/24  1308 10/06/24  1743 10/07/24  0533   * 135* 135*   K 5.2* 5.3* 4.4    112 113*   CO2 16.0* 17.0* 16.0*   BUN 22 19 18   CREATSERUM 2.21* 2.13* 1.91*   * 143* 127*   CA 9.6 9.1 8.6*   PHOS  --   --  3.3   TP 7.1  --  6.0   ALB 3.7  --  3.6   ALKPHO 101  --  89   AST 40*  --  56*   ALT 54*  --  50*   BILT 0.2  --  0.2       Recent Labs   Lab 10/06/24  1308 10/06/24  1743 10/06/24  2327 10/07/24  0755   INR 1.62*  --  1.97*  --    PTT  --  39.6* 66.1* 55.5*        Impression & Plan:     Pancreatic mass  Lung nodules  - discussed concern for malignancy  - CEA elevated. CA 19-9 normal  - GI consult for EUS, will likely need pancreatic mass biopsy  - will need restaging CT C/A/P with IV contrast when kidney function improved    AGGIE  - secondary to dehydration. Improving with IVF    Iron deficiency anemia  - he has persistent iron deficiency anemia secondary to chronic blood loss from both his hemorrhoidal bleeding and anastomotic ulcers which is exacerbated by the fact that he is on warfarin which cannot be stopped because of his mechanical valve. This same mechanical valve is likely causing also some mechanical hemolysis of his red blood cells as well as his platelets. With chronic thrombocytopenia, this exacerbates his bleeding. In addition, he has a gastric bypass that prevents him from absorbing iron so he will need to be on intravenous iron for life. He is planned for IV 1000mg infed [id number] outpatient at this point  - start IV ferrlecit 125mg daily while pt in patient    Vitamin B12 deficiency  Folic acid deficiency  - continue daily folic acid 1mg daily as well as monthly vitamin B12 lifelong     Thrombocytopenia  - likely caused by mechanical valve destruction. Mild, chronic, can monitor for now.   - vit B12 and folate stores are replete. Prior CT A/P showed normal liver. CMP normal liver function.      Hx mechanical valve surgery  - on coumadin with INRs monitored by PCP. Goal INR 2.5-3.5  - hold off on warfarin for now. Continue heparin gtt while awaiting scope by GI    Will continue to follow    Elvin Carr MD  Hematology/Medical Oncology  [facility]

## 2024-10-07 NOTE — PROGRESS NOTES
Alert & oriented x4. VSS on room air. Voids. NPO. Ambulates independently. Heparin gtt running at 10ml/hr, redraw this morning was therapeutic, will check again in tomorrow AM. Denies nausea/chest pain/SOB. Pain controlled. Patient updated on plan of care. Questions and concerns addressed. Safety precautions in place. Frequent rounds performed.    1708: Spoke to IR about potential for CT biopsy tomorrow. Areas where biopsy will be taken are small, hard to get without complications. Potential for 1pm tomorrow depending on if PT/INR from morning labs is WNL. Biopsy will be physician-directed on whether safe to complete. Pt will be NPO @0700 in case of procedure.

## 2024-10-07 NOTE — CONSULTS
[facility]                       Gastroenterology Consultation-[facility] Gastroenterology    Liang Schmidt Patient Status:  Observation    1955 MRN [id number]   Location [facility] 3NW-A Attending José Manjarrez MD   Hosp Day # 0 PCP Ema Arora,      Reason for consultation: Abd pain, abnormal abd imaging   HPI: This is a 69 yr-old male with a PMHx that includes HTN, dyslipidemia, s/p mechanical valve replacement (Coumadin), obesity s/p Antonio-en-Y gastric bypass, DM, CKD, and chronic anemia on IV iron supplementation who returned to the ER yesterday with abdominal pain following a recent admission for melena/anemia s/p EGD (24; Dr Quick) revealing marginal erosions limited to anastomosis (no active bleeding).  Patient reports epigastric abdominal discomfort over the last 2 weeks--especially worse after eating resulting in decreased appetite with subjective weight loss.  No nausea, vomiting, fever, or chills.  Patient with chronic diarrhea which he reports continues to fluctuate between brown stools and intermittent black stools.  No NSAID use.  CT chest suggests masslike enlargement within the uncinate process of the pancreas and multiple bilateral pulmonary nodules measuring 4 to 9 mm concerning for metastatic disease; labs with normal CA 19-9  Since admission, patient reports a resolution in abdominal pain and has been transitioned to a heparin infusion without overt GI bleeding. His Hgb is increased since last admission.  PMHx:   Past Medical History:    Anemia, chronic disease    Anesthesia complication    HX: OF AGGRESSION W/PAST PROCEDURES D/T INSUFFICIENT ANESTHESIA IN THE PAST?    Anticoagulated on warfarin    Arthritis    Nothing good to say    Calculus of kidney    Nothing major    Cancer (HCC)    skin cancer    Cellulitis    Diabetes (HCC)    Essential hypertension    Hearing impairment    High blood pressure    Hyperlipidemia    Obesity    Osteoarthritis     Pancreatitis (HCC)    Sepsis (HCC)    Shortness of breath    INTERMITTENT SOB ON EXERTION    Visual impairment    CONTACTS/GLASSES                PSHx:   Past Surgical History:   Procedure Laterality Date    Anast panc cyst-bowel,trang-en-y      Cabg      Cath transcatheter aortic valve replacement      Cholecystectomy  8 yrs ago    Following acute pancreatitis    Colonoscopy  Many times    Colonoscopy N/A 3/5/2024    Procedure: COLONOSCOPY;  Surgeon: Zenon Kelley DO;  Location:  ENDOSCOPY    Hemorrhoidectomy  1 yr ago    Not very successful to be repeated    Other surgical history  20 yrs ago    Aortic valve replacement. Metal valve now    Removal gallbladder      Replace aortic valve open      performed in [place]    Skin surgery      Tonsillectomy      Vasectomy  20 yrs ago     Medications:    [COMPLETED] magnesium oxide (Mag-Ox) tab 800 mg  800 mg Oral Once    sodium ferric gluconate (Ferrlecit) 125 mg in sodium chloride 0.9% 100mL IVPB premix  125 mg Intravenous Daily    HYDROmorphone (Dilaudid) 1 MG/ML injection 0.5 mg  0.5 mg Intravenous Q30 Min PRN    [COMPLETED] ondansetron (Zofran) 4 MG/2ML injection 4 mg  4 mg Intravenous Once    [COMPLETED] sodium chloride 0.9 % IV bolus 1,000 mL  1,000 mL Intravenous Once    [COMPLETED] sodium chloride 0.9% infusion 1,000 mL  1,000 mL Intravenous Once    [] sodium chloride 0.9% infusion   Intravenous Continuous    [COMPLETED] pantoprazole (Protonix) 40 mg in sodium chloride 0.9% PF 10 mL IV push  40 mg Intravenous Once    influenza virus trivalent high dose PF (Fluzone HD) 0.5 mL injection (ages >/= 65 years) 0.5 mL  0.5 mL Intramuscular Prior to discharge    sodium chloride 0.9% infusion   Intravenous Continuous    acetaminophen (Tylenol Extra Strength) tab 500 mg  500 mg Oral Q4H PRN    melatonin tab 3 mg  3 mg Oral Nightly PRN    polyethylene glycol (PEG 3350) (Miralax) 17 g oral packet 17 g  17 g Oral Daily PRN    sennosides (Senokot) tab 17.2 mg   17.2 mg Oral Nightly PRN    bisacodyl (Dulcolax) 10 MG rectal suppository 10 mg  10 mg Rectal Daily PRN    fleet enema (Fleet) rectal enema 133 mL  1 enema Rectal Once PRN    ondansetron (Zofran) 4 MG/2ML injection 4 mg  4 mg Intravenous Q6H PRN    metoclopramide (Reglan) 5 mg/mL injection 5 mg  5 mg Intravenous Q8H PRN    sodium chloride (Saline Mist) 0.65 % nasal solution 1 spray  1 spray Each Nare Q3H PRN    glycerin-hypromellose- (Artificial Tears) 0.2-0.2-1 % ophthalmic solution 1 drop  1 drop Both Eyes QID PRN    atorvastatin (Lipitor) tab 20 mg  20 mg Oral Nightly    cetirizine (ZyrTEC) tab 10 mg  10 mg Oral Daily    dilTIAZem ER (Dilacor XR) 24 hr cap 120 mg  120 mg Oral Daily    folic acid (Folvite) tab 1 mg  1 mg Oral Daily    gabapentin (Neurontin) cap 300 mg  300 mg Oral Daily    pantoprazole (Protonix) DR tab 40 mg  40 mg Oral QAM AC    sertraline (Zoloft) tab 200 mg  200 mg Oral Daily    sucralfate (Carafate) 1 GM/10ML oral suspension 1 g  1 g Oral TID AC and HS (2200)    [COMPLETED] heparin (Porcine) 1000 UNIT/ML injection - BOLUS IV 5,000 Units  5,000 Units Intravenous Once    [COMPLETED] heparin (Porcine) 30492 units/250 mL infusion (ACS/AFIB) INITIAL DOSE  1,000 Units/hr Intravenous Once    heparin (Porcine) 50775 units/250mL infusion ACS/AFIB CONTINUOUS  200-3,000 Units/hr Intravenous Continuous     Allergies: No Known Allergies  Social HX:   Social History     Socioeconomic History    Marital status: Single   Tobacco Use    Smoking status: Former     Current packs/day: 0.00     Average packs/day: 1 pack/day for 20.0 years (20.0 ttl pk-yrs)     Types: Cigarettes     Start date: 1967     Quit date: 1987     Years since quittin.7    Smokeless tobacco: Never    Tobacco comments:     No comment   Vaping Use    Vaping status: Never Used   Substance and Sexual Activity    Alcohol use: Never    Drug use: Never   Other Topics Concern    Caffeine Concern No    Exercise No    Seat Belt  No    Special Diet No    Stress Concern No    Weight Concern Yes     Social Determinants of Health     Food Insecurity: No Food Insecurity (10/6/2024)    Food Insecurity     Food Insecurity: Never true   Transportation Needs: No Transportation Needs (10/6/2024)    Transportation Needs     Lack of Transportation: No   Housing Stability: Low Risk  (10/6/2024)    Housing Stability     Housing Instability: No      FamHx: The patient has no family history of colon cancer or other gastrointestinal malignancies;  No family history of ulcer disease, or inflammatory bowel disease  ROS:  In addition to the pertinent positives described above:            Infectious Disease:  No chronic infections or recent fevers prior to the acute illness            Cardiovascular: + HTN, dyslipidemia, s/p mechanical AV replacement, CAD s/p CABG            Respiratory: No shortness of breath, asthma, copd, recurrent pneumonia            Hematologic: The patient reports no easy bruising, frequent gum bleeding or nose bleeding;  + chronic anemia            Dermatologic: The patient reports no recent rashes or chronic skin disorders            Rheumatologic: The patient reports no history of chronic arthritis, myalgias, arthralgias            Genitourinary:  The patient reports no history of recurrent urinary tract infections, hematuria, dysuria, or nephrolithiasis           Psychiatric: The patient reports no history of depression, anxiety, suicidal ideation, or homicidal ideation           Oncologic: + skin cancer           ENT: The patient reports no hoarseness of voice, hearing loss, sinus congestion, tinnitus           Neurologic: The patient reports no history of seizure, stroke, or frequent headaches  PE: /56 (BP Location: Left arm)   Pulse 76   Temp 97.8 °F (36.6 °C) (Oral)   Resp 12   Wt 223 lb (101.2 kg)   SpO2 100%   BMI 32.00 kg/m²   Gen: AAO x 3, able to speak in complete sentences  HENT: EOMI, PERRL, oropharynx is clear  with moist mucosal membranes  Eyes: Sclerae are anicteric  Neck:  Supple without nuchal rigidity  CV: Regular rate and rhythm, with normal S1 and S2 ; + mechanical valve, + murmur  Resp: Clear to auscultation bilaterally without wheezes; rubs, rhonchi, or rales  Abdomen: Soft, non-tender, non-distended with the presence of bowel sounds; No hepatosplenomegaly; no rebound or guarding; No ascites is clinically apparent; no tympany to percussion  Ext: No peripheral edema or cyanosis  Skin: Warm and dry  Psychiatric: Appropriate mood and congruent affect without obvious depression or anxiety  Labs:   Lab Results   Component Value Date    WBC 5.0 10/07/2024    HGB 8.3 10/07/2024    HCT 26.2 10/07/2024    .0 10/07/2024    CREATSERUM 1.91 10/07/2024    BUN 18 10/07/2024     10/07/2024    K 4.4 10/07/2024     10/07/2024    CO2 16.0 10/07/2024     10/07/2024    CA 8.6 10/07/2024    ALB 3.6 10/07/2024    ALKPHO 89 10/07/2024    BILT 0.2 10/07/2024    AST 56 10/07/2024    ALT 50 10/07/2024    PTT 55.5 10/07/2024    INR 1.97 10/06/2024    PTP 22.6 10/06/2024    LIP 66 10/06/2024    MG 1.5 10/07/2024    PHOS 3.3 10/07/2024     Recent Labs   Lab 10/06/24  1308 10/06/24  1743 10/07/24  0533   * 143* 127*   BUN 22 19 18   CREATSERUM 2.21* 2.13* 1.91*   CA 9.6 9.1 8.6*   * 135* 135*   K 5.2* 5.3* 4.4    112 113*   CO2 16.0* 17.0* 16.0*     Recent Labs   Lab 10/06/24  1308 10/06/24  1743 10/07/24  0533   RBC 3.46*   < > 2.93*   HGB 9.5*   < > 8.3*   HCT 30.2*   < > 26.2*   MCV 87.3   < > 89.4   MCH 27.5   < > 28.3   MCHC 31.5   < > 31.7   RDW 21.4   < > 21.4   NEPRELIM 5.86  --   --    WBC 7.9   < > 5.0   .0   < > 111.0*    < > = values in this interval not displayed.       Recent Labs   Lab 10/06/24  1308 10/07/24  0533   ALT 54* 50*   AST 40* 56*       Imaging:   PROCEDURE:  CT CHEST (CPT=71250)     COMPARISON:  None.     INDICATIONS:  Nodule seen on chest abdomen pelvis.      TECHNIQUE:  Unenhanced multislice CT scanning is performed through the chest.  Dose reduction techniques were used. Dose information is transmitted to the ACR (American College of Radiology) NRDR (National Radiology Data Registry) which includes the Dose   Index Registry.     PATIENT STATED HISTORY: (As transcribed by Technologist)  Eval for lung nodules         FINDINGS:    LUNGS:  There are numerous nodules throughout both lungs.  Right apical nodule measures 6 mm, right upper lobe nodule measures 5 mm, posterior segment right upper lobe nodule measures 9 mm and a subpleural nodule posteriorly and laterally within the  right lower lobe measures 4 mm.  Right middle lobe nodule measures 6 mm.  Superior segment right lower lobe nodule measuring 8 mm and 7 mm.  Medial right lower lobe nodule measures 7 mm and 5 mm. Posterior segment right lower lobe nodule measures 6 mm  and lateral segment right lower lobe pulmonary nodule measures 4 mm.       Left upper lobe posterior segment nodule measures 7 mm and anterior nodule measures 8 mm and 9 mm.  There is a posterior segment lingular nodule measures 8 mm.  Superior segment left lower lobe pulmonary nodule measures 7 mm and 4 mm and 5 mm.    Posterior basilar segment left lower lobe pulmonary nodule measures 4 mm and 8 mm.    VASCULATURE:  Pulmonary vessels are unremarkable within the limits of a noncontrast CT.    SAMMI:  No mass or adenopathy.    MEDIASTINUM:  No mass or adenopathy.    CARDIAC:  There is severe coronary calcifications.  PLEURA:  No mass or effusion.    THORACIC AORTA:  Unremarkable as seen on non-contrast imaging.  CHEST WALL:  No mass or axillary adenopathy.    LIMITED ABDOMEN:  Fluid distended esophagus diffusely can be due to reflux disease or possible stricture at the distal esophagus.  Bilateral kidney stones and cysts been described on previous CT abdomen pelvis.  Masslike enlargement of the uncinate  process is suspicious for a neoplasm.  BONES:   No bony lesion or fracture.        Impression   CONCLUSION:       1.  Multiple bilateral pulmonary nodules measuring between 4 and 9 mm concerning for metastatic disease.     2. Masslike enlargement with hypo attenuation within the uncinate process of the pancreas concerning for neoplasm.     3. Fluid distended and dilated esophagus may be due distal esophageal stricture or reflux.     LOCATION:  [facility]      Dictated by (CST): Josiah Rowe MD on 10/06/2024 at 6:16 PM      Finalized by (CST): Josiah Rowe MD on 10/06/2024 at 6:22 PM      Impression:  69 yr-old male with hx of HTN, dyslipidemia, s/p mechanical valve replacement (Coumadin), obesity s/p Antonio-en-Y gastric bypass, DM, CKD, and chronic anemia on IV iron supplementation admitted yesterday with abd pain with imaging suggesting masslike enlargement within the uncinate process of the pancreas and multiple bilateral pulmonary nodules measuring 4 to 9 mm concerning for metastatic disease; labs with normal CA 19-9  Discussed case with interventional GI (Dr. Shanks) and oncology.  Patient's surgically altered anatomy not amenable to EUS guided biopsy of the pancreatic mass.  Currently, plan for IR guided biopsy of lung lesion and if still needed can complete a two-step endoscopic process to reverse patient's gastric bypass and then complete EUS with biopsy.  Discussed need for 2 procedures and reversal of Antonio-en-Y with patient at length.  Patient agreeable to procedures if warranted.  Will await IR results and proceed with endoscopy if needed   Recommendations:   Case discussed extensively with Dr. Shanks (interventional GI) and oncology Dr. Carr--- plan for IR guided lung biopsy and if needed can plan for EDGE procedure to reverse patient's gastric bypass and then in a 2nd procedure complete EUS with FNB  Okay for a regular diet from a GI perspective  Pain management per hospitalist recommendations  Okay to continue heparin drip at this time from a GI  perspective  Monitor for overt bleeding; if melena occurs, will consider EGD  Continue daily pantoprazole 40mg po every day     Thank you for the consultation, we will follow the patient with you.  Attending addendum (Dr Ludwig) to follow later today and provide formal, final recommendations at that time   TRAM Jamison  11:52 AM  10/7/2024  [facility] Gastroenterology  [phone]    This patient was seen in conjunction with Diane Torres NP. I have personally seen and examined the patient and have discussed the above stated diagnosis and treatment plan with my edits above. The patient is a 69 year old male with a history of Antonio-en-y gastric bypass who presented with epigastric pain, with imaging showing a pancreatic mass and pulmonary masses concerning for metastatic disease. On exam, he is HD stable and has mild epigastric pain. Given his stomach anatomy, conventional EUS cannot be performed. Recommend onc consult, lung biopsy if able once safe from an anticoagulation perspective, holding warfarin, monitoring for overt bleeding.    Dustin Ludwig MD  [facility] Gastroenterology

## 2024-10-07 NOTE — PROGRESS NOTES
[facility]   part of [facility]     Hospitalist Progress Note     Rejiarydeedee Saurabh Schmidt Patient Status:  Observation    1955 MRN [id number]   Formerly [facility] 3NW-A Attending José Manjarrez MD   Hosp Day # 0 PCP Ema Arora DO     Chief Complaint: Abdominal pain    Subjective:     Patient reports some improvement in the bandlike pain he described yesterday. Daughter present at bedside.     Objective:    Review of Systems:   A comprehensive review of systems was completed; pertinent positive and negatives stated in subjective.    Vital signs:  Temp:  [97.3 °F (36.3 °C)-98.4 °F (36.9 °C)] 97.3 °F (36.3 °C)  Pulse:  [] 83  Resp:  [16-20] 18  BP: ()/(50-78) 103/50  SpO2:  [95 %-100 %] 100 %    Physical Exam:    General: Chronically ill appearing. Pale  Respiratory: No rhonchi, no wheezes  Cardiovascular: S1, S2. Regular rate and rhythm  Abdomen: Soft, Non-tender, non-distended, positive bowel sounds  Neuro: No new focal deficits  Extremities: No edema    Diagnostic Data:    Labs:  Recent Labs   Lab 10/06/24  1308 10/06/24  1743 10/06/24  2327 10/07/24  0533   WBC 7.9 6.7  --  5.0   HGB 9.5* 8.9*  --  8.3*   MCV 87.3 86.5  --  89.4   .0 168.0  --  111.0*   INR 1.62*  --  1.97*  --        Recent Labs   Lab 10/06/24  1308 10/06/24  1743 10/07/24  0533   * 143* 127*   BUN 22 19 18   CREATSERUM 2.21* 2.13* 1.91*   CA 9.6 9.1 8.6*   ALB 3.7  --  3.6   * 135* 135*   K 5.2* 5.3* 4.4    112 113*   CO2 16.0* 17.0* 16.0*   ALKPHO 101  --  89   AST 40*  --  56*   ALT 54*  --  50*   BILT 0.2  --  0.2   TP 7.1  --  6.0       Estimated Creatinine Clearance: 37.7 mL/min (A) (based on SCr of 1.91 mg/dL (H)).    Recent Labs   Lab 10/06/24  1308   TROPHS 18       Recent Labs   Lab 10/06/24  1308 10/06/24  2327   PTP 19.4* 22.6*   INR 1.62* 1.97*                  Microbiology    No results found for this visit on 10/06/24.      Imaging: Reviewed in  Epic.    Medications:    sodium ferric gluconate  125 mg Intravenous Daily    atorvastatin  20 mg Oral Nightly    cetirizine  10 mg Oral Daily    dilTIAZem ER  120 mg Oral Daily    folic acid  1 mg Oral Daily    gabapentin  300 mg Oral Daily    pantoprazole  40 mg Oral QAM AC    sertraline  200 mg Oral Daily    sucralfate  1 g Oral TID AC and HS (2200)       Assessment & Plan:      #Upper abdominal pain   Possibly related to anastomotic erosions noted on recent EGD with intense erythema. Lipase 66 and no pancreatic inflammation on CT.   -CT chest with fluid distended esophagus reflux disease vs. stricture  -Continue protonix  -Trial sucralfate   -GI consulted     #AGGIE likely pre-renal in setting of poor intake  -Improving with IVF    #Mass like enlargement with hypo attenuation within the uncinate process of pancreas  CEA 44. CA 19-9 wnl.   -GI and onc consult; possible biopsy tomorrow  -NPO after midnight in case of procedure tomorrow      #Lungs nodules, possible mets     #Suspicious left renal mass   -MR kidney non urgently      #Increased cough, ROMANO  ROMANO is likely multifactorial in setting of anemia, recent viral URI. New lung nodules/mets on CT chest  -Sputum culture pending  -Monitor off antibiotics for now      #Chronic iron deficiency anemia  #Recent GIB  -Trend hgb      #Mechanical AVR  Goal INR 2.5 - 3.5  -INR subtherapeutic on admission  -Heparin drip given possible need for procedure     #Pafib - heparin drip      #Hx of TIA     #HTN  #HLD     #Vitamin B12 deficiency  #Folic acid deficiency   -Folic acid, on monthly b12 injections      #Thrombocytopenia - mechanical valve distruction. Mild. Monitor      #Hx of gastric bypass     #Depression   #Peripheral neuropathy      #Obesity, BMI 32    Carola Lino MD    Supplementary Documentation:     Quality:  DVT Mechanical Prophylaxis:     Early ambuation  DVT Pharmacologic Prophylaxis   Medication    heparin (Porcine) 09950 units/250mL infusion ACS/AFIB  CONTINUOUS                Code Status: Full Code  Vee: No urinary catheter in place  Vee Duration (in days):   Central line:    ADAMARIS:     Discharge is dependent on: course  At this point Mr. Schmidt is expected to be discharge to: home    The 21st Century Cures Act makes medical notes like these available to patients in the interest of transparency. Please be advised this is a medical document. Medical documents are intended to carry relevant information, facts as evident, and the clinical opinion of the practitioner. The medical note is intended as peer to peer communication and may appear blunt or direct. It is written in medical language and may contain abbreviations or verbiage that are unfamiliar.

## 2024-10-07 NOTE — PROGRESS NOTES
A/Ox4, RA, IVF, NPO as of midnight.  Up ad duyen, voids freely in restroom.  No complaints of pain or nausea overnight.  Tele monitor in place, A flutter rhythm noted.  C diff sample came back negative.  Heparin drip infusing per order.  Pt updated on plan of care, call light and belongings within reach, questions and concerns addressed.

## 2024-10-08 LAB
ANION GAP SERPL CALC-SCNC: 6 MMOL/L (ref 0–18)
APTT PPP: 59.9 SECONDS (ref 23–36)
BUN BLD-MCNC: 16 MG/DL (ref 9–23)
CALCIUM BLD-MCNC: 8.6 MG/DL (ref 8.7–10.4)
CHLORIDE SERPL-SCNC: 113 MMOL/L (ref 98–112)
CO2 SERPL-SCNC: 18 MMOL/L (ref 21–32)
CREAT BLD-MCNC: 1.62 MG/DL
EGFRCR SERPLBLD CKD-EPI 2021: 46 ML/MIN/1.73M2 (ref 60–?)
ERYTHROCYTE [DISTWIDTH] IN BLOOD BY AUTOMATED COUNT: 21.1 %
GLUCOSE BLD-MCNC: 126 MG/DL (ref 70–99)
HCT VFR BLD AUTO: 25.6 %
HGB BLD-MCNC: 8.1 G/DL
INR BLD: 1.86 (ref 0.8–1.2)
MAGNESIUM SERPL-MCNC: 1.4 MG/DL (ref 1.6–2.6)
MCH RBC QN AUTO: 27.4 PG (ref 26–34)
MCHC RBC AUTO-ENTMCNC: 31.6 G/DL (ref 31–37)
MCV RBC AUTO: 86.5 FL
OSMOLALITY SERPL CALC.SUM OF ELEC: 287 MOSM/KG (ref 275–295)
PLATELET # BLD AUTO: 77 10(3)UL (ref 150–450)
POTASSIUM SERPL-SCNC: 4.2 MMOL/L (ref 3.5–5.1)
PROTHROMBIN TIME: 21.6 SECONDS (ref 11.6–14.8)
RBC # BLD AUTO: 2.96 X10(6)UL
SODIUM SERPL-SCNC: 137 MMOL/L (ref 136–145)
WBC # BLD AUTO: 6 X10(3) UL (ref 4–11)

## 2024-10-08 PROCEDURE — 99233 SBSQ HOSP IP/OBS HIGH 50: CPT | Performed by: INTERNAL MEDICINE

## 2024-10-08 PROCEDURE — 99232 SBSQ HOSP IP/OBS MODERATE 35: CPT | Performed by: INTERNAL MEDICINE

## 2024-10-08 RX ORDER — DIPHENHYDRAMINE HYDROCHLORIDE, ZINC ACETATE 2; .1 G/100G; G/100G
1 CREAM TOPICAL 3 TIMES DAILY PRN
Status: DISCONTINUED | OUTPATIENT
Start: 2024-10-08 | End: 2024-10-11

## 2024-10-08 RX ORDER — CYANOCOBALAMIN 1000 UG/ML
1000 INJECTION, SOLUTION INTRAMUSCULAR; SUBCUTANEOUS ONCE
Status: COMPLETED | OUTPATIENT
Start: 2024-10-08 | End: 2024-10-08

## 2024-10-08 NOTE — PROGRESS NOTES
Hematology/Oncology Progress Note    Patient Name: Liang Schmidt  Medical Record Number: [id number]    YOB: 1955     Reason for Consultation:  Liang Schmidt was seen today for the diagnosis of pancreatic mass, iron deficiency anemia    Interval events:      - due to trang-en-Y anatomy, reversal of gastric bypass would be needed to biopsy pancreas  - d/w IR, can obtain lung nodule biopsy with reasonable chance of success  - says he is feeling ok, pain controlled    Inpatient Meds:   magnesium sulfate  4 g Intravenous Once    sodium ferric gluconate  125 mg Intravenous Daily    atorvastatin  20 mg Oral Nightly    cetirizine  10 mg Oral Daily    dilTIAZem ER  120 mg Oral Daily    folic acid  1 mg Oral Daily    gabapentin  300 mg Oral Daily    pantoprazole  40 mg Oral QAM AC    sertraline  200 mg Oral Daily    sucralfate  1 g Oral TID AC and HS (2200)      continuous dose heparin 1,000 Units/hr (10/07/24 1937)       PRN Meds:    HYDROmorphone    influenza virus vaccine PF    acetaminophen    melatonin    polyethylene glycol (PEG 3350)    sennosides    bisacodyl    fleet enema    ondansetron    metoclopramide    sodium chloride    glycerin-hypromellose-    Allergies:   No Known Allergies    Vital Signs:  Height: --  Weight: --  BSA (Calculated - sq m): --  Pulse: 104 (10/08 0833)  BP: 130/62 (10/08 0833)  Temp: 97.6 °F (36.4 °C) (10/08 0833)  Do Not Use - Resp Rate: --  SpO2: 98 % (10/08 0833)    Wt Readings from Last 6 Encounters:   10/06/24 101.2 kg (223 lb)   10/01/24 101.5 kg (223 lb 12.8 oz)   09/26/24 105.6 kg (232 lb 12.8 oz)   09/23/24 106.9 kg (235 lb 10.8 oz)   09/04/24 106.1 kg (233 lb 12.8 oz)   09/01/24 105.9 kg (233 lb 7.5 oz)       Physical Examination:  General: Patient is alert and oriented, not in acute distress  Psych: Mood and affect are appropriate  Eyes: EOMI, PERRL  ENT: Oropharynx is clear, no adenopathy  CV: no LE edema  Respiratory: Non-labored  respirations  GI/Abd: Soft, non-tender   Neurological: Grossly intact   Skin: no rashes or petechiae    Laboratory:  Recent Labs   Lab 10/06/24  1308 10/06/24  1743 10/07/24  0533 10/08/24  0706   WBC 7.9 6.7 5.0 6.0   HGB 9.5* 8.9* 8.3* 8.1*   HCT 30.2* 28.2* 26.2* 25.6*   .0 168.0 111.0* 77.0*   MCV 87.3 86.5 89.4 86.5   RDW 21.4 21.5 21.4 21.1   NEPRELIM 5.86  --   --   --        Recent Labs   Lab 10/06/24  1308 10/06/24  1743 10/07/24  0533 10/08/24  0706   * 135* 135* 137   K 5.2* 5.3* 4.4 4.2    112 113* 113*   CO2 16.0* 17.0* 16.0* 18.0*   BUN 22 19 18 16   CREATSERUM 2.21* 2.13* 1.91* 1.62*   * 143* 127* 126*   CA 9.6 9.1 8.6* 8.6*   PHOS  --   --  3.3  --    TP 7.1  --  6.0  --    ALB 3.7  --  3.6  --    ALKPHO 101  --  89  --    AST 40*  --  56*  --    ALT 54*  --  50*  --    BILT 0.2  --  0.2  --        Recent Labs   Lab 10/06/24  1308 10/06/24  1743 10/06/24  2327 10/07/24  0755 10/08/24  0706   INR 1.62*  --  1.97*  --  1.86*   PTT  --    < > 66.1* 55.5* 59.9*    < > = values in this interval not displayed.       Impression & Plan:     Pancreatic mass  Lung nodules  - discussed concern for malignancy  - CEA elevated. CA 19-9 normal  - GI consult appreciated  - await further improvement of   - GI consult for EUS, will likely need pancreatic mass biopsy  - kidney function improving. Will wait for it to improve further before ordering CT C/A/P with IV contrast for staging     AGGIE  - secondary to dehydration. Improving with IVF     Iron deficiency anemia  - he has persistent iron deficiency anemia secondary to chronic blood loss from both his hemorrhoidal bleeding and anastomotic ulcers which is exacerbated by the fact that he is on warfarin which cannot be stopped because of his mechanical valve. This same mechanical valve is likely causing also some mechanical hemolysis of his red blood cells as well as his platelets. With chronic thrombocytopenia, this exacerbates his bleeding.  In addition, he has a gastric bypass that prevents him from absorbing iron so he will need to be on intravenous iron for life. He is planned for IV 1000mg infed [id number] outpatient at this point  - continue IV ferrlecit 125mg daily while pt in patient (D1 10/7)     Vitamin B12 deficiency  Folic acid deficiency  - continue daily folic acid 1mg daily as well as monthly vitamin B12 lifelong  - pre-emptive give vit B12 injection today     Thrombocytopenia  - likely caused by mechanical valve destruction. Mild, chronic, can monitor for now.   - vit B12 and folate stores are replete. Prior CT A/P showed normal liver. CMP normal liver function.      Hx mechanical valve surgery  - on coumadin with INRs monitored by PCP. Goal INR 2.5-3.5  - hold off on warfarin for now. Continue heparin gtt while awaiting biopsy by NICKI Carr MD  Hematology/Medical Oncology  [facility]

## 2024-10-08 NOTE — PROGRESS NOTES
[facility]   part of [facility]     Hospitalist Progress Note     Liang Saurabh Schmidt Patient Status:  Observation    1955 MRN [id number]   [facility] 3NW-A Attending José Manjarrez MD   Hosp Day # 1 PCP Ema Arora DO     Chief Complaint: Abdominal pain    Subjective:     Ab pain improved. NO acute complaints    Objective:    Review of Systems:   A comprehensive review of systems was completed; pertinent positive and negatives stated in subjective.    Vital signs:  Temp:  [97.6 °F (36.4 °C)-98.1 °F (36.7 °C)] 97.6 °F (36.4 °C)  Pulse:  [] 104  Resp:  [13-20] 20  BP: (113-138)/(53-67) 130/62  SpO2:  [97 %-100 %] 98 %    Physical Exam:    General: Chronically ill appearing.  Respiratory: No rhonchi, no wheezes  Cardiovascular: S1, S2. Regular rate and rhythm  Abdomen: Soft, Non-tender, non-distended, positive bowel sounds  Neuro: No new focal deficits  Extremities: No edema    Diagnostic Data:    Labs:  Recent Labs   Lab 10/06/24  1308 10/06/24  1743 10/06/24  2327 10/07/24  0533 10/08/24  0706   WBC 7.9 6.7  --  5.0 6.0   HGB 9.5* 8.9*  --  8.3* 8.1*   MCV 87.3 86.5  --  89.4 86.5   .0 168.0  --  111.0* 77.0*   INR 1.62*  --  1.97*  --  1.86*       Recent Labs   Lab 10/06/24  1308 10/06/24  1743 10/07/24  0533 10/08/24  0706   * 143* 127* 126*   BUN 22 19 18 16   CREATSERUM 2.21* 2.13* 1.91* 1.62*   CA 9.6 9.1 8.6* 8.6*   ALB 3.7  --  3.6  --    * 135* 135* 137   K 5.2* 5.3* 4.4 4.2    112 113* 113*   CO2 16.0* 17.0* 16.0* 18.0*   ALKPHO 101  --  89  --    AST 40*  --  56*  --    ALT 54*  --  50*  --    BILT 0.2  --  0.2  --    TP 7.1  --  6.0  --        Estimated Creatinine Clearance: 44.4 mL/min (A) (based on SCr of 1.62 mg/dL (H)).    Recent Labs   Lab 10/06/24  1308   TROPHS 18       Recent Labs   Lab 10/06/24  1308 10/06/24  2327 10/08/24  0706   PTP 19.4* 22.6* 21.6*   INR 1.62* 1.97* 1.86*                  [facility]    Hospital  Encounter on 10/06/24   1. Urine Culture, Routine     Status: None    Collection Time: 10/07/24 12:50 AM    Specimen: Urine, clean catch   Result Value Ref Range    Urine Culture  N/A     <10,000 cfu/ml Mixture of Gram positive organisms isolated - probable contamination.         Imaging: Reviewed in Epic.    Medications:    magnesium sulfate  4 g Intravenous Once    sodium ferric gluconate  125 mg Intravenous Daily    atorvastatin  20 mg Oral Nightly    cetirizine  10 mg Oral Daily    dilTIAZem ER  120 mg Oral Daily    folic acid  1 mg Oral Daily    gabapentin  300 mg Oral Daily    pantoprazole  40 mg Oral QAM AC    sertraline  200 mg Oral Daily    sucralfate  1 g Oral TID AC and HS (2200)       Assessment & Plan:      #Upper abdominal pain   Possibly related to anastomotic erosions noted on recent EGD with intense erythema. Lipase 66 and no pancreatic inflammation on CT.   -CT chest with fluid distended esophagus reflux disease vs. stricture  -Continue protonix  -Trial sucralfate   -GI following     #AGGIE likely pre-renal in setting of poor intake  -Improving    #Mass like enlargement with hypo attenuation within the uncinate process of pancreas  #Lungs nodules, possible mets  CEA 44. CA 19-9 wnl.   -GI and onc consult; IR lung nodule biopsy tomorrow      #Suspicious left renal mass   -MR kidney non urgently      #Increased cough, ROMANO  ROMANO is likely multifactorial in setting of anemia, recent viral URI. New lung nodules/mets on CT chest  -Sputum culture pending  -Continue to monitor off antibiotics for now      #Chronic iron deficiency anemia  #Recent GIB  -Trend hgb   -IV iron per heme     #Mechanical AVR  Goal INR 2.5 - 3.5  -INR subtherapeutic on admission  -Heparin drip given possible need for procedure     #Pafib - heparin drip      #Hx of TIA     #HTN  #HLD     #Vitamin B12 deficiency  #Folic acid deficiency   -Folic acid, on monthly b12 injections      #Thrombocytopenia - mechanical valve distruction. Mild.  Monitor      #Hx of gastric bypass     #Depression   #Peripheral neuropathy      #Obesity, BMI 32    Carola Lino MD    Supplementary Documentation:     Quality:  DVT Mechanical Prophylaxis:     Early ambuation  DVT Pharmacologic Prophylaxis   Medication    heparin (Porcine) 05027 units/250mL infusion ACS/AFIB CONTINUOUS                Code Status: Full Code  Vee: No urinary catheter in place  Vee Duration (in days):   Central line:    ADAMARIS:     Discharge is dependent on: course  At this point Mr. Schmidt is expected to be discharge to: home    The 21st Century Cures Act makes medical notes like these available to patients in the interest of transparency. Please be advised this is a medical document. Medical documents are intended to carry relevant information, facts as evident, and the clinical opinion of the practitioner. The medical note is intended as peer to peer communication and may appear blunt or direct. It is written in medical language and may contain abbreviations or verbiage that are unfamiliar.

## 2024-10-08 NOTE — PROGRESS NOTES
Inpatient Follow up Note    Liang Wiley Brayan Patient Status:  Inpatient    1955 MRN [id number]   Location [facility] 3NW-A Attending José Manjarrez MD   Hosp Day # 1 PCP Ema Arora,      Reason for Consultation   Abd pain, pancreatic mass     Subjective   Abdominal pain gone, no vomiting, having brown/tan stools, no blood noted.             Objective:     /62 (BP Location: Right arm)   Pulse 104   Temp 97.6 °F (36.4 °C) (Oral)   Resp 20   Wt 223 lb (101.2 kg)   SpO2 98%   BMI 32.00 kg/m²   Gen: AAOx3  CV: RRR with normal S1 / S2  Resp: CTA bilaterally  Abd: (+)BS, soft, non-tender, non-distended; no rebound or guarding  Ext: No edema or cyanosis  Skin: Warm and dry     Labs/Imaging     LABRCNTIP[PGLU:5@  Recent Labs   Lab 10/06/24  1308 10/06/24  2327 10/08/24  0706   INR 1.62* 1.97* 1.86*         Recent Labs   Lab 10/06/24  1308 10/06/24  1743 10/07/24  0533 10/08/24  0706   WBC 7.9 6.7 5.0 6.0   HGB 9.5* 8.9* 8.3* 8.1*   .0 168.0 111.0* 77.0*       Recent Labs   Lab 10/06/24  1308 10/06/24  1743 10/07/24  0533 10/08/24  0706   * 135* 135* 137   K 5.2* 5.3* 4.4 4.2    112 113* 113*   CO2 16.0* 17.0* 16.0* 18.0*   BUN 22 19 18 16   CREATSERUM 2.21* 2.13* 1.91* 1.62*       Recent Labs   Lab 10/06/24  1308 10/07/24  0533   ALT 54* 50*   AST 40* 56*     CT CHEST (CPT=71250)    Result Date: 10/6/2024  CONCLUSION:   1.  Multiple bilateral pulmonary nodules measuring between 4 and 9 mm concerning for metastatic disease.  2. Masslike enlargement with hypo attenuation within the uncinate process of the pancreas concerning for neoplasm.  3. Fluid distended and dilated esophagus may be due distal esophageal stricture or reflux.  LOCATION:  [facility]   Dictated by (CST): Josiah Rowe MD on 10/06/2024 at 6:16 PM     Finalized by (CST): Josiah Rowe MD on 10/06/2024 at 6:22 PM       XR CHEST AP PORTABLE  (CPT=71045)    Result Date: 10/6/2024  CONCLUSION:     Stable elevated right diaphragm.  Stable borderline heart size.  No sign of CHF, pneumonia, pleural effusion, pneumothorax.  LOCATION:  [facility]      Dictated by (CST): Arcadio Penn MD on 10/06/2024 at 2:47 PM     Finalized by (CST): Arcadio Penn MD on 10/06/2024 at 2:47 PM       CT ABDOMEN+PELVIS(CPT=74176)    Result Date: 10/6/2024  CONCLUSION:   1. New nodules at the lung base bilateral, noncalcified, concerning for metastatic disease.  These were not present previously.  Possible subtle low-attenuation lesion uncinate process pancreas, early malignant lesion pancreas not excluded.  No sign of acute pancreatitis.  No sign of bowel obstruction, ascites or free air.  Postsurgical changes of the stomach/gastroesophageal junction and bowel.  2. Assessment kidneys limited without contrast, with numerous masses, most of which will represent cysts, but the largest mass is hyperdense and projects from the lower pole left kidney, cannot exclude a solid mass lesion in the lower pole left kidney.  Nonobstructing kidney stones.    LOCATION:  [facility]   Dictated by (CST): Arcadio Penn MD on 10/06/2024 at 2:22 PM     Finalized by (CST): Arcadio Penn MD on 10/06/2024 at 2:29 PM      AST   Date/Time Value Ref Range Status   10/07/2024 05:33 AM 56 (H) <34 U/L Final     ALT   Date/Time Value Ref Range Status   10/07/2024 05:33 AM 50 (H) 10 - 49 U/L Final     BUN   Date/Time Value Ref Range Status   10/08/2024 07:06 AM 16 9 - 23 mg/dL Final              Assessment   Mr Schmidt is a 69 yr-old male with hx of HTN, dyslipidemia, s/p mechanical valve replacement (Coumadin), obesity s/p Antonio-en-Y gastric bypass, DM, CKD, and chronic anemia on IV iron supplementation admitted with abdominal pain with imaging suggesting masslike enlargement within the uncinate process of the pancreas and multiple bilateral pulmonary nodules measuring 4 to 9 mm concerning for metastatic disease; labs with normal CA 19-9  Discussed case with  interventional GI (Dr. Shanks) and oncology.  Patient's surgically altered anatomy not amenable to EUS guided biopsy of the pancreatic mass.  Currently, plan for IR guided biopsy of lung lesion and if still needed can complete a two-step endoscopic process to reverse patient's gastric bypass and then complete EUS with biopsy.  Discussed need for 2 procedures and reversal of Antonio-en-Y with patient at length.  Patient agreeable to procedures if warranted.       Plan   -appreciate onc consult  -will f/u results of IR guided lung biopsy; if unable to do or not confirmatory of a diagnosis, may need to consider reversing gastric bypass endoscopically to perform EUS  -continue daily pantoprazole 40mg po every day  -ok to eat from a GI perspective after biopsy  -ok for anticoagulation from a GI perspective       Dustin Ludwig MD  10:54 AM  10/8/2024  [facility]  [phone]

## 2024-10-08 NOTE — PAYOR COMM NOTE
--------------  ADMISSION REVIEW     Payor: [employer]UKESH CHOICE  Subscriber #:  [id number]  Authorization Number: [id number]    Admit date: 10/7/24  Admit time:  2:59 PM       REVIEW DOCUMENTATION:     ED Provider Notes        ED Provider Notes signed by Beth Pineda MD at 10/6/2024  2:54 PM      Patient Seen in: [facility] Emergency Department      History     Chief Complaint   Patient presents with    Abdomen/Flank Pain     Stated Complaint: abd pain, sob    Subjective:   HPI  ED History source : Patient  69-year-old male hospitalized recently for pneumonia and an upper GI bleed (ulcerations at the anastomosis site of his gastric bypass per EMR) presents to the emergency department complaining of bandlike upper abdominal pain 7/10 in severity since discharged home from the hospital associated with persistent and not improving shortness of breath despite completion of antibiotic therapy.  No fever.  No vomiting.  States his stools are still dark and he is having 3 to 4/day.  He is taking fluids well but states eating increases the pain severity.  Patient is anticoagulated on warfarin for a metallic heart valve.    Objective:     Past Medical History:    Anemia, chronic disease    Anesthesia complication    HX: OF AGGRESSION W/PAST PROCEDURES D/T INSUFFICIENT ANESTHESIA IN THE PAST?    Anticoagulated on warfarin    Arthritis    Nothing good to say    Calculus of kidney    Nothing major    Cancer (HCC)    skin cancer    Cellulitis    Diabetes (HCC)    Essential hypertension    Hearing impairment    High blood pressure    Hyperlipidemia    Obesity    Osteoarthritis    Pancreatitis (HCC)    Sepsis (HCC)    Shortness of breath    INTERMITTENT SOB ON EXERTION    Visual impairment    CONTACTS/GLASSES         Physical Exam     ED Triage Vitals   BP 10/06/24 1258 127/78   Pulse 10/06/24 1257 105   Resp 10/06/24 1257 20   Temp 10/06/24 1421 97.9 °F (36.6 °C)   Temp src 10/06/24 1421 Oral   SpO2 10/06/24 1257 100 %    O2 Device 10/06/24 1257 None (Room air)       Current Vitals:   Vital Signs  BP: 117/66  Pulse: 82  Resp: 20  Temp: 97.9 °F (36.6 °C)  Temp src: Oral  MAP (mmHg): 81    Oxygen Therapy  SpO2: 100 %  O2 Device: None (Room air)        Physical Exam  General Appearance: This is an older male lying on a gurney.  Vital signs were reviewed per nurses notes.  Monitor reveals a sinus rhythm rate of 100.  Respirations are unlabored.  Pulse oximetry is 100% on room air.  Blood pressure is 127/78.  HEENT: Normocephalic/atraumatic.  Anicteric sclera.  Oral mucosa is pasty.  Oropharynx is normal.  Neck: No adenopathy or thyromegaly.  Lungs are clear to auscultation.  Heart exam: Normal S1-S2 without extra sounds or murmurs.  Regular rate and rhythm.  Abdomen: NABS.  Flat, soft and nontender without masses or rebound.  Skin is dry without rashes.  Occasional healing bruises.  Extremities are atraumatic.  Neuroexam: Alert and oriented x 4.  Speech is fluent.  Moving all 4 extremities well.    ED Course     Labs Reviewed   COMP METABOLIC PANEL (14) - Abnormal; Notable for the following components:       Result Value    Glucose 230 (*)     Sodium 134 (*)     Potassium 5.2 (*)     CO2 16.0 (*)     Creatinine 2.21 (*)     eGFR-Cr 31 (*)     AST 40 (*)     ALT 54 (*)     All other components within normal limits   CBC WITH DIFFERENTIAL WITH PLATELET - Abnormal; Notable for the following components:    RBC 3.46 (*)     HGB 9.5 (*)     HCT 30.2 (*)     All other components within normal limits   LIPASE - Abnormal; Notable for the following components:    Lipase 66 (*)     All other components within normal limits   PROTHROMBIN TIME (PT) - Abnormal; Notable for the following components:    PT 19.4 (*)     INR 1.62 (*)     All other components within normal limits   TROPONIN I HIGH SENSITIVITY - Normal   URINALYSIS WITH CULTURE REFLEX     EKG    Rate, intervals and axes as noted on EKG Report.  Rate: 81  Rhythm: Atrial flutter  Reading:  Variable block.  Left axis deviation.  Septal infarct, age undetermined.  Abnormal EKG.  Agree with EKG report.         Intravenous access was obtained.  Laboratory studies were drawn.  IV fluids, analgesics and antiemetics were administered including Protonix.    Creatinine is 2.2 which is more than 1 point higher than prior value a few weeks ago.  Hemoglobin however is higher than previously and BUN is only modestly elevated suggesting this is most consistent with dehydration and not GI bleed.    XR CHEST AP PORTABLE  (CPT=71045)    Result Date: 10/6/2024  PROCEDURE:  XR CHEST AP PORTABLE  (CPT=71045)  TECHNIQUE:  AP chest radiograph was obtained.  COMPARISON:  [facility] , XR, XR CHEST AP PORTABLE  (CPT=71045), 9/19/2024, 4:38 PM.  INDICATIONS:  abd pain, sob, here two weeks ago, similar symptoms  PATIENT STATED HISTORY: (As transcribed by Technologist)  Patient states abdominal pain and shortness of breath for two weeks.              CONCLUSION:    Stable elevated right diaphragm.  Stable borderline heart size.  No sign of CHF, pneumonia, pleural effusion, pneumothorax.  LOCATION:  [facility]      Dictated by (CST): Arcadio Penn MD on 10/06/2024 at 2:47 PM     Finalized by (CST): Arcadio Penn MD on 10/06/2024 at 2:47 PM       CT ABDOMEN+PELVIS(CPT=74176)    Result Date: 10/6/2024  PROCEDURE:  CT ABDOMEN+PELVIS (CPT=74176)  COMPARISON:  [facility], CT ABDOMEN+PELVIS(CONTRAST ONLY)(CPT=74177), 9/08/2022, 9:29 AM.  INDICATIONS:  abd pain, sob, here two weeks ago, similar symptoms  TECHNIQUE:  Unenhanced multislice CT scanning was performed from the dome of the diaphragm to the pubic symphysis.  Dose reduction techniques were used. Dose information is transmitted to the ACR (American College of Radiology) NRDR (National Radiology Data Registry) which includes the Dose Index Registry.  PATIENT STATED HISTORY: (As transcribed by Technologist)  Patient is here with abdominal pain.    FINDINGS:  This scan performed  without IV or oral contrast, with limitations thereof.    LIVER:  Subtle nodular features could reflect cirrhosis..  BILIARY:  Cholecystectomy.  PANCREAS:  Assessment limited on contrast, but there may be a subtle low-attenuation lesion about 15 mm uncinate process pancreas series 4, image 43, series 2, image 55.  SPLEEN:  Unremarkable.  KIDNEYS:  No acute abnormality.  ADRENALS:  Assessment limited without contrast.  Bilateral kidney stones are present, the greatest burden lower pole left kidney and central left kidney, but no sign of hydronephrosis either kidney.  Numerous masses are seen projecting from the kidneys bilaterally, the largest and most hyperdense arises from the lower pole left kidney measuring 33 mm and measures 35 Hounsfield units.  AORTA/VASCULAR:  No aortic aneurysm. There are atherosclerotic changes including calcification involving the aorta, but no evidence for aneurysm involving the aorta.  RETROPERITONEUM:  Unremarkable.  BOWEL/MESENTERY:  Small hiatal hernia.  Surgical changes in the epigastric region around the distal esophagus and proximal stomach with surgical clips present.  Enteric staples are seen within some loops of bowel left upper quadrant.  No sign of bowel obstruction, ascites, free air, colitis or diverticulitis.  No excessive stool or rectal fecal impaction.  ABDOMINAL WALL:  Unremarkable.  URINARY BLADDER:  Unremarkable.  LYMPH NODES PELVIS:  Unremarkable.  PELVIC ORGANS:  No acute process.  LUNG BASES:  Numerous noncalcified pulmonary nodules are demonstrated at the lung base bilaterally, including portions of the visualized left upper lobe, right and left lower lobes, middle lobe.  These were not present on the prior CT scan of the abdomen  showing lower chest and range in size approximately 4-9 mm.  No sign of pleural effusion or lobar pneumonia.  Coronary artery calcifications are seen.  No pericardial effusion present.  BONES:  No acute abnormality. Note is made of  degenerative changes present in the spine.            CONCLUSION:   1. New nodules at the lung base bilateral, noncalcified, concerning for metastatic disease.  These were not present previously.  Possible subtle low-attenuation lesion uncinate process pancreas, early malignant lesion pancreas not excluded.  No sign of acute pancreatitis.  No sign of bowel obstruction, ascites or free air.  Postsurgical changes of the stomach/gastroesophageal junction and bowel.  2. Assessment kidneys limited without contrast, with numerous masses, most of which will represent cysts, but the largest mass is hyperdense and projects from the lower pole left kidney, cannot exclude a solid mass lesion in the lower pole left kidney.  Nonobstructing kidney stones.    LOCATION:  [facility]   Dictated by (CST): Arcadio Penn MD on 10/06/2024 at 2:22 PM     Finalized by (CST): Arcadio Penn MD on 10/06/2024 at 2:29 PM       I personally reviewed the images myself and went over results with patient.       Independent interpretation of imaging : CT abdomen and pelvis without contrast and chest x-ray and noted no acute cardiopulmonary abnormality.  Possible metastatic lesions in the lower lungs.  No acute intra-abdominal or retroperitoneal abnormality that was new and would explain the patient's symptoms.  Test results and treatment plan were discussed with the patient.  IV fluids were initiated for elevated creatinine.  [facility] hospitalist Dr. Ferrell was contacted via [facility] regarding patient's hospitalization.      MDM      #1.  Acute kidney injury.  Creatinine is up from 1.2-2.2.  Most plausible explanation is dehydration.  IV fluids initiated.  2.  Anticoagulated.  Mechanical heart valve.  3.  Subtherapeutic INR between 1 and 2.  3.  Hyperkalemia.  4.  Anemia.    Differential diagnosis included : Abdominal pain secondary to peptic ulcer disease.  Excluded options include diverticulitis, appendicitis, bowel obstruction.      Disposition  and Plan     Clinical Impression:  1. AGGIE (acute kidney injury) (HCC)    2. Anticoagulated    3. Subtherapeutic international normalized ratio (INR)    4. Hyperkalemia    5. Anemia, unspecified type         Disposition:  Admit  10/6/2024  2:50 pm     Hospital Problems       Present on Admission  Date Reviewed: 10/1/2024            ICD-10-CM Noted POA    * (Principal) AGGIE (acute kidney injury) (HCC) N17.9 10/6/2024 Unknown           Signed by Beth Pineda MD on 10/6/2024  2:54 PM           History and Physical    H&P signed by Carola Lino MD at 10/6/2024  6:51 PM       [facility]  History and Physical         Chief Complaint: generalized abdominal pain, REILLY     Subjective:    History of Present Illness:      Liang Schmidt is a 69 year old male with history of mechanical AVR (INR goal 2.5 - 3.5), pafib on coumadin, TIA, DMII, HTN, HLD, chronic iron deficiency anemia, hx of GIB, hx of gastric bypass, depression, peripheral neuropathy presented with generalized abdominal pain, REILLY.      Patient was admitted 9/19/2024 - 9/24/2024 with acute anemia 2/2 GIB in setting of supra therapeutic INR.  S/p EGD with gastric erosion felt to be source of bleed. He was placed on lovenox bridge and warfarin was restarted at discharge. Of note, he also tested positive for rhino/enterovirus.  Since discharge he developed new upper abdominal pain in a bandlike distribution. Its a constant, dull pain that wakes him up at night. He reports poor oral intake of fluids  and liquids due to pain exacerbation. NO known relieving factors. Continues to have intermittent melenic stools. Today he was so tired and had severe ROMANO. He reports increased cough with sputum production. Sputum is green color. Denies fevers, chills, weight loss, LUTS.      ED work up with acute kidney injury.     Assessment & Plan:       #Upper abdominal pain   Possibly related to anastomotic erosions noted on recent EGD with intense erythema.  Lipase 66 and no pancreatic inflammation on CT.   -Continue home protonix  -Trial sucralfate   -Advance diet; npo after midnight      #AGGIE likely pre-renal in setting of poor intake  -IVF and monitor     #Mass like enlargement with hypo attenuation within the uncinate process of pancreas  -GI and onc consult  -NPO after midnight in case of procedure tomorrow   -CA 19-9, CEA     #Lungs nodules, possible mets     #Suspicious left renal mass   -MR kidney non urgently      #Increased cough, ROMANO  ROMANO is likely multifactorial in setting of anemia, recent viral URI. New lung nodules/mets on CT chest  -Check sputum culture  -Monitor off antibiotics for now      #Chronic iron deficiency anemia  #Recent GIB  -Trend hgb      #Mechanical AVR  Goal INR 2.5 - 3.5  -INR subtherapeutic on admission  -Heparin drip given possible need for procedure     #Pafib - heparin drip      #Hx of TIA     #HTN  #HLD     #Vitamin B12 deficiency  #Folic acid deficiency   -Folic acid, on monthly b12 injections      #Thrombocytopenia - mechanical valve distruction. Mild. Monitor      #Hx of gastric bypass     #Depression   #Peripheral neuropathy      #Obesity, BMI 32     Plan of care discussed with patient and RN     Carola Lino MD          CONSULT  Recent Labs   Lab 10/06/24  1308 10/06/24  1743 10/07/24  0533   * 143* 127*   BUN 22 19 18   CREATSERUM 2.21* 2.13* 1.91*   CA 9.6 9.1 8.6*   * 135* 135*   K 5.2* 5.3* 4.4    112 113*   CO2 16.0* 17.0* 16.0*            Recent Labs   Lab 10/06/24  1308 10/06/24  1743 10/07/24  0533   RBC 3.46*   < > 2.93*   HGB 9.5*   < > 8.3*   HCT 30.2*   < > 26.2*   MCV 87.3   < > 89.4   MCH 27.5   < > 28.3   MCHC 31.5   < > 31.7   RDW 21.4   < > 21.4   NEPRELIM 5.86  --   --    WBC 7.9   < > 5.0   .0   < > 111.0*    < > = values in this interval not displayed.              Recent Labs   Lab 10/06/24  1308 10/07/24  0533   ALT 54* 50*   AST 40* 56*     Imaging:   PROCEDURE:  CT CHEST  (CPT=71250)     COMPARISON:  None.     INDICATIONS:  Nodule seen on chest abdomen pelvis.     Impression   CONCLUSION:       1.  Multiple bilateral pulmonary nodules measuring between 4 and 9 mm concerning for metastatic disease.     2. Masslike enlargement with hypo attenuation within the uncinate process of the pancreas concerning for neoplasm.     3. Fluid distended and dilated esophagus may be due distal esophageal stricture or reflux.     LOCATION:  [facility]      Dictated by (CST): Josiah Rowe MD on 10/06/2024 at 6:16 PM      Finalized by (CST): Josiah Rowe MD on 10/06/2024 at 6:22 PM       Impression:  69 yr-old male with hx of HTN, dyslipidemia, s/p mechanical valve replacement (Coumadin), obesity s/p Antonio-en-Y gastric bypass, DM, CKD, and chronic anemia on IV iron supplementation admitted yesterday with abd pain with imaging suggesting masslike enlargement within the uncinate process of the pancreas and multiple bilateral pulmonary nodules measuring 4 to 9 mm concerning for metastatic disease; labs with normal CA 19-9  Discussed case with interventional GI (Dr. Shanks) and oncology.  Patient's surgically altered anatomy not amenable to EUS guided biopsy of the pancreatic mass.  Currently, plan for IR guided biopsy of lung lesion and if still needed can complete a two-step endoscopic process to reverse patient's gastric bypass and then complete EUS with biopsy.  Discussed need for 2 procedures and reversal of Antonio-en-Y with patient at length.  Patient agreeable to procedures if warranted.  Will await IR results and proceed with endoscopy if needed   Recommendations:   Case discussed extensively with Dr. Shanks (interventional GI) and oncology Dr. Carr--- plan for IR guided lung biopsy and if needed can plan for EDGE procedure to reverse patient's gastric bypass and then in a 2nd procedure complete EUS with FNB  Okay for a regular diet from a GI perspective  Pain management per hospitalist  recommendations  Okay to continue heparin drip at this time from a GI perspective  Monitor for overt bleeding; if melena occurs, will consider EGD  Continue daily pantoprazole 40mg po every day      Thank you for the consultation, we will follow the patient with you.  Attending addendum (Dr Ludwig) to follow later today and provide formal, final recommendations at that time   TRAM Jamison  11:52 AM  10/7/2024  [facility]    10/7      Date of Service: 10/7/2024  8:23 AM     [facility]   part of [facility] Hospitalist Progress Note    Chief Complaint: Abdominal pain     Subjective:      Patient reports some improvement in the bandlike pain he described yesterday. Daughter present at bedside.      Objective:    Review of Systems:   A comprehensive review of systems was completed; pertinent positive and negatives stated in subjective.     Vital signs:  Temp:  [97.3 °F (36.3 °C)-98.4 °F (36.9 °C)] 97.3 °F (36.3 °C)  Pulse:  [] 83  Resp:  [16-20] 18  BP: ()/(50-78) 103/50  SpO2:  [95 %-100 %] 100 %     Physical Exam:    General: Chronically ill appearing. Pale  Respiratory: No rhonchi, no wheezes  Cardiovascular: S1, S2. Regular rate and rhythm  Abdomen: Soft, Non-tender, non-distended, positive bowel sounds  Neuro: No new focal deficits  Extremities: No edema     Diagnostic Data:    Labs:         Recent Labs   Lab 10/06/24  1308 10/06/24  1743 10/06/24  2327 10/07/24  0533   WBC 7.9 6.7  --  5.0   HGB 9.5* 8.9*  --  8.3*   MCV 87.3 86.5  --  89.4   .0 168.0  --  111.0*   INR 1.62*  --  1.97*  --                Recent Labs   Lab 10/06/24  1308 10/06/24  1743 10/07/24  0533   * 143* 127*   BUN 22 19 18   CREATSERUM 2.21* 2.13* 1.91*   CA 9.6 9.1 8.6*   ALB 3.7  --  3.6   * 135* 135*   K 5.2* 5.3* 4.4    112 113*   CO2 16.0* 17.0* 16.0*   ALKPHO 101  --  89   AST 40*  --  56*   ALT 54*  --  50*   BILT 0.2  --  0.2   TP 7.1  --  6.0          Estimated Creatinine Clearance: 37.7 mL/min (A) (based on SCr of 1.91 mg/dL (H)).         Recent Labs   Lab 10/06/24  1308   TROPHS 18              Recent Labs   Lab 10/06/24  1308 10/06/24  2327   PTP 19.4* 22.6*   INR 1.62* 1.97*          Microbiology     No results found for this visit on 10/06/24.        Imaging: Reviewed in Epic.     Medications:    sodium ferric gluconate  125 mg Intravenous Daily    atorvastatin  20 mg Oral Nightly    cetirizine  10 mg Oral Daily    dilTIAZem ER  120 mg Oral Daily    folic acid  1 mg Oral Daily    gabapentin  300 mg Oral Daily    pantoprazole  40 mg Oral QAM AC    sertraline  200 mg Oral Daily    sucralfate  1 g Oral TID AC and HS (2200)         Assessment & Plan:       #Upper abdominal pain   Possibly related to anastomotic erosions noted on recent EGD with intense erythema. Lipase 66 and no pancreatic inflammation on CT.   -CT chest with fluid distended esophagus reflux disease vs. stricture  -Continue protonix  -Trial sucralfate   -GI consulted     #AGGIE likely pre-renal in setting of poor intake  -Improving with IVF    #Mass like enlargement with hypo attenuation within the uncinate process of pancreas  CEA 44. CA 19-9 wnl.   -GI and onc consult; possible biopsy tomorrow  -NPO after midnight in case of procedure tomorrow      #Lungs nodules, possible mets     #Suspicious left renal mass   -MR kidney non urgently      #Increased cough, ROMANO  ROMAON is likely multifactorial in setting of anemia, recent viral URI. New lung nodules/mets on CT chest  -Sputum culture pending  -Monitor off antibiotics for now      #Chronic iron deficiency anemia  #Recent GIB  -Trend hgb      #Mechanical AVR  Goal INR 2.5 - 3.5  -INR subtherapeutic on admission  -Heparin drip given possible need for procedure     #Pafib - heparin drip      #Hx of TIA     #HTN  #HLD     #Vitamin B12 deficiency  #Folic acid deficiency   -Folic acid, on monthly b12 injections      #Thrombocytopenia - mechanical  valve distruction. Mild. Monitor      #Hx of gastric bypass     #Depression   #Peripheral neuropathy      #Obesity, BMI 32     Carola Lino MD                          10/8      Date of Service: 10/8/2024 12:24 PM     Signed         [facility]   part of [facility] Hospitalist Progress Note         Chief Complaint: Abdominal pain     Subjective:      Ab pain improved. NO acute complaints     Objective:    Review of Systems:   A comprehensive review of systems was completed; pertinent positive and negatives stated in subjective.     Vital signs:  Temp:  [97.6 °F (36.4 °C)-98.1 °F (36.7 °C)] 97.6 °F (36.4 °C)  Pulse:  [] 104  Resp:  [13-20] 20  BP: (113-138)/(53-67) 130/62  SpO2:  [97 %-100 %] 98 %     Physical Exam:    General: Chronically ill appearing.  Respiratory: No rhonchi, no wheezes  Cardiovascular: S1, S2. Regular rate and rhythm  Abdomen: Soft, Non-tender, non-distended, positive bowel sounds  Neuro: No new focal deficits  Extremities: No edema     Diagnostic Data:    Labs:          Recent Labs   Lab 10/06/24  1308 10/06/24  1743 10/06/24  2327 10/07/24  0533 10/08/24  0706   WBC 7.9 6.7  --  5.0 6.0   HGB 9.5* 8.9*  --  8.3* 8.1*   MCV 87.3 86.5  --  89.4 86.5   .0 168.0  --  111.0* 77.0*   INR 1.62*  --  1.97*  --  1.86*                Recent Labs   Lab 10/06/24  1308 10/06/24  1743 10/07/24  0533 10/08/24  0706   * 143* 127* 126*   BUN 22 19 18 16   CREATSERUM 2.21* 2.13* 1.91* 1.62*   CA 9.6 9.1 8.6* 8.6*   ALB 3.7  --  3.6  --    * 135* 135* 137   K 5.2* 5.3* 4.4 4.2    112 113* 113*   CO2 16.0* 17.0* 16.0* 18.0*   ALKPHO 101  --  89  --    AST 40*  --  56*  --    ALT 54*  --  50*  --    BILT 0.2  --  0.2  --    TP 7.1  --  6.0  --          Estimated Creatinine Clearance: 44.4 mL/min (A) (based on SCr of 1.62 mg/dL (H)).         Recent Labs   Lab 10/06/24  1308   TROPHS 18               Recent Labs   Lab 10/06/24  1308 10/06/24  2327 10/08/24  0706   PTP  19.4* 22.6* 21.6*   INR 1.62* 1.97* 1.86*                     Microbiology            Hospital Encounter on 10/06/24   1. Urine Culture, Routine     Status: None     Collection Time: 10/07/24 12:50 AM     Specimen: Urine, clean catch   Result Value Ref Range     Urine Culture   N/A       <10,000 cfu/ml Mixture of Gram positive organisms isolated - probable contamination.            Imaging: Reviewed in Epic.     Medications:    magnesium sulfate  4 g Intravenous Once    sodium ferric gluconate  125 mg Intravenous Daily    atorvastatin  20 mg Oral Nightly    cetirizine  10 mg Oral Daily    dilTIAZem ER  120 mg Oral Daily    folic acid  1 mg Oral Daily    gabapentin  300 mg Oral Daily    pantoprazole  40 mg Oral QAM AC    sertraline  200 mg Oral Daily    sucralfate  1 g Oral TID AC and HS (2200)         Assessment & Plan:       #Upper abdominal pain   Possibly related to anastomotic erosions noted on recent EGD with intense erythema. Lipase 66 and no pancreatic inflammation on CT.   -CT chest with fluid distended esophagus reflux disease vs. stricture  -Continue protonix  -Trial sucralfate   -GI following     #AGGIE likely pre-renal in setting of poor intake  -Improving    #Mass like enlargement with hypo attenuation within the uncinate process of pancreas  #Lungs nodules, possible mets  CEA 44. CA 19-9 wnl.   -GI and onc consult; IR lung nodule biopsy tomorrow      #Suspicious left renal mass   -MR kidney non urgently      #Increased cough, ROMANO  ROMANO is likely multifactorial in setting of anemia, recent viral URI. New lung nodules/mets on CT chest  -Sputum culture pending  -Continue to monitor off antibiotics for now      #Chronic iron deficiency anemia  #Recent GIB  -Trend hgb   -IV iron per heme     #Mechanical AVR  Goal INR 2.5 - 3.5  -INR subtherapeutic on admission  -Heparin drip given possible need for procedure     #Pafib - heparin drip      #Hx of TIA     #HTN  #HLD     #Vitamin B12 deficiency  #Folic acid  deficiency   -Folic acid, on monthly b12 injections      #Thrombocytopenia - mechanical valve distruction. Mild. Monitor      #Hx of gastric bypass     #Depression   #Peripheral neuropathy      #Obesity, BMI 32     Carola Lino MD                       MEDICATIONS ADMINISTERED IN LAST 1 DAY:  acetaminophen (Tylenol Extra Strength) tab 500 mg       Date Action Dose Route User    10/8/2024 0412 Given 500 mg Oral Ernestina Welsh RN          atorvastatin (Lipitor) tab 20 mg       Date Action Dose Route User    10/7/2024 2257 Given 20 mg Oral Ernestina Welsh RN          cetirizine (ZyrTEC) tab 10 mg       Date Action Dose Route User    10/8/2024 0905 Given 10 mg Oral Sheila Brooks RN          heparin (Porcine) 52058 units/250mL infusion ACS/AFIB CONTINUOUS       Date Action Dose Route User    10/7/2024 1937 New Bag 1,000 Units/hr Intravenous iNna Amaya RN          cyanocobalamin (Vitamin B12) 1000 MCG/ML injection 1,000 mcg       Date Action Dose Route User    10/8/2024 1019 Given 1,000 mcg Intramuscular (Left Deltoid) Sheila Brooks RN          dilTIAZem ER (Dilacor XR) 24 hr cap 120 mg       Date Action Dose Route User    10/8/2024 0905 Given 120 mg Oral Sheila Brooks RN          diphenhydrAMINE-zinc (Benadryl-Zinc) 2-0.1 % cream 1 Application       Date Action Dose Route User    10/8/2024 1010 Given 1 Application Topical Sheila Brooks RN          folic acid (Folvite) tab 1 mg       Date Action Dose Route User    10/8/2024 0905 Given 1 mg Oral Sheila Brooks RN          gabapentin (Neurontin) cap 300 mg       Date Action Dose Route User    10/8/2024 0905 Given 300 mg Oral Sheila Brooks RN          magnesium sulfate 4 g/100mL IVPB premix 4 g       Date Action Dose Route User    10/8/2024 1026 New Bag 4 g Intravenous Sheila Brooks RN          pantoprazole (Protonix) DR tab 40 mg       Date Action Dose Route User    10/8/2024 0639 Given 40 mg Oral Ernestina Welsh RN          sertraline (Zoloft)  tab 200 mg       Date Action Dose Route User    10/8/2024 0905 Given 200 mg Oral Sheila Brooks RN          sodium ferric gluconate (Ferrlecit) 125 mg in sodium chloride 0.9% 100mL IVPB premix       Date Action Dose Route User    10/8/2024 0916 New Bag 125 mg Intravenous Sheila Brooks RN          sucralfate (Carafate) 1 GM/10ML oral suspension 1 g       Date Action Dose Route User    10/8/2024 1204 Given 1 g Oral Sheila Brooks RN    10/8/2024 0639 Given 1 g Oral Ernestina Welsh RN    10/7/2024 2257 Given 1 g Oral Ernestina Welsh RN    10/7/2024 1700 Given 1 g Oral Nina Amaya RN            Vitals (last day)       Date/Time Temp Pulse Resp BP SpO2 Weight O2 Device O2 Flow Rate (L/min) [facility]    10/08/24 1238 97.6 °F (36.4 °C) 76 16 -- -- -- -- 0 L/min     10/08/24 0833 97.6 °F (36.4 °C) 104 20 130/62 98 % -- None (Room air) 0 L/min     10/08/24 0413 97.7 °F (36.5 °C) 66 18 126/53 98 % -- None (Room air) --     10/08/24 0026 98.1 °F (36.7 °C) 63 20 138/67 98 % -- None (Room air) --     10/07/24 2054 97.8 °F (36.6 °C) 80 18 114/62 97 % -- None (Room air) --     10/07/24 1647 97.7 °F (36.5 °C) 84 17 113/63 100 % -- None (Room air) --     10/07/24 1300 -- 80 13 -- -- -- -- --     10/07/24 1210 97.7 °F (36.5 °C) 69 21 98/72 97 % -- None (Room air) --     10/07/24 1051 -- 76 12 -- -- -- -- --     10/07/24 0853 97.8 °F (36.6 °C) 80 13 105/56 100 % -- None (Room air) --     10/07/24 0735 -- 83 18 -- -- -- -- --     10/07/24 0524 97.3 °F (36.3 °C) 58 16 103/50 100 % -- None (Room air) --     10/07/24 0030 97.8 °F (36.6 °C) 77 16 106/61 97 % -- None (Room air) --

## 2024-10-08 NOTE — PROGRESS NOTES
A&Ox4. VSS. RA. . Denies chest pain and SOB.   Telemetry: NSR. Heparin drip 10mL/hr  GI: Abdomen soft, nondistended. Passing gas.   Denies nausea.   : Voids.   Pain controlled with PRN pain medications.   Up ad duyen  Diet: Tolerating general diet, NPO at 0700  IVF running per order.   All appropriate safety measures in place. All questions and concerns addressed.

## 2024-10-08 NOTE — PLAN OF CARE
Assumed care at 0730  A/Ox4, RA, VSS  Tele, Afib/A flutter  Carb control diet, NPO at midnight  Mg replaced  Heparin gtt @10mL/hr  PRN benadryl cream for L forearm  Denies n/v & pain   Possible biopsy 10/09, heparin gtt to be stopped 5 hours prior to scheduled procedure at 1000  Continent, up ab duyen  Safety measures in place  All needs met at this time    Problem: METABOLIC/FLUID AND ELECTROLYTES - ADULT  Goal: Electrolytes maintained within normal limits  Description: INTERVENTIONS:  - Monitor labs and rhythm and assess patient for signs and symptoms of electrolyte imbalances  - Administer electrolyte replacement as ordered  - Monitor response to electrolyte replacements, including rhythm and repeat lab results as appropriate  - Fluid restriction as ordered  - Instruct patient on fluid and nutrition restrictions as appropriate  Outcome: Progressing  Goal: Hemodynamic stability and optimal renal function maintained  Description: INTERVENTIONS:  - Monitor labs and assess for signs and symptoms of volume excess or deficit  - Monitor intake, output and patient weight  - Monitor urine specific gravity, serum osmolarity and serum sodium as indicated or ordered  - Monitor response to interventions for patient's volume status, including labs, urine output, blood pressure (other measures as available)  - Encourage oral intake as appropriate  - Instruct patient on fluid and nutrition restrictions as appropriate  Outcome: Progressing     Problem: PAIN - ADULT  Goal: Verbalizes/displays adequate comfort level or patient's stated pain goal  Description: INTERVENTIONS:  - Encourage pt to monitor pain and request assistance  - Assess pain using appropriate pain scale  - Administer analgesics based on type and severity of pain and evaluate response  - Implement non-pharmacological measures as appropriate and evaluate response  - Consider cultural and social influences on pain and pain management  - Manage/alleviate anxiety  -  Utilize distraction and/or relaxation techniques  - Monitor for opioid side effects  - Notify MD/LIP if interventions unsuccessful or patient reports new pain  - Anticipate increased pain with activity and pre-medicate as appropriate  Outcome: Progressing

## 2024-10-09 ENCOUNTER — APPOINTMENT (OUTPATIENT)
Dept: GENERAL RADIOLOGY | Facility: HOSPITAL | Age: 69
End: 2024-10-09
Attending: RADIOLOGY
Payer: COMMERCIAL

## 2024-10-09 ENCOUNTER — APPOINTMENT (OUTPATIENT)
Dept: CT IMAGING | Facility: HOSPITAL | Age: 69
End: 2024-10-09
Attending: INTERNAL MEDICINE
Payer: COMMERCIAL

## 2024-10-09 PROBLEM — E87.20 ACIDOSIS: Status: ACTIVE | Noted: 2024-10-09

## 2024-10-09 PROBLEM — R91.8 LUNG MASS: Status: ACTIVE | Noted: 2024-10-09

## 2024-10-09 LAB
ANION GAP SERPL CALC-SCNC: 7 MMOL/L (ref 0–18)
ANION GAP SERPL CALC-SCNC: 7 MMOL/L (ref 0–18)
APTT PPP: 44.7 SECONDS (ref 23–36)
APTT PPP: 58.2 SECONDS (ref 23–36)
BUN BLD-MCNC: 10 MG/DL (ref 9–23)
BUN BLD-MCNC: 11 MG/DL (ref 9–23)
CALCIUM BLD-MCNC: 8.9 MG/DL (ref 8.7–10.4)
CALCIUM BLD-MCNC: 9 MG/DL (ref 8.7–10.4)
CHLORIDE SERPL-SCNC: 112 MMOL/L (ref 98–112)
CHLORIDE SERPL-SCNC: 113 MMOL/L (ref 98–112)
CO2 SERPL-SCNC: 17 MMOL/L (ref 21–32)
CO2 SERPL-SCNC: 18 MMOL/L (ref 21–32)
CREAT BLD-MCNC: 1.5 MG/DL
CREAT BLD-MCNC: 1.61 MG/DL
EGFRCR SERPLBLD CKD-EPI 2021: 46 ML/MIN/1.73M2 (ref 60–?)
EGFRCR SERPLBLD CKD-EPI 2021: 50 ML/MIN/1.73M2 (ref 60–?)
ERYTHROCYTE [DISTWIDTH] IN BLOOD BY AUTOMATED COUNT: 21.1 %
ERYTHROCYTE [DISTWIDTH] IN BLOOD BY AUTOMATED COUNT: 21.4 %
GLUCOSE BLD-MCNC: 102 MG/DL (ref 70–99)
GLUCOSE BLD-MCNC: 115 MG/DL (ref 70–99)
GLUCOSE BLD-MCNC: 125 MG/DL (ref 70–99)
HCT VFR BLD AUTO: 25.7 %
HCT VFR BLD AUTO: 27.3 %
HGB BLD-MCNC: 8.1 G/DL
HGB BLD-MCNC: 8.7 G/DL
INR BLD: 1.55 (ref 0.8–1.2)
INR BLD: 1.66 (ref 0.8–1.2)
MAGNESIUM SERPL-MCNC: 1.8 MG/DL (ref 1.6–2.6)
MCH RBC QN AUTO: 27.9 PG (ref 26–34)
MCH RBC QN AUTO: 27.9 PG (ref 26–34)
MCHC RBC AUTO-ENTMCNC: 31.5 G/DL (ref 31–37)
MCHC RBC AUTO-ENTMCNC: 31.9 G/DL (ref 31–37)
MCV RBC AUTO: 87.5 FL
MCV RBC AUTO: 88.6 FL
OSMOLALITY SERPL CALC.SUM OF ELEC: 284 MOSM/KG (ref 275–295)
OSMOLALITY SERPL CALC.SUM OF ELEC: 285 MOSM/KG (ref 275–295)
PLATELET # BLD AUTO: 107 10(3)UL (ref 150–450)
PLATELET # BLD AUTO: 119 10(3)UL (ref 150–450)
PLATELET # BLD AUTO: 119 10(3)UL (ref 150–450)
PLATELETS.RETICULATED NFR BLD AUTO: 2.5 % (ref 0–7)
PLATELETS.RETICULATED NFR BLD AUTO: 2.5 % (ref 0–7)
POTASSIUM SERPL-SCNC: 3.8 MMOL/L (ref 3.5–5.1)
POTASSIUM SERPL-SCNC: 4.7 MMOL/L (ref 3.5–5.1)
PROTHROMBIN TIME: 18.8 SECONDS (ref 11.6–14.8)
PROTHROMBIN TIME: 19.8 SECONDS (ref 11.6–14.8)
RBC # BLD AUTO: 2.9 X10(6)UL
RBC # BLD AUTO: 3.12 X10(6)UL
SODIUM SERPL-SCNC: 137 MMOL/L (ref 136–145)
SODIUM SERPL-SCNC: 137 MMOL/L (ref 136–145)
WBC # BLD AUTO: 4.5 X10(3) UL (ref 4–11)
WBC # BLD AUTO: 5.4 X10(3) UL (ref 4–11)

## 2024-10-09 PROCEDURE — 0BBC3ZX EXCISION OF RIGHT UPPER LUNG LOBE, PERCUTANEOUS APPROACH, DIAGNOSTIC: ICD-10-PCS | Performed by: RADIOLOGY

## 2024-10-09 PROCEDURE — 32408 CORE NDL BX LNG/MED PERQ: CPT | Performed by: INTERNAL MEDICINE

## 2024-10-09 PROCEDURE — 99233 SBSQ HOSP IP/OBS HIGH 50: CPT | Performed by: INTERNAL MEDICINE

## 2024-10-09 PROCEDURE — 74178 CT ABD&PLV WO CNTR FLWD CNTR: CPT | Performed by: INTERNAL MEDICINE

## 2024-10-09 PROCEDURE — 99232 SBSQ HOSP IP/OBS MODERATE 35: CPT | Performed by: HOSPITALIST

## 2024-10-09 PROCEDURE — 71045 X-RAY EXAM CHEST 1 VIEW: CPT | Performed by: RADIOLOGY

## 2024-10-09 PROCEDURE — 71260 CT THORAX DX C+: CPT | Performed by: INTERNAL MEDICINE

## 2024-10-09 PROCEDURE — 99152 MOD SED SAME PHYS/QHP 5/>YRS: CPT | Performed by: INTERNAL MEDICINE

## 2024-10-09 RX ORDER — NALOXONE HYDROCHLORIDE 0.4 MG/ML
INJECTION, SOLUTION INTRAMUSCULAR; INTRAVENOUS; SUBCUTANEOUS
Status: DISCONTINUED
Start: 2024-10-09 | End: 2024-10-09 | Stop reason: WASHOUT

## 2024-10-09 RX ORDER — SODIUM CHLORIDE 9 MG/ML
INJECTION, SOLUTION INTRAVENOUS CONTINUOUS
Status: DISCONTINUED | OUTPATIENT
Start: 2024-10-09 | End: 2024-10-09 | Stop reason: HOSPADM

## 2024-10-09 RX ORDER — MIDAZOLAM HYDROCHLORIDE 1 MG/ML
1 INJECTION INTRAMUSCULAR; INTRAVENOUS EVERY 5 MIN PRN
Status: DISCONTINUED | OUTPATIENT
Start: 2024-10-09 | End: 2024-10-09 | Stop reason: HOSPADM

## 2024-10-09 RX ORDER — NICOTINE POLACRILEX 4 MG
15 LOZENGE BUCCAL
Status: DISCONTINUED | OUTPATIENT
Start: 2024-10-09 | End: 2024-10-11

## 2024-10-09 RX ORDER — WARFARIN SODIUM 5 MG/1
5 TABLET ORAL
Status: COMPLETED | OUTPATIENT
Start: 2024-10-09 | End: 2024-10-09

## 2024-10-09 RX ORDER — NICOTINE POLACRILEX 4 MG
30 LOZENGE BUCCAL
Status: DISCONTINUED | OUTPATIENT
Start: 2024-10-09 | End: 2024-10-11

## 2024-10-09 RX ORDER — FLUMAZENIL 0.1 MG/ML
INJECTION INTRAVENOUS
Status: DISCONTINUED
Start: 2024-10-09 | End: 2024-10-09 | Stop reason: WASHOUT

## 2024-10-09 RX ORDER — MAGNESIUM OXIDE 400 MG/1
400 TABLET ORAL ONCE
Status: COMPLETED | OUTPATIENT
Start: 2024-10-09 | End: 2024-10-09

## 2024-10-09 RX ORDER — DEXTROSE MONOHYDRATE 25 G/50ML
50 INJECTION, SOLUTION INTRAVENOUS
Status: DISCONTINUED | OUTPATIENT
Start: 2024-10-09 | End: 2024-10-11

## 2024-10-09 RX ORDER — FLUMAZENIL 0.1 MG/ML
0.2 INJECTION INTRAVENOUS AS NEEDED
Status: DISCONTINUED | OUTPATIENT
Start: 2024-10-09 | End: 2024-10-09 | Stop reason: HOSPADM

## 2024-10-09 RX ORDER — NALOXONE HYDROCHLORIDE 0.4 MG/ML
0.08 INJECTION, SOLUTION INTRAMUSCULAR; INTRAVENOUS; SUBCUTANEOUS AS NEEDED
Status: DISCONTINUED | OUTPATIENT
Start: 2024-10-09 | End: 2024-10-09 | Stop reason: HOSPADM

## 2024-10-09 RX ORDER — MIDAZOLAM HYDROCHLORIDE 1 MG/ML
INJECTION INTRAMUSCULAR; INTRAVENOUS
Status: COMPLETED
Start: 2024-10-09 | End: 2024-10-09

## 2024-10-09 NOTE — IMAGING NOTE
Received pt to CT 1.  Pt verified name and  as well as allergies.  Pt states NPO since MN.  Pt does take blood thinners, coumadin last dose 10/6 and heparin gtt off at 0500, no APTT needed immediately prior to procedure per Dr. Hinton.  Lab results of PT/INR and PLT reviewed.  Informed consent obtained by Dr. Hinton.  Pt positioned prone on scanner.  CRM and pulse ox monitoring applied, alarms enabled. Sterile prep and 1% lido to area by Dr. Hinton.  Specimens obtained.  Neosporin/bandaid dressing applied to site. CDI.  Pt positioned supine on bed.  Pt drowsy, but easily arousable and converses appropriately with this RN and in no apparent distress.   Pt transported to room 315 with belongings. Pt accompanied by this RN, transporter.  SBAR given to Maria Victoria WRIGHT.

## 2024-10-09 NOTE — PROCEDURES
[facility]   part of [facility]  Procedure Note    Liang Schmidt Patient Status:  Inpatient    1955 MRN [id number]   Location [facility] 3NW-A Attending Evan Miranda MD   Hosp Day # 2 PCP Ema Arora DO     Procedure: CT guided lung biopsy    Pre-Procedure Diagnosis:  lung nodules    Post-Procedure Diagnosis: same    Anesthesia:  Local and Sedation    Findings:  solid cores    Specimens: 7 20 g cores RUL nodule    Blood Loss:  <5ml    Tourniquet Time: n/a  Complications:  None  Drains:  none    Secondary Diagnosis:  none    Kyrie Hinton MD  10/9/2024

## 2024-10-09 NOTE — PROGRESS NOTES
Inpatient Follow up Note    Liang Schmidt Patient Status:  Inpatient    1955 MRN [id number]   Location [facility] 3NW-A Attending Evan Miranda MD   Hosp Day # 2 PCP Ema Arora,      Reason for Consultation   Pancreatic mass, abdominal pain     Subjective   Abdominal pain resolved, no GI bleeding.             Objective:     /67 (BP Location: Left arm)   Pulse 76   Temp 98.1 °F (36.7 °C) (Oral)   Resp 16   Wt 223 lb (101.2 kg)   SpO2 99%   BMI 32.00 kg/m²   Gen: AAOx3  CV: RRR with normal S1 / S2  Resp: CTA bilaterally  Abd: (+)BS, soft, non-tender, non-distended; no rebound or guarding  Ext: No edema or cyanosis  Skin: Warm and dry     Labs/Imaging     LABRCNTIP[PGLU:5@  Recent Labs   Lab 10/06/24  1308 10/06/24  2327 10/08/24  0706 10/09/24  0450   INR 1.62* 1.97* 1.86* 1.66*         Recent Labs   Lab 10/06/24  1308 10/06/24  1743 10/07/24  0533 10/08/24  0706 10/09/24  0450   WBC 7.9 6.7 5.0 6.0 4.5   HGB 9.5* 8.9* 8.3* 8.1* 8.1*   .0 168.0 111.0* 77.0* 119.0*  119.0*       Recent Labs   Lab 10/06/24  1308 10/06/24  1743 10/07/24  0533 10/08/24  0706 10/09/24  0450   * 135* 135* 137 137   K 5.2* 5.3* 4.4 4.2 3.8    112 113* 113* 113*   CO2 16.0* 17.0* 16.0* 18.0* 17.0*   BUN 22 19 18 16 11   CREATSERUM 2.21* 2.13* 1.91* 1.62* 1.50*       Recent Labs   Lab 10/06/24  1308 10/07/24  0533   ALT 54* 50*   AST 40* 56*     CT CHEST (CPT=71250)    Result Date: 10/6/2024  CONCLUSION:   1.  Multiple bilateral pulmonary nodules measuring between 4 and 9 mm concerning for metastatic disease.  2. Masslike enlargement with hypo attenuation within the uncinate process of the pancreas concerning for neoplasm.  3. Fluid distended and dilated esophagus may be due distal esophageal stricture or reflux.  LOCATION:  [facility]   Dictated by (CST): Josiah Rowe MD on 10/06/2024 at 6:16 PM     Finalized by (CST): Josiah Rowe MD on 10/06/2024 at 6:22 PM       XR  CHEST AP PORTABLE  (CPT=71045)    Result Date: 10/6/2024  CONCLUSION:    Stable elevated right diaphragm.  Stable borderline heart size.  No sign of CHF, pneumonia, pleural effusion, pneumothorax.  LOCATION:  [facility]      Dictated by (CST): Arcadio Penn MD on 10/06/2024 at 2:47 PM     Finalized by (CST): Arcadio Penn MD on 10/06/2024 at 2:47 PM       CT ABDOMEN+PELVIS(CPT=74176)    Result Date: 10/6/2024  CONCLUSION:   1. New nodules at the lung base bilateral, noncalcified, concerning for metastatic disease.  These were not present previously.  Possible subtle low-attenuation lesion uncinate process pancreas, early malignant lesion pancreas not excluded.  No sign of acute pancreatitis.  No sign of bowel obstruction, ascites or free air.  Postsurgical changes of the stomach/gastroesophageal junction and bowel.  2. Assessment kidneys limited without contrast, with numerous masses, most of which will represent cysts, but the largest mass is hyperdense and projects from the lower pole left kidney, cannot exclude a solid mass lesion in the lower pole left kidney.  Nonobstructing kidney stones.    LOCATION:  [facility]   Dictated by (CST): Arcadio Penn MD on 10/06/2024 at 2:22 PM     Finalized by (CST): Arcadio Penn MD on 10/06/2024 at 2:29 PM      AST   Date/Time Value Ref Range Status   10/07/2024 05:33 AM 56 (H) <34 U/L Final     ALT   Date/Time Value Ref Range Status   10/07/2024 05:33 AM 50 (H) 10 - 49 U/L Final     BUN   Date/Time Value Ref Range Status   10/09/2024 04:50 AM 11 9 - 23 mg/dL Final              Assessment   Mr Schmidt is a 69 yr-old male with hx of HTN, dyslipidemia, s/p mechanical valve replacement (Coumadin), obesity s/p Antonoi-en-Y gastric bypass, DM, CKD, and chronic anemia on IV iron supplementation admitted with abdominal pain with imaging suggesting masslike enlargement within the uncinate process of the pancreas and multiple bilateral pulmonary nodules measuring 4 to 9 mm concerning for  metastatic disease; labs with normal CA 19-9.  Discussed case with interventional GI (Dr. Shanks) and oncology.  Patient's surgically altered anatomy not amenable to EUS guided biopsy of the pancreatic mass.  Currently, plan for IR guided biopsy of lung lesion and if still needed can complete a two-step endoscopic process to reverse patient's gastric bypass and then complete EUS with biopsy.  Discussed need for 2 procedures and reversal of Antonio-en-Y with patient at length.  Patient agreeable to procedures if warranted.     Plan   -appreciate onc consult, getting staging Cts today  -agree with IR guided lung biopsy of likely metastatic lesions; if unable to do or not confirmatory of a diagnosis, may need to consider reversing gastric bypass endoscopically to perform EUS  -continue daily pantoprazole 40mg po every day  -ok to eat from a GI perspective after biopsy  -ok for anticoagulation from a GI perspective  -we will help arrange outpatient GI f/u      GI will sign off now, re-consult with questions/concerns.     Dustin Ludwig MD  10:03 AM  10/9/2024  [facility]  [phone]

## 2024-10-09 NOTE — PLAN OF CARE
Pt a&o x 4.  Pleasant & cooperative w/ care  Family @ bedside today.  Participating & helpful with plan of care  Maintaining sats on ra  Tele = AFlutter  Mag replaced, per protocol  Pt returned from Lung biopsy w/ bandaid to upper mid back, cdi  Voiding w/out difficulty  Tolerating ada diet  Up ad duyen w/ steady gait  Plan of care: restart Heparin @ 1600, per IR

## 2024-10-09 NOTE — PROGRESS NOTES
Hematology/Oncology Progress Note    Patient Name: Liang Schmidt  Medical Record Number: [id number]    YOB: 1955     Reason for Consultation:  Liang Schmidt was seen today for the diagnosis of pancreatic mass, iron deficiency anemia    Interval events:      - feeling ok  - awaiting INR to decline for biopsy    Inpatient Meds:   sodium ferric gluconate  125 mg Intravenous Daily    atorvastatin  20 mg Oral Nightly    cetirizine  10 mg Oral Daily    dilTIAZem ER  120 mg Oral Daily    folic acid  1 mg Oral Daily    gabapentin  300 mg Oral Daily    pantoprazole  40 mg Oral QAM AC    sertraline  200 mg Oral Daily    sucralfate  1 g Oral TID AC and HS (2200)      continuous dose heparin Stopped (10/09/24 0502)       PRN Meds:    diphenhydrAMINE-zinc    HYDROmorphone    influenza virus vaccine PF    acetaminophen    melatonin    polyethylene glycol (PEG 3350)    sennosides    bisacodyl    fleet enema    ondansetron    metoclopramide    sodium chloride    glycerin-hypromellose-    Allergies:   No Known Allergies    Vital Signs:  Height: --  Weight: --  BSA (Calculated - sq m): --  Pulse: 76 (10/09 0812)  BP: 137/67 (10/09 0932)  Temp: 98.1 °F (36.7 °C) (10/09 0812)  Do Not Use - Resp Rate: --  SpO2: 99 % (10/09 0812)    Wt Readings from Last 6 Encounters:   10/06/24 101.2 kg (223 lb)   10/01/24 101.5 kg (223 lb 12.8 oz)   09/26/24 105.6 kg (232 lb 12.8 oz)   09/23/24 106.9 kg (235 lb 10.8 oz)   09/04/24 106.1 kg (233 lb 12.8 oz)   09/01/24 105.9 kg (233 lb 7.5 oz)       Physical Examination:  General: Patient is alert and oriented, not in acute distress  Psych: Mood and affect are appropriate  Eyes: EOMI, PERRL  ENT: Oropharynx is clear, no adenopathy  CV: no LE edema  Respiratory: Non-labored respirations  GI/Abd: Soft, non-tender   Neurological: Grossly intact   Skin: no rashes or petechiae    Laboratory:  Recent Labs   Lab 10/06/24  1308 10/06/24  1743 10/07/24  0533  10/08/24  0706 10/09/24  0450   WBC 7.9   < > 5.0 6.0 4.5   HGB 9.5*   < > 8.3* 8.1* 8.1*   HCT 30.2*   < > 26.2* 25.6* 25.7*   .0   < > 111.0* 77.0* 119.0*  119.0*   MCV 87.3   < > 89.4 86.5 88.6   RDW 21.4   < > 21.4 21.1 21.1   NEPRELIM 5.86  --   --   --   --     < > = values in this interval not displayed.       Recent Labs   Lab 10/06/24  1308 10/06/24  1743 10/07/24  0533 10/08/24  0706 10/09/24  0450   *   < > 135* 137 137   K 5.2*   < > 4.4 4.2 3.8      < > 113* 113* 113*   CO2 16.0*   < > 16.0* 18.0* 17.0*   BUN 22   < > 18 16 11   CREATSERUM 2.21*   < > 1.91* 1.62* 1.50*   *   < > 127* 126* 102*   CA 9.6   < > 8.6* 8.6* 9.0   PHOS  --   --  3.3  --   --    TP 7.1  --  6.0  --   --    ALB 3.7  --  3.6  --   --    ALKPHO 101  --  89  --   --    AST 40*  --  56*  --   --    ALT 54*  --  50*  --   --    BILT 0.2  --  0.2  --   --     < > = values in this interval not displayed.       Recent Labs   Lab 10/06/24  2327 10/07/24  0755 10/08/24  0706 10/09/24  0450   INR 1.97*  --  1.86* 1.66*   PTT 66.1* 55.5* 59.9* 58.2*       Impression & Plan:     Pancreatic mass  Lung nodules  - discussed concern for malignancy  - CEA elevated. CA 19-9 normal  - GI consult appreciated  - CT lung nodule biopsy today  - repeat CT C/A/P with IV contrast for staging    AGGIE  - secondary to dehydration. Improving with IVF     Iron deficiency anemia  - he has persistent iron deficiency anemia secondary to chronic blood loss from both his hemorrhoidal bleeding and anastomotic ulcers which is exacerbated by the fact that he is on warfarin which cannot be stopped because of his mechanical valve. This same mechanical valve is likely causing also some mechanical hemolysis of his red blood cells as well as his platelets. With chronic thrombocytopenia, this exacerbates his bleeding. In addition, he has a gastric bypass that prevents him from absorbing iron so he will need to be on intravenous iron for life. He is  planned for IV 1000mg infed [id number] outpatient at this point  - continue IV ferrlecit 125mg daily while pt in patient (D1 10/7)     Vitamin B12 deficiency  Folic acid deficiency  - continue daily folic acid 1mg daily as well as monthly vitamin B12 lifelong     Thrombocytopenia  - likely caused by mechanical valve destruction. Mild, chronic, can monitor for now.   - vit B12 and folate stores are replete. Prior CT A/P showed normal liver. CMP normal liver function.      Hx mechanical valve surgery  - on coumadin with INRs monitored by PCP. Goal INR 2.5-3.5  - hold off on warfarin for now. Continue heparin gtt while awaiting biopsy by IR  - after biopsy, can discharge on lovenox 1mg/kg BID bridge with warfarin    Ok to DC after biopsy if no other inpt issues. Will sign off. Pt has follow up with me on 10/29 and IV iron infusion on 10/15. Will move up outpt f/u earlier when biopsy results.    Elvin Carr MD  Hematology/Medical Oncology  [facility]

## 2024-10-09 NOTE — PROGRESS NOTES
[facility]   part of [facility]     Hospitalist Progress Note     Liang Wiley Brayan Patient Status:  Observation    1955 MRN [id number]   [facility] 3NW-A Attending José Manjarrez MD   Hosp Day # 1 PCP Ema Arora DO     Chief Complaint: Abdominal pain    Subjective:     Feels fine today    Objective:    Review of Systems:   A 6 point review of systems was completed; pertinent positive and negatives stated in subjective.    Vital signs:  Temp:  [97.5 °F (36.4 °C)-98.1 °F (36.7 °C)] 97.5 °F (36.4 °C)  Pulse:  [] 67  Resp:  [16-20] 16  BP: (114-138)/(52-67) 116/52  SpO2:  [97 %-99 %] 97 %    Physical Exam:    General: Chronically ill appearing.  Respiratory: No rhonchi, no wheezes  Cardiovascular: S1, S2. Regular rate and rhythm  Abdomen: Soft, Non-tender, non-distended, positive bowel sounds  Neuro: No new focal deficits  Extremities: No edema    Diagnostic Data:    Labs:  Recent Labs   Lab 10/06/24  1308 10/06/24  1743 10/06/24  2327 10/07/24  0533 10/08/24  0706   WBC 7.9 6.7  --  5.0 6.0   HGB 9.5* 8.9*  --  8.3* 8.1*   MCV 87.3 86.5  --  89.4 86.5   .0 168.0  --  111.0* 77.0*   INR 1.62*  --  1.97*  --  1.86*       Recent Labs   Lab 10/06/24  1308 10/06/24  1743 10/07/24  0533 10/08/24  0706   * 143* 127* 126*   BUN 22 19 18 16   CREATSERUM 2.21* 2.13* 1.91* 1.62*   CA 9.6 9.1 8.6* 8.6*   ALB 3.7  --  3.6  --    * 135* 135* 137   K 5.2* 5.3* 4.4 4.2    112 113* 113*   CO2 16.0* 17.0* 16.0* 18.0*   ALKPHO 101  --  89  --    AST 40*  --  56*  --    ALT 54*  --  50*  --    BILT 0.2  --  0.2  --    TP 7.1  --  6.0  --        Estimated Creatinine Clearance: 44.4 mL/min (A) (based on SCr of 1.62 mg/dL (H)).    Recent Labs   Lab 10/06/24  1308   TROPHS 18       Recent Labs   Lab 10/06/24  1308 10/06/24  2327 10/08/24  0706   PTP 19.4* 22.6* 21.6*   INR 1.62* 1.97* 1.86*                  Microbiology    Hospital Encounter on 10/06/24   1.  Urine Culture, Routine     Status: None    Collection Time: 10/07/24 12:50 AM    Specimen: Urine, clean catch   Result Value Ref Range    Urine Culture  N/A     <10,000 cfu/ml Mixture of Gram positive organisms isolated - probable contamination.         Imaging: Reviewed in Epic.    Medications:    sodium ferric gluconate  125 mg Intravenous Daily    atorvastatin  20 mg Oral Nightly    cetirizine  10 mg Oral Daily    dilTIAZem ER  120 mg Oral Daily    folic acid  1 mg Oral Daily    gabapentin  300 mg Oral Daily    pantoprazole  40 mg Oral QAM AC    sertraline  200 mg Oral Daily    sucralfate  1 g Oral TID AC and HS (2200)       Assessment & Plan:      #Upper abdominal pain   Possibly related to anastomotic erosions noted on recent EGD with intense erythema.   -CT chest with fluid distended esophagus reflux disease vs. stricture  -Continue protonix  -Trial sucralfate   -GI following    #non-anion gap metabolic acidosis-gentle bicarb; monitor     #AGGIE likely pre-renal in setting of poor intake-stable gfr today    #Mass like enlargement with hypo attenuation within the uncinate process of pancreas  -possible two stage endoscopic procedures?  Defer to GI    #Lungs nodules, possible mets- IR lung nodule biopsy today      #Suspicious left renal mass -MR kidney non urgently      #Increased cough, ROMANO-ROMANO is likely multifactorial in setting of anemia, recent viral URI. New lung nodules/mets on CT chest-Sputum culture pending-Continue to monitor off antibiotics for now      #Chronic iron deficiency anemia-s/p IV iron per heme    #Recent GIB-Trend hgb     #Mechanical AVR-Goal INR 2.5 - 3.5 when on warfarin; -Heparin drip for now given procedures needed     #Pafib - heparin drip      #Hx of TIA     #HTN    #HLD     #Vitamin B12 deficiency and Folic acid deficiency -Folic acid, on monthly b12 injections      #Thrombocytopenia - mechanical valve distruction. Stable today     #Hx of gastric bypass     #Depression     #Peripheral  neuropathy      #Obesity, BMI 32    Dvt prophy: on heparin elma Miranda MD  [facility]  Internal Medicine Hospitalist      The 21st Century Cures Act makes medical notes like these available to patients in the interest of transparency. Please be advised this is a medical document. Medical documents are intended to carry relevant information, facts as evident, and the clinical opinion of the practitioner. The medical note is intended as peer to peer communication and may appear blunt or direct. It is written in medical language and may contain abbreviations or verbiage that are unfamiliar.

## 2024-10-09 NOTE — PROGRESS NOTES
[facility] Pharmacy Dosing Service  Warfarin (Coumadin) Initial Dosing    Liang Schmidt is a 69 year old patient for whom pharmacy has been consulted to dose warfarin (COUMADIN) for  mechanical valve replacement  by Dr. Carr.  Based on this indication, goal INR is 2.5-3.5.    Pertinent Patient Medical History:  pancreatic mass, iron deficiency anemia   Potential Drug Interactions:  Sucralfate    Latest INR:   Recent Labs     10/09/24  1536   INR 1.55*       Other Anticoagulants:  Heparin gtt  Home regimen (if applicable):  Warfarin 5 mg daily   Date/Time last dose was given (if applicable): NA    Based on above -  1.  For today, Give warfarin (COUMADIN) 5 mg at 2100 tonight    2.  PT/INR ordered daily while on warfarin    3.  Pharmacy will continue to follow.  We appreciate the opportunity to assist in the care of this patient.    Rose Berry, PharmD  10/9/2024  4:30 PM

## 2024-10-09 NOTE — PROGRESS NOTES
A&Ox4. VSS. RA. Denies chest pain and SOB.   Telemetry: afib/flutter rate controlled(60-80)  GI: Abdomen soft, nondistended. Passing gas.   Denies nausea.   : Voiding without difficulty  Pain controlled with PRN pain medications.   Ambulating ad duyen.  Diet: Tolerating general diet, NPO at midnight for possible CT biopsy at 1000. Heparin stopped at 0500  IVF TKO  All appropriate safety measures in place. All questions and concerns addressed.

## 2024-10-10 LAB
ANION GAP SERPL CALC-SCNC: 10 MMOL/L (ref 0–18)
APTT PPP: 56.7 SECONDS (ref 23–36)
APTT PPP: 56.9 SECONDS (ref 23–36)
BUN BLD-MCNC: 10 MG/DL (ref 9–23)
CALCIUM BLD-MCNC: 8.6 MG/DL (ref 8.7–10.4)
CHLORIDE SERPL-SCNC: 109 MMOL/L (ref 98–112)
CO2 SERPL-SCNC: 19 MMOL/L (ref 21–32)
CREAT BLD-MCNC: 1.43 MG/DL
EGFRCR SERPLBLD CKD-EPI 2021: 53 ML/MIN/1.73M2 (ref 60–?)
GLUCOSE BLD-MCNC: 102 MG/DL (ref 70–99)
INR BLD: 1.7 (ref 0.8–1.2)
MAGNESIUM SERPL-MCNC: 1.6 MG/DL (ref 1.6–2.6)
OSMOLALITY SERPL CALC.SUM OF ELEC: 285 MOSM/KG (ref 275–295)
POTASSIUM SERPL-SCNC: 3.9 MMOL/L (ref 3.5–5.1)
PROTHROMBIN TIME: 20.2 SECONDS (ref 11.6–14.8)
SODIUM SERPL-SCNC: 138 MMOL/L (ref 136–145)

## 2024-10-10 PROCEDURE — 99232 SBSQ HOSP IP/OBS MODERATE 35: CPT | Performed by: HOSPITALIST

## 2024-10-10 RX ORDER — MAGNESIUM OXIDE 400 MG/1
400 TABLET ORAL ONCE
Status: COMPLETED | OUTPATIENT
Start: 2024-10-10 | End: 2024-10-10

## 2024-10-10 NOTE — PROGRESS NOTES
Pharmacy Dosing Service: Warfarin (Coumadin)  Liang Schmidt is a 69 year old patient for whom pharmacy has been consulted to dose warfarin (COUMADIN) for Mechanical valve replacement  by Dr. Carr.  Based on this indication, goal INR is 2.5-3.5.     Recent Labs   Lab 10/06/24  2327 10/08/24  0706 10/09/24  0450 10/09/24  1536 10/10/24  0558   INR 1.97* 1.86* 1.66* 1.55* 1.70*     Potential Drug Interactions:  Increase PT/INR: Sertraline, Atorvastatin, APAP. Decrease PT/INR:Sucralfate  Other Anticoagulants:  Heparin gtt  Home regimen (if applicable):  5mg qHS  Date/Time last dose was given: 10/9 @~2100    Inpatient Dosing History  Date 10/6 10/7 10/8 10/9 10/10    INR 1.97 - 1.86 1.55 1.70    Coumadin dose HELD HELD HELD 5 mg       Based on above -  1.  For today, Give warfarin (COUMADIN) 6 mg at 2100 tonight  2   PT/INR ordered daily while on coumadin  3.  Pharmacy will continue to follow.  We appreciate the opportunity to assist in his care.    Marisol Madsen, PharmD  10/10/2024  8:13 AM

## 2024-10-10 NOTE — PROGRESS NOTES
Called into pt's room.   Pt states 'i dropped my cell phone on my foot, and now there's an opening'.  Area cleansed w/ NSS,  neosporin & covered w/ gauze & paper tape

## 2024-10-10 NOTE — PLAN OF CARE
A&Ox4. VSS. RA. . Denies chest pain and SOB.   Telemetry: Irregular- AFib on tele- Coumadin restarted  GI: Abdomen soft, nondistended, tenderness, cramping (prn reglan given) Passing gas.   Denies nausea.   : Voids.   Mild pain during shift  Up ad duyen   Bandaid to upper back frim lung biopsy  Diet: Carb control diet  IVF running per order.  Hep gtt running per MAR   All appropriate safety measures in place. All questions and concerns addressed.

## 2024-10-10 NOTE — DISCHARGE SUMMARY
[facility]     Liang Schmidt Patient Status:  Inpatient    1955 MRN [id number]   Location [facility] 3NW-A Attending Evan Miranda MD   Hosp Day # 4 PCP Ema Arora DO     Date of Admission: 10/6/2024  Date of Discharge: 10/11/2024  Discharge Disposition: Home or Self Care  Chief complaint:   Chief Complaint   Patient presents with    Abdomen/Flank Pain         Discharge Diagnoses and Brief Synopsis:  #Upper abdominal pain -Possibly related to anastomotic erosions noted on recent EGD with intense erythema.     #non-anion gap metabolic acidosis     #AGGIE likely pre-renal in setting of poor intake-improved with hydration    #Mass like enlargement with hypo attenuation within the uncinate process of pancreas  -possible two stage endoscopic procedures in future but lung biopsy results will likely take precedence?  Defer to hem/onc and GI as outpatient     #Lungs nodules, possible mets- s/p CT guided lung biopsy; shows Moderately differentiated adenocarcinoma in lung tissue. Follow up with dr. Carr in clinic to discuss next steps. Discussed with patient      #Suspicious left renal mass - outpatient follow up for this.        #Chronic iron deficiency anemia-s/p IV iron per heme        #Mechanical AVR-Goal INR 2.5 - 3.5 when on warfarin; bridged with iv heparin during stay, transitioned to subQ lovenox for brdiging until INR therapuetic     #Pafib      #Hx of TIA     #HTN     #HLD     #Vitamin B12 deficiency and Folic acid deficiency     #Thrombocytopenia - mechanical valve distruction     #Hx of gastric bypass     #Depression      #Peripheral neuropathy      #Obesity, BMI 32        Lab/Test results pending at Discharge:   none        Admission History of Present Illness (author of HPI not necessarily myself; see H&P author): Liang Schmidt is a 69 year old male with history of mechanical AVR (INR goal 2.5 - 3.5), pafib on coumadin, TIA, DMII, HTN, HLD,  chronic iron deficiency anemia, hx of GIB, hx of gastric bypass, depression, peripheral neuropathy presented with generalized abdominal pain, REILLY.      Patient was admitted 9/19/2024 - 9/24/2024 with acute anemia 2/2 GIB in setting of supra therapeutic INR.  S/p EGD with gastric erosion felt to be source of bleed. He was placed on lovenox bridge and warfarin was restarted at discharge. Of note, he also tested positive for rhino/enterovirus.  Since discharge he developed new upper abdominal pain in a bandlike distribution. Its a constant, dull pain that wakes him up at night. He reports poor oral intake of fluids  and liquids due to pain exacerbation. NO known relieving factors. Continues to have intermittent melenic stools. Today he was so tired and had severe ROMANO. He reports increased cough with sputum production. Sputum is green color. Denies fevers, chills, weight loss, LUTS.      ED work up with acute kidney injury.         Lace+ Score: 72  59-90 High Risk  29-58 Medium Risk  0-28   Low Risk       TCM Follow-Up Recommendation:  LACE > 58: High Risk of readmission after discharge from the hospital.      Discharge Medication List:     Discharge Medications        START taking these medications        Instructions Prescription details   enoxaparin 100 MG/ML Sosy  Commonly known as: Lovenox      Inject 1.01 mL (101 mg total) into the skin every 12 (twelve) hours.   Quantity: 20 mL  Refills: 0     ferrous sulfate 325 (65 FE) MG Tbec      Take 1 tablet (325 mg total) by mouth daily with breakfast.   Quantity: 30 tablet  Refills: 0     sucralfate 1 GM/10ML Susp  Commonly known as: Carafate      Take 10 mL (1 g total) by mouth 4 (four) times daily before meals and nightly (2200).   Quantity: 1000 mL  Refills: 0            CONTINUE taking these medications        Instructions Prescription details   Accu-Chek Guide Strp       Refills: 0     Accu-Chek Guide w/Device Kit       Refills: 0     Accu-Chek Softclix Lancets  Misc       Refills: 0     atorvastatin 20 MG Tabs  Commonly known as: Lipitor      Take 1 tablet (20 mg total) by mouth nightly.   Quantity: 90 tablet  Refills: 0     cetirizine 10 MG Tabs  Commonly known as: ZyrTEC      Take 1 tablet (10 mg total) by mouth daily.   Quantity: 90 tablet  Refills: 0     cyanocobalamin 1000 MCG/ML Soln  Commonly known as: Vitamin B12      Inject 1 mL (1,000 mcg total) into the muscle every 30 (thirty) days.   Quantity: 3 mL  Refills: 3     dilTIAZem  MG Cp24  Commonly known as: Cardizem CD      Take 1 capsule (120 mg total) by mouth daily.   Quantity: 90 capsule  Refills: 0     folic acid 1 MG Tabs  Commonly known as: Folvite      Take 1 tablet (1 mg total) by mouth daily.   Quantity: 90 tablet  Refills: 3     gabapentin 300 MG Caps  Commonly known as: Neurontin      Take 1 capsule (300 mg total) by mouth daily.   Quantity: 90 capsule  Refills: 0     metFORMIN 500 MG Tabs  Commonly known as: Glucophage      Take 2 tablets (1,000 mg total) by mouth daily with breakfast.   Quantity: 180 tablet  Refills: 0     pantoprazole 40 MG Tbec  Commonly known as: Protonix      Take 1 tablet (40 mg total) by mouth daily.   Quantity: 30 tablet  Refills: 0     sertraline 100 MG Tabs  Commonly known as: Zoloft      Take 2 tablets (200 mg total) by mouth daily.   Quantity: 180 tablet  Refills: 0     triamcinolone 0.1 % Crea  Commonly known as: Kenalog      Apply twice a day for 2 weeks, then take a break for 1 week, reapply if persists   Quantity: 80 g  Refills: 1     warfarin 5 MG Tabs  Commonly known as: Coumadin      Take 1 tablet (5 mg total) by mouth nightly. 7.5mg tonight 9/24, then 5mg all other nights   Quantity: 90 tablet  Refills: 0            STOP taking these medications      hydroCHLOROthiazide 12.5 MG Tabs        ramipril 5 MG Caps  Commonly known as: Altace                  Where to Get Your Medications        Please  your prescriptions at the location directed by your doctor  or nurse    Bring a paper prescription for each of these medications  enoxaparin 100 MG/ML Sosy  ferrous sulfate 325 (65 FE) MG Tbec  sucralfate 1 GM/10ML Susp         [place]    Follow-up appointment:   Ema Arora DO  [address]  [facility] [address]  [phone]    Schedule an appointment as soon as possible for a visit in 1 week(s)  Diabetes / Hospital follow up    Prashanth Rapp MD  [address]  [facility] [address]  [phone]    Go in 1 month(s)  for a follow-up office visit with GI. The office will call you to arrange date/time of visit    Elvin Carr MD  [address]  [address]  [facility] [address]  [phone]    Go on 10/15/2024  at 12:30 to discuss next steps and receive IV iron    Appointments for Next 30 Days 10/11/2024 - 11/10/2024        Date Arrival Time Visit Type Length Department Provider     10/15/2024 12:30 PM  FOLLOW UP-HEM/ONC [5011] 15 min [facility] in [place] Elvin Carr MD    Patient Instructions:         Location Instructions:     **IF YOU NEED LABWORK OR AN INFUSION ALONG WITH YOUR APPOINTMENT, YOU MUST CALL TO SCHEDULE.**  Your appointment is on the [facility] in the Cancer Center. The address is [address] [place] [address]. Please register at the [facility]  on the second floor.  Masks are optional for all patients and visitors, unless otherwise indicated.               10/15/2024  1:00 PM  INFUSION [1980] 60 min [facility] [place] RN3    Patient Instructions:         Location Instructions:     Your appointment is on the [facility] in the [facility] [place]. The address is [address] [place] [address]. Please register at the [facility]  on the second floor.  Masks are optional for all patients and visitors, unless otherwise indicated. No care partners/visitors under 18 years of age are allowed in the infusion room.                10/29/2024  1:15 PM  ON TREATMENT VISIT FOLLOW-UP [2641] 15 min [facility] in [place] Elvin Carr MD    Patient Instructions:         Location Instructions:     **IF YOU NEED LABWORK OR AN INFUSION ALONG WITH YOUR APPOINTMENT, YOU MUST CALL TO SCHEDULE.**  Your appointment is on the [facility] in the Cancer Center. The address is [address] [place] [address]. Please register at the [facility]  on the second floor.  Masks are optional for all patients and visitors, unless otherwise indicated.               10/29/2024  1:30 PM  INFUSION [1980] 60 min [facility] [place] RN7    Patient Instructions:         Location Instructions:     Your appointment is on the [facility] in the Cancer Center. The address is [address] [place] [address]. Please register at the [facility]  on the second floor.  Masks are optional for all patients and visitors, unless otherwise indicated. No care partners/visitors under 18 years of age are allowed in the infusion room.                      Vital signs:  Temp:  [97.8 °F (36.6 °C)-98.2 °F (36.8 °C)] 97.8 °F (36.6 °C)  Pulse:  [58-84] 73  Resp:  [18] 18  BP: (116-137)/(50-77) 137/70  SpO2:  [97 %-99 %] 98 %    Physical Exam:    General: No acute distress.   Respiratory: Clear to auscultation bilaterally. No wheezes. No rhonchi.  Cardiovascular: S1, S2. Regular rate and rhythm. No murmurs, rubs or gallops.   Abdomen: Soft, nontender, nondistended.   Neurologic: No focal neurological deficits.   Musculoskeletal: Moves all extremities.  Extremities: No edema.  -----------------------------------------------------------------------------------------------  PATIENT DISCHARGE INSTRUCTIONS: See electronic chart    Evan Wong MD    Time spent:   31 minutes

## 2024-10-10 NOTE — PLAN OF CARE
Pt a&o x 4.  Friendly & cooperative w/ care  Up ad duyen w/ steady gait.  Pt has good safety awareness  Encourage pt to ambulate in halls today  VSS  Tele = AFib   Denies c/o pain  Appetite good.  Spoke with hospitalist re: ada diet.  MD states to discontinue ada diet  Voiding clear yellow urine  Coumadin discontinued.  Plan for lovenox upon discharge

## 2024-10-10 NOTE — PAYOR COMM NOTE
--------------RECONSIDERATION REQUEST FOR INPATIENT SERVICES--PATIENT IS STILL IN HOUSE AND RECEIVING HOSPITAL SERVICES    CONTINUED STAY REVIEW----    Payor: [employer]  Subscriber #:  [id number]  Authorization Number: [id number]    Admit date: 10/7/24  Admit time:  2:59 PM             Date of Service: 10/9/2024 12:48 PM     [facility]        Chief Complaint: Abdominal pain     Subjective:      Feels fine today     Objective:    Review of Systems:   A 6 point review of systems was completed; pertinent positive and negatives stated in subjective.     Vital signs:  Temp:  [97.5 °F (36.4 °C)-98.1 °F (36.7 °C)] 97.5 °F (36.4 °C)  Pulse:  [] 67  Resp:  [16-20] 16  BP: (114-138)/(52-67) 116/52  SpO2:  [97 %-99 %] 97 %     Physical Exam:    General: Chronically ill appearing.  Respiratory: No rhonchi, no wheezes  Cardiovascular: S1, S2. Regular rate and rhythm  Abdomen: Soft, Non-tender, non-distended, positive bowel sounds  Neuro: No new focal deficits  Extremities: No edema     Diagnostic Data:    Labs:          Recent Labs   Lab 10/06/24  1308 10/06/24  1743 10/06/24  2327 10/07/24  0533 10/08/24  0706   WBC 7.9 6.7  --  5.0 6.0   HGB 9.5* 8.9*  --  8.3* 8.1*   MCV 87.3 86.5  --  89.4 86.5   .0 168.0  --  111.0* 77.0*   INR 1.62*  --  1.97*  --  1.86*                Recent Labs   Lab 10/06/24  1308 10/06/24  1743 10/07/24  0533 10/08/24  0706   * 143* 127* 126*   BUN 22 19 18 16   CREATSERUM 2.21* 2.13* 1.91* 1.62*   CA 9.6 9.1 8.6* 8.6*   ALB 3.7  --  3.6  --    * 135* 135* 137   K 5.2* 5.3* 4.4 4.2    112 113* 113*   CO2 16.0* 17.0* 16.0* 18.0*   ALKPHO 101  --  89  --    AST 40*  --  56*  --    ALT 54*  --  50*  --    BILT 0.2  --  0.2  --    TP 7.1  --  6.0  --              Recent Labs   Lab 10/06/24  1308   TROPHS 18               Recent Labs   Lab 10/06/24  1308 10/06/24  2327 10/08/24  0706   PTP 19.4*  22.6* 21.6*   INR 1.62* 1.97* 1.86*           Assessment & Plan:       #Upper abdominal pain   Possibly related to anastomotic erosions noted on recent EGD with intense erythema.   -CT chest with fluid distended esophagus reflux disease vs. stricture  -Continue protonix  -Trial sucralfate   -GI following     #non-anion gap metabolic acidosis-gentle bicarb; monitor     #AGGIE likely pre-renal in setting of poor intake-stable gfr today    #Mass like enlargement with hypo attenuation within the uncinate process of pancreas  -possible two stage endoscopic procedures?  Defer to GI     #Lungs nodules, possible mets- IR lung nodule biopsy today      #Suspicious left renal mass -MR kidney non urgently      #Increased cough, ROMANO-ROMANO is likely multifactorial in setting of anemia, recent viral URI. New lung nodules/mets on CT chest-Sputum culture pending-Continue to monitor off antibiotics for now      #Chronic iron deficiency anemia-s/p IV iron per heme     #Recent GIB-Trend hgb      #Mechanical AVR-Goal INR 2.5 - 3.5 when on warfarin; -Heparin drip for now given procedures needed     #Pafib - heparin drip      #Hx of TIA     #HTN     #HLD     #Vitamin B12 deficiency and Folic acid deficiency -Folic acid, on monthly b12 injections      #Thrombocytopenia - mechanical valve distruction. Stable today     #Hx of gastric bypass     #Depression      #Peripheral neuropathy      #Obesity, BMI 32     Dvt prophy: on heparin drip     Evan Miranda MD  [facility]  Internal Medicine Hospitalist                     Recent Results (from the past 72 hours)   Magnesium    Collection Time: 10/08/24  7:06 AM   Result Value Ref Range    Magnesium 1.4 (L) 1.6 - 2.6 mg/dL   Prothrombin Time (PT)    Collection Time: 10/08/24  7:06 AM   Result Value Ref Range    PT 21.6 (H) 11.6 - 14.8 seconds    INR 1.86 (H) 0.80 - 1.20   Basic Metabolic Panel (8)    Collection Time: 10/08/24  7:06 AM   Result Value Ref Range    Glucose 126 (H) 70 - 99 mg/dL     Sodium 137 136 - 145 mmol/L    Potassium 4.2 3.5 - 5.1 mmol/L    Chloride 113 (H) 98 - 112 mmol/L    CO2 18.0 (L) 21.0 - 32.0 mmol/L    Anion Gap 6 0 - 18 mmol/L    BUN 16 9 - 23 mg/dL    Creatinine 1.62 (H) 0.70 - 1.30 mg/dL    Calcium, Total 8.6 (L) 8.7 - 10.4 mg/dL    Calculated Osmolality 287 275 - 295 mOsm/kg    eGFR-Cr 46 (L) >=60 mL/min/1.73m2   CBC, Platelet; No Differential    Collection Time: 10/08/24  7:06 AM   Result Value Ref Range    WBC 6.0 4.0 - 11.0 x10(3) uL    RBC 2.96 (L) 3.80 - 5.80 x10(6)uL    HGB 8.1 (L) 13.0 - 17.5 g/dL    HCT 25.6 (L) 39.0 - 53.0 %    PLT 77.0 (L) 150.0 - 450.0 10(3)uL    MCV 86.5 80.0 - 100.0 fL    MCH 27.4 26.0 - 34.0 pg    MCHC 31.6 31.0 - 37.0 g/dL    RDW 21.1 %   PTT, Activated    Collection Time: 10/08/24  7:06 AM   Result Value Ref Range    PTT 59.9 (H) 23.0 - 36.0 seconds   Scan slide    Collection Time: 10/08/24  7:06 AM   Result Value Ref Range    Slide Review       Platelets reviewed on smear. No giant platelets or platelet clumps observed.   Platelet Count    Collection Time: 10/09/24  4:50 AM   Result Value Ref Range    .0 (L) 150.0 - 450.0 10(3)uL    Immature Platelet Fraction 2.5 0.0 - 7.0 %   Prothrombin Time (PT)    Collection Time: 10/09/24  4:50 AM   Result Value Ref Range    PT 19.8 (H) 11.6 - 14.8 seconds    INR 1.66 (H) 0.80 - 1.20   PTT, Activated    Collection Time: 10/09/24  4:50 AM   Result Value Ref Range    PTT 58.2 (H) 23.0 - 36.0 seconds   Magnesium    Collection Time: 10/09/24  4:50 AM   Result Value Ref Range    Magnesium 1.8 1.6 - 2.6 mg/dL   Basic Metabolic Panel (8)    Collection Time: 10/09/24  4:50 AM   Result Value Ref Range    Glucose 102 (H) 70 - 99 mg/dL    Sodium 137 136 - 145 mmol/L    Potassium 3.8 3.5 - 5.1 mmol/L    Chloride 113 (H) 98 - 112 mmol/L    CO2 17.0 (L) 21.0 - 32.0 mmol/L    Anion Gap 7 0 - 18 mmol/L    BUN 11 9 - 23 mg/dL    Creatinine 1.50 (H) 0.70 - 1.30 mg/dL    Calcium, Total 9.0 8.7 - 10.4 mg/dL     Calculated Osmolality 284 275 - 295 mOsm/kg    eGFR-Cr 50 (L) >=60 mL/min/1.73m2   CBC, Platelet; No Differential    Collection Time: 10/09/24  4:50 AM   Result Value Ref Range    WBC 4.5 4.0 - 11.0 x10(3) uL    RBC 2.90 (L) 3.80 - 5.80 x10(6)uL    HGB 8.1 (L) 13.0 - 17.5 g/dL    HCT 25.7 (L) 39.0 - 53.0 %    .0 (L) 150.0 - 450.0 10(3)uL    Immature Platelet Fraction 2.5 0.0 - 7.0 %    MCV 88.6 80.0 - 100.0 fL    MCH 27.9 26.0 - 34.0 pg    MCHC 31.5 31.0 - 37.0 g/dL    RDW 21.1 %   POCT Glucose    Collection Time: 10/09/24  9:28 AM   Result Value Ref Range    POC Glucose 115 (H) 70 - 99 mg/dL   Prothrombin Time (PT)    Collection Time: 10/09/24  3:36 PM   Result Value Ref Range    PT 18.8 (H) 11.6 - 14.8 seconds    INR 1.55 (H) 0.80 - 1.20   PTT, Activated    Collection Time: 10/09/24  3:36 PM   Result Value Ref Range    PTT 44.7 (H) 23.0 - 36.0 seconds   Basic Metabolic Panel (8)    Collection Time: 10/09/24  4:57 PM   Result Value Ref Range    Glucose 125 (H) 70 - 99 mg/dL    Sodium 137 136 - 145 mmol/L    Potassium 4.7 3.5 - 5.1 mmol/L    Chloride 112 98 - 112 mmol/L    CO2 18.0 (L) 21.0 - 32.0 mmol/L    Anion Gap 7 0 - 18 mmol/L    BUN 10 9 - 23 mg/dL    Creatinine 1.61 (H) 0.70 - 1.30 mg/dL    Calcium, Total 8.9 8.7 - 10.4 mg/dL    Calculated Osmolality 285 275 - 295 mOsm/kg    eGFR-Cr 46 (L) >=60 mL/min/1.73m2   CBC, Platelet; No Differential    Collection Time: 10/09/24  4:57 PM   Result Value Ref Range    WBC 5.4 4.0 - 11.0 x10(3) uL    RBC 3.12 (L) 3.80 - 5.80 x10(6)uL    HGB 8.7 (L) 13.0 - 17.5 g/dL    HCT 27.3 (L) 39.0 - 53.0 %    .0 (L) 150.0 - 450.0 10(3)uL    MCV 87.5 80.0 - 100.0 fL    MCH 27.9 26.0 - 34.0 pg    MCHC 31.9 31.0 - 37.0 g/dL    RDW 21.4 %   PTT, Activated    Collection Time: 10/09/24 11:36 PM   Result Value Ref Range    PTT 56.9 (H) 23.0 - 36.0 seconds   Prothrombin Time (PT)    Collection Time: 10/10/24  5:58 AM   Result Value Ref Range    PT 20.2 (H) 11.6 - 14.8 seconds     INR 1.70 (H) 0.80 - 1.20   Basic Metabolic Panel (8)    Collection Time: 10/10/24  5:58 AM   Result Value Ref Range    Glucose 102 (H) 70 - 99 mg/dL    Sodium 138 136 - 145 mmol/L    Potassium 3.9 3.5 - 5.1 mmol/L    Chloride 109 98 - 112 mmol/L    CO2 19.0 (L) 21.0 - 32.0 mmol/L    Anion Gap 10 0 - 18 mmol/L    BUN 10 9 - 23 mg/dL    Creatinine 1.43 (H) 0.70 - 1.30 mg/dL    Calcium, Total 8.6 (L) 8.7 - 10.4 mg/dL    Calculated Osmolality 285 275 - 295 mOsm/kg    eGFR-Cr 53 (L) >=60 mL/min/1.73m2   Magnesium    Collection Time: 10/10/24  5:58 AM   Result Value Ref Range    Magnesium 1.6 1.6 - 2.6 mg/dL   PTT, Activated    Collection Time: 10/10/24  5:58 AM   Result Value Ref Range    PTT 56.7 (H) 23.0 - 36.0 seconds           MEDICATIONS ADMINISTERED IN LAST 1 DAY:  acetaminophen (Tylenol Extra Strength) tab 500 mg       Date Action Dose Route User    10/9/2024 1227 Given 500 mg Oral Maria Victoria Ba RN          atorvastatin (Lipitor) tab 20 mg       Date Action Dose Route User    10/9/2024 2108 Given 20 mg Oral Dayan De León RN          heparin (Porcine) 54578 units/250mL infusion ACS/AFIB CONTINUOUS       Date Action Dose Route User    10/9/2024 1707 New Bag 1,000 Units/hr Intravenous Maria Victoria Ba RN          fentaNYL (Sublimaze) 50 mcg/mL injection 50 mcg       Date Action Dose Route User    10/9/2024 1119 Given 50 mcg Intravenous Philomena Allen RN    10/9/2024 1113 Given 50 mcg Intravenous Philomena Allen RN          fentaNYL (Sublimaze) 50 mcg/mL injection       Date Action Dose Route User    10/9/2024 1113 Given 50 mcg Intravenous Philomena Allen RN          iopamidol 76% (ISOVUE-370) injection for power injector       Date Action Dose Route User    10/9/2024 1202 Given 100 mL Intravenous (Left Lower Arm) Param Anaya          magnesium oxide (Mag-Ox) tab 400 mg       Date Action Dose Route User    10/9/2024 1507 Given 400 mg Oral Maria Victoria Ba RN          metoclopramide (Reglan) 5 mg/mL  injection 5 mg       Date Action Dose Route User    10/9/2024 2304 Given 5 mg Intravenous Dayan De León RN          midazolam (Versed) 2 MG/2ML injection 1 mg       Date Action Dose Route User    10/9/2024 1118 Given 1 mg Intravenous Philomena Allen RN    10/9/2024 1114 Given 1 mg Intravenous Philomena Allen RN          midazolam (Versed) 2 MG/2ML injection       Date Action Dose Route User    10/9/2024 1114 Given 1 mg Intravenous Philomena Allen RN          pantoprazole (Protonix) DR tab 40 mg       Date Action Dose Route User    10/10/2024 0601 Given 40 mg Oral Dayan De León RN          sodium bicarbonate 150 mEq in sterile water 1,000 mL infusion       Date Action Dose Route User    10/9/2024 1508 New Bag 150 mEq Intravenous Maria Victoria Ba RN          sodium chloride 0.9% infusion       Date Action Dose Route User    10/9/2024 1042 New Bag (none) Intravenous Philomena Allen RN          sodium ferric gluconate (Ferrlecit) 125 mg in sodium chloride 0.9% 100mL IVPB premix       Date Action Dose Route User    10/9/2024 1216 New Bag 125 mg Intravenous Maria Victoria Ba RN          sucralfate (Carafate) 1 GM/10ML oral suspension 1 g       Date Action Dose Route User    10/10/2024 0601 Given 1 g Oral Dayan De León RN    10/9/2024 2200 Given 1 g Oral Dayan De León RN    10/9/2024 1711 Given 1 g Oral Maria Victoria Ba RN    10/9/2024 1224 Given 1 g Oral Maria Victoria Ba RN          warfarin (Coumadin) tab 5 mg       Date Action Dose Route User    10/9/2024 2108 Given 5 mg Oral Dayan De León RN            Vitals (last day)       Date/Time Temp Pulse Resp BP SpO2 Weight O2 Device O2 Flow Rate (L/min) Who    10/10/24 0816 97.6 °F (36.4 °C) 64 18 129/65 98 % -- None (Room air) -- LE    10/10/24 0440 98.2 °F (36.8 °C) 79 18 112/58 99 % -- None (Room air) 0 L/min ND    10/10/24 0007 98 °F (36.7 °C) 62 16 109/53 100 % -- None (Room air) 0 L/min ND    10/09/24 2118 97.9 °F (36.6 °C) 78 16 117/59 100 % -- None (Room  air) 0 L/min [place]    10/09/24 1734 97.8 °F (36.6 °C) 60 15 127/64 97 % -- None (Room air) -- PG    10/09/24 1700 -- 62 -- 119/50 -- -- -- -- PG    10/09/24 1630 -- 65 -- -- -- -- -- -- PG    10/09/24 1600 -- 64 -- 130/57 -- -- -- -- PG    10/09/24 1530 -- 72 -- 127/73 -- -- -- -- PG    10/09/24 1500 -- 70 -- 134/85 -- -- -- -- PG    10/09/24 1430 -- 84 -- 118/69 -- -- -- -- PG    10/09/24 1400 -- 62 -- 151/61 -- -- -- -- PG    10/09/24 1359 -- 56 -- 138/105 -- -- -- -- PG    10/09/24 1344 -- 56 -- 162/98 -- -- -- -- PG    10/09/24 1300 97.4 °F (36.3 °C) 81 15 129/46 97 % -- None (Room air) -- PG    10/09/24 1230 -- 82 -- 132/113 -- -- -- -- PG    10/09/24 1225 -- 89 -- 132/81 -- -- -- -- SB    10/09/24 1210 97.6 °F (36.4 °C) 79 16 119/61 -- -- -- -- CORY    10/09/24 1145 -- 91 16 133/77 97 % -- -- -- CH    10/09/24 1140 -- 97 16 131/63 97 % -- None (Room air) -- CH    10/09/24 11:37:46 -- 91 16 142/84 97 % -- -- -- CH    10/09/24 1135 -- 101 16 142/84 97 % -- -- -- CH    10/09/24 1130 -- 96 11 138/82 98 % -- None (Room air) -- CH    10/09/24 1125 -- 101 9 135/70 100 % -- -- -- CH    10/09/24 1120 -- 100 13 154/89 100 % -- -- -- CH    10/09/24 1115 -- 89 15 194/87 100 % -- -- -- CH    10/09/24 1110 -- 93 16 159/72 100 % -- -- -- CH    10/09/24 1105 -- 84 18 173/77 100 % -- Nasal cannula 2 L/min CH    10/09/24 0932 -- -- -- 137/67 -- -- -- -- CORY    10/09/24 0932 -- 65 -- -- -- -- -- -- CH    10/09/24 0812 98.1 °F (36.7 °C) 76 16 109/58 99 % -- None (Room air) -- PG    10/09/24 0614 97.8 °F (36.6 °C) 65 16 123/71 99 % -- None (Room air) -- MQ    10/09/24 0151 97.5 °F (36.4 °C) 84 18 132/66 95 % -- None (Room air) -- MQ

## 2024-10-10 NOTE — PROGRESS NOTES
[facility]   part of [facility]     Hospitalist Progress Note     Liang Wiley Brayan Patient Status:  Observation    1955 MRN [id number]   Formerly [facility] 3NW-A Attending José Manjarrez MD   Hosp Day # 3 PCP Ema Arora DO     Chief Complaint: Abdominal pain    Subjective:     Feels fine today    Objective:    Review of Systems:   A 6 point review of systems was completed; pertinent positive and negatives stated in subjective.    Vital signs:  Temp:  [97.4 °F (36.3 °C)-98.2 °F (36.8 °C)] 97.6 °F (36.4 °C)  Pulse:  [] 64  Resp:  [9-18] 18  BP: (109-162)/() 129/65  SpO2:  [97 %-100 %] 98 %    Physical Exam:    General: Chronically ill appearing.  Respiratory: No rhonchi, no wheezes  Cardiovascular: S1, S2. Regular rate and rhythm  Abdomen: Soft, Non-tender, non-distended  Neuro: No new focal deficits  Extremities: No edema    Diagnostic Data:    Labs:  Recent Labs   Lab 10/06/24  1743 10/06/24  2327 10/07/24  0533 10/08/24  0706 10/09/24  0450 10/09/24  1536 10/09/24  1657 10/10/24  0558   WBC 6.7  --  5.0 6.0 4.5  --  5.4  --    HGB 8.9*  --  8.3* 8.1* 8.1*  --  8.7*  --    MCV 86.5  --  89.4 86.5 88.6  --  87.5  --    .0  --  111.0* 77.0* 119.0*  119.0*  --  107.0*  --    INR  --  1.97*  --  1.86* 1.66* 1.55*  --  1.70*       Recent Labs   Lab 10/06/24  1308 10/06/24  1743 10/07/24  0533 10/08/24  0706 10/09/24  0450 10/09/24  1657 10/10/24  0558   *   < > 127*   < > 102* 125* 102*   BUN 22   < > 18   < > 11 10 10   CREATSERUM 2.21*   < > 1.91*   < > 1.50* 1.61* 1.43*   CA 9.6   < > 8.6*   < > 9.0 8.9 8.6*   ALB 3.7  --  3.6  --   --   --   --    *   < > 135*   < > 137 137 138   K 5.2*   < > 4.4   < > 3.8 4.7 3.9      < > 113*   < > 113* 112 109   CO2 16.0*   < > 16.0*   < > 17.0* 18.0* 19.0*   ALKPHO 101  --  89  --   --   --   --    AST 40*  --  56*  --   --   --   --    ALT 54*  --  50*  --   --   --   --    BILT 0.2  --  0.2  --    --   --   --    TP 7.1  --  6.0  --   --   --   --     < > = values in this interval not displayed.       Estimated Creatinine Clearance: 50.3 mL/min (A) (based on SCr of 1.43 mg/dL (H)).    Recent Labs   Lab 10/06/24  1308   TROPHS 18       Recent Labs   Lab 10/09/24  0450 10/09/24  1536 10/10/24  0558   PTP 19.8* 18.8* 20.2*   INR 1.66* 1.55* 1.70*                  Microbiology    Hospital Encounter on 10/06/24   1. Urine Culture, Routine     Status: None    Collection Time: 10/07/24 12:50 AM    Specimen: Urine, clean catch   Result Value Ref Range    Urine Culture  N/A     <10,000 cfu/ml Mixture of Gram positive organisms isolated - probable contamination.         Imaging: Reviewed in Epic.    Medications:    warfarin  6 mg Oral Once at night    sodium ferric gluconate  125 mg Intravenous Daily    atorvastatin  20 mg Oral Nightly    cetirizine  10 mg Oral Daily    dilTIAZem ER  120 mg Oral Daily    folic acid  1 mg Oral Daily    gabapentin  300 mg Oral Daily    pantoprazole  40 mg Oral QAM AC    sertraline  200 mg Oral Daily    sucralfate  1 g Oral TID AC and HS (2200)       Assessment & Plan:      #Upper abdominal pain   Possibly related to anastomotic erosions noted on recent EGD with intense erythema.   -CT chest with fluid distended esophagus reflux disease vs. stricture  -Continue protonix  -Trial sucralfate   -GI following    #non-anion gap metabolic acidosis-gentle bicarb; monitor; improved     #AGGIE likely pre-renal in setting of poor intake-stable gfr today/improved     #Mass like enlargement with hypo attenuation within the uncinate process of pancreas  -possible two stage endoscopic procedures?  Defer to GI    #Lungs nodules, possible mets- pod1 s/p CT guided lung biopsy       #Suspicious left renal mass -MR kidney non urgently      #Increased cough, ROMANO-ROMANO is likely multifactorial in setting of anemia, recent viral URI. New lung nodules/mets on CT chest-Sputum culture pending-Continue to monitor off  antibiotics for now      #Chronic iron deficiency anemia-s/p IV iron per heme    #Recent GIB-Trend hgb     #Mechanical AVR-Goal INR 2.5 - 3.5 when on warfarin; -Heparin drip for now given further procedures might be needed     #Pafib - heparin drip for now     #Hx of TIA     #HTN    #HLD     #Vitamin B12 deficiency and Folic acid deficiency -Folic acid, on monthly b12 injections      #Thrombocytopenia - mechanical valve distruction. Stable      #Hx of gastric bypass     #Depression     #Peripheral neuropathy      #Obesity, BMI 32    Dvt prophy: on heparin drip    Evan Miranda MD  [facility]  Internal Medicine Hospitalist      The 21st Century Cures Act makes medical notes like these available to patients in the interest of transparency. Please be advised this is a medical document. Medical documents are intended to carry relevant information, facts as evident, and the clinical opinion of the practitioner. The medical note is intended as peer to peer communication and may appear blunt or direct. It is written in medical language and may contain abbreviations or verbiage that are unfamiliar.

## 2024-10-11 VITALS
SYSTOLIC BLOOD PRESSURE: 137 MMHG | HEART RATE: 73 BPM | TEMPERATURE: 98 F | OXYGEN SATURATION: 98 % | WEIGHT: 223 LBS | DIASTOLIC BLOOD PRESSURE: 70 MMHG | RESPIRATION RATE: 18 BRPM | BODY MASS INDEX: 32 KG/M2

## 2024-10-11 LAB
APTT PPP: 62.9 SECONDS (ref 23–36)
INR BLD: 1.81 (ref 0.8–1.2)
MAGNESIUM SERPL-MCNC: 1.4 MG/DL (ref 1.6–2.6)
PROTHROMBIN TIME: 21.2 SECONDS (ref 11.6–14.8)

## 2024-10-11 PROCEDURE — 99239 HOSP IP/OBS DSCHRG MGMT >30: CPT | Performed by: HOSPITALIST

## 2024-10-11 RX ORDER — ENOXAPARIN SODIUM 150 MG/ML
1.5 INJECTION SUBCUTANEOUS EVERY 24 HOURS
Status: DISCONTINUED | OUTPATIENT
Start: 2024-10-11 | End: 2024-10-11

## 2024-10-11 RX ORDER — MAGNESIUM OXIDE 400 MG/1
800 TABLET ORAL ONCE
Status: COMPLETED | OUTPATIENT
Start: 2024-10-11 | End: 2024-10-11

## 2024-10-11 RX ORDER — ENOXAPARIN SODIUM 100 MG/ML
1 INJECTION SUBCUTANEOUS EVERY 12 HOURS SCHEDULED
Status: DISCONTINUED | OUTPATIENT
Start: 2024-10-11 | End: 2024-10-11

## 2024-10-11 RX ORDER — ENOXAPARIN SODIUM 100 MG/ML
1 INJECTION SUBCUTANEOUS EVERY 12 HOURS SCHEDULED
Qty: 20 ML | Refills: 0 | Status: SHIPPED | OUTPATIENT
Start: 2024-10-11

## 2024-10-11 RX ORDER — ENOXAPARIN SODIUM 150 MG/ML
1.5 INJECTION SUBCUTANEOUS DAILY
Status: DISCONTINUED | OUTPATIENT
Start: 2024-10-11 | End: 2024-10-11

## 2024-10-11 RX ORDER — ENOXAPARIN SODIUM 150 MG/ML
1.5 INJECTION SUBCUTANEOUS EVERY 24 HOURS
Qty: 10 ML | Refills: 0 | Status: SHIPPED | OUTPATIENT
Start: 2024-10-11 | End: 2024-10-11

## 2024-10-11 RX ORDER — FERROUS SULFATE 325(65) MG
325 TABLET, DELAYED RELEASE (ENTERIC COATED) ORAL
Qty: 30 TABLET | Refills: 0 | Status: SHIPPED | OUTPATIENT
Start: 2024-10-11

## 2024-10-11 RX ORDER — SUCRALFATE ORAL 1 G/10ML
1 SUSPENSION ORAL
Qty: 1000 ML | Refills: 0 | Status: SHIPPED | OUTPATIENT
Start: 2024-10-11

## 2024-10-11 NOTE — PLAN OF CARE
A&Ox4. VSS. RA. . Denies chest pain and SOB.   Telemetry: Irregular- AFib on tele- Coumadin restarted  GI: Abdomen soft, nondistended,  Passing gas.   Denies nausea.   : Voids.   Denies pain during shift  Up ad duyen   Diet: Regular diet  IVF running per order.  Hep gtt running per MAR   All appropriate safety measures in place. All questions and concerns addressed.

## 2024-10-11 NOTE — CM/SW NOTE
CM met w/ pt to discuss home injection of Lovenox. Pt reports he has experience self-administering injections and will be comfortable managing medication. Pt prefers to get medications filled through [facility]. He will have his dtr call pharmacy with a credit card before DC. DC meds tubed to [place] [facility].    [place] [facility] called re: dc medications. All 3 prescriptions were processed and are available. Pt will have a co-pay of $4.35. Pt/dtr can call the main pharmacy with a credit card for payment. Phone #[phone].    CM/SW will remain available for DC planning and/or support.     CHA LockwoodN, CMSRN    [id number]

## 2024-10-14 ENCOUNTER — PATIENT OUTREACH (OUTPATIENT)
Dept: CASE MANAGEMENT | Age: 69
End: 2024-10-14

## 2024-10-14 NOTE — PROGRESS NOTES
LM for pt to call [facility] for TCM since discharge. [facility] phone number was provided for pt to call back.

## 2024-10-14 NOTE — PAYOR COMM NOTE
--------------  DISCHARGE REVIEW-----RECONSIDERATION REQUEST FOR INPATIENT SERVICES FOR 10/10 WITH DISCHARGE ON 10/11      Payor: [employer]  Subscriber #:  [id number]  Authorization Number: [id number]    Admit date: 10/7/24  Admit time:   2:59 PM  Discharge Date: 10/11/2024  2:32 PM     Admitting Physician: José Manjarrez MD  Attending Physician:  No att. providers found  Primary Care Physician: Ema Arora,     10/10  Objective:    Review of Systems:   A 6 point review of systems was completed; pertinent positive and negatives stated in subjective.     Vital signs:  Temp:  [97.4 °F (36.3 °C)-98.2 °F (36.8 °C)] 97.6 °F (36.4 °C)  Pulse:  [] 64  Resp:  [9-18] 18  BP: (109-162)/() 129/65  SpO2:  [97 %-100 %] 98 %     Physical Exam:    General: Chronically ill appearing.  Respiratory: No rhonchi, no wheezes  Cardiovascular: S1, S2. Regular rate and rhythm  Abdomen: Soft, Non-tender, non-distended  Neuro: No new focal deficits  Extremities: No edema     Diagnostic Data:    Labs:             Recent Labs   Lab 10/06/24  1743 10/06/24  2327 10/07/24  0533 10/08/24  0706 10/09/24  0450 10/09/24  1536 10/09/24  1657 10/10/24  0558   WBC 6.7  --  5.0 6.0 4.5  --  5.4  --    HGB 8.9*  --  8.3* 8.1* 8.1*  --  8.7*  --    MCV 86.5  --  89.4 86.5 88.6  --  87.5  --    .0  --  111.0* 77.0* 119.0*  119.0*  --  107.0*  --    INR  --  1.97*  --  1.86* 1.66* 1.55*  --  1.70*                   Recent Labs   Lab 10/06/24  1308 10/06/24  1743 10/07/24  0533 10/08/24  0706 10/09/24  0450 10/09/24  1657 10/10/24  0558   *   < > 127*   < > 102* 125* 102*   BUN 22   < > 18   < > 11 10 10   CREATSERUM 2.21*   < > 1.91*   < > 1.50* 1.61* 1.43*   CA 9.6   < > 8.6*   < > 9.0 8.9 8.6*   ALB 3.7  --  3.6  --   --   --   --    *   < > 135*   < > 137 137 138   K 5.2*   < > 4.4   < > 3.8 4.7 3.9      < > 113*   < > 113* 112 109   CO2 16.0*   < > 16.0*   < > 17.0* 18.0* 19.0*   ALKPHO 101  --  89   --   --   --   --    AST 40*  --  56*  --   --   --   --    ALT 54*  --  50*  --   --   --   --    BILT 0.2  --  0.2  --   --   --   --    TP 7.1  --  6.0  --   --   --   --     < > = values in this interval not displayed.         Estimated Creatinine Clearance: 50.3 mL/min (A) (based on SCr of 1.43 mg/dL (H)).         Recent Labs   Lab 10/06/24  1308   TROPHS 18               Recent Labs   Lab 10/09/24  0450 10/09/24  1536 10/10/24  0558   PTP 19.8* 18.8* 20.2*   INR 1.66* 1.55* 1.70*                     Microbiology            Hospital Encounter on 10/06/24   1. Urine Culture, Routine     Status: None     Collection Time: 10/07/24 12:50 AM     Specimen: Urine, clean catch   Result Value Ref Range     Urine Culture   N/A       <10,000 cfu/ml Mixture of Gram positive organisms isolated - probable contamination.            Imaging: Reviewed in Epic.     Medications:    warfarin  6 mg Oral Once at night    sodium ferric gluconate  125 mg Intravenous Daily    atorvastatin  20 mg Oral Nightly    cetirizine  10 mg Oral Daily    dilTIAZem ER  120 mg Oral Daily    folic acid  1 mg Oral Daily    gabapentin  300 mg Oral Daily    pantoprazole  40 mg Oral QAM AC    sertraline  200 mg Oral Daily    sucralfate  1 g Oral TID AC and HS (2200)         Assessment & Plan:       #Upper abdominal pain   Possibly related to anastomotic erosions noted on recent EGD with intense erythema.   -CT chest with fluid distended esophagus reflux disease vs. stricture  -Continue protonix  -Trial sucralfate   -GI following     #non-anion gap metabolic acidosis-gentle bicarb; monitor; improved     #AGGIE likely pre-renal in setting of poor intake-stable gfr today/improved     #Mass like enlargement with hypo attenuation within the uncinate process of pancreas  -possible two stage endoscopic procedures?  Defer to GI     #Lungs nodules, possible mets- pod1 s/p CT guided lung biopsy       #Suspicious left renal mass -MR kidney non urgently       #Increased cough, ROMANO-ROMANO is likely multifactorial in setting of anemia, recent viral URI. New lung nodules/mets on CT chest-Sputum culture pending-Continue to monitor off antibiotics for now      #Chronic iron deficiency anemia-s/p IV iron per heme     #Recent GIB-Trend hgb      #Mechanical AVR-Goal INR 2.5 - 3.5 when on warfarin; -Heparin drip for now given further procedures might be needed     #Pafib - heparin drip for now     #Hx of TIA     #HTN     #HLD     #Vitamin B12 deficiency and Folic acid deficiency -Folic acid, on monthly b12 injections      #Thrombocytopenia - mechanical valve distruction. Stable      #Hx of gastric bypass     #Depression      #Peripheral neuropathy      #Obesity, BMI 32     Dvt prophy: on heparin drip     Evan Miranda MD  [facility]  Internal Medicine Hospitalist          Discharge Summary Notes        Discharge Summary signed by Evan Miranda MD at 10/11/2024  7:17 PM       Author: Evan Miranda MD Specialty: HOSPITALIST, Internal Medicine Author Type: Physician    Filed: 10/11/2024  7:17 PM Date of Service: 10/10/2024 12:02 PM Status: Signed    : Evan Miranda MD (Physician)           [facility]     Liang Schmidt Patient Status:  Inpatient    1955 MRN [id number]   Location [facility] 3NW-A Attending Evan Miranda MD   Hosp Day # 4 PCP Ema Arora DO     Date of Admission: 10/6/2024  Date of Discharge: 10/11/2024  Discharge Disposition: Home or Self Care  Chief complaint:   Chief Complaint   Patient presents with    Abdomen/Flank Pain         Discharge Diagnoses and Brief Synopsis:  #Upper abdominal pain -Possibly related to anastomotic erosions noted on recent EGD with intense erythema.     #non-anion gap metabolic acidosis     #AGGIE likely pre-renal in setting of poor intake-improved with hydration    #Mass like enlargement with hypo attenuation within the uncinate process of pancreas  -possible two  stage endoscopic procedures in future but lung biopsy results will likely take precedence?  Defer to hem/onc and GI as outpatient     #Lungs nodules, possible mets- s/p CT guided lung biopsy; shows Moderately differentiated adenocarcinoma in lung tissue. Follow up with dr. Carr in clinic to discuss next steps. Discussed with patient      #Suspicious left renal mass - outpatient follow up for this.        #Chronic iron deficiency anemia-s/p IV iron per heme        #Mechanical AVR-Goal INR 2.5 - 3.5 when on warfarin; bridged with iv heparin during stay, transitioned to subQ lovenox for brdiging until INR therapuetic     #Pafib      #Hx of TIA     #HTN     #HLD     #Vitamin B12 deficiency and Folic acid deficiency     #Thrombocytopenia - mechanical valve distruction     #Hx of gastric bypass     #Depression      #Peripheral neuropathy      #Obesity, BMI 32        Lab/Test results pending at Discharge:   none        Admission History of Present Illness (author of HPI not necessarily myself; see H&P author): Liang Schmidt is a 69 year old male with history of mechanical AVR (INR goal 2.5 - 3.5), pafib on coumadin, TIA, DMII, HTN, HLD, chronic iron deficiency anemia, hx of GIB, hx of gastric bypass, depression, peripheral neuropathy presented with generalized abdominal pain, REILLY.      Patient was admitted 9/19/2024 - 9/24/2024 with acute anemia 2/2 GIB in setting of supra therapeutic INR.  S/p EGD with gastric erosion felt to be source of bleed. He was placed on lovenox bridge and warfarin was restarted at discharge. Of note, he also tested positive for rhino/enterovirus.  Since discharge he developed new upper abdominal pain in a bandlike distribution. Its a constant, dull pain that wakes him up at night. He reports poor oral intake of fluids  and liquids due to pain exacerbation. NO known relieving factors. Continues to have intermittent melenic stools. Today he was so tired and had severe ROMANO. He reports  increased cough with sputum production. Sputum is green color. Denies fevers, chills, weight loss, LUTS.      ED work up with acute kidney injury.         Lace+ Score: 72  59-90 High Risk  29-58 Medium Risk  0-28   Low Risk       TCM Follow-Up Recommendation:  LACE > 58: High Risk of readmission after discharge from the hospital.      Discharge Medication List:     Discharge Medications        START taking these medications        Instructions Prescription details   enoxaparin 100 MG/ML Sosy  Commonly known as: Lovenox      Inject 1.01 mL (101 mg total) into the skin every 12 (twelve) hours.   Quantity: 20 mL  Refills: 0     ferrous sulfate 325 (65 FE) MG Tbec      Take 1 tablet (325 mg total) by mouth daily with breakfast.   Quantity: 30 tablet  Refills: 0     sucralfate 1 GM/10ML Susp  Commonly known as: Carafate      Take 10 mL (1 g total) by mouth 4 (four) times daily before meals and nightly (2200).   Quantity: 1000 mL  Refills: 0            CONTINUE taking these medications        Instructions Prescription details   Accu-Chek Guide Strp       Refills: 0     Accu-Chek Guide w/Device Kit       Refills: 0     Accu-Chek Softclix Lancets Misc       Refills: 0     atorvastatin 20 MG Tabs  Commonly known as: Lipitor      Take 1 tablet (20 mg total) by mouth nightly.   Quantity: 90 tablet  Refills: 0     cetirizine 10 MG Tabs  Commonly known as: ZyrTEC      Take 1 tablet (10 mg total) by mouth daily.   Quantity: 90 tablet  Refills: 0     cyanocobalamin 1000 MCG/ML Soln  Commonly known as: Vitamin B12      Inject 1 mL (1,000 mcg total) into the muscle every 30 (thirty) days.   Quantity: 3 mL  Refills: 3     dilTIAZem  MG Cp24  Commonly known as: Cardizem CD      Take 1 capsule (120 mg total) by mouth daily.   Quantity: 90 capsule  Refills: 0     folic acid 1 MG Tabs  Commonly known as: Folvite      Take 1 tablet (1 mg total) by mouth daily.   Quantity: 90 tablet  Refills: 3     gabapentin 300 MG Caps  Commonly  known as: Neurontin      Take 1 capsule (300 mg total) by mouth daily.   Quantity: 90 capsule  Refills: 0     metFORMIN 500 MG Tabs  Commonly known as: Glucophage      Take 2 tablets (1,000 mg total) by mouth daily with breakfast.   Quantity: 180 tablet  Refills: 0     pantoprazole 40 MG Tbec  Commonly known as: Protonix      Take 1 tablet (40 mg total) by mouth daily.   Quantity: 30 tablet  Refills: 0     sertraline 100 MG Tabs  Commonly known as: Zoloft      Take 2 tablets (200 mg total) by mouth daily.   Quantity: 180 tablet  Refills: 0     triamcinolone 0.1 % Crea  Commonly known as: Kenalog      Apply twice a day for 2 weeks, then take a break for 1 week, reapply if persists   Quantity: 80 g  Refills: 1     warfarin 5 MG Tabs  Commonly known as: Coumadin      Take 1 tablet (5 mg total) by mouth nightly. 7.5mg tonight 9/24, then 5mg all other nights   Quantity: 90 tablet  Refills: 0            STOP taking these medications      hydroCHLOROthiazide 12.5 MG Tabs        ramipril 5 MG Caps  Commonly known as: Altace                  Where to Get Your Medications        Please  your prescriptions at the location directed by your doctor or nurse    Bring a paper prescription for each of these medications  enoxaparin 100 MG/ML Sosy  ferrous sulfate 325 (65 FE) MG Tbec  sucralfate 1 GM/10ML Susp         [place]    Follow-up appointment:   Ema Arora DO  [address]  [facility] [address] [phone]    Schedule an appointment as soon as possible for a visit in 1 week(s)  Diabetes / Hospital follow up    Prashanth Rapp MD  [address] [place] [address] [phone]    Go in 1 month(s)  for a follow-up office visit with GI. The office will call you to arrange date/time of visit    Elvin Carr MD  [address]  [place] [address] [phone]    Go on 10/15/2024  at 12:30 to discuss next steps and receive IV iron    Appointments for Next 30 Days  10/11/2024 - 11/10/2024        Date Arrival Time Visit Type Length Department Provider     10/15/2024 12:30 PM  FOLLOW UP-HEM/ONC [2451] 15 min [facility] in [place] Elvin Carr MD    Patient Instructions:         Location Instructions:     **IF YOU NEED LABWORK OR AN INFUSION ALONG WITH YOUR APPOINTMENT, YOU MUST CALL TO SCHEDULE.**  Your appointment is on the [facility] in the [facility] [place]. The address is [address] [place] [address]. Please register at the [facility]  on the second floor.  Masks are optional for all patients and visitors, unless otherwise indicated.               10/15/2024  1:00 PM  INFUSION [1980] 60 min [facility] [place] RN3    Patient Instructions:         Location Instructions:     Your appointment is on the [facility] in the [facility]. The address is [address] [place] [address]. Please register at the [facility]  on the second floor.  Masks are optional for all patients and visitors, unless otherwise indicated. No care partners/visitors under 18 years of age are allowed in the infusion room.               10/29/2024  1:15 PM  ON TREATMENT VISIT FOLLOW-UP [2641] 15 min [facility] in [place] Elvin Carr MD    Patient Instructions:         Location Instructions:     **IF YOU NEED LABWORK OR AN INFUSION ALONG WITH YOUR APPOINTMENT, YOU MUST CALL TO SCHEDULE.**  Your appointment is on the [facility] in the [facility]. The address is [address] [place] [address]. Please register at the [facility]  on the second floor.  Masks are optional for all patients and visitors, unless otherwise indicated.               10/29/2024  1:30 PM  INFUSION [1980] 60 min [facility] [place] RN7    Patient Instructions:         Location Instructions:     Your appointment is on the [facility] in the [facility] [place].  The address is [address] [place] [address]. Please register at the Cancer Center  on the second floor.  Masks are optional for all patients and visitors, unless otherwise indicated. No care partners/visitors under 18 years of age are allowed in the infusion room.                      Vital signs:  Temp:  [97.8 °F (36.6 °C)-98.2 °F (36.8 °C)] 97.8 °F (36.6 °C)  Pulse:  [58-84] 73  Resp:  [18] 18  BP: (116-137)/(50-77) 137/70  SpO2:  [97 %-99 %] 98 %    Physical Exam:    General: No acute distress.   Respiratory: Clear to auscultation bilaterally. No wheezes. No rhonchi.  Cardiovascular: S1, S2. Regular rate and rhythm. No murmurs, rubs or gallops.   Abdomen: Soft, nontender, nondistended.   Neurologic: No focal neurological deficits.   Musculoskeletal: Moves all extremities.  Extremities: No edema.  -----------------------------------------------------------------------------------------------  PATIENT DISCHARGE INSTRUCTIONS: See electronic chart    Evan Wong MD    Time spent:   31 minutes      Electronically signed by Evan Wong MD on 10/11/2024  7:17 PM         Medication Administration Report  for BrayanLiangian as of 10/02/24 through 10/11/24  Legend:       Medications 10/02 10/03 10/04 10/05 10/06 10/07 10/08 10/09 10/10 10/11   Completed Medications   cyanocobalamin (Vitamin B12) 1000 MCG/ML injection 1,000 mcg  Dose: 1,000 mcg  Freq: Once Route: IM  Start: 10/08/24 0845 End: 10/08/24 2540 43076           heparin (Porcine) 1000 UNIT/ML injection - BOLUS IV 5,000 Units  Dose: 5,000 Units  Freq: Once Route: IV  Start: 10/06/24 7950 End: 10/06/24 1814   Admin Instructions:   BOLUS dose for Heparin ACS/A-fib  Maximum bolus dose = 5000 units        64383             heparin (Porcine) 89267 units/250 mL infusion (ACS/AFIB) INITIAL DOSE  Dose: 1,000 Units/hr  Freq: Once Route: IV  Start: 10/06/24 1730 End: 10/06/24 2330   Admin Instructions:   Heparin for ACS/Afib  Max  initial heparin dose is 1000 units/hour        31282     94091     2330 [C]5             influenza virus trivalent high dose PF (Fluzone HD) 0.5 mL injection (ages >/= 65 years) 0.5 mL  Dose: 0.5 mL  Freq: Prior to discharge Route: IM  PRN Reason: immunization  Start: 10/06/24 1533 End: 10/11/24 1400   Admin Instructions:   Shake Well.             04161        iopamidol 76% (ISOVUE-370) injection for power injector  Dose: 100 mL  Freq: IMG once as needed Route: IV  PRN Reason: contrast  Start: 10/09/24 1202 End: 10/09/24 1202           84784          magnesium oxide (Mag-Ox) tab 400 mg  Dose: 400 mg  Freq: Once Route: OR  Start: 10/10/24 1700 End: 10/10/24 1743            80951         magnesium oxide (Mag-Ox) tab 400 mg  Dose: 400 mg  Freq: Once Route: OR  Start: 10/09/24 1500 End: 10/09/24 1507           77758          magnesium oxide (Mag-Ox) tab 800 mg  Dose: 800 mg  Freq: Once Route: OR  Start: 10/11/24 0730 End: 10/11/24 0915             626601        magnesium oxide (Mag-Ox) tab 800 mg  Dose: 800 mg  Freq: Once Route: OR  Start: 10/07/24 0745 End: 10/07/24 0819         268108            magnesium sulfate 4 g/100mL IVPB premix 4 g  Dose: 4 g  Freq: Once Route: IV  Start: 10/08/24 0745 End: 10/08/24 1226          595655           ondansetron (Zofran) 4 MG/2ML injection 4 mg  Dose: 4 mg  Freq: Once Route: IV  Start: 10/06/24 1305 End: 10/06/24 1321        727306             pantoprazole (Protonix) 40 mg in sodium chloride 0.9% PF 10 mL IV push  Dose: 40 mg  Freq: Once Route: IV  Start: 10/06/24 1350 End: 10/06/24 1402   Admin Instructions:   Dilute with 10 ml NS; IV push over 2 minutes        426900             sodium chloride 0.9 % IV bolus 1,000 mL  Dose: 1,000 mL  Freq: Once Route: IV  Start: 10/06/24 1340 End: 10/06/24 1501        113018     520774     543245              sodium chloride 0.9 % IV bolus 500 mL  Dose: 500 mL  Freq: Once Route: IV  Start: 10/06/24 1305 End: 10/06/24 1339        052760      495825             Followed by   sodium chloride 0.9% infusion  Rate: 125 mL/hr  Freq: Continuous Route: IV  Start: 10/06/24 1404 End: 10/06/24 1339                sodium chloride 0.9% infusion 1,000 mL  Dose: 1,000 mL  Freq: Once Route: IV  Start: 10/06/24 1340 End: 10/06/24 1747        439708     906454     457135             warfarin (Coumadin) tab 5 mg  Dose: 5 mg  Freq: Once at night Route: OR  Start: 10/09/24 2100 End: 10/09/24 2108   Admin Instructions:   CAUTION: REPRODUCTIVE RISK           [id number]          Discontinued Medications   Medications 10/02 10/03 10/04 10/05 10/06 10/07 10/08 10/09 10/10 10/11   acetaminophen (Tylenol Extra Strength) tab 500 mg  Dose: 500 mg  Freq: Every 4 hours PRN Route: OR  PRN Reason: Fever  PRN Comment: equal to or greater than 100.4  Start: 10/06/24 1549 End: 10/11/24 1634   Admin Instructions:   do not initiate oral therapy until 6-8 hours after the last IV acetaminophen dose if IV acetaminophen was used previously          514298      296611          atorvastatin (Lipitor) tab 20 mg  Dose: 20 mg  Freq: Nightly Route: OR  Start: 10/06/24 2100 End: 10/11/24 1634        584509      305172      660413      106949      766934         bisacodyl (Dulcolax) 10 MG rectal suppository 10 mg  Dose: 10 mg  Freq: Daily as needed Route: RE  PRN Reason: constipation  Start: 10/06/24 1549 End: 10/11/24 1634   Admin Instructions:   Use after MiraLAX and Senokot.  Use prior to Fleet's Enema.                cetirizine (ZyrTEC) tab 10 mg  Dose: 10 mg  Freq: Daily Route: OR  Start: 10/07/24 0930 End: 10/11/24 9390 [phone])33 465803 535193 126544      762404                                                                                                          dilTIAZem ER (Dilacor XR) 24 hr cap 120 mg  Dose: 120 mg  Freq: Daily Route: OR  Start: 10/07/24 0930 End: 10/11/24 1634   Admin Instructions:   Swallow whole.  Do not chew, break or crush.         [phone])85       678237      154937      473197      156092        diphenhydrAMINE-zinc (Benadryl-Zinc) 2-0.1 % cream 1 Application  Dose: 1 Application  Freq: 3 times daily PRN Route: TOP  PRN Reason: Itching  Start: 10/08/24 0934 End: 10/11/24 1634   Order specific questions:             098806           enoxaparin (Lovenox) 100 MG/ML SUBQ injection 100 mg  Dose: 1 mg/kg  Weight Dosing Info: 101.2 kg  Freq: 2 times per day Route: SC  Start: 10/11/24 1300 End: 10/11/24 1634             683331                                               fentaNYL (Sublimaze) 50 mcg/mL injection 50 mcg  Dose: 50 mcg  Freq: Every 5 min PRN Route: IV  PRN Comment: sedation  Start: 10/09/24 1030 End: 10/09/24 1221   Admin Instructions:   Maximum dose 150 mcg, then consult Radiologist.           738470     963372                                                 folic acid (Folvite) tab 1 mg  Dose: 1 mg  Freq: Daily Route: OR  Start: 10/07/24 0930 End: 10/11/24 1634         0930)46      771212      288960      052966      330485        gabapentin (Neurontin) cap 300 mg  Dose: 300 mg  Freq: Daily Route: OR  Start: 10/07/24 0930 End: 10/11/24 1634         0930)51      865615      344282      770297      895583                                             heparin (Porcine) 96697 units/250mL infusion ACS/AFIB CONTINUOUS  Rate: 2-30 mL/hr Dose: 200-3000 Units/hr  Freq: Continuous Route: IV  Start: 10/06/24 2330 End: 10/11/24 1100   Admin Instructions:   Heparin for ACS/Afib        2330 [C]58      233236     566422     969509      109587     237369      552396     1600) [C]65     267337     923309      871999     221105     [id number] 132371     008946                     HYDROmorphone (Dilaudid) 1 MG/ML injection 0.5 mg  Dose: 0.5 mg  Freq: Every 30 min PRN Route: IV  PRN Reason: severe pain  Start: 10/06/24 1304 End: 10/11/24 1636        339735                          metoclopramide (Reglan) 5 mg/mL injection 5 mg  Dose: 5 mg  Freq: Every 8 hours PRN  Route: IV  PRN Reasons: Nausea,vomiting  Start: 10/06/24 1549 End: 10/11/24 1634   Admin Instructions:   Default antiemetic sequence (unless otherwise preferred by patient):  1. ondansetron (Zofran) 2. metoclopramide (Reglan)  Wait 15 minutes before proceeding to next medication in sequence.  Follow therapeutic duplication policy           [id number]          midazolam (Versed) 2 MG/2ML injection 1 mg  Dose: 1 mg  Freq: Every 5 min PRN Route: IV  PRN Reason: sedation  Start: 10/09/24 1030 End: 10/09/24 1221   Admin Instructions:   Maximum dose 10 mg, then consult Radiologist. Administer by slow IV injection over at least 2 mins.           420491     132195                                                 pantoprazole (Protonix) DR tab 40 mg  Dose: 40 mg  Freq: Every morning before breakfast Route: OR  Start: 10/07/24 0700 End: 10/11/24 1634   Admin Instructions:   Do not crush         105831      8208170 (0700)82 651926      144831                                  sertraline (Zoloft) tab 200 mg  Dose: 200 mg  Freq: Daily Route: OR  Start: 10/07/24 0930 End: 10/11/24 1634         0930)86 074404 272586 647391      478333        sodium bicarbonate 150 mEq in sterile water 1,000 mL infusion  Rate: 42 mL/hr Dose: 150 mEq  Freq: Continuous Route: IV  Start: 10/09/24 1300 End: 10/11/24 1634           776525      1052 [C]89     808143     073397      939485     612285                     sodium chloride 0.9% infusion  Rate: 10 mL/hr  Freq: Continuous Route: IV  Start: 10/09/24 1045 End: 10/09/24 1221           1042 [C]94     625145          sodium chloride 0.9% infusion  Rate: 100 mL/hr  Freq: Continuous Route: IV  Start: 10/06/24 1600 End: 10/07/24 1603        645668      355885                         sodium ferric gluconate (Ferrlecit) 125 mg in sodium chloride 0.9% 100mL IVPB premix  Dose: 125 mg  Freq: Daily Route: IV  Start: 10/07/24 0800 End: 10/11/24 1634         390039      4012145       8930006      8733096      6408138     9771818        sucralfate (Carafate) 1 GM/10ML oral suspension 1 g  Dose: 1 g  Freq: 3 times daily before meals and nightly (2200) Route: OR  Start: 10/06/24 1730 End: 10/11/24 1634   Admin Instructions:   Administer on empty stomach. Separate administration from other oral medications by 2 hours if possible        [id number]     5171951      5510904     (1100)108     2534169     1962287      4245604     7737996     9921054     2288893      (0700)115     9228684     0387990     6747748      0605951     6504663     3009283     3454086      9431473     5061939     125                     Medications 10/02 10/03 10/04 10/05 10/06 10/07 10/08 10/09 10/10 10/11

## 2024-10-14 NOTE — PROGRESS NOTES
Hospital follow up.    Last A1C Value: 8.8% Date: [8/19/2024]    Ema Arora,   Internal Medicine  [facility]  [address] [facility] [address]  [place] [address]  [phone]    Attempt #1:  Left message on voicemail for patient to call transitions specialist back to schedule follow up appointments. Provided Trans[facility] specialist scheduling phone number [phone].

## 2024-10-15 ENCOUNTER — OFFICE VISIT (OUTPATIENT)
Dept: HEMATOLOGY/ONCOLOGY | Facility: HOSPITAL | Age: 69
End: 2024-10-15
Attending: INTERNAL MEDICINE
Payer: COMMERCIAL

## 2024-10-15 VITALS
RESPIRATION RATE: 18 BRPM | HEIGHT: 70 IN | TEMPERATURE: 97 F | DIASTOLIC BLOOD PRESSURE: 77 MMHG | BODY MASS INDEX: 30.75 KG/M2 | HEART RATE: 88 BPM | WEIGHT: 214.81 LBS | OXYGEN SATURATION: 97 % | SYSTOLIC BLOOD PRESSURE: 135 MMHG

## 2024-10-15 VITALS — DIASTOLIC BLOOD PRESSURE: 68 MMHG | SYSTOLIC BLOOD PRESSURE: 145 MMHG | HEART RATE: 80 BPM

## 2024-10-15 DIAGNOSIS — L03.90 CELLULITIS, UNSPECIFIED CELLULITIS SITE: ICD-10-CM

## 2024-10-15 DIAGNOSIS — D50.9 IRON DEFICIENCY ANEMIA, UNSPECIFIED IRON DEFICIENCY ANEMIA TYPE: Primary | ICD-10-CM

## 2024-10-15 DIAGNOSIS — G89.3 NEOPLASM RELATED PAIN (ACUTE) (CHRONIC): ICD-10-CM

## 2024-10-15 DIAGNOSIS — D50.9 IRON DEFICIENCY ANEMIA, UNSPECIFIED IRON DEFICIENCY ANEMIA TYPE: ICD-10-CM

## 2024-10-15 DIAGNOSIS — C25.9 PANCREATIC ADENOCARCINOMA (HCC): Primary | ICD-10-CM

## 2024-10-15 LAB
BASOPHILS # BLD AUTO: 0.05 X10(3) UL (ref 0–0.2)
BASOPHILS NFR BLD AUTO: 0.9 %
DEPRECATED HBV CORE AB SER IA-ACNC: 270 NG/ML
EOSINOPHIL # BLD AUTO: 0.15 X10(3) UL (ref 0–0.7)
EOSINOPHIL NFR BLD AUTO: 2.6 %
ERYTHROCYTE [DISTWIDTH] IN BLOOD BY AUTOMATED COUNT: 20.1 %
HCT VFR BLD AUTO: 32.6 %
HGB BLD-MCNC: 10.3 G/DL
IMM GRANULOCYTES # BLD AUTO: 0.03 X10(3) UL (ref 0–1)
IMM GRANULOCYTES NFR BLD: 0.5 %
IRON SATN MFR SERPL: 14 %
IRON SERPL-MCNC: 57 UG/DL
LYMPHOCYTES # BLD AUTO: 0.66 X10(3) UL (ref 1–4)
LYMPHOCYTES NFR BLD AUTO: 11.4 %
MCH RBC QN AUTO: 28.3 PG (ref 26–34)
MCHC RBC AUTO-ENTMCNC: 31.6 G/DL (ref 31–37)
MCV RBC AUTO: 89.6 FL
MONOCYTES # BLD AUTO: 0.31 X10(3) UL (ref 0.1–1)
MONOCYTES NFR BLD AUTO: 5.4 %
NEUTROPHILS # BLD AUTO: 4.57 X10 (3) UL (ref 1.5–7.7)
NEUTROPHILS # BLD AUTO: 4.57 X10(3) UL (ref 1.5–7.7)
NEUTROPHILS NFR BLD AUTO: 79.2 %
PLATELET # BLD AUTO: 125 10(3)UL (ref 150–450)
PLATELETS.RETICULATED NFR BLD AUTO: 1.7 % (ref 0–7)
RBC # BLD AUTO: 3.64 X10(6)UL
TOTAL IRON BINDING CAPACITY: 396 UG/DL (ref 250–425)
TRANSFERRIN SERPL-MCNC: 313 MG/DL (ref 215–365)
WBC # BLD AUTO: 5.8 X10(3) UL (ref 4–11)

## 2024-10-15 PROCEDURE — 99215 OFFICE O/P EST HI 40 MIN: CPT | Performed by: INTERNAL MEDICINE

## 2024-10-15 PROCEDURE — 96365 THER/PROPH/DIAG IV INF INIT: CPT

## 2024-10-15 RX ORDER — HYDROCODONE BITARTRATE AND ACETAMINOPHEN 5; 325 MG/1; MG/1
1 TABLET ORAL
Qty: 60 TABLET | Refills: 0 | Status: SHIPPED | OUTPATIENT
Start: 2024-10-15

## 2024-10-15 RX ORDER — CEFDINIR 300 MG/1
300 CAPSULE ORAL 2 TIMES DAILY
Qty: 10 CAPSULE | Refills: 0 | Status: SHIPPED | OUTPATIENT
Start: 2024-10-15

## 2024-10-15 NOTE — PROGRESS NOTES
Hematology/Oncology Clinic Follow Up Visit    Patient Name: Liang Schmidt  Medical Record Number: [id number]    YOB: 1955   PCP: Ema Arora DO  Other providers: Dr Juan Cedeno    Reason for Consultation:  Liang Schmidt was seen today for the diagnosis of anemia.    Oncologic History:  7157-9328: persistent ASHLEY due to bleeding and gastric bypass requiring frequent IV iron infusions  - several weeks of slowly worsening epigastric pain, initially attributed to bleeding anastomotic ulcers  10/6/24: CT C/A/P w/o contrast with bilateral lung nodules, pancreatic mass at uncinate process  10/9/24: CT C/A/P w/ contrast with bilateral pulmonary nodules, pancreatic mass  10/9/24: s/p biopsy of RUL nodule with adenocarcinoma consistent with pancreatic origin    History of Present Illness:      70 y/o M PMH grade 4 hemorrhoidal disease s/p 2 hemorrhoidectomies, hx AVR on long term warfarin, hx trang-en-Y, metastatic pancreatic cancer here for follow-up.    - here to discuss biopsy result  - here with daughters Margo  - has persistent epigastric pain  - poor appetite  - notes increasing pain and erythema over his left arm where he had IV site    Past Medical History:  Past Medical History:    Anemia, chronic disease    Anesthesia complication    HX: OF AGGRESSION W/PAST PROCEDURES D/T INSUFFICIENT ANESTHESIA IN THE PAST?    Anticoagulated on warfarin    Arthritis    Nothing good to say    Calculus of kidney    Nothing major    Cancer (HCC)    skin cancer    Cellulitis    Diabetes (HCC)    Essential hypertension    Hearing impairment    High blood pressure    Hyperlipidemia    Obesity    Osteoarthritis    Pancreatitis (HCC)    Sepsis (HCC)    Shortness of breath    INTERMITTENT SOB ON EXERTION    Visual impairment    CONTACTS/GLASSES     Past Surgical History:   Procedure Laterality Date    Anast panc cyst-bowel,trang-en-y      Cabg      Cath transcatheter aortic valve  replacement      Cholecystectomy  8 yrs ago    Following acute pancreatitis    Colonoscopy  Many times    Colonoscopy N/A 3/5/2024    Procedure: COLONOSCOPY;  Surgeon: Zenon Kelley DO;  Location:  ENDOSCOPY    Hemorrhoidectomy  1 yr ago    Not very successful to be repeated    Other surgical history  20 yrs ago    Aortic valve replacement. Metal valve now    Removal gallbladder      Replace aortic valve open      performed in [place]    Skin surgery      Tonsillectomy      Vasectomy  20 yrs ago       Home Medications:  No outpatient medications have been marked as taking for the 10/15/24 encounter (Appointment) with Elvin Carr MD.       Allergies:   No Known Allergies    Psychosocial History:  Social History     Socioeconomic History    Marital status: Single     Spouse name: Not on file    Number of children: Not on file    Years of education: Not on file    Highest education level: Not on file   Occupational History    Not on file   Tobacco Use    Smoking status: Former     Current packs/day: 0.00     Average packs/day: 1 pack/day for 20.0 years (20.0 ttl pk-yrs)     Types: Cigarettes     Start date: 1967     Quit date: 1987     Years since quittin.8    Smokeless tobacco: Never    Tobacco comments:     No comment   Vaping Use    Vaping status: Never Used   Substance and Sexual Activity    Alcohol use: Never    Drug use: Never    Sexual activity: Not on file   Other Topics Concern    Caffeine Concern No    Exercise No    Seat Belt No    Special Diet No    Stress Concern No    Weight Concern Yes   Social History Narrative    Not on file     Social Drivers of Health     Financial Resource Strain: Not on file   Food Insecurity: No Food Insecurity (10/6/2024)    Food Insecurity     Food Insecurity: Never true   Transportation Needs: No Transportation Needs (10/6/2024)    Transportation Needs     Lack of Transportation: No     Car Seat: Not on file   Physical Activity: Not on file   Stress: Not  on file   Social Connections: Not on file   Housing Stability: Low Risk  (10/6/2024)    Housing Stability     Housing Instability: No     Housing Instability Emergency: Not on file     Crib or Bassinette: Not on file       Family Medical History:  No family history on file.    Review of Systems:  A 10-point ROS was done with pertinent positives and negative per the HPI    Vital Signs:  Height: --  Weight: --  BSA (Calculated - sq m): --  Pulse: --  BP: --  Temp: --  Do Not Use - Resp Rate: --  SpO2: --    Wt Readings from Last 6 Encounters:   10/06/24 101.2 kg (223 lb)   10/01/24 101.5 kg (223 lb 12.8 oz)   09/26/24 105.6 kg (232 lb 12.8 oz)   09/23/24 106.9 kg (235 lb 10.8 oz)   09/04/24 106.1 kg (233 lb 12.8 oz)   09/01/24 105.9 kg (233 lb 7.5 oz)       Physical Examination:  General: Patient is alert and oriented, not in acute distress  Psych: Mood and affect are appropriate  Eyes: EOMI, PERRL  ENT: Oropharynx is clear, no adenopathy  CV: no LE edema  Respiratory: Non-labored respirations  GI/Abd: Soft, non-tender   Neurological: Grossly intact   Skin: no rashes or petechiae      Laboratory:  Lab Results   Component Value Date    WBC 5.4 10/09/2024    WBC 4.5 10/09/2024    WBC 6.0 10/08/2024    HGB 8.7 (L) 10/09/2024    HGB 8.1 (L) 10/09/2024    HGB 8.1 (L) 10/08/2024    HCT 27.3 (L) 10/09/2024    MCV 87.5 10/09/2024    MCH 27.9 10/09/2024    MCHC 31.9 10/09/2024    RDW 21.4 10/09/2024    .0 (L) 10/09/2024    .0 (L) 10/09/2024    .0 (L) 10/09/2024     Lab Results   Component Value Date     (H) 10/10/2024    BUN 10 10/10/2024    CREATSERUM 1.43 (H) 10/10/2024    CREATSERUM 1.61 (H) 10/09/2024    CREATSERUM 1.50 (H) 10/09/2024    ANIONGAP 10 10/10/2024    CA 8.6 (L) 10/10/2024    OSMOCALC 285 10/10/2024    ALKPHO 89 10/07/2024    AST 56 (H) 10/07/2024    ALT 50 (H) 10/07/2024    BILT 0.2 10/07/2024    TP 6.0 10/07/2024    ALB 3.6 10/07/2024    GLOBULIN 2.4 10/07/2024     10/10/2024     K 3.9 10/10/2024     10/10/2024    CO2 19.0 (L) 10/10/2024     Lab Results   Component Value Date    PTT 62.9 (H) 10/11/2024    INR 1.81 (H) 10/11/2024       Impression & Plan:     Metastatic pancreatic cancer  - we discussed diagnosis, incurable prognosis and palliative intent of treatment  - we discussed treatment option of 1L FOLFIRINOX if he desires treatment. We discussed side effects including but not limited to low blood counts, n/v/d, neuropathy. The risk of him bleeding is higher on anticoagulation so platelet count will need to be watched closely  - Devante desires palliative care route. IV iron infusions make him feel much better and hospice will close off these infusions to him. At the time that his cancer associated symptoms begin to outweigh the benefit from iron infusions, he can then decide to transition to hospice  - will place hospice referral for him for informational meeting  - outpatient palliative care referral  - we discussed POLST; he signed DNAR/select  - send germline testing; discussed implications for cascade screening for his daughters. His brother  from pancreatic cancer  - he will likely pursue palliative route but wants to think about it further. Asked him to take the week to decide and will reach out to him next Monday    L arm cellulitis  - possible early cellulitis. Start cefdinir 300mg BID x 5 days    Cancer associated pain  - start 5-325mg q4-6h PRN Norco  - see palliative care    Iron deficiency anemia  - Discussed with Devante that he is a mess; he has persistent iron deficiency anemia secondary to chronic blood loss from both his hemorrhoidal bleeding and anastomotic ulcers which is exacerbated by the fact that he is on warfarin which cannot be stopped because of his mechanical valve. This same mechanical valve is likely causing also some mechanical hemolysis of his red blood cells as well as his platelets. With chronic thrombocytopenia, this exacerbates his bleeding.  In addition, he has a gastric bypass that prevents him from absorbing iron so he will need to be on intravenous iron for life  - he is persistently iron deficient. IV iron dextran 1000mg [id number]  - check CBC/iron/tibc/ferritin [id number] to trend CBC and iron levels  - his hemoglobin and iron levels are improving    Vitamin B12 deficiency  Folic acid deficiency  - continue daily folic acid 1mg daily as well as monthly vitamin B12 lifelong    Thrombocytopenia  - likely caused by mechanical valve destruction. Mild, chronic, can monitor for now.   - vit B12 and folate stores are replete. Prior CT A/P showed normal liver. CMP normal liver function.     Hx mechanical valve surgery  - on coumadin with INRs monitored by PCP. Goal INR 2.5-3.5    Follow up: 4 weeks    Elvin Carr  Hematology/Medical Oncology  [facility]

## 2024-10-15 NOTE — PROGRESS NOTES
Pt here for Infed . Pt denies any issues or concerns.      Ordering Provider: Dr. Carr  Order Exp: ongoing     Pt tolerated infusion without difficulty or complaint. Reviewed next apt date/time: yes      Education Record  Learner:  Patient and Family Member  Disease / Diagnosis: iron deficiency anemia  Barriers / Limitations:  None  Method:  Brief focused and Discussion  General Topics:  Medication and Plan of care reviewed  Outcome:  Shows understanding

## 2024-10-15 NOTE — PROGRESS NOTES
Hospital follow up.    Last A1C Value: 8.8% Date: [8/19/2024]    Ema Arora,   Internal Medicine  [facility]  [address]  [place] [address] [phone]    Patient does not wish to follow up.  Confirmed with patient.    Closing encounter.

## 2024-10-16 ENCOUNTER — SOCIAL WORK SERVICES (OUTPATIENT)
Dept: HEMATOLOGY/ONCOLOGY | Facility: HOSPITAL | Age: 69
End: 2024-10-16

## 2024-10-16 NOTE — PROGRESS NOTES
MD requesting arrangement of a hospice informational meeting. VLADIMIR faxed referral to [facility] (sushila Louie) ph: [phone], fax: [phone].

## 2024-10-18 ENCOUNTER — PATIENT MESSAGE (OUTPATIENT)
Dept: INTERNAL MEDICINE CLINIC | Facility: CLINIC | Age: 69
End: 2024-10-18

## 2024-10-18 ENCOUNTER — SOCIAL WORK SERVICES (OUTPATIENT)
Dept: HEMATOLOGY/ONCOLOGY | Facility: HOSPITAL | Age: 69
End: 2024-10-18

## 2024-10-18 NOTE — PROGRESS NOTES
VLADIMIR notified from [facility] (sushila Louie) ph: [phone], fax: [phone] that pt has chosen to start with Palliative care so he can continue with iron infusions.  Dr. Carr/RN notified.

## 2024-10-23 ENCOUNTER — DOCUMENTATION ONLY (OUTPATIENT)
Dept: HEMATOLOGY/ONCOLOGY | Facility: HOSPITAL | Age: 69
End: 2024-10-23

## 2024-10-29 ENCOUNTER — OFFICE VISIT (OUTPATIENT)
Dept: HEMATOLOGY/ONCOLOGY | Facility: HOSPITAL | Age: 69
End: 2024-10-29
Attending: INTERNAL MEDICINE
Payer: COMMERCIAL

## 2024-10-29 VITALS
SYSTOLIC BLOOD PRESSURE: 106 MMHG | DIASTOLIC BLOOD PRESSURE: 58 MMHG | TEMPERATURE: 97 F | BODY MASS INDEX: 29.49 KG/M2 | HEIGHT: 70 IN | OXYGEN SATURATION: 99 % | HEART RATE: 98 BPM | RESPIRATION RATE: 18 BRPM | WEIGHT: 206 LBS

## 2024-10-29 VITALS
DIASTOLIC BLOOD PRESSURE: 74 MMHG | TEMPERATURE: 97 F | HEART RATE: 85 BPM | SYSTOLIC BLOOD PRESSURE: 120 MMHG | RESPIRATION RATE: 16 BRPM | OXYGEN SATURATION: 99 %

## 2024-10-29 DIAGNOSIS — D50.9 IRON DEFICIENCY ANEMIA, UNSPECIFIED IRON DEFICIENCY ANEMIA TYPE: ICD-10-CM

## 2024-10-29 DIAGNOSIS — C25.9 PANCREATIC ADENOCARCINOMA (HCC): Primary | ICD-10-CM

## 2024-10-29 DIAGNOSIS — D50.9 IRON DEFICIENCY ANEMIA, UNSPECIFIED IRON DEFICIENCY ANEMIA TYPE: Primary | ICD-10-CM

## 2024-10-29 DIAGNOSIS — Z51.5 PALLIATIVE CARE BY SPECIALIST: ICD-10-CM

## 2024-10-29 DIAGNOSIS — C25.9 PANCREATIC ADENOCARCINOMA (HCC): ICD-10-CM

## 2024-10-29 DIAGNOSIS — G89.3 NEOPLASM RELATED PAIN: Primary | ICD-10-CM

## 2024-10-29 LAB
BASOPHILS # BLD AUTO: 0.04 X10(3) UL (ref 0–0.2)
BASOPHILS NFR BLD AUTO: 0.5 %
DEPRECATED HBV CORE AB SER IA-ACNC: 401 NG/ML
EOSINOPHIL # BLD AUTO: 0.09 X10(3) UL (ref 0–0.7)
EOSINOPHIL NFR BLD AUTO: 1.1 %
ERYTHROCYTE [DISTWIDTH] IN BLOOD BY AUTOMATED COUNT: 17.9 %
HCT VFR BLD AUTO: 36 %
HGB BLD-MCNC: 12.1 G/DL
IMM GRANULOCYTES # BLD AUTO: 0.03 X10(3) UL (ref 0–1)
IMM GRANULOCYTES NFR BLD: 0.4 %
IRON SATN MFR SERPL: 9 %
IRON SERPL-MCNC: 28 UG/DL
LYMPHOCYTES # BLD AUTO: 0.91 X10(3) UL (ref 1–4)
LYMPHOCYTES NFR BLD AUTO: 10.9 %
MCH RBC QN AUTO: 28.7 PG (ref 26–34)
MCHC RBC AUTO-ENTMCNC: 33.6 G/DL (ref 31–37)
MCV RBC AUTO: 85.5 FL
MONOCYTES # BLD AUTO: 0.59 X10(3) UL (ref 0.1–1)
MONOCYTES NFR BLD AUTO: 7 %
NEUTROPHILS # BLD AUTO: 6.72 X10 (3) UL (ref 1.5–7.7)
NEUTROPHILS # BLD AUTO: 6.72 X10(3) UL (ref 1.5–7.7)
NEUTROPHILS NFR BLD AUTO: 80.1 %
PLATELET # BLD AUTO: 132 10(3)UL (ref 150–450)
PLATELETS.RETICULATED NFR BLD AUTO: 3 % (ref 0–7)
RBC # BLD AUTO: 4.21 X10(6)UL
TOTAL IRON BINDING CAPACITY: 300 UG/DL (ref 250–425)
TRANSFERRIN SERPL-MCNC: 213 MG/DL (ref 215–365)
WBC # BLD AUTO: 8.4 X10(3) UL (ref 4–11)

## 2024-10-29 PROCEDURE — 96365 THER/PROPH/DIAG IV INF INIT: CPT

## 2024-10-29 PROCEDURE — 83550 IRON BINDING TEST: CPT

## 2024-10-29 PROCEDURE — 99215 OFFICE O/P EST HI 40 MIN: CPT | Performed by: INTERNAL MEDICINE

## 2024-10-29 PROCEDURE — 83540 ASSAY OF IRON: CPT

## 2024-10-29 PROCEDURE — 82728 ASSAY OF FERRITIN: CPT

## 2024-10-29 PROCEDURE — 85025 COMPLETE CBC W/AUTO DIFF WBC: CPT

## 2024-10-29 NOTE — PROGRESS NOTES
Hematology/Oncology Clinic Follow Up Visit    Patient Name: Liang Schmidt  Medical Record Number: [id number]    YOB: 1955   PCP: Ema Arora DO  Other providers: Dr Juan Cedeno    Reason for Consultation:  Liang Schmidt was seen today for the diagnosis of anemia.    Oncologic History:  [id number]: persistent ASHLEY due to bleeding and gastric bypass requiring frequent IV iron infusions  - several weeks of slowly worsening epigastric pain, initially attributed to bleeding anastomotic ulcers  10/6/24: CT C/A/P w/o contrast with bilateral lung nodules, pancreatic mass at uncinate process  10/9/24: CT C/A/P w/ contrast with bilateral pulmonary nodules, pancreatic mass  10/9/24: s/p biopsy of RUL nodule with adenocarcinoma consistent with pancreatic origin    History of Present Illness:      68 y/o M PMH grade 4 hemorrhoidal disease s/p 2 hemorrhoidectomies, hx AVR on long term warfarin, hx trang-en-Y, metastatic pancreatic cancer here for follow-up.    - here with daughters  - his 3rd daughter is visiting next week from [place]  - fatigue has not improved that much from last iron infusion  - daughters report that he has approval to be in hospice and still get iron infusions  - takes Norco about a few times a day      Past Medical History:  Past Medical History:    Anemia, chronic disease    Anesthesia complication    HX: OF AGGRESSION W/PAST PROCEDURES D/T INSUFFICIENT ANESTHESIA IN THE PAST?    Anticoagulated on warfarin    Arthritis    Nothing good to say    Calculus of kidney    Nothing major    Cancer (HCC)    skin cancer    Cellulitis    Diabetes (HCC)    Essential hypertension    Hearing impairment    High blood pressure    Hyperlipidemia    Obesity    Osteoarthritis    Pancreatitis (HCC)    Sepsis (HCC)    Shortness of breath    INTERMITTENT SOB ON EXERTION    Visual impairment    CONTACTS/GLASSES     Past Surgical History:   Procedure Laterality Date    Anast panc  cyst-bowel,trang-en-y      Cabg      Cath transcatheter aortic valve replacement      Cholecystectomy  8 yrs ago    Following acute pancreatitis    Colonoscopy  Many times    Colonoscopy N/A 3/5/2024    Procedure: COLONOSCOPY;  Surgeon: Zenon Kelley DO;  Location:  ENDOSCOPY    Hemorrhoidectomy  1 yr ago    Not very successful to be repeated    Other surgical history  20 yrs ago    Aortic valve replacement. Metal valve now    Removal gallbladder      Replace aortic valve open      performed in [place]    Skin surgery      Tonsillectomy      Vasectomy  20 yrs ago       Home Medications:  No outpatient medications have been marked as taking for the 10/29/24 encounter (Office Visit) with Elvin Carr MD.       Allergies:   No Known Allergies    Psychosocial History:  Social History     Socioeconomic History    Marital status: Single     Spouse name: Not on file    Number of children: Not on file    Years of education: Not on file    Highest education level: Not on file   Occupational History    Not on file   Tobacco Use    Smoking status: Former     Current packs/day: 0.00     Average packs/day: 1 pack/day for 20.0 years (20.0 ttl pk-yrs)     Types: Cigarettes     Start date: 1967     Quit date: 1987     Years since quittin.8    Smokeless tobacco: Never    Tobacco comments:     No comment   Vaping Use    Vaping status: Never Used   Substance and Sexual Activity    Alcohol use: Never    Drug use: Never    Sexual activity: Not on file   Other Topics Concern    Caffeine Concern No    Exercise No    Seat Belt No    Special Diet No    Stress Concern No    Weight Concern Yes   Social History Narrative    Not on file     Social Drivers of Health     Financial Resource Strain: Not on file   Food Insecurity: No Food Insecurity (10/6/2024)    Food Insecurity     Food Insecurity: Never true   Transportation Needs: No Transportation Needs (10/6/2024)    Transportation Needs     Lack of Transportation: No      Car Seat: Not on file   Physical Activity: Not on file   Stress: Not on file   Social Connections: Not on file   Housing Stability: Low Risk  (10/6/2024)    Housing Stability     Housing Instability: No     Housing Instability Emergency: Not on file     Crib or Bassinette: Not on file       Family Medical History:  No family history on file.    Review of Systems:  A 10-point ROS was done with pertinent positives and negative per the HPI    Vital Signs:  Height: 177.8 cm (5' 10\") (10/29 1320)  Weight: 93.4 kg (206 lb) (10/29 1320)  BSA (Calculated - sq m): 2.11 sq meters (10/29 1320)  Pulse: 98 (10/29 1320)  BP: 106/58 (10/29 1320)  Temp: 97.1 °F (36.2 °C) (10/29 1320)  Do Not Use - Resp Rate: --  SpO2: 99 % (10/29 1320)    Wt Readings from Last 6 Encounters:   10/29/24 93.4 kg (206 lb)   10/15/24 97.4 kg (214 lb 12.8 oz)   10/06/24 101.2 kg (223 lb)   10/01/24 101.5 kg (223 lb 12.8 oz)   09/26/24 105.6 kg (232 lb 12.8 oz)   09/23/24 106.9 kg (235 lb 10.8 oz)       Physical Examination:  General: Patient is alert and oriented, not in acute distress  Psych: Mood and affect are appropriate  Eyes: EOMI, PERRL  ENT: Oropharynx is clear, no adenopathy  CV: no LE edema  Respiratory: Non-labored respirations  GI/Abd: Soft, non-tender   Neurological: Grossly intact   Skin: no rashes or petechiae      Laboratory:  Lab Results   Component Value Date    WBC 5.8 10/15/2024    WBC 5.4 10/09/2024    WBC 4.5 10/09/2024    HGB 10.3 (L) 10/15/2024    HGB 8.7 (L) 10/09/2024    HGB 8.1 (L) 10/09/2024    HCT 32.6 (L) 10/15/2024    MCV 89.6 10/15/2024    MCH 28.3 10/15/2024    MCHC 31.6 10/15/2024    RDW 20.1 10/15/2024    .0 (L) 10/15/2024    .0 (L) 10/09/2024    .0 (L) 10/09/2024    .0 (L) 10/09/2024     Lab Results   Component Value Date     (H) 10/10/2024    BUN 10 10/10/2024    CREATSERUM 1.43 (H) 10/10/2024    CREATSERUM 1.61 (H) 10/09/2024    CREATSERUM 1.50 (H) 10/09/2024    ANIONGAP 10  10/10/2024    CA 8.6 (L) 10/10/2024    OSMOCALC 285 10/10/2024    ALKPHO 89 10/07/2024    AST 56 (H) 10/07/2024    ALT 50 (H) 10/07/2024    BILT 0.2 10/07/2024    TP 6.0 10/07/2024    ALB 3.6 10/07/2024    GLOBULIN 2.4 10/07/2024     10/10/2024    K 3.9 10/10/2024     10/10/2024    CO2 19.0 (L) 10/10/2024     Lab Results   Component Value Date    PTT 62.9 (H) 10/11/2024    INR 1.81 (H) 10/11/2024       Impression & Plan:     Metastatic pancreatic cancer  - we discussed diagnosis, incurable prognosis and palliative intent of treatment. He has declined treatment and opted for palliative/hospice care  - now in palliative care OP but possibly transitioning to hospice   - sent germline testing; discussed implications for cascade screening for his daughters. His brother  from pancreatic cancer. This is still pending  - we discussed signs and symptoms of expected natural progression of pancreatic cancer to death  - pt and daughters can continue with IV iron infusions [id number] to the point where they can decide to discontinue if they feel that he is not deriving further benefit    Cancer associated pain  - 5-325mg q4-6h PRN Norco; subsequent management to be done by pall care/hospice    Iron deficiency anemia  - he has persistent iron deficiency anemia secondary to chronic blood loss from both his hemorrhoidal bleeding and anastomotic ulcers which is exacerbated by the fact that he is on warfarin which cannot be stopped because of his mechanical valve. This same mechanical valve is likely causing also some mechanical hemolysis of his red blood cells as well as his platelets. With chronic thrombocytopenia, this exacerbates his bleeding. In addition, he has a gastric bypass that prevents him from absorbing iron so he will need to be on intravenous iron for life  - he is persistently iron deficient. IV iron dextran 1000mg [id number]  - check CBC/iron/tibc/ferritin [id number] to trend CBC and iron levels  - his  hemoglobin and iron levels are improving    Vitamin B12 deficiency  Folic acid deficiency  - continue daily folic acid 1mg daily as well as monthly vitamin B12 lifelong    Thrombocytopenia  - likely caused by mechanical valve destruction. Mild, chronic, can monitor for now.   - vit B12 and folate stores are replete. Prior CT A/P showed normal liver. CMP normal liver function.     Hx mechanical valve surgery  - on coumadin with INRs monitored by PCP. Goal INR 2.5-3.5. Discussed he can choose to go off coumadin if he desires.    Follow up: 4 weeks    Elvin Carr  Hematology/Medical Oncology  [facility]

## 2024-10-29 NOTE — CONSULTS
Met with patient and 3 daughters briefly to make sure needs were met for patient.  They recently met with [facility] and are hopeful to transition next week.  In the interim, they will have home based palliative support.    He will continue to receive iron infusions while in hospice as long as benefit is felt.      He is currently using norco 5mg 4 times daily with benefit.  The pain relief is not lasting until next dose.  Instructed him that he can take at 4 hr intervals as needed to maintain pain control.  Hospice can further evaluate effectiveness or need for plan modification.  They think they have enough norco until next [facility] visit.    The family was appreciative and feel comfortable with [facility] managing his symptoms.

## 2024-10-29 NOTE — PROGRESS NOTES
Pt here for INFED . Pt denies any issues or concerns.      Ordering Provider: Dr. Carr  Order Exp: 10/1/25     Pt tolerated infusion without difficulty or complaint. Reviewed next apt date/time: Yes - scheduled every 2 weeks for labs and iron infusion (no need to wait for labs to start iron) per Dr. Carr      Education Record  Learner:  Patient and Family Member  Disease / Diagnosis: Iron Deficiency Anemia  Barriers / Limitations:  None  Method:  Brief focused and Reinforcement  General Topics:  Medication, Side effects and symptom management, Plan of care reviewed, and Fall risk and prevention  Outcome:  Shows understanding  Tolerated iron infusion without any issues. Vital signs taken and recorded after a 5-minute flush. Patient discharged in good condition.

## 2024-10-29 NOTE — PROGRESS NOTES
Education Record    Learner:  Patient and Family Member    Disease / Diagnosis: Anemia, pancreaticCA    Barriers / Limitations:  None   Comments:    Method:  Discussion   Comments:    General Topics:  Medication and Side effects and symptom management   Comments:    Outcome:  Shows understanding   Comments:    Here for iron infusion. Feeling more fatigued, shortness of breath is unchanged. Had intermittent chest pain over the weekend which has resolved now. Low appetite.

## 2024-10-30 DIAGNOSIS — E53.8 VITAMIN B12 DEFICIENCY: ICD-10-CM

## 2024-10-30 RX ORDER — CYANOCOBALAMIN 1000 UG/ML
1000 INJECTION, SOLUTION INTRAMUSCULAR; SUBCUTANEOUS
Qty: 3 ML | Refills: 3 | Status: SHIPPED | OUTPATIENT
Start: 2024-10-30 | End: 2025-01-13

## 2024-11-12 ENCOUNTER — APPOINTMENT (OUTPATIENT)
Dept: HEMATOLOGY/ONCOLOGY | Facility: HOSPITAL | Age: 69
End: 2024-11-12
Attending: INTERNAL MEDICINE
Payer: COMMERCIAL

## 2024-11-22 ENCOUNTER — TELEPHONE (OUTPATIENT)
Dept: HEMATOLOGY/ONCOLOGY | Facility: HOSPITAL | Age: 69
End: 2024-11-22

## 2024-11-22 NOTE — TELEPHONE ENCOUNTER
Called [facility] to let them know that the genetic testing was negative and her and her sisters do not have to worry about anything genetically passed down in regards to cancer.    Left voicemail for her to return my call.

## 2024-11-26 ENCOUNTER — APPOINTMENT (OUTPATIENT)
Dept: HEMATOLOGY/ONCOLOGY | Facility: HOSPITAL | Age: 69
End: 2024-11-26
Attending: INTERNAL MEDICINE
Payer: COMMERCIAL

## 2024-12-10 ENCOUNTER — APPOINTMENT (OUTPATIENT)
Dept: HEMATOLOGY/ONCOLOGY | Facility: HOSPITAL | Age: 69
End: 2024-12-10
Attending: INTERNAL MEDICINE
Payer: COMMERCIAL

## 2024-12-20 DIAGNOSIS — E11.9 TYPE 2 DIABETES MELLITUS WITHOUT COMPLICATION, WITHOUT LONG-TERM CURRENT USE OF INSULIN (HCC): ICD-10-CM

## 2024-12-20 NOTE — TELEPHONE ENCOUNTER
Name from pharmacy: METFORMIN  MG TABLET         Will file in chart as: METFORMIN 500 MG Oral Tab    Sig: TAKE 2 TABLETS BY MOUTH EVERY DAY WITH BREAKFAST    Disp: 180 tablet    Refills: 0 (Pharmacy requested: Not specified)    Start: 12/20/2024    Class: Normal    Non-formulary For: Type 2 diabetes mellitus without complication, without long-term current use of insulin (HCC)    Last ordered: 3 months ago (9/19/2024) by Ema Arora DO    Last refill: 9/19/2024    Rx #: [id number]    Diabetes Medication Protocol [id number]/20/2024 12:45 AM   Protocol Details Last A1C < 7.5 and within past 6 months    Microalbumin procedure in past 12 months or taking ACE/ARB    In person appointment or virtual visit in the past 6 mos or appointment in next 3 mos    EGFRCR or GFRNAA > 50    GFR in the past 12 months       Name from pharmacy: ATORVASTATIN 20 MG TABLET         Will file in chart as: ATORVASTATIN 20 MG Oral Tab    Sig: TAKE 1 TABLET BY MOUTH EVERY DAY AT NIGHT    Disp: 90 tablet    Refills: 0 (Pharmacy requested: Not specified)    Start: 12/20/2024    Class: Normal    Non-formulary    Last ordered: 3 months ago (9/19/2024) by Ema Arora DO    Last refill: 9/19/2024    Rx #: [id number]    Cholesterol Medication Protocol [id number]/20/2024 12:45 AM   Protocol Details ALT < 80    ALT resulted within past year    Lipid panel within past 12 months    In person appointment or virtual visit in the past 12 mos or appointment in next 3 mos       Name from pharmacy: DILTIAZEM 24H ER(CD) 120 MG CP         Will file in chart as: DILTIAZEM  MG Oral Capsule SR 24 Hr    Sig: TAKE 1 CAPSULE BY MOUTH EVERY DAY    Disp: 90 capsule    Refills: 0 (Pharmacy requested: Not specified)    Start: 12/20/2024    Class: Normal    Non-formulary    Last ordered: 3 months ago (9/19/2024) by Ema Arora DO    Last refill: 9/19/2024    Rx #: [id number]    Hypertension Medications Protocol Ayrtsv3312/20/2024 12:45 AM   Protocol  Details CMP or BMP in past 12 months    Last BP reading less than 140/90    In person appointment or virtual visit in the past 12 mos or appointment in next 3 mos    EGFRCR or GFRNAA > 50      To be filled at: [facility] [address] [place] [facility] [place] [address] [phone], [phone]          LOV: 09/26/2024   RTC: 6-8 weeks   Labs: BMP was 10/9/2024 ALT/Lipid   Last filled:08/31/2024   No future appointments.

## 2024-12-23 ENCOUNTER — APPOINTMENT (OUTPATIENT)
Dept: HEMATOLOGY/ONCOLOGY | Facility: HOSPITAL | Age: 69
End: 2024-12-23
Attending: INTERNAL MEDICINE
Payer: COMMERCIAL

## 2024-12-23 RX ORDER — ATORVASTATIN CALCIUM 20 MG/1
20 TABLET, FILM COATED ORAL NIGHTLY
Qty: 90 TABLET | Refills: 0 | Status: CANCELLED | OUTPATIENT
Start: 2024-12-23

## 2024-12-23 RX ORDER — DILTIAZEM HYDROCHLORIDE 120 MG/1
120 CAPSULE, COATED, EXTENDED RELEASE ORAL DAILY
Qty: 90 CAPSULE | Refills: 0 | Status: CANCELLED | OUTPATIENT
Start: 2024-12-23

## 2025-01-06 ENCOUNTER — TELEPHONE (OUTPATIENT)
Dept: INTERNAL MEDICINE CLINIC | Facility: CLINIC | Age: 70
End: 2025-01-06

## 2025-01-10 ENCOUNTER — TELEPHONE (OUTPATIENT)
Facility: CLINIC | Age: 70
End: 2025-01-10

## 2025-01-10 DIAGNOSIS — F33.42 RECURRENT MAJOR DEPRESSIVE DISORDER, IN FULL REMISSION (HCC): ICD-10-CM

## 2025-01-10 DIAGNOSIS — I10 PRIMARY HYPERTENSION: ICD-10-CM

## 2025-01-10 RX ORDER — SERTRALINE HYDROCHLORIDE 100 MG/1
200 TABLET, FILM COATED ORAL DAILY
Qty: 180 TABLET | Refills: 0 | OUTPATIENT
Start: 2025-01-10

## 2025-01-10 RX ORDER — HYDROCHLOROTHIAZIDE 12.5 MG/1
12.5 TABLET ORAL DAILY
Qty: 90 TABLET | Refills: 0 | OUTPATIENT
Start: 2025-01-10

## 2025-01-10 NOTE — TELEPHONE ENCOUNTER
Patient outreach message received:    Last colonoscopy/EGD done 3/5/24 by Dr. Kelley  1 year recall placed into Pt Outreach  Next due on 3/5/25  per Dr. Ortiz       Recall reminder letter sent out to patient via [employer]

## 2025-01-10 NOTE — TELEPHONE ENCOUNTER
Name from pharmacy: SERTRALINE  MG TABLET          Will file in chart as: SERTRALINE 100 MG Oral Tab    Sig: TAKE 2 TABLETS BY MOUTH EVERY DAY    Disp: 180 tablet    Refills: 0 (Pharmacy requested: Not specified)    Start: 1/10/2025    Class: Normal    Non-formulary For: Recurrent major depressive disorder, in full remission (HCC)    Last ordered: 3 months ago (9/19/2024) by Ema Arora DO    Last refill: 10/3/2024    Rx #: [id number]    Psychiatric Non-Scheduled (Anti-Anxiety) Ihwkyh10/10/2025 12:35 AM   Protocol Details In person appointment or virtual visit in the past 6 mos or appointment in next 3 mos    Depression Screening completed within the past 12 months    Medication is active on med list       Name from pharmacy: HYDROCHLOROTHIAZIDE 12.5 MG TB         Will file in chart as: HYDROCHLOROTHIAZIDE 12.5 MG Oral Tab    The original prescription was discontinued on 10/11/2024 by Evan Miranda MD for the following reason:  Stop Taking at Discharge. Renewing this prescription may not be appropriate.    Sig: TAKE 1 TABLET BY MOUTH EVERY DAY    Disp: 90 tablet    Refills: 0 (Pharmacy requested: Not specified)    Start: 1/10/2025    Class: Normal    Non-formulary For: Primary hypertension    Last ordered: 3 months ago (9/19/2024) by Ema Arora DO    Last refill: 10/3/2024    Rx #: [id number]    Hypertension Medications Protocol Fijtjq18/10/2025 12:35 AM   Protocol Details Medication is active on med list    CMP or BMP in past 12 months    Last BP reading less than 140/90    In person appointment or virtual visit in the past 12 mos or appointment in next 3 mos    EGFRCR or GFRNAA > 50      To be filled at: [facility] [address] [place] [address] [phone], [phone]          The one medication the patient was told to stop taking but didn't know if its something you want him to continue.        LOV: 9/26/24  RTC:  6 weeks  Recent Labs: 10/15/24    Upcoming OV   none

## (undated) DEVICE — BITEBLOCK ENDOSCP 60FR MAXI STRP

## (undated) DEVICE — TRAP POLYP W/ 2 SPEC TY CLR MAGNIFYING WIND

## (undated) DEVICE — KIT VLV 5 PC AIR H2O SUCT BX ENDOGATOR CONN

## (undated) DEVICE — LASSO POLYPECTOMY SNARE: Brand: LASSO

## (undated) DEVICE — PAD ULNAR NERVE PROTECTOR

## (undated) DEVICE — 3M™ RED DOT™ MONITORING ELECTRODE WITH FOAM TAPE AND STICKY GEL, 50/BAG, 20/CASE, 72/PLT 2570: Brand: RED DOT™

## (undated) DEVICE — REM POLYHESIVE ADULT PATIENT RETURN ELECTRODE: Brand: VALLEYLAB

## (undated) DEVICE — 3M(TM) MICROPORE TAPE DISPENSER 1535-2: Brand: 3M™ MICROPORE™

## (undated) DEVICE — RECTAL CDS-LF: Brand: MEDLINE INDUSTRIES, INC.

## (undated) DEVICE — GIJAW SINGLE-USE BIOPSY FORCEPS WITH NEEDLE: Brand: GIJAW

## (undated) DEVICE — STERILE SYNTHETIC POLYISOPRENE POWDER-FREE SURGICAL GLOVES WITH HYDROGEL COATING: Brand: PROTEXIS

## (undated) DEVICE — SUT CHROMIC GUT 3-0 SH G122H

## (undated) DEVICE — SUT CHROMIC GUT 2-0 SH G123H

## (undated) DEVICE — BAND HEMORRHOID LIGATOR

## (undated) DEVICE — SOLUTION  .9 1000ML BTL

## (undated) DEVICE — 10FT COMBINED O2 DELIVERY/CO2 MONITORING. FILTER WITH MICROSTREAM TYPE LUER: Brand: DUAL ADULT NASAL CANNULA

## (undated) DEVICE — KIT CUSTOM ENDOPROCEDURE STERIS

## (undated) DEVICE — 1200CC GUARDIAN II: Brand: GUARDIAN

## (undated) DEVICE — ABDOMINAL PAD: Brand: DERMACEA

## (undated) DEVICE — SLEEVE KENDALL SCD EXPRESS MED

## (undated) DEVICE — KIT MFLD FOR SPEC COLL

## (undated) NOTE — LETTER
To:  Dr. Jered Dorman  Date:  2022  Fax #: [phone]   Patient Name: Kamlesh GEORGE-Age / Sex: 1955-A: 79 y  male  Phone:  [phone] CSN: [id number]     Medical Records: [id number]    Clearance for Surgery Requested by Surgeon    Request for:  Medical Clearance    Requested by Surgeon: Dr. Farooq Dey    Surgical Date: 2022    Procedure: ANAL EXAM UNDER ANESTHESIA, HEMORRHOIDECTOMY, N/A      Please fax the clearance note to the [place] [phone]. Thank you.

## (undated) NOTE — LETTER
[address] [facility] FOR BLOOD OR BLOOD COMPONENTS      In the course of your treatment, it may become necessary to administer a transfusion of blood or blood components. This form provides basic information concerning this procedure and, if signed by you, authorizes its performance by qualified medical personnel. DESCRIPTION OF PROCEDURE:  Blood is introduced into one of your veins, commonly in the arm, using a sterilized disposable needle. The amount of blood transfused, and whether the transfusion will be of blood or blood components is a judgment the physician will make based on your particular needs. RISKS:  The transfusion is a common procedure of low risk. MINOR AND TEMPORARY REACTIONS ARE NOT UNCOMMON, including a slight bruise, swelling or local reaction in the area where the needle pierces your skin, or a non-serious reaction to the transfused material itself, including headache, fever or a mild skin reaction, such as rash. Serious reactions are possible, though very unlikely and include severe allergic reaction (shock)  and destruction (hemolysis) of transfused blood cells. Infectious diseases which are known to be transmitted by blood transfusion include CERTAIN TYPES OF VIRAL HEPATITIS, a viral infection of the liver, HUMAN IMMUNODEFICIENCY VIRUS (HIV-1,2) infection, a viral infection known to cause ACQUIRED IMMUNODEFICIENCY SYNDROME (AIDS) AS WELL AS CERTAIN OTHER BACTERIAL, VIRAL AND PARASITIC DISEASES. While a minimal risk of acquiring an infectious disease from transfused blood exists, in accordance with Federal and State law all due care has been taken in donor selection and testing to avoid transmission of disease. ALTERNATIVES:  If loss of blood poses serious threats in the course of your treatment, THERE IS NO EFFECTIVE ALTERNATIVE TO BLOOD TRANSFUSION.  However, if you have any further questions on this matter, your physician will fully explain the alternatives to you if it has not already been done. I,Devante Schmidt, have read/had read to me the above. I understand the matters bearing on the decision whether or not to authorize a transfusion of blood or blood components. I have no questions which have not been answered to my full satisfaction.  I hereby consent to such transfusion as  my physician may deem necessary or advisable in the course of my treatment.        _______________   __________________________________________________  Date     Signature of Patient, Parent or Legal Guardian      ([place])      __________________________________________  Witness to Signature (title or relationship to patient)    Patient Name: Jacobo Gresham     : 1955                 Printed: 2022     Medical Record #: [id number]                    Page 1 of 1

## (undated) NOTE — LETTER
To: Dr. Jeimy Liu  Date:  2022  Fax #: [phone]   Patient Name: Franchesca GEORGE-Age / Sex: 1955-A: 79 y  male  Phone:   CSN: [id number]     Medical Records: [id number]    Clearance for Surgery Requested by Surgeon    Request for:  Cardiac Clearance-Anticoagulation [facility]    Requested by Surgeon: Dr. Maralyn Phalen    Surgical Date: 2022    Procedure: ANAL EXAM UNDER ANESTHESIA, HEMORRHOIDECTOMY, N/A      Please fax the clearance note to the [place] [phone]. Thank you.

## (undated) NOTE — Clinical Note
Pt declined HFU appt when contacted for TCM. Pt reports he is better. Pt is aware to see GI and cardiology as well. Pt declined to discuss his medications, please complete med rec if he schedules.  Thank you.l

## (undated) NOTE — LETTER
10/25/24        Liang Schmidt  [address] [place] [address]      Dear Liang,    Our records indicate that you have outstanding lab work and or testing that was ordered for you and has not yet been completed:  Orders Placed This Encounter      Basic Metabolic Panel (8) [E]    To provide you with the best possible care, please complete these orders at your earliest convenience. If you have recently completed these orders please disregard this letter.     If you have any questions please call the office at Dept: [phone].     Thank you,       Ema Arora, DO

## (undated) NOTE — LETTER
[facility]  [address] [facility] [address]  [phone]    Blood Transfusion Consent    In the course of your treatment, it may become necessary to administer a transfusion of blood or blood components. This form provides basic information concerning this procedure and, if signed by you, authorizes its administration. By signing this form, you agree that all of your questions about the administration of blood or blood products have been answered by the ordering medical professional or designee.    Description of Procedure  Blood is introduced into one of your veins, commonly in the arm, using a sterilized disposable needle. The amount of blood transfused, and whether the transfusion will be of blood or blood components is a judgement the physician will make based on your particular needs.    Risks  The transfusion is a common procedure of low risk.  MINOR AND TEMPORARY REACTIONS ARE NOT UNCOMMON, including a slight bruise, swelling or local reaction in the area where the needle pierces your skin, or a nonserious reaction to the transfused material itself, including headache, fever or mild skin reaction, such as rash.  Serious reactions are possible, though very unlikely, and include severe allergic reaction (shock) and destruction (hemolysis) of transfused blood cells.  Infectious diseases which are known to be transmitted by blood transfusion include certain types of viral Hepatitis(liver infection from a virus), Human Immunodeficiency Virus (HIV-1,2) infection, a viral infection known to cause Acquired Immunodeficiency Syndrome (AIDS), as well as certain other bacterial, viral, and parasitic diseases. While a minimal risk of acquiring an infectious disease from transfused blood exists, in accordance with the Federal and State law, all due care has been taken in donor selection and testing to avoid transmission of disease.    Alternatives  If loss of blood poses serious threats during your  treatment, THERE IS NO EFFECTIVE ALTERNATIVE TO BLOOD TRANSFUSION. However, if you have any further questions on this matter, your provider will fully explain the alternatives to you if it has not already been done.    I, ______________________________, have read/had read to me the above. I understand the matters bearing on the decision whether or not to authorize a transfusion of blood or blood components. I have no questions which have not been answered to my full satisfaction. I hereby consent to such transfusion as my physician may deem necessary or advisable in the course of my treatment.    ______________________________________________                    ___________________________  (Signature of Patient or Responsible party in case of minor,                 (Printed Name of Patient or incompetent, or unconscious patient)              Responsible Party)    ___________________________               _____________________  (Relationship to Patient if not self)                                    (Date and Time)    __________________________                                                           ______________________              (Signature of Witness)               (Printed Name of Witness)     Language line ()    Telephone/Verbal/Video Consent    __________________________                     ____________________  (Signature of 2nd Witness           (Printed Name of 2nd  Telephone/Verbal/Video Consent)           Witness)    Patient Name: Liang Schmidt     : 1955                 Printed: 2024     Medical Record #: [id number]      Rev: 2023

## (undated) NOTE — LETTER
22    Patient: Richard Negrete  : 1955 Visit date: 2022    Dear  Jhony Chen DO    Thank you for referring Richard Negrete to my practice. Please find my assessment and plan below. Assessment   Hemorrhoids, unspecified hemorrhoid type  (primary encounter diagnosis)      Plan   Hemorrhoidectomy discussed with patient risks of bleeding and recurrence of the hemorrhoids. Advised him that he would continue to have some bleeding even following hemorrhoidectomy. I told him it will be a constant bridges as long as he has hemorrhoidal disease along with his Coumadin intake , he will always have some degree of rectal bleeding that will not likely ever resolve completely.       Sincerely,       Alexey Oneill DO   CC: No Recipients

## (undated) NOTE — LETTER
1/10/2025    Liang Schmidt        [address] [place] [address]            Dear Liang Schmidt,      Our records indicate that you are due for an appointment for a Colonoscopy/EGD with Salome Salomon MD. Our doctors are booking out about 3-6 months in advance for procedures.     Please call our office to schedule this appointment.  Your medical well-being is important to us.    If your insurance requires a referral, please call your primary care office to request one.      Thank you,      The Physicians and Staff at [facility]

## (undated) NOTE — LETTER
[address]  [address]  Authorization for Surgical Operation and Procedure     Date:___________                                                                                                         Time:__________  I hereby authorize, IRWIN BOOKER, my physician and his/her assistants (if applicable), which may include medical students, residents, and/or fellows, to perform the following surgical operation/ procedure and administer such anesthesia as may be determined necessary by my physician:  Operation/Procedure name (s) ESOPHAGOGASTRODUODENOSCOPY AND COLONOSCOPY WITH MAC on  [place] Saurabh Schmidt   2.   I recognize that during the surgical operation/procedure, unforeseen conditions may necessitate additional or different procedures than those listed above.  I, therefore, further authorize and request that the above-named surgeon, assistants, or designees perform such procedures as are, in their judgment, necessary and desirable.    3.   My surgeon/physician has discussed prior to my surgery the potential benefits, risks and side effects of this procedure; the likelihood of achieving goals; and potential problems that might occur during recuperation.  They also discussed reasonable alternatives to the procedure, including risks, benefits, and side effects related to the alternatives and risks related to not receiving this procedure.  I have had all my questions answered and I acknowledge that no guarantee has been made as to the result that may be obtained.    4.   Should the need arise during my operation/procedure, which includes change of level of care prior to discharge, I also consent to the administration of blood and/or blood products.  Further, I understand that despite careful testing and screening of blood or blood products by collecting agencies, I may still be subject to ill effects as a result of receiving a blood transfusion and/or blood products.   The following are some, but not all, of the potential risks that can occur: fever and allergic reactions, hemolytic reactions, transmission of diseases such as Hepatitis, AIDS and Cytomegalovirus (CMV) and fluid overload.  In the event that I wish to have an autologous transfusion of my own blood, or a directed donor transfusion, I will discuss this with my physician.  Check only if Refusing Blood or Blood Products  I understand refusal of blood or blood products as deemed necessary by my physician may have serious consequences to my condition to include possible death. I hereby assume responsibility for my refusal and release the hospital, its personnel, and my physicians from any responsibility for the consequences of my refusal.          o  Refuse      5.   I authorize the use of any specimen, organs, tissues, body parts or foreign objects that may be removed from my body during the operation/procedure for diagnosis, research or teaching purposes and their subsequent disposal by hospital authorities.  I also authorize the release of specimen test results and/or written reports to my treating physician on the hospital medical staff or other referring or consulting physicians involved in my care, at the discretion of the Pathologist or my treating physician.    6.   I consent to the photographing or videotaping of the operations or procedures to be performed, including appropriate portions of my body for medical, scientific, or educational purposes, provided my identity is not revealed by the pictures or by descriptive texts accompanying them.  If the procedure has been photographed/videotaped, the surgeon will obtain the original picture, image, videotape or CD.  The hospital will not be responsible for storage, release or maintenance of the picture, image, tape or CD.    7.   I consent to the presence of a  or observers in the operating room as deemed necessary by my physician or their  designees.    8.   I recognize that in the event my procedure results in extended X-Ray/fluoroscopy time, I may develop a skin reaction.    9. If I have a Do Not Attempt Resuscitation (DNAR) order in place, that status will be suspended while in the operating room, procedural suite, and during the recovery period unless otherwise explicitly stated by me (or a person authorized to consent on my behalf). The surgeon or my attending physician will determine when the applicable recovery period ends for purposes of reinstating the DNAR order.  10. Patients having a sterilization procedure: I understand that if the procedure is successful the results will be permanent and it will therefore be impossible for me to inseminate, conceive, or bear children.  I also understand that the procedure is intended to result in sterility, although the result has not been guaranteed.   11. I acknowledge that my physician has explained sedation/analgesia administration to me including the risk and benefits I consent to the administration of sedation/analgesia as may be necessary or desirable in the judgment of my physician.    I CERTIFY THAT I HAVE READ AND FULLY UNDERSTAND THE ABOVE CONSENT TO OPERATION and/or OTHER PROCEDURE.    _________________________________________  __________________________________  Signature of Patient     Signature of Responsible Person         ___________________________________         Printed Name of Responsible Person           _________________________________                 Relationship to Patient  _________________________________________  ______________________________  Signature of Witness          Date  Time      Patient Name: Liang Schmidt     : 1955                 Printed: 2024     Medical Record #: [id number]                     Page [address] of [address] [place] [address]  [id number]    Consent for Anesthesia    I,  Liang Schmidt agree to be cared for by an anesthesiologist, who is specially trained to monitor me and give me medicine to put me to sleep or keep me comfortable during my procedure    I understand that my anesthesiologist is not an employee or agent of [facility] [employer]. He or she works for [employer]    As the patient asking for anesthesia services, I agree to:  Allow the anesthesiologist (anesthesia doctor) to give me medicine and do additional procedures as necessary. Some examples are: Starting or using an “IV” to give me medicine, fluids or blood during my procedure, and having a breathing tube placed to help me breathe when I’m asleep (intubation). In the event that my heart stops working properly, I understand that my anesthesiologist will make every effort to sustain my life, unless otherwise directed by [facility] Do Not Resuscitate documents.  Tell my anesthesia doctor before my procedure:  If I am pregnant.  The last time that I ate or drank.  All of the medicines I take (including prescriptions, herbal supplements, and pills I can buy without a prescription (including street drugs/illegal medications). Failure to inform my anesthesiologist about these medicines may increase my risk of anesthetic complications.  If I am allergic to anything or have had a reaction to anesthesia before.  I understand how the anesthesia medicine will help me (benefits).  I understand that with any type of anesthesia medicine there are risks:  The most common risks are: nausea, vomiting, sore throat, muscle soreness, damage to my eyes, mouth, or teeth (from breathing tube placement).  Rare risks include: remembering what happened during my procedure, allergic reactions to medications, injury to my airway, heart, lungs, vision, nerves, or muscles and in extremely rare instances death.  My doctor has explained to me other choices available to me for my care  (alternatives).  Pregnant Patients (“epidural”):  I understand that the risks of having an epidural (medicine given into my back to help control pain during labor), include itching, low blood pressure, difficulty urinating, headache or slowing of the baby’s heart. Very rare risks include infection, bleeding, seizure, irregular heart rhythms and nerve injury.  Regional Anesthesia (“spinal”, “epidural”, & “nerve blocks”):  I understand that rare but potential complications include headache, bleeding, infection, seizure, irregular heart rhythms, and nerve injury.    I can change my mind about having anesthesia services at any time before I get the medicine.    _____________________________________________________________________________  Patient (or Representative) Signature/Relationship to Patient  Date   Time    _____________________________________________________________________________   Name (if used)    Language/Organization   Time    _____________________________________________________________________________  Anesthesiologist Signature     Date   Time  I have discussed the procedure and information above with the patient (or patient’s representative) and answered their questions. The patient or their representative has agreed to have anesthesia services.    _____________________________________________________________________________  Witness        Date   Time  I have verified that the signature is that of the patient or patient’s representative, and that it was signed before the procedure  Patient Name: Liang Schmidt     : 1955                 Printed: 2024     Medical Record #: [id number]                     Page 2 of 2

## (undated) NOTE — LETTER
[address] [facility] FOR BLOOD OR BLOOD COMPONENTS      In the course of your treatment, it may become necessary to administer a transfusion of blood or blood components. This form provides basic information concerning this procedure and, if signed by you, authorizes its performance by qualified medical personnel. DESCRIPTION OF PROCEDURE:  Blood is introduced into one of your veins, commonly in the arm, using a sterilized disposable needle. The amount of blood transfused, and whether the transfusion will be of blood or blood components is a judgment the physician will make based on your particular needs. RISKS:  The transfusion is a common procedure of low risk. MINOR AND TEMPORARY REACTIONS ARE NOT UNCOMMON, including a slight bruise, swelling or local reaction in the area where the needle pierces your skin, or a non-serious reaction to the transfused material itself, including headache, fever or a mild skin reaction, such as rash. Serious reactions are possible, though very unlikely and include severe allergic reaction (shock)  and destruction (hemolysis) of transfused blood cells. Infectious diseases which are known to be transmitted by blood transfusion include CERTAIN TYPES OF VIRAL HEPATITIS, a viral infection of the liver, HUMAN IMMUNODEFICIENCY VIRUS (HIV-1,2) infection, a viral infection known to cause ACQUIRED IMMUNODEFICIENCY SYNDROME (AIDS) AS WELL AS CERTAIN OTHER BACTERIAL, VIRAL AND PARASITIC DISEASES. While a minimal risk of acquiring an infectious disease from transfused blood exists, in accordance with Federal and State law all due care has been taken in donor selection and testing to avoid transmission of disease. ALTERNATIVES:  If loss of blood poses serious threats in the course of your treatment, THERE IS NO EFFECTIVE ALTERNATIVE TO BLOOD TRANSFUSION.  However, if you have any further questions on this matter, your physician will fully explain the alternatives to you if it has not already been done. I,Devante Schmidt, have read/had read to me the above. I understand the matters bearing on the decision whether or not to authorize a transfusion of blood or blood components. I have no questions which have not been answered to my full satisfaction.  I hereby consent to such transfusion as  my physician may deem necessary or advisable in the course of my treatment.        _______________   __________________________________________________  Date     Signature of Patient, Parent or Legal Guardian      ([place] [employer])      __________________________________________  Witness to Signature (title or relationship to patient)    Patient Name: Leyla Ronnie     : 1955                 Printed: 2022     Medical Record #: [id number]                    Page 1 of 1

## (undated) NOTE — LETTER
Deloris Reyes D.O. Surgical Clearance Needed    Date: 8/24/2022                                                                       From: [facility]    Attn: DR Elder Tapia                                                                                Fax:     RE: Surgical Clearance               # of Pages: 1        Patient Name: Samm Jack    Patient YOB: 1955    Consult For: CARDIAC CLEARANCE AND ANTI COAGULATION MANAGEMENT    Surgery: ANAL EXAM UNDER ANESTHESIA, HEMORRHOIDECTOMY    Date of Surgery: 9/19/2022 AT [facility]        This facsimile transmission is provided for your internal use only. If the reader of this message is not the intended recipient, you are hereby notified that you have received this document in error, and that any review, dissemination, distribution, or copying of this message is strictly prohibited. If you have received this communication in error, please notify us immediately by telephone and return the original message to us by mail.

## (undated) NOTE — LETTER
[address]  [address]  Authorization for Surgical Operation and Procedure     Date:___________                                                                                                         Time:__________  I hereby authorize Surgeon(s):  Zenon Kelley DO, my physician and his/her assistants (if applicable), which may include medical students, residents, and/or fellows, to perform the following surgical operation/ procedure and administer such anesthesia as may be determined necessary by my physician:  Operation/Procedure name (s) Procedure(s):  ESOPHAGOGASTRODUODENOSCOPY (EGD) AND COLONOSCOPY WITH MAC on Liang Schmidt   2.   I recognize that during the surgical operation/procedure, unforeseen conditions may necessitate additional or different procedures than those listed above.  I, therefore, further authorize and request that the above-named surgeon, assistants, or designees perform such procedures as are, in their judgment, necessary and desirable.    3.   My surgeon/physician has discussed prior to my surgery the potential benefits, risks and side effects of this procedure; the likelihood of achieving goals; and potential problems that might occur during recuperation.  They also discussed reasonable alternatives to the procedure, including risks, benefits, and side effects related to the alternatives and risks related to not receiving this procedure.  I have had all my questions answered and I acknowledge that no guarantee has been made as to the result that may be obtained.    4.   Should the need arise during my operation/procedure, which includes change of level of care prior to discharge, I also consent to the administration of blood and/or blood products.  Further, I understand that despite careful testing and screening of blood or blood products by collecting agencies, I may still be subject to ill effects as a result of receiving a blood  transfusion and/or blood products.  The following are some, but not all, of the potential risks that can occur: fever and allergic reactions, hemolytic reactions, transmission of diseases such as Hepatitis, AIDS and Cytomegalovirus (CMV) and fluid overload.  In the event that I wish to have an autologous transfusion of my own blood, or a directed donor transfusion, I will discuss this with my physician.  Check only if Refusing Blood or Blood Products  I understand refusal of blood or blood products as deemed necessary by my physician may have serious consequences to my condition to include possible death. I hereby assume responsibility for my refusal and release the hospital, its personnel, and my physicians from any responsibility for the consequences of my refusal.          o  Refuse      5.   I authorize the use of any specimen, organs, tissues, body parts or foreign objects that may be removed from my body during the operation/procedure for diagnosis, research or teaching purposes and their subsequent disposal by hospital authorities.  I also authorize the release of specimen test results and/or written reports to my treating physician on the hospital medical staff or other referring or consulting physicians involved in my care, at the discretion of the Pathologist or my treating physician.    6.   I consent to the photographing or videotaping of the operations or procedures to be performed, including appropriate portions of my body for medical, scientific, or educational purposes, provided my identity is not revealed by the pictures or by descriptive texts accompanying them.  If the procedure has been photographed/videotaped, the surgeon will obtain the original picture, image, videotape or CD.  The hospital will not be responsible for storage, release or maintenance of the picture, image, tape or CD.    7.   I consent to the presence of a  or observers in the operating room as deemed  necessary by my physician or their designees.    8.   I recognize that in the event my procedure results in extended X-Ray/fluoroscopy time, I may develop a skin reaction.    9. If I have a Do Not Attempt Resuscitation (DNAR) order in place, that status will be suspended while in the operating room, procedural suite, and during the recovery period unless otherwise explicitly stated by me (or a person authorized to consent on my behalf). The surgeon or my attending physician will determine when the applicable recovery period ends for purposes of reinstating the DNAR order.  10. Patients having a sterilization procedure: I understand that if the procedure is successful the results will be permanent and it will therefore be impossible for me to inseminate, conceive, or bear children.  I also understand that the procedure is intended to result in sterility, although the result has not been guaranteed.   11. I acknowledge that my physician has explained sedation/analgesia administration to me including the risk and benefits I consent to the administration of sedation/analgesia as may be necessary or desirable in the judgment of my physician.    I CERTIFY THAT I HAVE READ AND FULLY UNDERSTAND THE ABOVE CONSENT TO OPERATION and/or OTHER PROCEDURE.    _________________________________________  __________________________________  Signature of Patient     Signature of Responsible Person         ___________________________________         Printed Name of Responsible Person           _________________________________                 Relationship to Patient  _________________________________________  ______________________________  Signature of Witness          Date  Time      Patient Name: Liang Schmidt     : 1955                 Printed: 2024     Medical Record #: [id number]                     Page 1 of 2                                    [address]   [id number]    Consent for Anesthesia    I, Liang Schmidt agree to be cared for by an anesthesiologist, who is specially trained to monitor me and give me medicine to put me to sleep or keep me comfortable during my procedure    I understand that my anesthesiologist is not an employee or agent of [facility] or [employer]. He or she works for [employer].    As the patient asking for anesthesia services, I agree to:  Allow the anesthesiologist (anesthesia doctor) to give me medicine and do additional procedures as necessary. Some examples are: Starting or using an “IV” to give me medicine, fluids or blood during my procedure, and having a breathing tube placed to help me breathe when I’m asleep (intubation). In the event that my heart stops working properly, I understand that my anesthesiologist will make every effort to sustain my life, unless otherwise directed by [facility] Do Not Resuscitate documents.  Tell my anesthesia doctor before my procedure:  If I am pregnant.  The last time that I ate or drank.  All of the medicines I take (including prescriptions, herbal supplements, and pills I can buy without a prescription (including street drugs/illegal medications). Failure to inform my anesthesiologist about these medicines may increase my risk of anesthetic complications.  If I am allergic to anything or have had a reaction to anesthesia before.  I understand how the anesthesia medicine will help me (benefits).  I understand that with any type of anesthesia medicine there are risks:  The most common risks are: nausea, vomiting, sore throat, muscle soreness, damage to my eyes, mouth, or teeth (from breathing tube placement).  Rare risks include: remembering what happened during my procedure, allergic reactions to medications, injury to my airway, heart, lungs, vision, nerves, or muscles and in extremely rare instances death.  My doctor has explained to me other choices  available to me for my care (alternatives).  Pregnant Patients (“epidural”):  I understand that the risks of having an epidural (medicine given into my back to help control pain during labor), include itching, low blood pressure, difficulty urinating, headache or slowing of the baby’s heart. Very rare risks include infection, bleeding, seizure, irregular heart rhythms and nerve injury.  Regional Anesthesia (“spinal”, “epidural”, & “nerve blocks”):  I understand that rare but potential complications include headache, bleeding, infection, seizure, irregular heart rhythms, and nerve injury.    I can change my mind about having anesthesia services at any time before I get the medicine.    _____________________________________________________________________________  Patient (or Representative) Signature/Relationship to Patient  Date   Time    _____________________________________________________________________________   Name (if used)    Language/Organization   Time    _____________________________________________________________________________  Anesthesiologist Signature     Date   Time  I have discussed the procedure and information above with the patient (or patient’s representative) and answered their questions. The patient or their representative has agreed to have anesthesia services.    _____________________________________________________________________________  Witness        Date   Time  I have verified that the signature is that of the patient or patient’s representative, and that it was signed before the procedure  Patient Name: Liang Schmidt     : 1955                 Printed: 2024     Medical Record #: [id number]                     Page 2 of 2

## (undated) NOTE — LETTER
[address]  [address]  Authorization for Surgical Operation and Procedure     Date:___________                                                                                                         Time:__________  I hereby authorize Surgeon(s):  Davonte Quick MD, my physician and his/her assistants (if applicable), which may include medical students, residents, and/or fellows, to perform the following surgical operation/ procedure and administer such anesthesia as may be determined necessary by my physician:  Operation/Procedure name (s) Procedure(s):  ESOPHAGOGASTRODUODENOSCOPY (EGD) on Liang Schmidt   2.   I recognize that during the surgical operation/procedure, unforeseen conditions may necessitate additional or different procedures than those listed above.  I, therefore, further authorize and request that the above-named surgeon, assistants, or designees perform such procedures as are, in their judgment, necessary and desirable.    3.   My surgeon/physician has discussed prior to my surgery the potential benefits, risks and side effects of this procedure; the likelihood of achieving goals; and potential problems that might occur during recuperation.  They also discussed reasonable alternatives to the procedure, including risks, benefits, and side effects related to the alternatives and risks related to not receiving this procedure.  I have had all my questions answered and I acknowledge that no guarantee has been made as to the result that may be obtained.    4.   Should the need arise during my operation/procedure, which includes change of level of care prior to discharge, I also consent to the administration of blood and/or blood products.  Further, I understand that despite careful testing and screening of blood or blood products by collecting agencies, I may still be subject to ill effects as a result of receiving a blood transfusion and/or blood  products.  The following are some, but not all, of the potential risks that can occur: fever and allergic reactions, hemolytic reactions, transmission of diseases such as Hepatitis, AIDS and Cytomegalovirus (CMV) and fluid overload.  In the event that I wish to have an autologous transfusion of my own blood, or a directed donor transfusion, I will discuss this with my physician.  Check only if Refusing Blood or Blood Products  I understand refusal of blood or blood products as deemed necessary by my physician may have serious consequences to my condition to include possible death. I hereby assume responsibility for my refusal and release the hospital, its personnel, and my physicians from any responsibility for the consequences of my refusal.          o  Refuse      5.   I authorize the use of any specimen, organs, tissues, body parts or foreign objects that may be removed from my body during the operation/procedure for diagnosis, research or teaching purposes and their subsequent disposal by hospital authorities.  I also authorize the release of specimen test results and/or written reports to my treating physician on the hospital medical staff or other referring or consulting physicians involved in my care, at the discretion of the Pathologist or my treating physician.    6.   I consent to the photographing or videotaping of the operations or procedures to be performed, including appropriate portions of my body for medical, scientific, or educational purposes, provided my identity is not revealed by the pictures or by descriptive texts accompanying them.  If the procedure has been photographed/videotaped, the surgeon will obtain the original picture, image, videotape or CD.  The hospital will not be responsible for storage, release or maintenance of the picture, image, tape or CD.    7.   I consent to the presence of a  or observers in the operating room as deemed necessary by my physician or  their designees.    8.   I recognize that in the event my procedure results in extended X-Ray/fluoroscopy time, I may develop a skin reaction.    9. If I have a Do Not Attempt Resuscitation (DNAR) order in place, that status will be suspended while in the operating room, procedural suite, and during the recovery period unless otherwise explicitly stated by me (or a person authorized to consent on my behalf). The surgeon or my attending physician will determine when the applicable recovery period ends for purposes of reinstating the DNAR order.  10. Patients having a sterilization procedure: I understand that if the procedure is successful the results will be permanent and it will therefore be impossible for me to inseminate, conceive, or bear children.  I also understand that the procedure is intended to result in sterility, although the result has not been guaranteed.   11. I acknowledge that my physician has explained sedation/analgesia administration to me including the risk and benefits I consent to the administration of sedation/analgesia as may be necessary or desirable in the judgment of my physician.    I CERTIFY THAT I HAVE READ AND FULLY UNDERSTAND THE ABOVE CONSENT TO OPERATION and/or OTHER PROCEDURE.    _________________________________________  __________________________________  Signature of Patient     Signature of Responsible Person         ___________________________________         Printed Name of Responsible Person           _________________________________                 Relationship to Patient  _________________________________________  ______________________________  Signature of Witness          Date  Time      Patient Name: Liang Schmidt     : 1955                 Printed: 2024     Medical Record #: [id number]                     Page 1 of 2                                    [address]  [address]    Consent for  Anesthesia    I, Liang Saurabh Schmidt agree to be cared for by an anesthesiologist, who is specially trained to monitor me and give me medicine to put me to sleep or keep me comfortable during my procedure    I understand that my anesthesiologist is not an employee or agent of [facility] or [employer]. He or she works for [employer].    As the patient asking for anesthesia services, I agree to:  Allow the anesthesiologist (anesthesia doctor) to give me medicine and do additional procedures as necessary. Some examples are: Starting or using an “IV” to give me medicine, fluids or blood during my procedure, and having a breathing tube placed to help me breathe when I’m asleep (intubation). In the event that my heart stops working properly, I understand that my anesthesiologist will make every effort to sustain my life, unless otherwise directed by [facility] Do Not Resuscitate documents.  Tell my anesthesia doctor before my procedure:  If I am pregnant.  The last time that I ate or drank.  All of the medicines I take (including prescriptions, herbal supplements, and pills I can buy without a prescription (including street drugs/illegal medications). Failure to inform my anesthesiologist about these medicines may increase my risk of anesthetic complications.  If I am allergic to anything or have had a reaction to anesthesia before.  I understand how the anesthesia medicine will help me (benefits).  I understand that with any type of anesthesia medicine there are risks:  The most common risks are: nausea, vomiting, sore throat, muscle soreness, damage to my eyes, mouth, or teeth (from breathing tube placement).  Rare risks include: remembering what happened during my procedure, allergic reactions to medications, injury to my airway, heart, lungs, vision, nerves, or muscles and in extremely rare instances death.  My doctor has explained to me other choices available to me for  my care (alternatives).  Pregnant Patients (“epidural”):  I understand that the risks of having an epidural (medicine given into my back to help control pain during labor), include itching, low blood pressure, difficulty urinating, headache or slowing of the baby’s heart. Very rare risks include infection, bleeding, seizure, irregular heart rhythms and nerve injury.  Regional Anesthesia (“spinal”, “epidural”, & “nerve blocks”):  I understand that rare but potential complications include headache, bleeding, infection, seizure, irregular heart rhythms, and nerve injury.    I can change my mind about having anesthesia services at any time before I get the medicine.    _____________________________________________________________________________  Patient (or Representative) Signature/Relationship to Patient  Date   Time    _____________________________________________________________________________   Name (if used)    Language/Organization   Time    _____________________________________________________________________________  Anesthesiologist Signature     Date   Time  I have discussed the procedure and information above with the patient (or patient’s representative) and answered their questions. The patient or their representative has agreed to have anesthesia services.    _____________________________________________________________________________  Witness        Date   Time  I have verified that the signature is that of the patient or patient’s representative, and that it was signed before the procedure  Patient Name: Liang Schmidt     : 1955                 Printed: 2024     Medical Record #: [id number]                     Page 2 of 2